# Patient Record
Sex: MALE | Race: WHITE | Employment: OTHER | URBAN - METROPOLITAN AREA
[De-identification: names, ages, dates, MRNs, and addresses within clinical notes are randomized per-mention and may not be internally consistent; named-entity substitution may affect disease eponyms.]

---

## 2017-03-02 RX ORDER — CLOPIDOGREL BISULFATE 75 MG/1
TABLET ORAL
Qty: 30 TABLET | Refills: 6 | Status: SHIPPED | OUTPATIENT
Start: 2017-03-02 | End: 2017-05-26 | Stop reason: SDUPTHER

## 2017-03-02 RX ORDER — ASPIRIN 81 MG
TABLET, DELAYED RELEASE (ENTERIC COATED) ORAL
Qty: 30 TABLET | Refills: 6 | Status: SHIPPED | OUTPATIENT
Start: 2017-03-02 | End: 2017-09-06 | Stop reason: SDUPTHER

## 2017-03-02 RX ORDER — ATORVASTATIN CALCIUM 80 MG/1
TABLET, FILM COATED ORAL
Qty: 30 TABLET | Refills: 6 | Status: SHIPPED | OUTPATIENT
Start: 2017-03-02 | End: 2017-05-26 | Stop reason: SDUPTHER

## 2017-03-02 RX ORDER — CARVEDILOL 3.12 MG/1
TABLET ORAL
Qty: 30 TABLET | Refills: 6 | Status: SHIPPED | OUTPATIENT
Start: 2017-03-02 | End: 2017-05-26 | Stop reason: SDUPTHER

## 2017-03-07 RX ORDER — PANTOPRAZOLE SODIUM 40 MG/1
TABLET, DELAYED RELEASE ORAL
Qty: 90 TABLET | Refills: 3 | Status: SHIPPED | OUTPATIENT
Start: 2017-03-07 | End: 2018-01-16 | Stop reason: SDUPTHER

## 2017-05-26 RX ORDER — CLOPIDOGREL BISULFATE 75 MG/1
TABLET ORAL
Qty: 90 TABLET | Refills: 3 | Status: SHIPPED | OUTPATIENT
Start: 2017-05-26 | End: 2017-09-06 | Stop reason: SDUPTHER

## 2017-05-26 RX ORDER — PANTOPRAZOLE SODIUM 40 MG/1
TABLET, DELAYED RELEASE ORAL
Qty: 90 TABLET | Refills: 3 | OUTPATIENT
Start: 2017-05-26

## 2017-05-26 RX ORDER — CARVEDILOL 3.12 MG/1
TABLET ORAL
Qty: 90 TABLET | Refills: 3 | Status: SHIPPED | OUTPATIENT
Start: 2017-05-26 | End: 2017-07-31 | Stop reason: SDUPTHER

## 2017-05-26 RX ORDER — ATORVASTATIN CALCIUM 80 MG/1
TABLET, FILM COATED ORAL
Qty: 90 TABLET | Refills: 3 | Status: SHIPPED | OUTPATIENT
Start: 2017-05-26 | End: 2017-09-06 | Stop reason: SDUPTHER

## 2017-07-06 ENCOUNTER — OFFICE VISIT (OUTPATIENT)
Dept: CARDIOLOGY CLINIC | Age: 67
End: 2017-07-06

## 2017-07-06 VITALS
BODY MASS INDEX: 25.61 KG/M2 | WEIGHT: 150 LBS | HEART RATE: 92 BPM | SYSTOLIC BLOOD PRESSURE: 130 MMHG | OXYGEN SATURATION: 98 % | DIASTOLIC BLOOD PRESSURE: 68 MMHG | HEIGHT: 64 IN

## 2017-07-06 DIAGNOSIS — I25.10 CORONARY ARTERY DISEASE INVOLVING NATIVE HEART WITHOUT ANGINA PECTORIS, UNSPECIFIED VESSEL OR LESION TYPE: Primary | ICD-10-CM

## 2017-07-06 DIAGNOSIS — I10 ESSENTIAL HYPERTENSION: ICD-10-CM

## 2017-07-06 PROCEDURE — 99214 OFFICE O/P EST MOD 30 MIN: CPT | Performed by: INTERNAL MEDICINE

## 2017-07-31 ENCOUNTER — OFFICE VISIT (OUTPATIENT)
Dept: CARDIOLOGY CLINIC | Age: 67
End: 2017-07-31

## 2017-07-31 VITALS
DIASTOLIC BLOOD PRESSURE: 52 MMHG | SYSTOLIC BLOOD PRESSURE: 122 MMHG | BODY MASS INDEX: 25.61 KG/M2 | HEART RATE: 84 BPM | HEIGHT: 64 IN | WEIGHT: 150 LBS

## 2017-07-31 DIAGNOSIS — E78.2 MIXED HYPERLIPIDEMIA: ICD-10-CM

## 2017-07-31 DIAGNOSIS — I25.10 CORONARY ARTERY DISEASE INVOLVING NATIVE HEART WITHOUT ANGINA PECTORIS, UNSPECIFIED VESSEL OR LESION TYPE: Primary | ICD-10-CM

## 2017-07-31 DIAGNOSIS — Z95.5 S/P CORONARY ARTERY STENT PLACEMENT: ICD-10-CM

## 2017-07-31 DIAGNOSIS — I10 ESSENTIAL HYPERTENSION: ICD-10-CM

## 2017-07-31 DIAGNOSIS — E11.319 TYPE 2 DIABETES MELLITUS WITH RETINOPATHY OF BOTH EYES, WITH LONG-TERM CURRENT USE OF INSULIN, MACULAR EDEMA PRESENCE UNSPECIFIED, UNSPECIFIED RETINOPATHY SEVERITY (HCC): ICD-10-CM

## 2017-07-31 DIAGNOSIS — Z79.4 TYPE 2 DIABETES MELLITUS WITH RETINOPATHY OF BOTH EYES, WITH LONG-TERM CURRENT USE OF INSULIN, MACULAR EDEMA PRESENCE UNSPECIFIED, UNSPECIFIED RETINOPATHY SEVERITY (HCC): ICD-10-CM

## 2017-07-31 PROCEDURE — 99214 OFFICE O/P EST MOD 30 MIN: CPT | Performed by: NURSE PRACTITIONER

## 2017-07-31 RX ORDER — CARVEDILOL 3.12 MG/1
3.12 TABLET ORAL 2 TIMES DAILY WITH MEALS
Qty: 180 TABLET | Refills: 3 | Status: SHIPPED | OUTPATIENT
Start: 2017-07-31 | End: 2017-09-06 | Stop reason: SDUPTHER

## 2017-07-31 RX ORDER — FUROSEMIDE 20 MG/1
20 TABLET ORAL DAILY
Qty: 30 TABLET | Refills: 3 | Status: SHIPPED | OUTPATIENT
Start: 2017-07-31 | End: 2017-08-29 | Stop reason: ALTCHOICE

## 2017-08-11 LAB
BUN BLDV-MCNC: 32 MG/DL
CALCIUM SERPL-MCNC: 9 MG/DL
CHLORIDE BLD-SCNC: 107 MMOL/L
CO2: 27 MMOL/L
CREAT SERPL-MCNC: 1.39 MG/DL
GFR CALCULATED: NORMAL
GLUCOSE BLD-MCNC: 115 MG/DL
POTASSIUM SERPL-SCNC: 4.7 MMOL/L
SODIUM BLD-SCNC: 140 MMOL/L

## 2017-08-15 ENCOUNTER — TELEPHONE (OUTPATIENT)
Dept: CARDIOLOGY CLINIC | Age: 67
End: 2017-08-15

## 2017-08-16 ENCOUNTER — PROCEDURE VISIT (OUTPATIENT)
Dept: CARDIOLOGY CLINIC | Age: 67
End: 2017-08-16

## 2017-08-16 ENCOUNTER — HOSPITAL ENCOUNTER (OUTPATIENT)
Dept: CARDIOLOGY | Facility: CLINIC | Age: 67
Discharge: HOME | End: 2017-08-16
Attending: NURSE PRACTITIONER | Admitting: NURSE PRACTITIONER

## 2017-08-16 DIAGNOSIS — R07.89 OTHER CHEST PAIN: Primary | ICD-10-CM

## 2017-08-16 DIAGNOSIS — I25.118 CORONARY ARTERY DISEASE OF NATIVE HEART WITH STABLE ANGINA PECTORIS, UNSPECIFIED VESSEL OR LESION TYPE (HCC): ICD-10-CM

## 2017-08-16 LAB
LV EF: 55 %
LVEF MODALITY: NORMAL

## 2017-08-17 ENCOUNTER — TELEPHONE (OUTPATIENT)
Dept: CARDIOLOGY CLINIC | Age: 67
End: 2017-08-17

## 2017-08-29 ENCOUNTER — HOSPITAL ENCOUNTER (OUTPATIENT)
Dept: CARDIOLOGY | Facility: CLINIC | Age: 67
Discharge: HOME | End: 2017-08-29
Attending: NURSE PRACTITIONER | Admitting: NURSE PRACTITIONER

## 2017-08-29 ENCOUNTER — OFFICE VISIT (OUTPATIENT)
Dept: CARDIOLOGY CLINIC | Age: 67
End: 2017-08-29

## 2017-08-29 VITALS
WEIGHT: 148 LBS | HEIGHT: 64 IN | BODY MASS INDEX: 25.27 KG/M2 | HEART RATE: 63 BPM | SYSTOLIC BLOOD PRESSURE: 115 MMHG | DIASTOLIC BLOOD PRESSURE: 58 MMHG | OXYGEN SATURATION: 98 %

## 2017-08-29 DIAGNOSIS — R60.0 LOCALIZED EDEMA: ICD-10-CM

## 2017-08-29 DIAGNOSIS — E78.2 MIXED HYPERLIPIDEMIA: ICD-10-CM

## 2017-08-29 DIAGNOSIS — I25.118 CORONARY ARTERY DISEASE OF NATIVE ARTERY OF NATIVE HEART WITH STABLE ANGINA PECTORIS (HCC): Primary | ICD-10-CM

## 2017-08-29 DIAGNOSIS — E11.319 TYPE 2 DIABETES MELLITUS WITH RETINOPATHY OF BOTH EYES, WITH LONG-TERM CURRENT USE OF INSULIN, MACULAR EDEMA PRESENCE UNSPECIFIED, UNSPECIFIED RETINOPATHY SEVERITY (HCC): ICD-10-CM

## 2017-08-29 DIAGNOSIS — Z79.4 TYPE 2 DIABETES MELLITUS WITH RETINOPATHY OF BOTH EYES, WITH LONG-TERM CURRENT USE OF INSULIN, MACULAR EDEMA PRESENCE UNSPECIFIED, UNSPECIFIED RETINOPATHY SEVERITY (HCC): ICD-10-CM

## 2017-08-29 DIAGNOSIS — Z95.5 S/P CORONARY ARTERY STENT PLACEMENT: ICD-10-CM

## 2017-08-29 DIAGNOSIS — I10 ESSENTIAL HYPERTENSION: ICD-10-CM

## 2017-08-29 LAB
LV EF: 64 %
LVEF MODALITY: NORMAL

## 2017-08-29 PROCEDURE — 99214 OFFICE O/P EST MOD 30 MIN: CPT | Performed by: NURSE PRACTITIONER

## 2017-08-29 RX ORDER — LISINOPRIL 10 MG/1
10 TABLET ORAL DAILY
Qty: 30 TABLET | Refills: 5 | Status: SHIPPED | OUTPATIENT
Start: 2017-08-29 | End: 2017-10-30 | Stop reason: SDUPTHER

## 2017-08-30 ENCOUNTER — TELEPHONE (OUTPATIENT)
Dept: CARDIOLOGY CLINIC | Age: 67
End: 2017-08-30

## 2017-09-06 RX ORDER — CLOPIDOGREL BISULFATE 75 MG/1
TABLET ORAL
Qty: 30 TABLET | Refills: 11 | Status: SHIPPED | OUTPATIENT
Start: 2017-09-06 | End: 2018-06-08

## 2017-09-06 RX ORDER — ATORVASTATIN CALCIUM 80 MG/1
TABLET, FILM COATED ORAL
Qty: 30 TABLET | Refills: 0 | Status: SHIPPED | OUTPATIENT
Start: 2017-09-06 | End: 2017-10-06 | Stop reason: SDUPTHER

## 2017-09-06 RX ORDER — ASPIRIN 81 MG
TABLET, DELAYED RELEASE (ENTERIC COATED) ORAL
Qty: 30 TABLET | Refills: 11 | Status: SHIPPED | OUTPATIENT
Start: 2017-09-06 | End: 2018-09-04 | Stop reason: SDUPTHER

## 2017-09-06 RX ORDER — CARVEDILOL 3.12 MG/1
TABLET ORAL
Qty: 30 TABLET | Refills: 11 | Status: ON HOLD | OUTPATIENT
Start: 2017-09-06 | End: 2017-09-12 | Stop reason: HOSPADM

## 2017-09-10 PROBLEM — N18.30 CKD (CHRONIC KIDNEY DISEASE), STAGE III (HCC): Status: ACTIVE | Noted: 2017-09-10

## 2017-09-10 PROBLEM — R55 NEAR SYNCOPE: Status: ACTIVE | Noted: 2017-09-10

## 2017-09-10 PROBLEM — E11.9 DM (DIABETES MELLITUS) (HCC): Status: ACTIVE | Noted: 2017-09-10

## 2017-09-10 PROBLEM — I50.9 CHF (CONGESTIVE HEART FAILURE) (HCC): Status: ACTIVE | Noted: 2017-09-10

## 2017-09-10 PROBLEM — D64.9 ANEMIA: Status: ACTIVE | Noted: 2017-09-10

## 2017-09-10 PROBLEM — R55 SYNCOPE: Status: ACTIVE | Noted: 2017-09-10

## 2017-10-09 RX ORDER — ATORVASTATIN CALCIUM 80 MG/1
TABLET, FILM COATED ORAL
Qty: 30 TABLET | Refills: 0 | Status: SHIPPED | OUTPATIENT
Start: 2017-10-09 | End: 2017-11-07 | Stop reason: SDUPTHER

## 2017-10-30 ENCOUNTER — OFFICE VISIT (OUTPATIENT)
Dept: CARDIOLOGY CLINIC | Age: 67
End: 2017-10-30

## 2017-10-30 VITALS
DIASTOLIC BLOOD PRESSURE: 78 MMHG | OXYGEN SATURATION: 94 % | HEART RATE: 79 BPM | WEIGHT: 148 LBS | BODY MASS INDEX: 26.22 KG/M2 | HEIGHT: 63 IN | SYSTOLIC BLOOD PRESSURE: 124 MMHG

## 2017-10-30 DIAGNOSIS — I50.32 CHRONIC DIASTOLIC CONGESTIVE HEART FAILURE (HCC): ICD-10-CM

## 2017-10-30 DIAGNOSIS — Z95.5 S/P CORONARY ARTERY STENT PLACEMENT: ICD-10-CM

## 2017-10-30 DIAGNOSIS — E78.2 MIXED HYPERLIPIDEMIA: ICD-10-CM

## 2017-10-30 DIAGNOSIS — I10 ESSENTIAL HYPERTENSION: ICD-10-CM

## 2017-10-30 DIAGNOSIS — I25.118 CORONARY ARTERY DISEASE OF NATIVE ARTERY OF NATIVE HEART WITH STABLE ANGINA PECTORIS (HCC): Primary | ICD-10-CM

## 2017-10-30 PROCEDURE — 99213 OFFICE O/P EST LOW 20 MIN: CPT | Performed by: NURSE PRACTITIONER

## 2017-10-30 PROCEDURE — G8484 FLU IMMUNIZE NO ADMIN: HCPCS | Performed by: NURSE PRACTITIONER

## 2017-10-30 PROCEDURE — G8427 DOCREV CUR MEDS BY ELIG CLIN: HCPCS | Performed by: NURSE PRACTITIONER

## 2017-10-30 PROCEDURE — 1036F TOBACCO NON-USER: CPT | Performed by: NURSE PRACTITIONER

## 2017-10-30 PROCEDURE — 3017F COLORECTAL CA SCREEN DOC REV: CPT | Performed by: NURSE PRACTITIONER

## 2017-10-30 PROCEDURE — G8417 CALC BMI ABV UP PARAM F/U: HCPCS | Performed by: NURSE PRACTITIONER

## 2017-10-30 PROCEDURE — G8598 ASA/ANTIPLAT THER USED: HCPCS | Performed by: NURSE PRACTITIONER

## 2017-10-30 PROCEDURE — 1123F ACP DISCUSS/DSCN MKR DOCD: CPT | Performed by: NURSE PRACTITIONER

## 2017-10-30 PROCEDURE — 4040F PNEUMOC VAC/ADMIN/RCVD: CPT | Performed by: NURSE PRACTITIONER

## 2017-10-30 RX ORDER — LISINOPRIL 20 MG/1
20 TABLET ORAL DAILY
Qty: 30 TABLET | Refills: 0 | Status: ON HOLD
Start: 2017-10-30 | End: 2019-04-06 | Stop reason: HOSPADM

## 2017-10-30 RX ORDER — CARVEDILOL 3.12 MG/1
3.12 TABLET ORAL 2 TIMES DAILY WITH MEALS
Qty: 60 TABLET | Refills: 5 | Status: SHIPPED | OUTPATIENT
Start: 2017-10-30 | End: 2018-11-06 | Stop reason: SDUPTHER

## 2017-10-30 NOTE — PROGRESS NOTES
He has a 25.00 pack-year smoking history. He has never used smokeless tobacco. He reports that he does not drink alcohol or use drugs. Family History:   Family History   Problem Relation Age of Onset    Other Mother      CEREBRAL PALSY    Heart Disease Father      CHF       Home Medications:  Prior to Admission medications    Medication Sig Start Date End Date Taking? Authorizing Provider   atorvastatin (LIPITOR) 80 MG tablet TAKE 1 TABLET BY MOUTH AT BEDTIME 10/9/17  Yes Marisa Klilian MD   gabapentin (NEURONTIN) 100 MG capsule Take 2 capsules by mouth daily 9/12/17  Yes Bo Perez MD   gabapentin (NEURONTIN) 400 MG capsule Take 1 capsule by mouth nightly 9/12/17  Yes Bo Perez MD   metFORMIN (GLUCOPHAGE) 1000 MG tablet Take 1,000 mg by mouth 2 times daily (with meals) 6/6/17  Yes Historical Provider, MD   pioglitazone (ACTOS) 45 MG tablet Take 45 mg by mouth daily 6/7/17  Yes Historical Provider, MD   clopidogrel (PLAVIX) 75 MG tablet TAKE ONE (1) TABLET BY MOUTH ONCE DAILY 9/6/17  Yes Fang Rome CNP   ASPIRIN LOW DOSE 81 MG EC tablet TAKE 1 TABLET BY MOUTH ONCE DAILY 9/6/17  Yes Fang Rome CNP   lisinopril (PRINIVIL;ZESTRIL) 10 MG tablet Take 1 tablet by mouth daily 8/29/17  Yes Kirk Newsome NP   SITagliptin (JANUVIA) 100 MG tablet Take 100 mg by mouth daily   Yes Historical Provider, MD   pantoprazole (PROTONIX) 40 MG tablet TAKE 1 TABLET BY MOUTH ONCE DAILY 3/7/17  Yes Marisa Killian MD   insulin glargine (LANTUS) 100 UNIT/ML injection pen Inject 18 Units into the skin nightly  8/13/15  Yes Historical Provider, MD   traZODone (DESYREL) 100 MG tablet Take 100 mg by mouth nightly   Yes Historical Provider, MD   fluticasone (FLONASE) 50 MCG/ACT nasal spray 1 spray by Nasal route daily as needed. Yes Historical Provider, MD   PARoxetine (PAXIL) 40 MG tablet Take 40 mg by mouth every morning.    Yes Historical Provider, MD   carvedilol (COREG) 3.125 MG tablet Take 1 tablet ventricular diastolic filling pressure. Mild mitral regurgitation. Systolic pulmonary artery pressure (SPAP) is normal and estimated at 22 mmHg (RA pressure 3 mmHg). Echo: 12/07/2015  Normal left ventricle size, wall thickness and systolic function with an estimated ejection fraction of 55%. No regional wall motion abnormalities are seen. Diastolic filling parameters suggests grade I diastolic dysfunction. The mitral valve is mildly thickend. Trace mitral and tricuspid regurgitation. Systolic pulmonary artery pressure (SPAP) is normal and estimated at 28 mmHg (RA pressure 3 mmHg). NM Stress Test: 12/07/2015  1. Small focal area of pharmacologically induced cardiac ischemia in the   lateral apex. 2. Ejection fraction of 60%   3. No focal wall motion abnormality. CATH:  10/10/14  PROCEDURE FINDINGS:  1.  Mild to moderate coronary artery disease. There are patent stents noted  within the circumflex coronary artery. The left anterior descending has some  in-stent restenosis in 2 segments that is mild to moderate, 20% to 30%. The  circumflex stent has minimal in-stent restenosis. The right coronary artery  has mild disease in its proximal segment. 2.  Normal left ventricular function with an ejection fraction of 50%. 3.  Normal hemodynamics. ECHO: 5/13:Ejection fraction is visually estimated to be 55 %. wall motion is normal.  The mitral valve is normal in structure and function. No evidence of mitral regurgitation. The aortic valve is normal in structure and function. No evidence of aortic valve regurgitation. no intracardiac mass, vegetations or thrombi. inter atrial septum is intact with normal IVC    5/10/2013,   PCI of the LAD and CIRC with AMY (jailed OM1 and unable to re-cross)    Assessment:    1. Coronary artery disease of native artery of native heart with stable angina pectoris ([address]    2. S/P coronary artery stent placement x 4    3. Essential hypertension    4.

## 2017-11-08 RX ORDER — ATORVASTATIN CALCIUM 80 MG/1
TABLET, FILM COATED ORAL
Qty: 90 TABLET | Refills: 3 | Status: SHIPPED | OUTPATIENT
Start: 2017-11-08 | End: 2018-10-31 | Stop reason: SDUPTHER

## 2018-01-16 RX ORDER — PANTOPRAZOLE SODIUM 40 MG/1
TABLET, DELAYED RELEASE ORAL
Qty: 90 TABLET | Refills: 1 | Status: SHIPPED | OUTPATIENT
Start: 2018-01-16 | End: 2018-08-06 | Stop reason: SDUPTHER

## 2018-01-18 ENCOUNTER — OFFICE VISIT (OUTPATIENT)
Dept: CARDIOLOGY CLINIC | Age: 68
End: 2018-01-18

## 2018-01-18 VITALS
HEIGHT: 63 IN | HEART RATE: 74 BPM | WEIGHT: 145 LBS | DIASTOLIC BLOOD PRESSURE: 56 MMHG | OXYGEN SATURATION: 94 % | SYSTOLIC BLOOD PRESSURE: 118 MMHG | BODY MASS INDEX: 25.69 KG/M2

## 2018-01-18 DIAGNOSIS — I25.118 CORONARY ARTERY DISEASE OF NATIVE ARTERY OF NATIVE HEART WITH STABLE ANGINA PECTORIS (HCC): ICD-10-CM

## 2018-01-18 DIAGNOSIS — I50.32 CHRONIC DIASTOLIC CONGESTIVE HEART FAILURE (HCC): ICD-10-CM

## 2018-01-18 DIAGNOSIS — I10 ESSENTIAL HYPERTENSION: ICD-10-CM

## 2018-01-18 DIAGNOSIS — R07.89 OTHER CHEST PAIN: Primary | ICD-10-CM

## 2018-01-18 PROCEDURE — G8417 CALC BMI ABV UP PARAM F/U: HCPCS | Performed by: INTERNAL MEDICINE

## 2018-01-18 PROCEDURE — 3017F COLORECTAL CA SCREEN DOC REV: CPT | Performed by: INTERNAL MEDICINE

## 2018-01-18 PROCEDURE — G8427 DOCREV CUR MEDS BY ELIG CLIN: HCPCS | Performed by: INTERNAL MEDICINE

## 2018-01-18 PROCEDURE — G8484 FLU IMMUNIZE NO ADMIN: HCPCS | Performed by: INTERNAL MEDICINE

## 2018-01-18 PROCEDURE — 1036F TOBACCO NON-USER: CPT | Performed by: INTERNAL MEDICINE

## 2018-01-18 PROCEDURE — 4040F PNEUMOC VAC/ADMIN/RCVD: CPT | Performed by: INTERNAL MEDICINE

## 2018-01-18 PROCEDURE — 1123F ACP DISCUSS/DSCN MKR DOCD: CPT | Performed by: INTERNAL MEDICINE

## 2018-01-18 PROCEDURE — G8598 ASA/ANTIPLAT THER USED: HCPCS | Performed by: INTERNAL MEDICINE

## 2018-01-18 PROCEDURE — 99213 OFFICE O/P EST LOW 20 MIN: CPT | Performed by: INTERNAL MEDICINE

## 2018-01-18 NOTE — PROGRESS NOTES
Diabetes mellitus ([facility] [address] Environmental allergies; Hydronephrosis; Hyperlipidemia; Hypertension; Neuropathy ([facility] [address] Parkinson's disease ([facility] [address] S/P coronary artery stent placement x 4; and Schizoaffective disorder, bipolar type ([facility] [address] Surgical History:   has a past surgical history that includes Coronary angioplasty; Coronary angioplasty with stent; hernia repair; Vasectomy; and Cystocopy (4/4/14). Social History:   reports that he has quit smoking. His smoking use included Cigarettes. He has a 25.00 pack-year smoking history. He has never used smokeless tobacco. He reports that he does not drink alcohol or use drugs. Family History:  No evidence for sudden cardiac death or premature CAD    Home Medications:  Reviewed and are listed in nursing record.  and/or listed below  Current Outpatient Prescriptions   Medication Sig Dispense Refill    pantoprazole (PROTONIX) 40 MG tablet TAKE 1 TABLET BY MOUTH ONCE DAILY 90 tablet 1    atorvastatin (LIPITOR) 80 MG tablet TAKE 1 TABLET BY MOUTH AT BEDTIME 90 tablet 3    lisinopril (PRINIVIL;ZESTRIL) 20 MG tablet Take 1 tablet by mouth daily 30 tablet 0    carvedilol (COREG) 3.125 MG tablet Take 1 tablet by mouth 2 times daily (with meals) 60 tablet 5    gabapentin (NEURONTIN) 100 MG capsule Take 2 capsules by mouth daily 90 capsule 3    gabapentin (NEURONTIN) 400 MG capsule Take 1 capsule by mouth nightly 90 capsule 3    metFORMIN (GLUCOPHAGE) 1000 MG tablet Take 1,000 mg by mouth 2 times daily (with meals)      pioglitazone (ACTOS) 45 MG tablet Take 45 mg by mouth daily      clopidogrel (PLAVIX) 75 MG tablet TAKE ONE (1) TABLET BY MOUTH ONCE DAILY 30 tablet 11    ASPIRIN LOW DOSE 81 MG EC tablet TAKE 1 TABLET BY MOUTH ONCE DAILY 30 tablet 11    SITagliptin (JANUVIA) 100 MG tablet Take 100 mg by mouth daily      insulin glargine (LANTUS) 100 UNIT/ML injection pen Inject 18 Units into the skin nightly       traZODone (DESYREL) 100 MG tablet Take 100 mg by abnormality, atraumatic   Eyes:  PERRL, conjunctiva/corneas clear       Nose: Nares normal, no drainage or sinus tenderness   Throat: Lips, mucosa, and tongue normal   Neck: Supple, symmetrical, trachea midline, no adenopathy, thyroid: not enlarged, symmetric, no tenderness/mass/nodules, no carotid bruit or JVD       Lungs:   Clear to auscultation bilaterally, respirations unlabored   Chest Wall:  No tenderness or deformity   Heart:  Regular rhythm and normal rate; S1, S2 are normal; no murmur noted; no rub or gallop   Abdomen:   Soft, non-tender, bowel sounds active all four quadrants,  no masses, no organomegaly           Extremities: Extremities normal, atraumatic, no cyanosis or edema   Pulses:  faint and not palpable in the lower extremities bilaterally.      Skin: Skin color, texture, turgor normal, no rashes or lesions   Pysch: Normal mood and unusual affect   Neurologic: Normal gross motor and sensory exam.         Labs  CBC:   Lab Results   Component Value Date    WBC 5.1 09/12/2017    RBC 3.14 09/12/2017    HGB 9.5 09/12/2017    HCT 27.8 09/12/2017    MCV 88.6 09/12/2017    RDW 14.5 09/12/2017     09/12/2017     CMP:    Lab Results   Component Value Date     09/12/2017    K 4.1 09/12/2017     09/12/2017    CO2 25 09/12/2017    BUN 16 09/12/2017    CREATININE 1.1 09/12/2017    GFRAA >60 09/12/2017    GFRAA >60 05/11/2013    AGRATIO 1.4 09/09/2017    LABGLOM >60 09/12/2017    GLUCOSE 114 09/12/2017    PROT 6.8 09/09/2017    CALCIUM 8.6 09/12/2017    BILITOT 0.3 09/09/2017    ALKPHOS 115 09/09/2017    AST 19 09/09/2017    ALT 20 09/09/2017     PT/INR:    No components found for: PTPATIENT,  PTINR  Lab Results   Component Value Date    TROPONINI 0.03 (H) 09/10/2017     No components found for: CHLPL  Lab Results   Component Value Date    TRIG 146 12/07/2015    TRIG 97 10/09/2014    TRIG 115 03/14/2014     Lab Results   Component Value Date    HDL 30 (L) 12/07/2015    HDL 32 (L) 10/09/2014

## 2018-01-18 NOTE — PATIENT INSTRUCTIONS
Plan:    1. Continue current medications. 2. Remain as active as possible. 3. Follow up with me in 6 months.

## 2018-02-13 NOTE — COMMUNICATION BODY
History of present illness on initial date of evaluation:              Deonte López is a 79 y.o. patient who presents for cardiac follow up. Today he reports he has been feeling well. He states that he occasionally has twinges of chest pain during stressful situations. He does not have anything at rest.  He states that the symptoms resolved without intervention and have been unchanged since her last visit. Patient Active Problem List   Diagnosis    DM2 (diabetes mellitus, type 2) ([facility] [address]    Lower urinary tract infectious disease    Dizziness    Hematuria    Hyponatremia    ARF [facility]acute renal failure) ([facility])    S/P coronary artery stent placement    S/P coronary artery stent placement x 4    Orthostatic hypotension    Acute posthemorrhagic anemia    Contusion, knee    DM hyperosmolarity type II, uncontrolled ([facility] [address]    Coronary artery disease involving native coronary artery of native heart without angina pectoris    HTN (hypertension)    Chest pain    Ischemic chest pain ([facility] [address]    Near syncope    CKD (chronic kidney disease), stage III    Anemia    CHF (congestive heart failure) ([facility] [address]    DM (diabetes mellitus) ([facility] [address]    Syncope     Assessment:  79 y.o. patient with:  1. Chest pain              ~chronic, unchanged. Appears chronic stable angina. 2.  Bilateral leg claudication              ~ongoing, recent arterial study 8/2015 showed no blockages  3. Coronary artery disease              ~PCI of the LAD and CIRC with AMY (jailed OM1 and unable to re-cross). ~repeat cath 10/13/2014 - stable disease/stents  4. DM               ~stable  5. Hypertension              ~BP: (118)/(56)    6. Possible GI bleed               ~Colonoscopy/EGD      Plan:    1. I recommend that the patient continue their currently prescribed medications. Their drug modifiable risk factors appear to be well controlled. I will continue to address the need/dosing of medications in future visits.   2. The patient was seen for >25 minutes. >50% of the time was devoted to giving the patient detailed instructions instructions on addressing diet, regular exercise, weight control, smoking abstention, medication compliance, and stress minimization. The patient was provided written and verbal instructions regarding risk factor modification.    3. RTC in 6 months

## 2018-04-10 ENCOUNTER — TELEPHONE (OUTPATIENT)
Dept: CARDIOLOGY CLINIC | Age: 68
End: 2018-04-10

## 2018-04-30 ENCOUNTER — HOSPITAL ENCOUNTER (OUTPATIENT)
Dept: GENERAL RADIOLOGY | Age: 68
Discharge: HOME | End: 2018-04-30
Attending: INTERNAL MEDICINE | Admitting: INTERNAL MEDICINE

## 2018-04-30 ENCOUNTER — OFFICE VISIT (OUTPATIENT)
Dept: CARDIOLOGY CLINIC | Age: 68
End: 2018-04-30

## 2018-04-30 VITALS
HEART RATE: 96 BPM | WEIGHT: 143 LBS | SYSTOLIC BLOOD PRESSURE: 132 MMHG | HEIGHT: 63 IN | BODY MASS INDEX: 25.34 KG/M2 | DIASTOLIC BLOOD PRESSURE: 60 MMHG

## 2018-04-30 DIAGNOSIS — I10 ESSENTIAL HYPERTENSION: ICD-10-CM

## 2018-04-30 DIAGNOSIS — I25.10 CORONARY ARTERY DISEASE INVOLVING NATIVE CORONARY ARTERY OF NATIVE HEART WITHOUT ANGINA PECTORIS: ICD-10-CM

## 2018-04-30 DIAGNOSIS — I25.10 CORONARY ARTERY DISEASE INVOLVING NATIVE CORONARY ARTERY OF NATIVE HEART WITHOUT ANGINA PECTORIS: Primary | ICD-10-CM

## 2018-04-30 LAB
BASOPHILS ABSOLUTE: 0.1 K/UL (ref 0–0.2)
BASOPHILS RELATIVE PERCENT: 0.8 %
EOSINOPHILS ABSOLUTE: 0.3 K/UL (ref 0–0.6)
EOSINOPHILS RELATIVE PERCENT: 4.2 %
HCT VFR BLD CALC: 32.8 % (ref 40.5–52.5)
HEMOGLOBIN: 11.3 G/DL (ref 13.5–17.5)
LYMPHOCYTES ABSOLUTE: 1.2 K/UL (ref 1–5.1)
LYMPHOCYTES RELATIVE PERCENT: 16.3 %
MCH RBC QN AUTO: 30 PG (ref 26–34)
MCHC RBC AUTO-ENTMCNC: 34.5 G/DL (ref 31–36)
MCV RBC AUTO: 87 FL (ref 80–100)
MONOCYTES ABSOLUTE: 0.5 K/UL (ref 0–1.3)
MONOCYTES RELATIVE PERCENT: 7.4 %
NEUTROPHILS ABSOLUTE: 5.3 K/UL (ref 1.7–7.7)
NEUTROPHILS RELATIVE PERCENT: 71.3 %
PDW BLD-RTO: 15.6 % (ref 12.4–15.4)
PLATELET # BLD: 202 K/UL (ref 135–450)
PMV BLD AUTO: 8.4 FL (ref 5–10.5)
RBC # BLD: 3.77 M/UL (ref 4.2–5.9)
WBC # BLD: 7.4 K/UL (ref 4–11)

## 2018-04-30 PROCEDURE — 99214 OFFICE O/P EST MOD 30 MIN: CPT | Performed by: INTERNAL MEDICINE

## 2018-04-30 PROCEDURE — 3017F COLORECTAL CA SCREEN DOC REV: CPT | Performed by: INTERNAL MEDICINE

## 2018-04-30 PROCEDURE — G8598 ASA/ANTIPLAT THER USED: HCPCS | Performed by: INTERNAL MEDICINE

## 2018-04-30 PROCEDURE — G8427 DOCREV CUR MEDS BY ELIG CLIN: HCPCS | Performed by: INTERNAL MEDICINE

## 2018-04-30 PROCEDURE — 4040F PNEUMOC VAC/ADMIN/RCVD: CPT | Performed by: INTERNAL MEDICINE

## 2018-04-30 PROCEDURE — G8417 CALC BMI ABV UP PARAM F/U: HCPCS | Performed by: INTERNAL MEDICINE

## 2018-04-30 PROCEDURE — 1036F TOBACCO NON-USER: CPT | Performed by: INTERNAL MEDICINE

## 2018-04-30 PROCEDURE — 1123F ACP DISCUSS/DSCN MKR DOCD: CPT | Performed by: INTERNAL MEDICINE

## 2018-05-01 ENCOUNTER — TELEPHONE (OUTPATIENT)
Dept: CARDIOLOGY CLINIC | Age: 68
End: 2018-05-01

## 2018-06-08 PROBLEM — E87.6 HYPOKALEMIA: Status: ACTIVE | Noted: 2018-06-08

## 2018-06-08 PROBLEM — S82.851A: Status: ACTIVE | Noted: 2018-06-08

## 2018-06-08 PROBLEM — S62.307A CLOSED FRACTURE OF FIFTH METACARPAL BONE OF LEFT HAND: Status: ACTIVE | Noted: 2018-06-08

## 2018-06-08 PROBLEM — S92.355A CLOSED NONDISPLACED FRACTURE OF FIFTH METATARSAL BONE OF LEFT FOOT: Status: ACTIVE | Noted: 2018-06-08

## 2018-06-08 PROBLEM — E87.5 HYPERKALEMIA: Status: ACTIVE | Noted: 2018-06-08

## 2018-06-20 DIAGNOSIS — R52 PAIN: Primary | ICD-10-CM

## 2018-07-03 DIAGNOSIS — M25.571 RIGHT ANKLE PAIN, UNSPECIFIED CHRONICITY: Primary | ICD-10-CM

## 2018-07-19 DIAGNOSIS — M25.571 RIGHT ANKLE PAIN, UNSPECIFIED CHRONICITY: Primary | ICD-10-CM

## 2018-08-01 DIAGNOSIS — M25.571 RIGHT ANKLE PAIN, UNSPECIFIED CHRONICITY: Primary | ICD-10-CM

## 2018-08-06 RX ORDER — PANTOPRAZOLE SODIUM 40 MG/1
TABLET, DELAYED RELEASE ORAL
Qty: 90 TABLET | Refills: 3 | Status: SHIPPED | OUTPATIENT
Start: 2018-08-06 | End: 2019-06-03 | Stop reason: SDUPTHER

## 2018-09-05 RX ORDER — ASPIRIN 81 MG/1
TABLET, COATED ORAL
Qty: 30 TABLET | Refills: 11 | Status: SHIPPED | OUTPATIENT
Start: 2018-09-05 | End: 2019-08-22 | Stop reason: SDUPTHER

## 2018-09-05 RX ORDER — CARVEDILOL 3.12 MG/1
TABLET ORAL
Qty: 30 TABLET | Refills: 11 | Status: SHIPPED | OUTPATIENT
Start: 2018-09-05 | End: 2018-11-06

## 2018-09-05 RX ORDER — CLOPIDOGREL BISULFATE 75 MG/1
TABLET ORAL
Qty: 30 TABLET | Refills: 11 | Status: SHIPPED | OUTPATIENT
Start: 2018-09-05 | End: 2018-11-06 | Stop reason: SDUPTHER

## 2018-10-31 RX ORDER — ATORVASTATIN CALCIUM 80 MG/1
TABLET, FILM COATED ORAL
Qty: 90 TABLET | Refills: 0 | Status: SHIPPED | OUTPATIENT
Start: 2018-10-31

## 2018-11-06 RX ORDER — CARVEDILOL 3.12 MG/1
3.12 TABLET ORAL 2 TIMES DAILY WITH MEALS
Qty: 60 TABLET | Refills: 3 | Status: SHIPPED | OUTPATIENT
Start: 2018-11-06 | End: 2019-01-29 | Stop reason: SDUPTHER

## 2018-11-06 RX ORDER — CLOPIDOGREL BISULFATE 75 MG/1
TABLET ORAL
Qty: 30 TABLET | Refills: 3 | Status: SHIPPED | OUTPATIENT
Start: 2018-11-06 | End: 2019-06-18 | Stop reason: SDUPTHER

## 2018-11-09 ENCOUNTER — APPOINTMENT (OUTPATIENT)
Dept: CT IMAGING | Age: 68
End: 2018-11-09
Payer: COMMERCIAL

## 2018-11-09 ENCOUNTER — APPOINTMENT (OUTPATIENT)
Dept: GENERAL RADIOLOGY | Age: 68
End: 2018-11-09
Payer: COMMERCIAL

## 2018-11-09 ENCOUNTER — HOSPITAL ENCOUNTER (EMERGENCY)
Age: 68
Discharge: HOME | End: 2018-11-09
Attending: EMERGENCY MEDICINE
Payer: COMMERCIAL

## 2018-11-09 VITALS
TEMPERATURE: 97.5 F | OXYGEN SATURATION: 100 % | HEIGHT: 64 IN | SYSTOLIC BLOOD PRESSURE: 151 MMHG | WEIGHT: 140 LBS | HEART RATE: 70 BPM | BODY MASS INDEX: 23.9 KG/M2 | DIASTOLIC BLOOD PRESSURE: 63 MMHG | RESPIRATION RATE: 14 BRPM

## 2018-11-09 DIAGNOSIS — R53.81 COMPLAINT OF DEBILITY AND MALAISE: Primary | ICD-10-CM
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
## 2018-11-09 LAB
A/G RATIO: 1.3 (ref 1.1–2.2)
ACETAMINOPHEN LEVEL: <5 UG/ML (ref 10–30)
ALBUMIN SERPL-MCNC: 3.9 G/DL (ref 3.4–5)
ALP BLD-CCNC: 187 U/L (ref 40–129)
ALT SERPL-CCNC: 16 U/L (ref 10–40)
AMMONIA: 20 UMOL/L (ref 16–60)
AMPHETAMINE SCREEN, URINE: NORMAL
ANION GAP SERPL CALCULATED.3IONS-SCNC: 12 MMOL/L (ref 3–16)
AST SERPL-CCNC: 17 U/L (ref 15–37)
BACTERIA: ABNORMAL /HPF
BARBITURATE SCREEN URINE: NORMAL
BASOPHILS ABSOLUTE: 0 K/UL (ref 0–0.2)
BASOPHILS RELATIVE PERCENT: 0.6 %
BENZODIAZEPINE SCREEN, URINE: NORMAL
BILIRUB SERPL-MCNC: <0.2 MG/DL (ref 0–1)
BILIRUBIN URINE: NEGATIVE
BLOOD, URINE: ABNORMAL
BUN BLDV-MCNC: 33 MG/DL (ref 7–20)
CALCIUM SERPL-MCNC: 8.8 MG/DL (ref 8.3–10.6)
CANNABINOID SCREEN URINE: NORMAL
CHLORIDE BLD-SCNC: 98 MMOL/L (ref 99–110)
CLARITY: CLEAR
CO2: 23 MMOL/L (ref 21–32)
COCAINE METABOLITE SCREEN URINE: NORMAL
COLOR: YELLOW
CREAT SERPL-MCNC: 1.6 MG/DL (ref 0.8–1.3)
EOSINOPHILS ABSOLUTE: 0.3 K/UL (ref 0–0.6)
EOSINOPHILS RELATIVE PERCENT: 4.3 %
ETHANOL: NORMAL MG/DL (ref 0–0.08)
GFR AFRICAN AMERICAN: 52
GFR NON-AFRICAN AMERICAN: 43
GLOBULIN: 2.9 G/DL
GLUCOSE BLD-MCNC: 88 MG/DL (ref 70–99)
GLUCOSE URINE: NEGATIVE MG/DL
HCT VFR BLD CALC: 31.2 % (ref 40.5–52.5)
HEMOGLOBIN: 10.6 G/DL (ref 13.5–17.5)
KETONES, URINE: NEGATIVE MG/DL
LEUKOCYTE ESTERASE, URINE: NEGATIVE
LIPASE: 33 U/L (ref 13–60)
LYMPHOCYTES ABSOLUTE: 0.9 K/UL (ref 1–5.1)
LYMPHOCYTES RELATIVE PERCENT: 12.9 %
Lab: NORMAL
MCH RBC QN AUTO: 29.2 PG (ref 26–34)
MCHC RBC AUTO-ENTMCNC: 33.8 G/DL (ref 31–36)
MCV RBC AUTO: 86.3 FL (ref 80–100)
METHADONE SCREEN, URINE: NORMAL
MICROSCOPIC EXAMINATION: YES
MONOCYTES ABSOLUTE: 0.5 K/UL (ref 0–1.3)
MONOCYTES RELATIVE PERCENT: 7.7 %
NEUTROPHILS ABSOLUTE: 5.2 K/UL (ref 1.7–7.7)
NEUTROPHILS RELATIVE PERCENT: 74.5 %
NITRITE, URINE: NEGATIVE
OPIATE SCREEN URINE: NORMAL
OXYCODONE URINE: NORMAL
PDW BLD-RTO: 15.1 % (ref 12.4–15.4)
PH UA: 5.5
PH UA: 5.5
PHENCYCLIDINE SCREEN URINE: NORMAL
PLATELET # BLD: 192 K/UL (ref 135–450)
PMV BLD AUTO: 8.1 FL (ref 5–10.5)
POTASSIUM REFLEX MAGNESIUM: 4.1 MMOL/L (ref 3.5–5.1)
PROPOXYPHENE SCREEN: NORMAL
PROTEIN UA: ABNORMAL MG/DL
RBC # BLD: 3.62 M/UL (ref 4.2–5.9)
RBC UA: ABNORMAL /HPF (ref 0–2)
SALICYLATE, SERUM: 0.7 MG/DL (ref 15–30)
SODIUM BLD-SCNC: 133 MMOL/L (ref 136–145)
SPECIFIC GRAVITY UA: <=1.005
TOTAL PROTEIN: 6.8 G/DL (ref 6.4–8.2)
URINE TYPE: ABNORMAL
UROBILINOGEN, URINE: 0.2 E.U./DL
WBC # BLD: 7 K/UL (ref 4–11)
WBC UA: ABNORMAL /HPF (ref 0–5)

## 2018-11-09 PROCEDURE — G0480 DRUG TEST DEF 1-7 CLASSES: HCPCS

## 2018-11-09 PROCEDURE — 71045 X-RAY EXAM CHEST 1 VIEW: CPT

## 2018-11-09 PROCEDURE — 99284 EMERGENCY DEPT VISIT MOD MDM: CPT

## 2018-11-09 PROCEDURE — 2580000003 HC RX 258: Performed by: EMERGENCY MEDICINE

## 2018-11-09 PROCEDURE — 85025 COMPLETE CBC W/AUTO DIFF WBC: CPT

## 2018-11-09 PROCEDURE — 93005 ELECTROCARDIOGRAM TRACING: CPT | Performed by: EMERGENCY MEDICINE

## 2018-11-09 PROCEDURE — 72125 CT NECK SPINE W/O DYE: CPT

## 2018-11-09 PROCEDURE — 82140 ASSAY OF AMMONIA: CPT

## 2018-11-09 PROCEDURE — 70450 CT HEAD/BRAIN W/O DYE: CPT

## 2018-11-09 PROCEDURE — 83690 ASSAY OF LIPASE: CPT

## 2018-11-09 PROCEDURE — 80307 DRUG TEST PRSMV CHEM ANLYZR: CPT

## 2018-11-09 PROCEDURE — 87086 URINE CULTURE/COLONY COUNT: CPT

## 2018-11-09 PROCEDURE — 96360 HYDRATION IV INFUSION INIT: CPT

## 2018-11-09 PROCEDURE — 81001 URINALYSIS AUTO W/SCOPE: CPT

## 2018-11-09 PROCEDURE — 80053 COMPREHEN METABOLIC PANEL: CPT

## 2018-11-09 PROCEDURE — 93010 ELECTROCARDIOGRAM REPORT: CPT | Performed by: INTERNAL MEDICINE

## 2018-11-09 RX ORDER — 0.9 % SODIUM CHLORIDE 0.9 %
1000 INTRAVENOUS SOLUTION INTRAVENOUS ONCE
Status: COMPLETED | OUTPATIENT
Start: 2018-11-09 | End: 2018-11-09

## 2018-11-09 RX ADMIN — SODIUM CHLORIDE 1000 ML: 9 INJECTION, SOLUTION INTRAVENOUS at 16:17

## 2018-11-09 ASSESSMENT — PAIN SCALES - GENERAL
PAINLEVEL_OUTOF10: 2
PAINLEVEL_OUTOF10: 0

## 2018-11-09 ASSESSMENT — PAIN DESCRIPTION - LOCATION: LOCATION: EAR;TEETH

## 2018-11-09 ASSESSMENT — PAIN DESCRIPTION - PAIN TYPE: TYPE: ACUTE PAIN

## 2018-11-09 NOTE — ED NOTES
Patient discharged with instructions, follow up instructions, verbalizes understanding. Ambulates from Ed without assist, gait steady.  No distress noted, respirations even and unlabored     Amanda Marrero RN  11/09/18 1987

## 2018-11-09 NOTE — ED NOTES
Medication reconciliation incomplete due to patient/family's inability to provide complete list.  Will need follow up. States his wife knows all of his medications.       Skyler Erazo RN  11/09/18 [id number]

## 2018-11-11 LAB — URINE CULTURE, ROUTINE: NORMAL

## 2018-11-21 LAB
EKG ATRIAL RATE: 69 BPM
EKG DIAGNOSIS: NORMAL
EKG P AXIS: 62 DEGREES
EKG P-R INTERVAL: 160 MS
EKG Q-T INTERVAL: 424 MS
EKG QRS DURATION: 150 MS
EKG QTC CALCULATION (BAZETT): 454 MS
EKG R AXIS: -4 DEGREES
EKG T AXIS: 7 DEGREES
EKG VENTRICULAR RATE: 69 BPM

## 2018-11-23 NOTE — PROGRESS NOTES
2. Mild patchy periatrial white matter disease and abnormal signal in the left carolann, which are most consistent with chronic small vessel ischemic changes. 3. Mild atrophic changes. 2/2/2015 4:14 PM    ECHO: 5/13:Ejection fraction is visually estimated to be 55 %. wall motion is normal.  The mitral valve is normal in structure and function. No evidence of mitral  regurgitation. The aortic valve is normal in structure and function. No evidence of aortic  valve regurgitation. no intracardiac mass, vegetations or thrombi. inter atrial septum is intact with normal IVC    Echo: 12/07/2015  Normal left ventricle size, wall thickness and systolic function with an  estimated ejection fraction of 55%. No regional wall motion abnormalities  are seen. Diastolic filling parameters suggests grade I diastolic dysfunction. The mitral valve is mildly thickend. Trace mitral and tricuspid regurgitation. Systolic pulmonary artery pressure (SPAP) is normal and estimated at 28 mmHg  (RA pressure 3 mmHg). STRESS TEST:5/13-Normal pharmacological stress study. NM Stress Test: 12/07/2015  1. Small focal area of pharmacologically induced cardiac ischemia in the   lateral apex. 2. Ejection fraction of 60%   3. No focal wall motion abnormality. CATH:  10/10/14  PROCEDURE FINDINGS:  1.  Mild to moderate coronary artery disease. There are patent stents noted  within the circumflex coronary artery. The left anterior descending has some  in-stent restenosis in 2 segments that is mild to moderate, 20% to 30%. The  circumflex stent has minimal in-stent restenosis. The right coronary artery  has mild disease in its proximal segment. 2.  Normal left ventricular function with an ejection fraction of 50%. 3.  Normal hemodynamics. 5/10/2013,   PCI of the LAD and CIRC with AMY (jailed OM1 and unable to re-cross)    Arterial Duplex: 8/4/15  Summary    1.  There is no evidence to suggest arterial insufficiency at differently: Take 100 mg by mouth 3 times daily. Lilly Drummond ) 90 capsule 3    SITagliptin (JANUVIA) 100 MG tablet Take 100 mg by mouth daily      traZODone (DESYREL) 100 MG tablet Take 100 mg by mouth nightly      fluticasone (FLONASE) 50 MCG/ACT nasal spray 1 spray by Nasal route daily as needed.  PARoxetine (PAXIL) 40 MG tablet Take 40 mg by mouth every morning.  gabapentin (NEURONTIN) 400 MG capsule Take 1 capsule by mouth nightly 90 capsule 3    pioglitazone (ACTOS) 45 MG tablet Take 45 mg by mouth daily       No current facility-administered medications for this visit. Allergies:  Patient has no known allergies. Review of Systems:   All 14 point review of symptoms completed. Pertinent positives identified in the HPI, all other review of symptoms negative as below.     Review of Systems - History obtained from the patient  General ROS: negative for - chills, fever or night sweats  Psychological ROS: negative for - disorientation or hallucinations  Ophthalmic ROS: negative for - dry eyes, eye pain or loss of vision  ENT ROS: negative for - nasal discharge or sore throat  Allergy and Immunology ROS: negative for - hives or itchy/watery eyes  Hematological and Lymphatic ROS: negative for - jaundice or night sweats  Endocrine ROS: negative for - mood swings or temperature intolerance  Breast ROS: deferred  Respiratory ROS: negative for - hemoptysis or stridor  Cardiovascular ROS: negative for - chest pain, dyspnea on exertion or palpitations  Gastrointestinal ROS: no abdominal pain, change in bowel habits, or black or bloody stools  Genito-Urinary ROS: no dysuria, trouble voiding, or hematuria  Musculoskeletal ROS: negative for - gait disturbance, joint pain or joint stiffness  Neurological ROS: negative for - seizures or speech problems  Dermatological ROS: negative for - rash or skin lesion changes        Physical Examination:    Filed Vitals:    05/24/13 0928   BP: 82/0   Repeat  110/70   Pulse: 73      Weight: 147 lb 9.6 oz (67 kg)     Wt Readings from Last 3 Encounters:   11/26/18 147 lb 9.6 oz (67 kg)   11/09/18 140 lb (63.5 kg)   06/13/18 152 lb 8.9 oz (69.2 kg)         General Appearance:  Alert, cooperative, no distress, appears stated age   Head:  Normocephalic, without obvious abnormality, atraumatic   Eyes:  PERRL, conjunctiva/corneas clear       Nose: Nares normal, no drainage or sinus tenderness   Throat: Lips, mucosa, and tongue normal   Neck: Supple, symmetrical, trachea midline, no adenopathy, thyroid: not enlarged, symmetric, no tenderness/mass/nodules, no carotid bruit or JVD       Lungs:   Clear to auscultation bilaterally, respirations unlabored   Chest Wall:  No tenderness or deformity   Heart:  Regular rhythm and normal rate; S1, S2 are normal; no murmur noted; no rub or gallop   Abdomen:   Soft, non-tender, bowel sounds active all four quadrants,  no masses, no organomegaly           Extremities: Extremities normal, atraumatic, no cyanosis or edema   Pulses:  faint and not palpable in the lower extremities bilaterally.      Skin: Skin color, texture, turgor normal, no rashes or lesions   Pysch: Normal mood and unusual affect   Neurologic: Normal gross motor and sensory exam.         Labs  CBC:   Lab Results   Component Value Date    WBC 7.0 11/09/2018    RBC 3.62 11/09/2018    HGB 10.6 11/09/2018    HCT 31.2 11/09/2018    MCV 86.3 11/09/2018    RDW 15.1 11/09/2018     11/09/2018     CMP:    Lab Results   Component Value Date     11/09/2018    K 4.1 11/09/2018    CL 98 11/09/2018    CO2 23 11/09/2018    BUN 33 11/09/2018    CREATININE 1.6 11/09/2018    GFRAA 52 11/09/2018    GFRAA >60 05/11/2013    AGRATIO 1.3 11/09/2018    LABGLOM 43 11/09/2018    GLUCOSE 88 11/09/2018    PROT 6.8 11/09/2018    CALCIUM 8.8 11/09/2018    BILITOT <0.2 11/09/2018    ALKPHOS 187 11/09/2018    AST 17 11/09/2018    ALT 16 11/09/2018     PT/INR:    No components found for: PTPATIENT,  PTINR  Lab

## 2018-11-26 ENCOUNTER — OFFICE VISIT (OUTPATIENT)
Dept: CARDIOLOGY CLINIC | Age: 68
End: 2018-11-26
Payer: COMMERCIAL

## 2018-11-26 VITALS
HEIGHT: 64 IN | HEART RATE: 78 BPM | DIASTOLIC BLOOD PRESSURE: 60 MMHG | BODY MASS INDEX: 25.2 KG/M2 | WEIGHT: 147.6 LBS | SYSTOLIC BLOOD PRESSURE: 114 MMHG | OXYGEN SATURATION: 98 %

## 2018-11-26 DIAGNOSIS — D64.9 ANEMIA, UNSPECIFIED TYPE: ICD-10-CM

## 2018-11-26 DIAGNOSIS — I25.10 CORONARY ARTERY DISEASE INVOLVING NATIVE CORONARY ARTERY OF NATIVE HEART WITHOUT ANGINA PECTORIS: ICD-10-CM

## 2018-11-26 DIAGNOSIS — I95.1 ORTHOSTATIC HYPOTENSION: ICD-10-CM

## 2018-11-26 DIAGNOSIS — I10 ESSENTIAL HYPERTENSION: Primary | ICD-10-CM

## 2018-11-26 PROCEDURE — 99214 OFFICE O/P EST MOD 30 MIN: CPT | Performed by: INTERNAL MEDICINE

## 2018-11-26 PROCEDURE — 3017F COLORECTAL CA SCREEN DOC REV: CPT | Performed by: INTERNAL MEDICINE

## 2018-11-26 PROCEDURE — G8598 ASA/ANTIPLAT THER USED: HCPCS | Performed by: INTERNAL MEDICINE

## 2018-11-26 PROCEDURE — G8417 CALC BMI ABV UP PARAM F/U: HCPCS | Performed by: INTERNAL MEDICINE

## 2018-11-26 PROCEDURE — 1123F ACP DISCUSS/DSCN MKR DOCD: CPT | Performed by: INTERNAL MEDICINE

## 2018-11-26 PROCEDURE — G8427 DOCREV CUR MEDS BY ELIG CLIN: HCPCS | Performed by: INTERNAL MEDICINE

## 2018-11-26 PROCEDURE — 1036F TOBACCO NON-USER: CPT | Performed by: INTERNAL MEDICINE

## 2018-11-26 PROCEDURE — G8484 FLU IMMUNIZE NO ADMIN: HCPCS | Performed by: INTERNAL MEDICINE

## 2018-11-26 PROCEDURE — 4040F PNEUMOC VAC/ADMIN/RCVD: CPT | Performed by: INTERNAL MEDICINE

## 2018-11-26 PROCEDURE — 1101F PT FALLS ASSESS-DOCD LE1/YR: CPT | Performed by: INTERNAL MEDICINE

## 2018-11-26 NOTE — PATIENT INSTRUCTIONS
Plan:    1. Referral to Dr. Sony Bridges, Gastroenterology to determine the cause of your anemia. 2. I recommend that the patient continue their currently prescribed medications. Their drug modifiable risk factors appear to be well controlled. I will continue to address the need/dosing of medications in future visits. 3. Follow up with me in 6 months.

## 2018-11-26 NOTE — COMMUNICATION BODY
[address] [facility]    PATIENT: Kayy Duke  DATE: 2018  MRN: [id number]  CSN: [id number]  : 1950    Coronary Artery Disease    History of present illness on initial date of evaluation:   Kayy Duke is a 79 y.o. patient who presents for cardiac follow up. He was found to anemic on his blood work ordered at his last visit. It was decided to have him remain off of Plavix until her blood counts normalized. This does not appear to have resolved on his recent labs. Today he reports he has not been feeling well. He recently presented to the emergency room, 2018, due to a fall and not feeling well. He has been feeling extremely weak with low energy levels. He feels off balance more often than not. He has been taking his medications as prescribed. Assessment:  79 y.o. patient with:  1. Anemia   ~ongoing   ~off of Plavix   2. Chest pain   ~chronic, unchanged. Appears chronic stable angina. 3.  Bilateral leg claudication   ~ongoing, recent arterial study 2015 showed no blockages  4. Coronary artery disease   ~PCI of the LAD and CIRC with AMY (jailed OM1 and unable to re-cross). ~repeat cath 10/13/2014 - stable disease/stents  5. Diabetes mellitus    ~stable  6. Hypertension   ~BP: (114)/(60)    7. Possible GI bleed    ~Colonoscopy/EGD     Plan:    1. Referral to Dr. Goodman, Gastroenterology to determine the cause of your anemia. 2. I recommend that the patient continue their currently prescribed medications. Their drug modifiable risk factors appear to be well controlled. I will continue to address the need/dosing of medications in future visits. 3. Follow up with me in 6 months. This note was scribed in the presence of Mariya Arias MD, by Edgardo Lim RN.     I, Dr. Mariya Arias, personally performed the services described in this documentation, as scribed by the above signed scribe in my presence.  It is both accurate and complete to

## 2018-11-30 ENCOUNTER — HOSPITAL ENCOUNTER (OUTPATIENT)
Age: 68
Discharge: HOME | End: 2018-11-30
Payer: COMMERCIAL

## 2018-11-30 ENCOUNTER — INITIAL CONSULT (OUTPATIENT)
Dept: GASTROENTEROLOGY | Age: 68
End: 2018-11-30
Payer: COMMERCIAL

## 2018-11-30 VITALS
WEIGHT: 148.6 LBS | SYSTOLIC BLOOD PRESSURE: 146 MMHG | DIASTOLIC BLOOD PRESSURE: 72 MMHG | BODY MASS INDEX: 25.37 KG/M2 | HEIGHT: 64 IN

## 2018-11-30 DIAGNOSIS — D64.9 ANEMIA, UNSPECIFIED TYPE: ICD-10-CM

## 2018-11-30 DIAGNOSIS — D64.9 ANEMIA, UNSPECIFIED TYPE: Primary | ICD-10-CM

## 2018-11-30 PROCEDURE — G8417 CALC BMI ABV UP PARAM F/U: HCPCS | Performed by: INTERNAL MEDICINE

## 2018-11-30 PROCEDURE — 82746 ASSAY OF FOLIC ACID SERUM: CPT

## 2018-11-30 PROCEDURE — 1123F ACP DISCUSS/DSCN MKR DOCD: CPT | Performed by: INTERNAL MEDICINE

## 2018-11-30 PROCEDURE — 1101F PT FALLS ASSESS-DOCD LE1/YR: CPT | Performed by: INTERNAL MEDICINE

## 2018-11-30 PROCEDURE — 1036F TOBACCO NON-USER: CPT | Performed by: INTERNAL MEDICINE

## 2018-11-30 PROCEDURE — 99204 OFFICE O/P NEW MOD 45 MIN: CPT | Performed by: INTERNAL MEDICINE

## 2018-11-30 PROCEDURE — 36415 COLL VENOUS BLD VENIPUNCTURE: CPT

## 2018-11-30 PROCEDURE — G8598 ASA/ANTIPLAT THER USED: HCPCS | Performed by: INTERNAL MEDICINE

## 2018-11-30 PROCEDURE — 3017F COLORECTAL CA SCREEN DOC REV: CPT | Performed by: INTERNAL MEDICINE

## 2018-11-30 PROCEDURE — 83550 IRON BINDING TEST: CPT

## 2018-11-30 PROCEDURE — 4040F PNEUMOC VAC/ADMIN/RCVD: CPT | Performed by: INTERNAL MEDICINE

## 2018-11-30 PROCEDURE — 83540 ASSAY OF IRON: CPT

## 2018-11-30 PROCEDURE — G8427 DOCREV CUR MEDS BY ELIG CLIN: HCPCS | Performed by: INTERNAL MEDICINE

## 2018-11-30 PROCEDURE — 82728 ASSAY OF FERRITIN: CPT

## 2018-11-30 PROCEDURE — G8484 FLU IMMUNIZE NO ADMIN: HCPCS | Performed by: INTERNAL MEDICINE

## 2018-11-30 PROCEDURE — 82607 VITAMIN B-12: CPT

## 2018-11-30 RX ORDER — POLYETHYLENE GLYCOL 3350 17 G/17G
255 POWDER ORAL DAILY
Qty: 255 G | Refills: 0 | Status: ON HOLD | OUTPATIENT
Start: 2018-11-30 | End: 2018-12-13 | Stop reason: HOSPADM

## 2018-11-30 NOTE — PATIENT INSTRUCTIONS
to detect lumps (tumors), polyps, inflammation, and areas of bleeding. Your caregiver may also take a small piece of tissue (biopsy) that will be examined under a microscope. LET YOUR CAREGIVER KNOW ABOUT:   Allergies to food or medicine. Medicines taken, including vitamins, herbs, eyedrops, over-the-counter medicines, and creams. Use of steroids (by mouth or creams). Previous problems with anesthetics or numbing medicines. History of bleeding problems or blood clots. Previous surgery. Other health problems, including diabetes and kidney problems. Possibility of pregnancy, if this applies. BEFORE THE PROCEDURE   A clear liquid diet may be required for 2 days before the exam.   Ask your caregiver about changing or stopping your regular medications. Liquid injections (enemas) or laxatives may be required. A large amount of electrolyte solution may be given to you to drink over a short period of time. This solution is used to clean out your colon. You should be present 60 minutes prior to your procedure or as directed by your caregiver. AFTER THE PROCEDURE   If you received a sedative or pain relieving medication, you will need to arrange for someone to drive you home. Occasionally, there is a little blood passed with the first bowel movement. Do not be concerned. FINDING OUT THE RESULTS OF YOUR TEST   Not all test results are available during your visit. If your test results are not back during the visit, make an appointment with your caregiver to find out the results. Do not assume everything is normal if you have not heard from your caregiver or the medical facility. It is important for you to follow up on all of your test results. HOME CARE INSTRUCTIONS   It is not unusual to pass moderate amounts of gas and experience mild abdominal cramping following the procedure.  This is due to air being used to inflate your colon during the exam. Walking or a warm pack on your belly (abdomen) may into the anus and advanced through the entire colon. The procedure generally takes between 20 minutes and one hour. Other tests that are sometimes used to screen for colon cancer, like virtual colonoscopy (also called CT colonography), are discussed separately. More detailed information about colonoscopy is available by subscription. REASONS FOR COLONOSCOPY - The most common reasons for colonoscopy are to evaluate the following:        As a screening exam for colon cancer      Rectal bleeding      A change in bowel habits, like persistent diarrhea      Iron deficiency anemia (a decrease in blood count due to loss of iron)      A family history of colon cancer      As a follow-up test in people with colon polyps or colon cancer      Chronic, unexplained abdominal or rectal pain      An abnormal X-ray exam, like a barium enema or CT scan    COLONOSCOPY PREPARATION - Before colonoscopy, your colon must be completely cleaned out so that the doctor can see any abnormal areas. To clean the colon, you will take a strong laxative and empty your bowels the night before your test.    Your doctor's office will provide specific instructions about how you should prepare for colonoscopy. Be sure to read these instructions ahead of time so you will be prepared for the prep. If you have questions, call the doctor's office in advance. You will need to avoid solid food for at least one day before the test. You should also drink plenty of fluids on the day before the test. You can drink clear liquids up to several hours before your procedure, including:        Water      Clear broth (beef, chicken, or vegetable)      Coffee or tea (without milk)      Ices      Gelatin (avoid red gelatin)    The day or night before the colonoscopy, you will take a laxative in two parts:        A pill that you take by mouth      A powder that is mixed with water    The most common laxative treatment is called Go-Lytely® or Half-Lytely®.  You

## 2018-11-30 NOTE — PROGRESS NOTES
01/18/18 145 lb (65.8 kg)   10/30/17 148 lb (67.1 kg)   09/12/17 143 lb 6.4 oz (65 kg)   08/29/17 148 lb (67.1 kg)   07/31/17 150 lb (68 kg)   07/06/17 150 lb (68 kg)   12/22/16 142 lb (64.4 kg)   07/30/16 138 lb (62.6 kg)   06/22/16 138 lb (62.6 kg)   12/22/15 142 lb (64.4 kg)   12/06/15 140 lb (63.5 kg)   11/09/15 137 lb 6.4 oz (62.3 kg)   06/12/15 139 lb (63 kg)   03/13/15 120 lb (54.4 kg)   10/24/14 126 lb (57.2 kg)   10/10/14 127 lb (57.6 kg)   09/10/14 123 lb (55.8 kg)   04/23/14 118 lb (53.5 kg)   04/04/14 118 lb (53.5 kg)   03/28/14 117 lb (53.1 kg)   03/26/14 117 lb (53.1 kg)   03/13/14 140 lb (63.5 kg)   02/21/14 129 lb (58.5 kg)   02/06/14 140 lb (63.5 kg)   08/29/13 118 lb (53.5 kg)   05/24/13 118 lb (53.5 kg)   05/11/13 118 lb 1.6 oz (53.6 kg)       No components found for: HGBA1C  BP Readings from Last 3 Encounters:   11/30/18 (!) 146/72   11/26/18 114/60   11/09/18 (!) 151/63     Health Maintenance   Topic Date Due    AAA screen  1950    Hepatitis C screen  1950    Diabetic foot exam  12/29/1960    Diabetic retinal exam  12/29/1960    DTaP/Tdap/Td vaccine (1 - Tdap) 12/29/1969    Shingles Vaccine (1 of 2 - 2 Dose Series) 12/29/2000    Colon cancer screen colonoscopy  12/29/2000    Lipid screen  12/07/2016    Flu vaccine (1) 09/01/2018    Pneumococcal low/med risk (2 of 2 - PPSV23) 09/11/2018    A1C test (Diabetic or Prediabetic)  06/08/2019    Potassium monitoring  11/09/2019    Creatinine monitoring  11/09/2019       No components found for: [facility]     PAST MEDICAL HISTORY     Past Medical History:   Diagnosis Date    Ataxia 3/14/2014    BPH (benign prostatic hyperplasia)     FERGUSON IN PLACE    CAD (coronary artery disease)     Coronary atherosclerosis of native coronary artery 5/11/2013    Diabetes mellitus ([facility] [address]     Environmental allergies     Hydronephrosis 3/14/2014    Hyperlipidemia     Hypertension     Neuropathy     Parkinson's disease ([facility] [address]     S/P coronary artery stent placement x 4     x 4    Schizoaffective disorder, bipolar type ([address]      FAMILY HISTORY     Family History   Problem Relation Age of Onset    Other Mother         CEREBRAL PALSY    Heart Disease Father         CHF     SOCIAL HISTORY     Social History     Social History    Marital status:      Spouse name: N/A    Number of children: N/A    Years of education: N/A     Occupational History    Not on file.      Social History Main Topics    Smoking status: Former Smoker     Packs/day: 1.00     Years: 25.00     Types: Cigarettes    Smokeless tobacco: Never Used      Comment: QUIT 5/2013    Alcohol use No    Drug use: No    Sexual activity: Not Currently     Other Topics Concern    Not on file     Social History Narrative    No narrative on file     SURGICAL HISTORY     Past Surgical History:   Procedure Laterality Date    ANKLE SURGERY Right 06/09/2018    ORIF of R ankle     CORONARY ANGIOPLASTY      CORONARY ANGIOPLASTY WITH STENT PLACEMENT      x 4    CYSTOSCOPY  4/4/14    transurethral vaporization of prostate    HERNIA REPAIR      X2    VASECTOMY       CURRENT MEDICATIONS   (This list may include medications prescribed during this encounter as epic can not insert only the list prior to this encounter.)  Current Outpatient Rx   Medication Sig Dispense Refill    bisacodyl (DULCOLAX) 5 MG EC tablet Take 1 tablet by mouth daily as needed for Constipation 4 tablet 0    polyethylene glycol (MIRALAX) POWD powder Take 255 g by mouth daily Take as directed for colonoscopy 255 g 0    carvedilol (COREG) 3.125 MG tablet Take 1 tablet by mouth 2 times daily (with meals) 60 tablet 3    clopidogrel (PLAVIX) 75 MG tablet TAKE 1 TABLET BY MOUTH ONCE DAILY 30 tablet 3    atorvastatin (LIPITOR) 80 MG tablet TAKE 1 TABLET BY MOUTH AT BEDTIME 90 tablet 0    ASPIRIN LOW DOSE 81 MG EC tablet TAKE 1 TABLET BY MOUTH ONCE DAILY 30 tablet 11    pantoprazole (PROTONIX) 40 MG tablet TAKE 1 TABLET BY MOUTH ONCE DAILY 90 tablet 3    insulin glargine (LANTUS) 100 UNIT/ML injection vial Inject 30 Units into the skin nightly 1 vial 3    lisinopril (PRINIVIL;ZESTRIL) 20 MG tablet Take 1 tablet by mouth daily 30 tablet 0    gabapentin (NEURONTIN) 100 MG capsule Take 2 capsules by mouth daily (Patient taking differently: Take 100 mg by mouth 3 times daily. Veleta Gift ) 90 capsule 3    gabapentin (NEURONTIN) 400 MG capsule Take 1 capsule by mouth nightly 90 capsule 3    pioglitazone (ACTOS) 45 MG tablet Take 45 mg by mouth daily      SITagliptin (JANUVIA) 100 MG tablet Take 100 mg by mouth daily      traZODone (DESYREL) 100 MG tablet Take 100 mg by mouth nightly      fluticasone (FLONASE) 50 MCG/ACT nasal spray 1 spray by Nasal route daily as needed.  PARoxetine (PAXIL) 40 MG tablet Take 40 mg by mouth every morning. ALLERGIES   No Known Allergies  IMMUNIZATIONS     Immunization History   Administered Date(s) Administered    Influenza Virus Vaccine 10/10/2014    Pneumococcal 13-valent Conjugate ([id number]) 09/11/2017     REVIEW OF SYSTEMS   See HPI for further details and pertinent postiives. Negative for the following:  Constitutional: Negative for weight change. Negative for appetite change and fatigue. HENT: Negative for nosebleeds, sore throat, mouth sores, and voice change. Respiratory: Negative for cough, choking and chest tightness. Cardiovascular: Negative for chest pain   Gastrointestinal: See HPI  Musculoskeletal: Negative for arthralgias. Skin: Negative for pallor. Neurological: Negative for weakness and light-headedness. Hematological: Negative for adenopathy. Does not bruise/bleed easily. Psychiatric/Behavioral: Negative for suicidal ideas.    PHYSICAL EXAM   VITAL SIGNS: BP (!) 146/72   Ht 5' 3.5\" (1.613 m)   Wt 148 lb 9.6 oz (67.4 kg)   BMI 25.91 kg/m²   Wt Readings from Last 3 Encounters:   11/30/18 148 lb 9.6 oz (67.4 kg)   11/26/18 147 lb 9.6 oz (67 kg)   11/09/18 Order Specific Question:   Screening or Diagnostic? Answer:   Gabriel Pennington was seen today for establish care. Diagnoses and all orders for this visit:    Anemia, unspecified type  -     EGD; Future  -     COLONOSCOPY W/ OR W/O BIOPSY; Future  -     bisacodyl (DULCOLAX) 5 MG EC tablet; Take 1 tablet by mouth daily as needed for Constipation  -     polyethylene glycol (MIRALAX) POWD powder; Take 255 g by mouth daily Take as directed for colonoscopy  -     Iron and TIBC; Future  -     FERRITIN; Future  -     Vitamin B12 & Folate; Future    He has presumed sandra related to chronic gi blood loss secondary to small bowel and cecal AVM's in combination with CKD where they are more common. AVMs are more likely to bleed on plavix and his cbc may have improved 2g while off plavix? Will see if any are treatable by repeating bidirectional endoscopy. Should continue indifinite iron therapy. Likely ok to resume plavix after scopes and observe Hg if benefits greater then risks. ORDERED FUTURE/PENDING TESTS     Lab Frequency Next Occurrence   Lipid Panel Once 03/01/2019       FOLLOWUP   Return for EGD & Colonoscopy.           Bernard 40 11/30/18 2:45 PM    CC:  PRESTON Welch - CNP

## 2018-12-01 LAB
FERRITIN: 45.7 NG/ML (ref 30–400)
FOLATE: 10.51 NG/ML (ref 4.78–24.2)
IRON SATURATION: 27 % (ref 20–50)
IRON: 95 UG/DL (ref 59–158)
TOTAL IRON BINDING CAPACITY: 354 UG/DL (ref 260–445)
VITAMIN B-12: 818 PG/ML (ref 211–911)

## 2018-12-10 RX ORDER — GABAPENTIN 300 MG/1
300 CAPSULE ORAL 3 TIMES DAILY
Status: ON HOLD | COMMUNITY
End: 2020-04-02 | Stop reason: SDUPTHER

## 2018-12-10 NOTE — PROGRESS NOTES
Obstructive Sleep Apnea (CATALINO) Screening     Patient:  Daniel Plaza    YOB: 1950      Medical Record #:  [id number]                     Date:  12/10/2018     1. Are you a loud and/or regular snorer? []  Yes       [x] No    2. Have you been observed to gasp or stop breathing during sleep? []  Yes       [x] No    3. Do you feel tired or groggy upon awakening or do you awaken with a headache?           []  Yes       [] No    4. Are you often tired or fatigued during the wake time hours? []  Yes       [] No    5. Do you fall asleep sitting, reading, watching TV or driving? []  Yes       [] No    6. Do you often have problems with memory or concentration? []  Yes       [] No    **If patient's score is ? 3 they are considered high risk for CATALINO. Notify the anesthesiologist of the high risk and document in focus note. Note:  If the patient's BMI is more than 35 kg m¯² , has neck circumference > 40 cm, and/or high blood pressure the risk is greater (© American Sleep Apnea Association, 2006).

## 2018-12-12 ENCOUNTER — ANESTHESIA EVENT (OUTPATIENT)
Dept: ENDOSCOPY | Age: 68
End: 2018-12-12
Payer: COMMERCIAL

## 2018-12-13 ENCOUNTER — ANESTHESIA (OUTPATIENT)
Dept: ENDOSCOPY | Age: 68
End: 2018-12-13
Payer: COMMERCIAL

## 2018-12-13 ENCOUNTER — HOSPITAL ENCOUNTER (OUTPATIENT)
Age: 68
Setting detail: OUTPATIENT SURGERY
Discharge: HOME | End: 2018-12-13
Attending: INTERNAL MEDICINE | Admitting: INTERNAL MEDICINE
Payer: COMMERCIAL

## 2018-12-13 ENCOUNTER — TELEPHONE (OUTPATIENT)
Dept: GASTROENTEROLOGY | Age: 68
End: 2018-12-13

## 2018-12-13 VITALS
WEIGHT: 148 LBS | SYSTOLIC BLOOD PRESSURE: 163 MMHG | TEMPERATURE: 97.5 F | OXYGEN SATURATION: 100 % | HEIGHT: 64 IN | RESPIRATION RATE: 16 BRPM | BODY MASS INDEX: 25.27 KG/M2 | HEART RATE: 66 BPM | DIASTOLIC BLOOD PRESSURE: 68 MMHG

## 2018-12-13 VITALS — OXYGEN SATURATION: 100 % | DIASTOLIC BLOOD PRESSURE: 56 MMHG | SYSTOLIC BLOOD PRESSURE: 117 MMHG

## 2018-12-13 DIAGNOSIS — D64.9 ANEMIA, UNSPECIFIED TYPE: Primary | ICD-10-CM

## 2018-12-13 LAB
GLUCOSE BLD-MCNC: 228 MG/DL (ref 70–99)
PERFORMED ON: ABNORMAL

## 2018-12-13 PROCEDURE — 2720000010 HC SURG SUPPLY STERILE: Performed by: INTERNAL MEDICINE

## 2018-12-13 PROCEDURE — 2709999900 HC NON-CHARGEABLE SUPPLY: Performed by: INTERNAL MEDICINE

## 2018-12-13 PROCEDURE — 88305 TISSUE EXAM BY PATHOLOGIST: CPT

## 2018-12-13 PROCEDURE — 3609009900 HC COLONOSCOPY W/CONTROL BLEEDING ANY METHOD: Performed by: INTERNAL MEDICINE

## 2018-12-13 PROCEDURE — 3700000001 HC ADD 15 MINUTES (ANESTHESIA): Performed by: INTERNAL MEDICINE

## 2018-12-13 PROCEDURE — 7100000010 HC PHASE II RECOVERY - FIRST 15 MIN: Performed by: INTERNAL MEDICINE

## 2018-12-13 PROCEDURE — 7100000011 HC PHASE II RECOVERY - ADDTL 15 MIN: Performed by: INTERNAL MEDICINE

## 2018-12-13 PROCEDURE — 3609012400 HC EGD TRANSORAL BIOPSY SINGLE/MULTIPLE: Performed by: INTERNAL MEDICINE

## 2018-12-13 PROCEDURE — 45382 COLONOSCOPY W/CONTROL BLEED: CPT | Performed by: INTERNAL MEDICINE

## 2018-12-13 PROCEDURE — 6360000002 HC RX W HCPCS: Performed by: NURSE ANESTHETIST, CERTIFIED REGISTERED

## 2018-12-13 PROCEDURE — 3700000000 HC ANESTHESIA ATTENDED CARE: Performed by: INTERNAL MEDICINE

## 2018-12-13 PROCEDURE — 2580000003 HC RX 258: Performed by: ANESTHESIOLOGY

## 2018-12-13 PROCEDURE — 2500000003 HC RX 250 WO HCPCS: Performed by: NURSE ANESTHETIST, CERTIFIED REGISTERED

## 2018-12-13 PROCEDURE — 43239 EGD BIOPSY SINGLE/MULTIPLE: CPT | Performed by: INTERNAL MEDICINE

## 2018-12-13 RX ORDER — SODIUM CHLORIDE, SODIUM LACTATE, POTASSIUM CHLORIDE, CALCIUM CHLORIDE 600; 310; 30; 20 MG/100ML; MG/100ML; MG/100ML; MG/100ML
INJECTION, SOLUTION INTRAVENOUS CONTINUOUS
Status: DISCONTINUED | OUTPATIENT
Start: 2018-12-13 | End: 2018-12-17 | Stop reason: HOSPADM

## 2018-12-13 RX ORDER — SODIUM CHLORIDE 9 MG/ML
INJECTION, SOLUTION INTRAVENOUS CONTINUOUS
Status: DISCONTINUED | OUTPATIENT
Start: 2018-12-13 | End: 2018-12-17 | Stop reason: HOSPADM

## 2018-12-13 RX ORDER — PROPOFOL 10 MG/ML
INJECTION, EMULSION INTRAVENOUS PRN
Status: DISCONTINUED | OUTPATIENT
Start: 2018-12-13 | End: 2018-12-13 | Stop reason: SDUPTHER

## 2018-12-13 RX ADMIN — PROPOFOL 50 MG: 10 INJECTION, EMULSION INTRAVENOUS at 11:26

## 2018-12-13 RX ADMIN — PROPOFOL 30 MG: 10 INJECTION, EMULSION INTRAVENOUS at 11:15

## 2018-12-13 RX ADMIN — PROPOFOL 70 MG: 10 INJECTION, EMULSION INTRAVENOUS at 11:12

## 2018-12-13 RX ADMIN — SODIUM CHLORIDE, POTASSIUM CHLORIDE, SODIUM LACTATE AND CALCIUM CHLORIDE: 600; 310; 30; 20 INJECTION, SOLUTION INTRAVENOUS at 10:34

## 2018-12-13 RX ADMIN — LIDOCAINE HYDROCHLORIDE 20 MG: 10 INJECTION, SOLUTION INFILTRATION; PERINEURAL at 11:12

## 2018-12-13 RX ADMIN — PROPOFOL 70 MG: 10 INJECTION, EMULSION INTRAVENOUS at 11:18

## 2018-12-13 RX ADMIN — PROPOFOL 30 MG: 10 INJECTION, EMULSION INTRAVENOUS at 11:21

## 2018-12-13 RX ADMIN — PROPOFOL 50 MG: 10 INJECTION, EMULSION INTRAVENOUS at 11:24

## 2018-12-13 NOTE — ANESTHESIA POSTPROCEDURE EVALUATION
Department of Anesthesiology  Postprocedure Note    Patient: Kris Gooden  MRN: [id number]  YOB: 1950  Date of evaluation: 12/13/2018  Time:  12:54 PM     Procedure Summary     Date:  12/13/18 Room / Location:  [facility] / [address] [facility] ENDOSCOPY    Anesthesia Start:  1104 Anesthesia Stop:  1142    Procedures:       EGD BIOPSY (N/A )      COLONOSCOPY CONTROL HEMORRHAGE (N/A ) Diagnosis:       Anemia, unspecified type      (ANEMIA)    Surgeon:  Aayush Jack MD Responsible Provider:  Marcy Cabral MD    Anesthesia Type:  TIVA ASA Status:  3          Anesthesia Type: TIVA    Wendy Phase I: Wendy Score: 10    Wendy Phase II: Wendy Score: 10    Last vitals: Reviewed and per EMR flowsheets.        Anesthesia Post Evaluation    Patient location during evaluation: PACU  Level of consciousness: awake  Airway patency: patent  Nausea & Vomiting: no nausea  Complications: no  Cardiovascular status: blood pressure returned to baseline  Respiratory status: acceptable  Hydration status: euvolemic

## 2018-12-13 NOTE — H&P
Hypertension      Neuropathy      Parkinson's disease ([facility] [address]      S/P coronary artery stent placement x 4       x 4    Schizoaffective disorder, bipolar type ([facility] [address]           FAMILY HISTORY      Family History         Family History   Problem Relation Age of Onset    Other Mother           CEREBRAL PALSY    Heart Disease Father           CHF         SOCIAL HISTORY      Social History   Social History            Social History    Marital status:        Spouse name: N/A    Number of children: N/A    Years of education: N/A          Occupational History    Not on file.             Social History Main Topics    Smoking status: Former Smoker       Packs/day: 1.00       Years: 25.00       Types: Cigarettes    Smokeless tobacco: Never Used         Comment: QUIT 5/2013    Alcohol use No    Drug use: No    Sexual activity: Not Currently           Other Topics Concern    Not on file          Social History Narrative    No narrative on file         SURGICAL HISTORY      Past Surgical History         Past Surgical History:   Procedure Laterality Date    ANKLE SURGERY Right 06/09/2018     ORIF of R ankle     CORONARY ANGIOPLASTY        CORONARY ANGIOPLASTY WITH STENT PLACEMENT         x 4    CYSTOSCOPY   4/4/14     transurethral vaporization of prostate    HERNIA REPAIR         X2    VASECTOMY             CURRENT MEDICATIONS   (This list may include medications prescribed during this encounter as epic can not insert only the list prior to this encounter.)  Current Outpatient Rx          Current Outpatient Rx   Medication Sig Dispense Refill    bisacodyl (DULCOLAX) 5 MG EC tablet Take 1 tablet by mouth daily as needed for Constipation 4 tablet 0    polyethylene glycol (MIRALAX) POWD powder Take 255 g by mouth daily Take as directed for colonoscopy 255 g 0    carvedilol (COREG) 3.125 MG tablet Take 1 tablet by mouth 2 times daily (with meals) 60 tablet 3    clopidogrel (PLAVIX) 75 MG tablet TAKE 1 TABLET BY MOUTH ONCE DAILY 30 tablet 3    atorvastatin (LIPITOR) 80 MG tablet TAKE 1 TABLET BY MOUTH AT BEDTIME 90 tablet 0    ASPIRIN LOW DOSE 81 MG EC tablet TAKE 1 TABLET BY MOUTH ONCE DAILY 30 tablet 11    pantoprazole (PROTONIX) 40 MG tablet TAKE 1 TABLET BY MOUTH ONCE DAILY 90 tablet 3    insulin glargine (LANTUS) 100 UNIT/ML injection vial Inject 30 Units into the skin nightly 1 vial 3    lisinopril (PRINIVIL;ZESTRIL) 20 MG tablet Take 1 tablet by mouth daily 30 tablet 0    gabapentin (NEURONTIN) 100 MG capsule Take 2 capsules by mouth daily (Patient taking differently: Take 100 mg by mouth 3 times daily. Burnice Sinning ) 90 capsule 3    gabapentin (NEURONTIN) 400 MG capsule Take 1 capsule by mouth nightly 90 capsule 3    pioglitazone (ACTOS) 45 MG tablet Take 45 mg by mouth daily        SITagliptin (JANUVIA) 100 MG tablet Take 100 mg by mouth daily        traZODone (DESYREL) 100 MG tablet Take 100 mg by mouth nightly        fluticasone (FLONASE) 50 MCG/ACT nasal spray 1 spray by Nasal route daily as needed.        PARoxetine (PAXIL) 40 MG tablet Take 40 mg by mouth every morning.             ALLERGIES   No Known Allergies  IMMUNIZATIONS           Immunization History   Administered Date(s) Administered    Influenza Virus Vaccine 10/10/2014    Pneumococcal 13-valent Conjugate ([id number]) 09/11/2017      REVIEW OF SYSTEMS   See HPI for further details and pertinent postiives. Negative for the following:  Constitutional: Negative for weight change. Negative for appetite change and fatigue. HENT: Negative for nosebleeds, sore throat, mouth sores, and voice change. Respiratory: Negative for cough, choking and chest tightness. Cardiovascular: Negative for chest pain   Gastrointestinal: See HPI  Musculoskeletal: Negative for arthralgias. Skin: Negative for pallor. Neurological: Negative for weakness and light-headedness. Hematological: Negative for adenopathy. Does not bruise/bleed easily.

## 2018-12-13 NOTE — ANESTHESIA PRE PROCEDURE
Department of Anesthesiology  Preprocedure Note       Name:  Drew Perez   Age:  79 y.o.  :  1950                                          MRN:  [id number]         Date:  2018      Surgeon: Av Nelson):  Wen Patiño MD    Procedure: EGD AND COLONOSCOPY WITH ANESTHESIA (N/A )  EGD AND COLONOSCOPY WITH ANESTHESIA (N/A )    Medications prior to admission:   Prior to Admission medications    Medication Sig Start Date End Date Taking? Authorizing Provider   gabapentin (NEURONTIN) 300 MG capsule Take 300 mg by mouth 3 times daily. Jossie Condon Historical Provider, MD   PEG-KCl-NaCl-NaSulf-Na Asc-C (PLENVU) 140 g SOLR Gave pt sample- lot # [id number] exp 18   Wen Patiño MD   bisacodyl (DULCOLAX) 5 MG EC tablet Take 1 tablet by mouth daily as needed for Constipation 18   Wen Patiño MD   polyethylene glycol [facility]) POWD powder Take 255 g by mouth daily Take as directed for colonoscopy 18   Wen Patiño MD   carvedilol (COREG) 3.125 MG tablet Take 1 tablet by mouth 2 times daily (with meals) 18   Haylee Fairchild MD   clopidogrel (PLAVIX) 75 MG tablet TAKE 1 TABLET BY MOUTH ONCE DAILY 18   Haylee Fairchild MD   atorvastatin (LIPITOR) 80 MG tablet TAKE 1 TABLET BY MOUTH AT BEDTIME 10/31/18   Sarahi Calderón MD   ASPIRIN LOW DOSE 81 MG EC tablet TAKE 1 TABLET BY MOUTH ONCE DAILY 18   Sarahi Calderón MD   pantoprazole (PROTONIX) 40 MG tablet TAKE 1 TABLET BY MOUTH ONCE DAILY 18   Sarahi Calderón MD   insulin glargine (LANTUS) 100 UNIT/ML injection vial Inject 30 Units into the skin nightly 18   PRESTON Stone CNP   lisinopril (PRINIVIL;ZESTRIL) 20 MG tablet Take 1 tablet by mouth daily 10/30/17   PRESTON Leon CNP   pioglitazone (ACTOS) 45 MG tablet Take 45 mg by mouth daily 17   Historical Provider, MD   SITagliptin (JANUVIA) 100 MG tablet Take 100 mg by mouth daily    Historical Provider, MD  Parkinson's disease ([facility]     ?  S/P coronary artery stent placement x 4     x 4    Schizoaffective disorder, bipolar type ([facility]        Past Surgical History:        Procedure Laterality Date    ANKLE SURGERY Right 06/09/2018    ORIF of R ankle     CORONARY ANGIOPLASTY      CORONARY ANGIOPLASTY WITH STENT PLACEMENT      x 4    CYSTOSCOPY  4/4/14    transurethral vaporization of prostate    HERNIA REPAIR      X2    VASECTOMY         Social History:    Social History   Substance Use Topics    Smoking status: Former Smoker     Packs/day: 1.00     Years: 25.00     Types: Cigarettes     Quit date: 5/10/2013    Smokeless tobacco: Never Used    Alcohol use No                                Counseling given: Not Answered      Vital Signs (Current):   Vitals:    12/10/18 1000   Weight: 148 lb (67.1 kg)   Height: 5' 3.5\" (1.613 m)                                              BP Readings from Last 3 Encounters:   11/30/18 (!) 146/72   11/26/18 114/60   11/09/18 (!) 151/63       NPO Status:                                                                                 BMI:   Wt Readings from Last 3 Encounters:   12/10/18 148 lb (67.1 kg)   11/30/18 148 lb 9.6 oz (67.4 kg)   11/26/18 147 lb 9.6 oz (67 kg)     Body mass index is 25.81 kg/m².     CBC:   Lab Results   Component Value Date    WBC 7.0 11/09/2018    RBC 3.62 11/09/2018    HGB 10.6 11/09/2018    HCT 31.2 11/09/2018    MCV 86.3 11/09/2018    RDW 15.1 11/09/2018     11/09/2018       CMP:   Lab Results   Component Value Date     11/09/2018    K 4.1 11/09/2018    CL 98 11/09/2018    CO2 23 11/09/2018    BUN 33 11/09/2018    CREATININE 1.6 11/09/2018    GFRAA 52 11/09/2018    GFRAA >60 05/11/2013    AGRATIO 1.3 11/09/2018    LABGLOM 43 11/09/2018    GLUCOSE 88 11/09/2018    PROT 6.8 11/09/2018    CALCIUM 8.8 11/09/2018    BILITOT <0.2 11/09/2018    ALKPHOS 187 11/09/2018    AST 17 11/09/2018    ALT 16 11/09/2018       POC Tests: No results for

## 2018-12-26 ENCOUNTER — TELEPHONE (OUTPATIENT)
Dept: GASTROENTEROLOGY | Age: 68
End: 2018-12-26

## 2018-12-26 DIAGNOSIS — D50.9 IRON DEFICIENCY ANEMIA, UNSPECIFIED IRON DEFICIENCY ANEMIA TYPE: Primary | ICD-10-CM

## 2019-01-14 ENCOUNTER — TELEPHONE (OUTPATIENT)
Dept: GASTROENTEROLOGY | Age: 69
End: 2019-01-14

## 2019-01-14 DIAGNOSIS — R10.11 RUQ PAIN: Primary | ICD-10-CM

## 2019-01-21 ENCOUNTER — TELEPHONE (OUTPATIENT)
Dept: GASTROENTEROLOGY | Age: 69
End: 2019-01-21

## 2019-01-24 ENCOUNTER — HOSPITAL ENCOUNTER (OUTPATIENT)
Dept: ULTRASOUND IMAGING | Age: 69
Discharge: HOME | End: 2019-01-24
Payer: COMMERCIAL

## 2019-01-24 DIAGNOSIS — R10.11 RUQ PAIN: ICD-10-CM

## 2019-01-24 PROCEDURE — 76705 ECHO EXAM OF ABDOMEN: CPT

## 2019-01-29 RX ORDER — CARVEDILOL 3.12 MG/1
TABLET ORAL
Qty: 180 TABLET | Refills: 3 | Status: SHIPPED | OUTPATIENT
Start: 2019-01-29 | End: 2019-06-18 | Stop reason: SDUPTHER

## 2019-04-05 ENCOUNTER — HOSPITAL ENCOUNTER (INPATIENT)
Age: 69
LOS: 1 days | Discharge: HOME | DRG: 641 | End: 2019-04-06
Attending: EMERGENCY MEDICINE | Admitting: INTERNAL MEDICINE
Payer: COMMERCIAL

## 2019-04-05 DIAGNOSIS — R11.2 NON-INTRACTABLE VOMITING WITH NAUSEA, UNSPECIFIED VOMITING TYPE: ICD-10-CM

## 2019-04-05 DIAGNOSIS — E87.5 HYPERKALEMIA: Primary | ICD-10-CM

## 2019-04-05 DIAGNOSIS — E86.0 DEHYDRATION: ICD-10-CM

## 2019-04-05 PROBLEM — N17.9 AKI (ACUTE KIDNEY INJURY) (HCC): Status: ACTIVE | Noted: 2019-04-05

## 2019-04-05 LAB
A/G RATIO: 1.8 (ref 1.1–2.2)
ALBUMIN SERPL-MCNC: 4.1 G/DL (ref 3.4–5)
ALP BLD-CCNC: 112 U/L (ref 40–129)
ALT SERPL-CCNC: 19 U/L (ref 10–40)
ANION GAP SERPL CALCULATED.3IONS-SCNC: 7 MMOL/L (ref 3–16)
ANION GAP SERPL CALCULATED.3IONS-SCNC: 9 MMOL/L (ref 3–16)
AST SERPL-CCNC: 19 U/L (ref 15–37)
BILIRUB SERPL-MCNC: <0.2 MG/DL (ref 0–1)
BUN BLDV-MCNC: 25 MG/DL (ref 7–20)
BUN BLDV-MCNC: 29 MG/DL (ref 7–20)
CALCIUM SERPL-MCNC: 8.6 MG/DL (ref 8.3–10.6)
CALCIUM SERPL-MCNC: 8.8 MG/DL (ref 8.3–10.6)
CHLORIDE BLD-SCNC: 103 MMOL/L (ref 99–110)
CHLORIDE BLD-SCNC: 104 MMOL/L (ref 99–110)
CO2: 20 MMOL/L (ref 21–32)
CO2: 20 MMOL/L (ref 21–32)
CREAT SERPL-MCNC: 1.4 MG/DL (ref 0.8–1.3)
CREAT SERPL-MCNC: 1.7 MG/DL (ref 0.8–1.3)
EKG ATRIAL RATE: 69 BPM
EKG DIAGNOSIS: NORMAL
EKG P AXIS: 48 DEGREES
EKG P-R INTERVAL: 162 MS
EKG Q-T INTERVAL: 392 MS
EKG QRS DURATION: 142 MS
EKG QTC CALCULATION (BAZETT): 420 MS
EKG R AXIS: -6 DEGREES
EKG T AXIS: 4 DEGREES
EKG VENTRICULAR RATE: 69 BPM
GFR AFRICAN AMERICAN: 49
GFR AFRICAN AMERICAN: >60
GFR NON-AFRICAN AMERICAN: 40
GFR NON-AFRICAN AMERICAN: 50
GLOBULIN: 2.3 G/DL
GLUCOSE BLD-MCNC: 112 MG/DL (ref 70–99)
GLUCOSE BLD-MCNC: 166 MG/DL (ref 70–99)
GLUCOSE BLD-MCNC: 289 MG/DL (ref 70–99)
GLUCOSE BLD-MCNC: 289 MG/DL (ref 70–99)
GLUCOSE BLD-MCNC: 379 MG/DL (ref 70–99)
GLUCOSE BLD-MCNC: 39 MG/DL (ref 70–99)
GLUCOSE BLD-MCNC: 45 MG/DL (ref 70–99)
HCT VFR BLD CALC: 27.8 % (ref 40.5–52.5)
HEMOGLOBIN: 9.4 G/DL (ref 13.5–17.5)
LIPASE: 23 U/L (ref 13–60)
MAGNESIUM: 1.9 MG/DL (ref 1.8–2.4)
MCH RBC QN AUTO: 30.1 PG (ref 26–34)
MCHC RBC AUTO-ENTMCNC: 33.7 G/DL (ref 31–36)
MCV RBC AUTO: 89.3 FL (ref 80–100)
OCCULT BLOOD SCREENING: NORMAL
PDW BLD-RTO: 15.2 % (ref 12.4–15.4)
PERFORMED ON: ABNORMAL
PLATELET # BLD: 165 K/UL (ref 135–450)
PMV BLD AUTO: 8 FL (ref 5–10.5)
POTASSIUM SERPL-SCNC: 6.3 MMOL/L (ref 3.5–5.1)
POTASSIUM SERPL-SCNC: 6.5 MMOL/L (ref 3.5–5.1)
RBC # BLD: 3.11 M/UL (ref 4.2–5.9)
SODIUM BLD-SCNC: 130 MMOL/L (ref 136–145)
SODIUM BLD-SCNC: 133 MMOL/L (ref 136–145)
SPECIMEN STATUS: NORMAL
TOTAL PROTEIN: 6.4 G/DL (ref 6.4–8.2)
TROPONIN: <0.01 NG/ML
URIC ACID, SERUM: 6.2 MG/DL (ref 3.5–7.2)
WBC # BLD: 5.9 K/UL (ref 4–11)

## 2019-04-05 PROCEDURE — 96361 HYDRATE IV INFUSION ADD-ON: CPT

## 2019-04-05 PROCEDURE — 96365 THER/PROPH/DIAG IV INF INIT: CPT

## 2019-04-05 PROCEDURE — 6370000000 HC RX 637 (ALT 250 FOR IP): Performed by: INTERNAL MEDICINE

## 2019-04-05 PROCEDURE — 2580000003 HC RX 258: Performed by: INTERNAL MEDICINE

## 2019-04-05 PROCEDURE — 87324 CLOSTRIDIUM AG IA: CPT

## 2019-04-05 PROCEDURE — 93010 ELECTROCARDIOGRAM REPORT: CPT | Performed by: INTERNAL MEDICINE

## 2019-04-05 PROCEDURE — 6360000002 HC RX W HCPCS: Performed by: PHYSICIAN ASSISTANT

## 2019-04-05 PROCEDURE — 84550 ASSAY OF BLOOD/URIC ACID: CPT

## 2019-04-05 PROCEDURE — 85027 COMPLETE CBC AUTOMATED: CPT

## 2019-04-05 PROCEDURE — 6360000002 HC RX W HCPCS: Performed by: INTERNAL MEDICINE

## 2019-04-05 PROCEDURE — 6370000000 HC RX 637 (ALT 250 FOR IP): Performed by: PHYSICIAN ASSISTANT

## 2019-04-05 PROCEDURE — 80053 COMPREHEN METABOLIC PANEL: CPT

## 2019-04-05 PROCEDURE — 2580000003 HC RX 258: Performed by: PHYSICIAN ASSISTANT

## 2019-04-05 PROCEDURE — G0378 HOSPITAL OBSERVATION PER HR: HCPCS

## 2019-04-05 PROCEDURE — 96372 THER/PROPH/DIAG INJ SC/IM: CPT

## 2019-04-05 PROCEDURE — 83735 ASSAY OF MAGNESIUM: CPT

## 2019-04-05 PROCEDURE — 36415 COLL VENOUS BLD VENIPUNCTURE: CPT

## 2019-04-05 PROCEDURE — 82570 ASSAY OF URINE CREATININE: CPT

## 2019-04-05 PROCEDURE — 84484 ASSAY OF TROPONIN QUANT: CPT

## 2019-04-05 PROCEDURE — G0328 FECAL BLOOD SCRN IMMUNOASSAY: HCPCS

## 2019-04-05 PROCEDURE — 87449 NOS EACH ORGANISM AG IA: CPT

## 2019-04-05 PROCEDURE — 84300 ASSAY OF URINE SODIUM: CPT

## 2019-04-05 PROCEDURE — 96375 TX/PRO/DX INJ NEW DRUG ADDON: CPT

## 2019-04-05 PROCEDURE — 51798 US URINE CAPACITY MEASURE: CPT

## 2019-04-05 PROCEDURE — 93005 ELECTROCARDIOGRAM TRACING: CPT | Performed by: PHYSICIAN ASSISTANT

## 2019-04-05 PROCEDURE — 99285 EMERGENCY DEPT VISIT HI MDM: CPT

## 2019-04-05 PROCEDURE — 83690 ASSAY OF LIPASE: CPT

## 2019-04-05 PROCEDURE — 1200000000 HC SEMI PRIVATE

## 2019-04-05 RX ORDER — CALCIUM GLUCONATE 94 MG/ML
1 INJECTION, SOLUTION INTRAVENOUS ONCE
Status: DISCONTINUED | OUTPATIENT
Start: 2019-04-05 | End: 2019-04-05 | Stop reason: CLARIF

## 2019-04-05 RX ORDER — HEPARIN SODIUM 5000 [USP'U]/ML
5000 INJECTION, SOLUTION INTRAVENOUS; SUBCUTANEOUS EVERY 8 HOURS SCHEDULED
Status: DISCONTINUED | OUTPATIENT
Start: 2019-04-05 | End: 2019-04-06 | Stop reason: HOSPADM

## 2019-04-05 RX ORDER — PAROXETINE HYDROCHLORIDE 20 MG/1
40 TABLET, FILM COATED ORAL EVERY MORNING
Status: DISCONTINUED | OUTPATIENT
Start: 2019-04-06 | End: 2019-04-06 | Stop reason: HOSPADM

## 2019-04-05 RX ORDER — FLUTICASONE PROPIONATE 50 MCG
1 SPRAY, SUSPENSION (ML) NASAL DAILY
Status: DISCONTINUED | OUTPATIENT
Start: 2019-04-06 | End: 2019-04-06 | Stop reason: HOSPADM

## 2019-04-05 RX ORDER — CLOPIDOGREL BISULFATE 75 MG/1
75 TABLET ORAL DAILY
Status: DISCONTINUED | OUTPATIENT
Start: 2019-04-06 | End: 2019-04-06 | Stop reason: HOSPADM

## 2019-04-05 RX ORDER — SODIUM POLYSTYRENE SULFONATE 15 G/60ML
15 SUSPENSION ORAL; RECTAL ONCE
Status: COMPLETED | OUTPATIENT
Start: 2019-04-05 | End: 2019-04-05

## 2019-04-05 RX ORDER — NICOTINE POLACRILEX 4 MG
15 LOZENGE BUCCAL PRN
Status: DISCONTINUED | OUTPATIENT
Start: 2019-04-05 | End: 2019-04-06 | Stop reason: HOSPADM

## 2019-04-05 RX ORDER — SODIUM POLYSTYRENE SULFONATE 15 G/60ML
30 SUSPENSION ORAL; RECTAL ONCE
Status: COMPLETED | OUTPATIENT
Start: 2019-04-06 | End: 2019-04-06

## 2019-04-05 RX ORDER — DOCUSATE SODIUM 100 MG/1
100 CAPSULE, LIQUID FILLED ORAL 2 TIMES DAILY
Status: DISCONTINUED | OUTPATIENT
Start: 2019-04-05 | End: 2019-04-06 | Stop reason: HOSPADM

## 2019-04-05 RX ORDER — DEXTROSE MONOHYDRATE 50 MG/ML
100 INJECTION, SOLUTION INTRAVENOUS PRN
Status: DISCONTINUED | OUTPATIENT
Start: 2019-04-05 | End: 2019-04-06 | Stop reason: HOSPADM

## 2019-04-05 RX ORDER — TRAZODONE HYDROCHLORIDE 50 MG/1
100 TABLET ORAL NIGHTLY
Status: DISCONTINUED | OUTPATIENT
Start: 2019-04-05 | End: 2019-04-06 | Stop reason: HOSPADM

## 2019-04-05 RX ORDER — ONDANSETRON 2 MG/ML
4 INJECTION INTRAMUSCULAR; INTRAVENOUS EVERY 6 HOURS PRN
Status: DISCONTINUED | OUTPATIENT
Start: 2019-04-05 | End: 2019-04-06 | Stop reason: HOSPADM

## 2019-04-05 RX ORDER — 0.9 % SODIUM CHLORIDE 0.9 %
1000 INTRAVENOUS SOLUTION INTRAVENOUS ONCE
Status: COMPLETED | OUTPATIENT
Start: 2019-04-05 | End: 2019-04-05

## 2019-04-05 RX ORDER — GABAPENTIN 300 MG/1
300 CAPSULE ORAL 3 TIMES DAILY
Status: DISCONTINUED | OUTPATIENT
Start: 2019-04-05 | End: 2019-04-06 | Stop reason: HOSPADM

## 2019-04-05 RX ORDER — DEXTROSE MONOHYDRATE 25 G/50ML
12.5 INJECTION, SOLUTION INTRAVENOUS PRN
Status: DISCONTINUED | OUTPATIENT
Start: 2019-04-05 | End: 2019-04-06 | Stop reason: HOSPADM

## 2019-04-05 RX ORDER — SODIUM CHLORIDE 0.9 % (FLUSH) 0.9 %
10 SYRINGE (ML) INJECTION EVERY 12 HOURS SCHEDULED
Status: DISCONTINUED | OUTPATIENT
Start: 2019-04-05 | End: 2019-04-06 | Stop reason: HOSPADM

## 2019-04-05 RX ORDER — SODIUM CHLORIDE 9 MG/ML
INJECTION, SOLUTION INTRAVENOUS CONTINUOUS
Status: DISCONTINUED | OUTPATIENT
Start: 2019-04-05 | End: 2019-04-06 | Stop reason: HOSPADM

## 2019-04-05 RX ORDER — ATORVASTATIN CALCIUM 80 MG/1
80 TABLET, FILM COATED ORAL NIGHTLY
Status: DISCONTINUED | OUTPATIENT
Start: 2019-04-05 | End: 2019-04-06 | Stop reason: HOSPADM

## 2019-04-05 RX ORDER — ASPIRIN 81 MG/1
81 TABLET ORAL DAILY
Status: DISCONTINUED | OUTPATIENT
Start: 2019-04-06 | End: 2019-04-06 | Stop reason: HOSPADM

## 2019-04-05 RX ORDER — CARVEDILOL 3.12 MG/1
3.12 TABLET ORAL 2 TIMES DAILY WITH MEALS
Status: DISCONTINUED | OUTPATIENT
Start: 2019-04-05 | End: 2019-04-06 | Stop reason: HOSPADM

## 2019-04-05 RX ORDER — SODIUM CHLORIDE 0.9 % (FLUSH) 0.9 %
10 SYRINGE (ML) INJECTION PRN
Status: DISCONTINUED | OUTPATIENT
Start: 2019-04-05 | End: 2019-04-06 | Stop reason: HOSPADM

## 2019-04-05 RX ORDER — PANTOPRAZOLE SODIUM 40 MG/1
40 TABLET, DELAYED RELEASE ORAL DAILY
Status: DISCONTINUED | OUTPATIENT
Start: 2019-04-06 | End: 2019-04-06 | Stop reason: HOSPADM

## 2019-04-05 RX ORDER — INSULIN GLARGINE 100 [IU]/ML
30 INJECTION, SOLUTION SUBCUTANEOUS NIGHTLY
Status: DISCONTINUED | OUTPATIENT
Start: 2019-04-05 | End: 2019-04-05

## 2019-04-05 RX ADMIN — DEXTROSE 15 G: 15 GEL ORAL at 17:37

## 2019-04-05 RX ADMIN — INSULIN HUMAN 10 UNITS: 100 INJECTION, SOLUTION PARENTERAL at 15:07

## 2019-04-05 RX ADMIN — TRAZODONE HYDROCHLORIDE 100 MG: 50 TABLET ORAL at 21:05

## 2019-04-05 RX ADMIN — SODIUM POLYSTYRENE SULFONATE 15 G: 15 SUSPENSION ORAL; RECTAL at 15:07

## 2019-04-05 RX ADMIN — CARVEDILOL 3.12 MG: 3.12 TABLET, FILM COATED ORAL at 18:12

## 2019-04-05 RX ADMIN — SODIUM CHLORIDE: 9 INJECTION, SOLUTION INTRAVENOUS at 18:12

## 2019-04-05 RX ADMIN — CALCIUM GLUCONATE 1 G: 98 INJECTION, SOLUTION INTRAVENOUS at 15:07

## 2019-04-05 RX ADMIN — DEXTROSE MONOHYDRATE 12.5 G: 25 INJECTION, SOLUTION INTRAVENOUS at 17:59

## 2019-04-05 RX ADMIN — INSULIN LISPRO 5 UNITS: 100 INJECTION, SOLUTION INTRAVENOUS; SUBCUTANEOUS at 21:53

## 2019-04-05 RX ADMIN — SODIUM CHLORIDE, PRESERVATIVE FREE 10 ML: 5 INJECTION INTRAVENOUS at 21:06

## 2019-04-05 RX ADMIN — GABAPENTIN 300 MG: 300 CAPSULE ORAL at 21:05

## 2019-04-05 RX ADMIN — SODIUM CHLORIDE 1000 ML: 9 INJECTION, SOLUTION INTRAVENOUS at 14:07

## 2019-04-05 RX ADMIN — HEPARIN SODIUM 5000 UNITS: 5000 INJECTION INTRAVENOUS; SUBCUTANEOUS at 21:06

## 2019-04-05 ASSESSMENT — PAIN SCALES - GENERAL
PAINLEVEL_OUTOF10: 6
PAINLEVEL_OUTOF10: 0

## 2019-04-05 ASSESSMENT — PAIN DESCRIPTION - PAIN TYPE: TYPE: ACUTE PAIN

## 2019-04-05 ASSESSMENT — PAIN DESCRIPTION - LOCATION: LOCATION: ABDOMEN

## 2019-04-05 NOTE — ED PROVIDER NOTES
I independently performed a history and physical on Chauncey Mraio. All diagnostic, treatment, and disposition decisions were made by myself in conjunction with the advanced practice provider. For further details of [facility] emergency department encounter, please see ZEESHAN Helm's documentation. Patient complains of abdominal pain and some dark stools. On exam abdomen is benign. Labs significant for hypokalemia. Patient being admitted for further treatment and evaluation. No EKG changes to suggest impending arrhythmia from hyperkalemia.     EKG  The Ekg interpreted by me shows  normal sinus rhythm with a rate of 69  Axis is   Normal  QTc is  normal  rbbb   ST Segments: no acute change  No significant change from prior EKG dated 9 NOV 2018            Jermaine Broussard MD  04/05/19 [id number]

## 2019-04-05 NOTE — ED PROVIDER NOTES
[facility] Emergency Department    CHIEF COMPLAINT  Abdominal Pain (x 1 week off and on with N/V x 1 time )      HISTORY OF PRESENT ILLNESS  Pito Lucero is a 76 y.o. male who presents to the ED complaining of one-week history of abdominal discomfort and feeling unwell. Patient observed lying in bed, appears nontoxic and in no acute distress at this time. He was dropped off by a neighbor today for evaluation. Patient states that she has had some epigastric and lower abdominal discomfort for the past week. Develop some nausea and vomiting today. States that he has had some loose stools. Has had some formed stools which were black. May be taking blood thinners although unsure. States that he went to doctor's office to be evaluated today and was informed that he had abnormal lab work and needed to come into the emergency. Believes that his potassium was low. He denies associated headache, lightheadedness, dizziness or confusion. No chest pain shortness of breath. No urinary symptoms. No fevers or chills. No other complaints, modifying factors or associated symptoms. Nursing notes reviewed. Past Medical History:   Diagnosis Date    Arthritis     Ataxia 3/14/2014    BPH (benign prostatic hyperplasia)     FERGUSON IN PLACE    CAD (coronary artery disease)     Coronary atherosclerosis of native coronary artery 5/11/2013    Diabetes mellitus ([facility]     Environmental allergies     Hydronephrosis 3/14/2014    Hyperlipidemia     Hypertension     Kidney disease     Neuropathy     Parkinson's disease ([address]     ?     S/P coronary artery stent placement x 4     x 4    Schizoaffective disorder, bipolar type [facility])      Past Surgical History:   Procedure Laterality Date    ANKLE SURGERY Right 06/09/2018    ORIF of R ankle     COLONOSCOPY N/A 12/13/2018    COLONOSCOPY CONTROL HEMORRHAGE performed by Jose Mccauley MD at [place] [address] facility-administered medications for this encounter. Current Outpatient Medications   Medication Sig Dispense Refill    Dulaglutide (TRULICITY SC) Inject into the skin      carvedilol (COREG) 3.125 MG tablet TAKE ONE (1) TABLET BY MOUTH TWICE DAILY WITH MEALS 180 tablet 3    gabapentin (NEURONTIN) 300 MG capsule Take 300 mg by mouth 3 times daily. Alison Renner clopidogrel (PLAVIX) 75 MG tablet TAKE 1 TABLET BY MOUTH ONCE DAILY 30 tablet 3    atorvastatin (LIPITOR) 80 MG tablet TAKE 1 TABLET BY MOUTH AT BEDTIME 90 tablet 0    ASPIRIN LOW DOSE 81 MG EC tablet TAKE 1 TABLET BY MOUTH ONCE DAILY 30 tablet 11    pantoprazole (PROTONIX) 40 MG tablet TAKE 1 TABLET BY MOUTH ONCE DAILY 90 tablet 3    insulin glargine (LANTUS) 100 UNIT/ML injection vial Inject 30 Units into the skin nightly 1 vial 3    lisinopril (PRINIVIL;ZESTRIL) 20 MG tablet Take 1 tablet by mouth daily 30 tablet 0    pioglitazone (ACTOS) 45 MG tablet Take 45 mg by mouth daily      traZODone (DESYREL) 100 MG tablet Take 100 mg by mouth nightly      fluticasone (FLONASE) 50 MCG/ACT nasal spray 1 spray by Nasal route daily as needed.  PARoxetine (PAXIL) 40 MG tablet Take 40 mg by mouth every morning. No Known Allergies    REVIEW OF SYSTEMS  10 systems reviewed, pertinent positives per HPI otherwise noted to be negative    PHYSICAL EXAM  BP (!) 151/68   Pulse 74   Temp 98 °F (36.7 °C) (Oral)   Resp 15   Ht 5' 2\" (1.575 m)   Wt 145 lb (65.8 kg)   SpO2 100%   BMI 26.52 kg/m²   GENERAL APPEARANCE: Awake and alert. Cooperative. No acute distress. HEAD: Normocephalic. Atraumatic. EYES: PERRL. EOM's grossly intact. ENT: Mucous membranes are moist.   NECK: Supple. HEART: RRR. No murmurs. No chest wall tenderness. LUNGS: Respirations unlabored. CTAB. Good air exchange. Speaking comfortably in full sentences. ABDOMEN: Soft. Non-distended. Generalized nonfocal abdominal tenderness without rigidity, guarding or rebound.   Negative Villanueva's, McBurney's, Rovsing's. No fluids or ascites. No hernias or masses. Bowel sounds normal quadrants. No CVA tenderness. EXTREMITIES: No peripheral edema. Moves all extremities equally. All extremities neurovascularly intact. SKIN: Warm and dry. No acute rashes. NEUROLOGICAL: Alert and oriented. CN's 2-12 intact. No gross facial drooping. Strength 5/5, sensation intact. PSYCHIATRIC: Normal mood and affect. ED COURSE   I have evaluated this patient in collaboration with Dr. Heather Greenberg. Pain control was not required here in the ED. Triage vital stable. CBC baseline anemia w/o leukocytosis. CMP consistent w/dehydration and hyperkalemia. Infusion on 1L IVF bolus. Blood glucose 290. Given 10 units of insulin. Also given kayexalate and calcium gluconate. EKG NSR w/o acute ischemic changes. Normal Magnesium and lipase. Troponin negative. Discussed admission with hospitalist and orders placed. A discussion was had with Mr Kuldeep Koroma regarding nausea/vomiting, ED findings, and recommedations for admission. All questions were answered. Patient in agreement. 30 minutes of critical care time spent, not including any separately billable procedures.     MDM  Results for orders placed or performed during the hospital encounter of 04/05/19   CBC   Result Value Ref Range    WBC 5.9 4.0 - 11.0 K/uL    RBC 3.11 (L) 4.20 - 5.90 M/uL    Hemoglobin 9.4 (L) 13.5 - 17.5 g/dL    Hematocrit 27.8 (L) 40.5 - 52.5 %    MCV 89.3 80.0 - 100.0 fL    MCH 30.1 26.0 - 34.0 pg    MCHC 33.7 31.0 - 36.0 g/dL    RDW 15.2 12.4 - 15.4 %    Platelets 758 951 - 008 K/uL    MPV 8.0 5.0 - 10.5 fL   Comprehensive metabolic panel   Result Value Ref Range    Sodium 130 (L) 136 - 145 mmol/L    Potassium 6.3 (HH) 3.5 - 5.1 mmol/L    Chloride 103 99 - 110 mmol/L    CO2 20 (L) 21 - 32 mmol/L    Anion Gap 7 3 - 16    Glucose 289 (H) 70 - 99 mg/dL    BUN 29 (H) 7 - 20 mg/dL    CREATININE 1.7 (H) 0.8 - 1.3 mg/dL    GFR Non-African American 40 (A) >60    GFR  49 (A) >60    Calcium 8.8 8.3 - 10.6 mg/dL    Total Protein 6.4 6.4 - 8.2 g/dL    Alb 4.1 3.4 - 5.0 g/dL    Albumin/Globulin Ratio 1.8 1.1 - 2.2    Total Bilirubin <0.2 0.0 - 1.0 mg/dL    Alkaline Phosphatase 112 40 - 129 U/L    ALT 19 10 - 40 U/L    AST 19 15 - 37 U/L    Globulin 2.3 g/dL   Lipase   Result Value Ref Range    Lipase 23.0 13.0 - 60.0 U/L   Sample possible blood bank testing   Result Value Ref Range    Specimen Status MERCEDES    Magnesium   Result Value Ref Range    Magnesium 1.90 1.80 - 2.40 mg/dL   Troponin   Result Value Ref Range    Troponin <0.01 <0.01 ng/mL   POCT Glucose   Result Value Ref Range    POC Glucose 39 (LL) 70 - 99 mg/dl    Performed on ACCU-CHEK    POCT Glucose   Result Value Ref Range    POC Glucose 45 (LL) 70 - 99 mg/dl    Performed on ACCU-CHEK    POCT Glucose   Result Value Ref Range    POC Glucose 112 (H) 70 - 99 mg/dl    Performed on ACCU-CHEK    EKG 12 Lead   Result Value Ref Range    Ventricular Rate 69 BPM    Atrial Rate 69 BPM    P-R Interval 162 ms    QRS Duration 142 ms    Q-T Interval 392 ms    QTc Calculation (Bazett) 420 ms    P Axis 48 degrees    R Axis -6 degrees    T Axis 4 degrees    Diagnosis       Normal sinus rhythmRight bundle branch blockAbnormal ECGWhen compared with ECG of 09-NOV-2018 15:38,No significant change was foundConfirmed by [facility] OMERO ERVIN MD ([id number]) on 4/5/2019 5:31:39 PM     I spoke with Keri Bonilla and Mio Phillips. We thoroughly discussed the history, physical exam, laboratory and imaging studies, as well as, emergency department course. Based upon that discussion, we've decided to admit Ulysses Wetzel to the hospital for further observation, evaluation, and treatment. Final Impression  1. Hyperkalemia    2. Non-intractable vomiting with nausea, unspecified vomiting type    3. Dehydration      Blood pressure (!) 163/86, pulse 88, temperature 98 °F (36.7 °C), temperature source Oral, resp.  rate 14, height 5' 2\" (1.575 m), weight 145 lb (65.8 kg), SpO2 98 %. DISPOSITION  Patient was admitted to the hospital in stable condition.          ZEESHAN Jimenez  04/05/19 184 ZEESHAN Yates  04/05/19 1943

## 2019-04-05 NOTE — PROGRESS NOTES
Patient admitted to room 358 from ED. Patient oriented to room, call light, bed rails, phone, lights and bathroom. Patient instructed about the schedule of the day including: vital sign frequency, lab draws, possible tests, frequency of MD and staff rounds, including RN/MD rounding together at bedside, daily weights, and I &O's. Patient instructed about prescribed diet, how to use 8MENU, and television. Bed alarm in place, patient aware of placement and reason. Telemetry box #103 in place, patient aware of placement and reason. Bed locked, in lowest position, side rails up 2/4, call light within reach. Will continue to monitor. Pt placed in Environmental precautions. Positive for bed bugs in ED.

## 2019-04-05 NOTE — ED NOTES
[email or web address]  Re: hyperkalemia per Clair Carpenter returned call @[phone]     Laveda Pat Naegele  04/05/19 [id number]

## 2019-04-05 NOTE — PROGRESS NOTES
1731 - Received call from PCA that patient's blood glucose level 39. Pt Alert and speaking, able to answer orientation questions correctly. 1737 - oral glucose gel 2 tubes given per order. Will recheck blood glucose level in 15 minutes. 1755 - blood glucose level 45.     1759 - Dextrose 50% solution 12.5g given. Will recheck blood glucose level in 15 minutes. 1814 - Blood glucose level 112    Will monitor.

## 2019-04-06 VITALS
HEART RATE: 76 BPM | DIASTOLIC BLOOD PRESSURE: 65 MMHG | HEIGHT: 62 IN | RESPIRATION RATE: 18 BRPM | OXYGEN SATURATION: 100 % | TEMPERATURE: 97.6 F | SYSTOLIC BLOOD PRESSURE: 147 MMHG | WEIGHT: 151.9 LBS | BODY MASS INDEX: 27.95 KG/M2

## 2019-04-06 LAB
A/G RATIO: 1.6 (ref 1.1–2.2)
ALBUMIN SERPL-MCNC: 3.5 G/DL (ref 3.4–5)
ALP BLD-CCNC: 107 U/L (ref 40–129)
ALT SERPL-CCNC: 19 U/L (ref 10–40)
ANION GAP SERPL CALCULATED.3IONS-SCNC: 6 MMOL/L (ref 3–16)
AST SERPL-CCNC: 18 U/L (ref 15–37)
BILIRUB SERPL-MCNC: <0.2 MG/DL (ref 0–1)
BUN BLDV-MCNC: 22 MG/DL (ref 7–20)
C DIFFICILE TOXIN, EIA: NORMAL
CALCIUM SERPL-MCNC: 8.2 MG/DL (ref 8.3–10.6)
CHLORIDE BLD-SCNC: 112 MMOL/L (ref 99–110)
CO2: 21 MMOL/L (ref 21–32)
CREAT SERPL-MCNC: 1.3 MG/DL (ref 0.8–1.3)
CREATININE URINE: 21.5 MG/DL (ref 39–259)
EKG ATRIAL RATE: 79 BPM
EKG DIAGNOSIS: NORMAL
EKG P AXIS: 68 DEGREES
EKG P-R INTERVAL: 156 MS
EKG Q-T INTERVAL: 396 MS
EKG QRS DURATION: 140 MS
EKG QTC CALCULATION (BAZETT): 454 MS
EKG R AXIS: 14 DEGREES
EKG T AXIS: 30 DEGREES
EKG VENTRICULAR RATE: 79 BPM
GFR AFRICAN AMERICAN: >60
GFR NON-AFRICAN AMERICAN: 55
GLOBULIN: 2.2 G/DL
GLUCOSE BLD-MCNC: 132 MG/DL (ref 70–99)
GLUCOSE BLD-MCNC: 150 MG/DL (ref 70–99)
GLUCOSE BLD-MCNC: 192 MG/DL (ref 70–99)
PERFORMED ON: ABNORMAL
PERFORMED ON: ABNORMAL
POTASSIUM REFLEX MAGNESIUM: 4.8 MMOL/L (ref 3.5–5.1)
SODIUM BLD-SCNC: 139 MMOL/L (ref 136–145)
SODIUM URINE: 39 MMOL/L
TOTAL PROTEIN: 5.7 G/DL (ref 6.4–8.2)
TROPONIN: <0.01 NG/ML

## 2019-04-06 PROCEDURE — 96375 TX/PRO/DX INJ NEW DRUG ADDON: CPT

## 2019-04-06 PROCEDURE — 2500000003 HC RX 250 WO HCPCS: Performed by: INTERNAL MEDICINE

## 2019-04-06 PROCEDURE — 93005 ELECTROCARDIOGRAM TRACING: CPT | Performed by: INTERNAL MEDICINE

## 2019-04-06 PROCEDURE — 2580000003 HC RX 258: Performed by: INTERNAL MEDICINE

## 2019-04-06 PROCEDURE — 80053 COMPREHEN METABOLIC PANEL: CPT

## 2019-04-06 PROCEDURE — G0378 HOSPITAL OBSERVATION PER HR: HCPCS

## 2019-04-06 PROCEDURE — 6370000000 HC RX 637 (ALT 250 FOR IP): Performed by: INTERNAL MEDICINE

## 2019-04-06 PROCEDURE — 6360000002 HC RX W HCPCS: Performed by: INTERNAL MEDICINE

## 2019-04-06 PROCEDURE — 84484 ASSAY OF TROPONIN QUANT: CPT

## 2019-04-06 PROCEDURE — 36415 COLL VENOUS BLD VENIPUNCTURE: CPT

## 2019-04-06 PROCEDURE — 93010 ELECTROCARDIOGRAM REPORT: CPT | Performed by: INTERNAL MEDICINE

## 2019-04-06 PROCEDURE — 96372 THER/PROPH/DIAG INJ SC/IM: CPT

## 2019-04-06 RX ADMIN — SODIUM CHLORIDE: 9 INJECTION, SOLUTION INTRAVENOUS at 06:16

## 2019-04-06 RX ADMIN — FLUTICASONE PROPIONATE 1 SPRAY: 50 SPRAY, METERED NASAL at 08:17

## 2019-04-06 RX ADMIN — CARVEDILOL 3.12 MG: 3.12 TABLET, FILM COATED ORAL at 08:16

## 2019-04-06 RX ADMIN — SODIUM POLYSTYRENE SULFONATE 30 G: 15 SUSPENSION ORAL; RECTAL at 00:00

## 2019-04-06 RX ADMIN — SODIUM BICARBONATE 50 MEQ: 84 INJECTION, SOLUTION INTRAVENOUS at 00:01

## 2019-04-06 RX ADMIN — HEPARIN SODIUM 5000 UNITS: 5000 INJECTION INTRAVENOUS; SUBCUTANEOUS at 15:02

## 2019-04-06 RX ADMIN — PANTOPRAZOLE SODIUM 40 MG: 40 TABLET, DELAYED RELEASE ORAL at 08:17

## 2019-04-06 RX ADMIN — ASPIRIN 81 MG: 81 TABLET, COATED ORAL at 08:17

## 2019-04-06 RX ADMIN — PAROXETINE HYDROCHLORIDE 40 MG: 20 TABLET, FILM COATED ORAL at 08:16

## 2019-04-06 RX ADMIN — GABAPENTIN 300 MG: 300 CAPSULE ORAL at 08:17

## 2019-04-06 RX ADMIN — CLOPIDOGREL BISULFATE 75 MG: 75 TABLET ORAL at 08:17

## 2019-04-06 RX ADMIN — GABAPENTIN 300 MG: 300 CAPSULE ORAL at 15:02

## 2019-04-06 RX ADMIN — HEPARIN SODIUM 5000 UNITS: 5000 INJECTION INTRAVENOUS; SUBCUTANEOUS at 06:15

## 2019-04-06 ASSESSMENT — PAIN SCALES - GENERAL
PAINLEVEL_OUTOF10: 0
PAINLEVEL_OUTOF10: 4

## 2019-04-06 ASSESSMENT — PAIN DESCRIPTION - PAIN TYPE: TYPE: ACUTE PAIN

## 2019-04-06 ASSESSMENT — PAIN DESCRIPTION - LOCATION: LOCATION: CHEST

## 2019-04-06 NOTE — DISCHARGE SUMMARY
Hospital Medicine Discharge Summary    Patient ID: Elier Davis      Patient's PCP: Ric Nayak, APRN - CNP    Admit Date: 4/5/2019     Discharge Date:   04/06/19    Admitting Physician: John Llamas MD     Discharge Physician: Inna Garcia MD     Discharge Diagnoses: Active Hospital Problems    Diagnosis Date Noted    [facility] [N17.9] 04/05/2019    Hyperkalemia [E87.5] 06/08/2018    Coronary artery disease involving native coronary artery of native heart without angina pectoris [I25.10] 10/09/2014    HTN (hypertension) [I10] 10/09/2014    S/P coronary artery stent placement [Z95.5]     DM2 (diabetes mellitus, type 2) ([facility] [E11.9] 05/12/2013       The patient was seen and examined on day of discharge and this discharge summary is in conjunction with any daily progress note from day of discharge. Hospital Course:   76 y.o. male who presented to [facility] with above problems  Patient presented to the ED today with complaints of abdominal pain, lower quadrants and epigastric in location, intermittent since one week, with occasional diarrhea, associated with nausea and vomiting today, complains of generalized weakness and just not feeling well. Patient went to his PCPs office today and complained of feeling dizzy, lightheaded, his blood sugar was checked found to be 184, he was given 4 ounces of soda and a package of peanut butter crackers, and then left the clinic without any incidents. His blood was drawn for labs, and was later called to report it was abnormal and was asked to come to the ER. OCTAVIO  - likely prerenal from dehydration  - Cr improved with IVF  - will continue to hold ACE on discharge  - C diff was negative. Hyperkalemia  - likely related to OCTAVIO, ACE use  - improved with IVF, kayexalate  - will continue to hold ACE    HTN  - well controlled  - Continue coreg.  Discontinued ACE on discharge  - Should follow up with PCP regarding possibly restarting ACE at a later date    DMII  - BG levels normalized. Had both hyperglycemia and hypoglycemia during admission  - will continue usual regimen on discharge    Physical Exam Performed:     /69   Pulse 84   Temp 97.8 °F (36.6 °C) (Oral)   Resp 16   Ht 5' 2\" (1.575 m)   Wt 151 lb 14.4 oz (68.9 kg)   SpO2 99%   BMI 27.78 kg/m²         General appearance:  No apparent distress, appears stated age and cooperative. HEENT:  Normal cephalic, atraumatic without obvious deformity. Pupils equal, round, and reactive to light. Extra ocular muscles intact. Conjunctivae/corneas clear. Dry oral mucosa  Neck: Supple, with full range of motion. No jugular venous distention. Trachea midline. Respiratory:  Normal respiratory effort. Clear to auscultation, bilaterally without Rales/Wheezes/Rhonchi. Cardiovascular:  Regular rate and rhythm with normal S1/S2 without murmurs, rubs or gallops. Abdomen: Soft, non-tender, non-distended with normal bowel sounds. Musculoskeletal:  No clubbing, cyanosis or edema bilaterally. Full range of motion without deformity. Skin: Skin color, texture, normal.  No rashes or lesions. Decreased skin turgor  Neurologic:  Neurovascularly intact without any focal sensory/motor deficits. Cranial nerves: II-XII intact, grossly non-focal.  Psychiatric:  Alert and oriented, thought content appropriate, normal insight  Capillary Refill: Brisk,< 3 seconds   Peripheral Pulses: +2 palpable, equal bilaterally       Labs:  For convenience and continuity at follow-up the following most recent labs are provided:      CBC:    Lab Results   Component Value Date    WBC 5.9 04/05/2019    HGB 9.4 04/05/2019    HCT 27.8 04/05/2019     04/05/2019       Renal:    Lab Results   Component Value Date     04/06/2019    K 4.8 04/06/2019     04/06/2019    CO2 21 04/06/2019    BUN 22 04/06/2019    CREATININE 1.3 04/06/2019    CALCIUM 8.2 04/06/2019    PHOS 3.8 09/12/2017 Significant Diagnostic Studies    Radiology:   No orders to display          Consults:     IP CONSULT TO HOSPITALIST  IP CONSULT TO NEPHROLOGY    Disposition:  home     Condition at Discharge: Stable    Discharge Instructions/Follow-up:  Follow up with PCP within 1 week    Code Status:  Full Code     Activity: activity as tolerated    Diet: diabetic diet      Discharge Medications:     Current Discharge Medication List           Details   Dulaglutide (TRULICITY SC) Inject into the skin      carvedilol (COREG) 3.125 MG tablet TAKE ONE (1) TABLET BY MOUTH TWICE DAILY WITH MEALS  Qty: 180 tablet, Refills: 3      gabapentin (NEURONTIN) 300 MG capsule Take 300 mg by mouth 3 times daily. .      clopidogrel (PLAVIX) 75 MG tablet TAKE 1 TABLET BY MOUTH ONCE DAILY  Qty: 30 tablet, Refills: 3      atorvastatin (LIPITOR) 80 MG tablet TAKE 1 TABLET BY MOUTH AT BEDTIME  Qty: 90 tablet, Refills: 0      ASPIRIN LOW DOSE 81 MG EC tablet TAKE 1 TABLET BY MOUTH ONCE DAILY  Qty: 30 tablet, Refills: 11      pantoprazole (PROTONIX) 40 MG tablet TAKE 1 TABLET BY MOUTH ONCE DAILY  Qty: 90 tablet, Refills: 3      insulin glargine (LANTUS) 100 UNIT/ML injection vial Inject 30 Units into the skin nightly  Qty: 1 vial, Refills: 3      pioglitazone (ACTOS) 45 MG tablet Take 45 mg by mouth daily      traZODone (DESYREL) 100 MG tablet Take 100 mg by mouth nightly      fluticasone (FLONASE) 50 MCG/ACT nasal spray 1 spray by Nasal route daily as needed. PARoxetine (PAXIL) 40 MG tablet Take 40 mg by mouth every morning. Time Spent on discharge is more than 20 minutes in the examination, evaluation, counseling and review of medications and discharge plan. Signed:    Dmitriy Bailey MD   4/6/2019      Thank you PRESTON Roberts - LC for the opportunity to be involved in this patient's care. If you have any questions or concerns please feel free to contact me at [phone].

## 2019-04-06 NOTE — CONSULTS
[facility]    Consult Note           Reason for Consult:  hyperkalemia  Requesting Physician:  Dr. Heidi Guardado    Chief Complaint:  Dizziness, abd pain, n/v    History of Present Illness on 4/6/19:    76 y.o. yo male with PMH of DM, CAD, CKD 2-3 ( followed by UC)  who is admitted for hyperkalemia   Pt presented to the PCP c/o dizziness, abd pain and nausea and was thought to have viral URI; labs showed k of 6.5 and cr of 1.6 and was instructed to come to the ED  He also had hypoglycemia episodes   He was medically treated with iv insulin, kayexalate times 2 and has improved  lisinopril has been stopped     Past Medical History:        Diagnosis Date    Arthritis     Ataxia 3/14/2014    BPH (benign prostatic hyperplasia)     FERGUSON IN PLACE    CAD (coronary artery disease)     Coronary atherosclerosis of native coronary artery 5/11/2013    Diabetes mellitus ([facility]     Environmental allergies     Hydronephrosis 3/14/2014    Hyperlipidemia     Hypertension     Kidney disease     Neuropathy     Parkinson's disease ([facility]     ?  S/P coronary artery stent placement x 4     x 4    Schizoaffective disorder, bipolar type [facility])        Past Surgical History:        Procedure Laterality Date    ANKLE SURGERY Right 06/09/2018    ORIF of R ankle     COLONOSCOPY N/A 12/13/2018    COLONOSCOPY CONTROL HEMORRHAGE [facility] by [facility]ylene Hale[facility] at [facility]      x 4    CYSTOSCOPY  4/4/14    transurethral vaporization of prostate    HERNIA REPAIR      X2    UPPER GASTROINTESTINAL ENDOSCOPY N/A 12/13/2018    EGD BIOPSY performed by Cherylene Hale, MD at [address] [facility] Medications:    No current facility-administered medications on file prior to encounter.       Current Outpatient Medications on File Prior to Encounter   Medication Sig Dispense Refill    Dulaglutide (TRULICITY SC) Inject into the skin      carvedilol (COREG) 3.125 MG tablet TAKE ONE (1) TABLET BY MOUTH TWICE DAILY WITH MEALS 180 tablet 3    gabapentin (NEURONTIN) 300 MG capsule Take 300 mg by mouth 3 times daily. Sushant Hylton clopidogrel (PLAVIX) 75 MG tablet TAKE 1 TABLET BY MOUTH ONCE DAILY 30 tablet 3    atorvastatin (LIPITOR) 80 MG tablet TAKE 1 TABLET BY MOUTH AT BEDTIME 90 tablet 0    ASPIRIN LOW DOSE 81 MG EC tablet TAKE 1 TABLET BY MOUTH ONCE DAILY 30 tablet 11    pantoprazole (PROTONIX) 40 MG tablet TAKE 1 TABLET BY MOUTH ONCE DAILY 90 tablet 3    insulin glargine (LANTUS) 100 UNIT/ML injection vial Inject 30 Units into the skin nightly 1 vial 3    pioglitazone (ACTOS) 45 MG tablet Take 45 mg by mouth daily      traZODone (DESYREL) 100 MG tablet Take 100 mg by mouth nightly      fluticasone (FLONASE) 50 MCG/ACT nasal spray 1 spray by Nasal route daily as needed.  PARoxetine (PAXIL) 40 MG tablet Take 40 mg by mouth every morning. Allergies:  Patient has no known allergies.     Social History:    Social History     Socioeconomic History    Marital status:      Spouse name: Not on file    Number of children: Not on file    Years of education: Not on file    Highest education level: Not on file   Occupational History    Not on file   Social Needs    Financial resource strain: Not on file    Food insecurity:     Worry: Not on file     Inability: Not on file    Transportation needs:     Medical: Not on file     Non-medical: Not on file   Tobacco Use    Smoking status: Former Smoker     Packs/day: 1.00     Years: 25.00     Pack years: 25.00     Types: Cigarettes     Last attempt to quit: 5/10/2013     Years since quittin.9    Smokeless tobacco: Never Used   Substance and Sexual Activity    Alcohol use: No    Drug use: No    Sexual activity: Not Currently   Lifestyle    Physical activity:     Days per week: Not on file     Minutes per session: Not on file    Stress: Not on file   Relationships    Social connections:     Talks on phone: Not on file     Gets together: Not on file     Attends Hindu service: Not on file     Active member of club or organization: Not on file     Attends meetings of clubs or organizations: Not on file     Relationship status: Not on file    Intimate partner violence:     Fear of current or ex partner: Not on file     Emotionally abused: Not on file     Physically abused: Not on file     Forced sexual activity: Not on file   Other Topics Concern    Not on file   Social History Narrative    Not on file       Family History:   Family History   Problem Relation Age of Onset    Other Mother         CEREBRAL PALSY    Heart Disease Father         CHF       Review of Systems:   Pertinent positives stated above in HPI. All other 10 systems were reviewed and were negative.      Physical exam:   Constitutional:  VITALS:  /69   Pulse 84   Temp 97.8 °F (36.6 °C) (Oral)   Resp 16   Ht 5' 2\" (1.575 m)   Wt 151 lb 14.4 oz (68.9 kg)   SpO2 99%   BMI 27.78 kg/m²   Gen: alert, awake, nad  Skin: no rash, turgor wnl  Heent:  eomi, mmm  Neck: no bruits or jvd noted, thyroid normal  Cardiovascular:  S1, S2 without m/r/g  Respiratory: CTA B without w/r/r; respiratory effort normal  Abdomen:  +bs, soft, nt, nd, no hepatosplenomegaly  Ext: no lower extremity edema  Neuro/Psy: AAoriented times 3 ; moves all 4 ext  Musculoskeletal:  Rom, muscular strength intact; digits, nails normal    Data/  Recent Labs     04/05/19  1316   WBC 5.9   HGB 9.4*   HCT 27.8*   MCV 89.3        Recent Labs     04/05/19  1316 04/05/19  2220 04/06/19  0653   * 133* 139   K 6.3* 6.5* 4.8    104 112*   CO2 20* 20* 21   GLUCOSE 289* 289* 150*   MG 1.90  --   --    BUN 29* 25* 22*   CREATININE 1.7* 1.4* 1.3   LABGLOM 40* 50* 55*   GFRAA 49* >60 >60    chaparro 39 cr 21.5    Assessment  -OCTAVIO on CKD in the setting likely volume depletion from n/v and decreased po with abd pain, improved with ivf.  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 Please do not hesitate to call with questions.     Juvencio Baker  [facility]  Office: [phone]  Fax:    [phone]

## 2019-05-01 ENCOUNTER — TELEPHONE (OUTPATIENT)
Dept: CARDIOLOGY CLINIC | Age: 69
End: 2019-05-01

## 2019-05-01 NOTE — TELEPHONE ENCOUNTER
Pt called stating that he has been struggling with epigastric pain and difficulty eating. He states that he was told that he has a hiatal hernia years ago but never had this treated. He states the pain is the same as what he felt prior to being admitted for hypokalemia on 4/5/2019. He has been unable to eat. The pain is non-exertional, does not radiate, and is only exacerbated by eating. Pt sounded anxious and slightly short of breath on the phone. I advised him to contact his Gastroenterology to discuss his reported hiatal hernia, but to also proceed to the ER for evaluation as he sounded to be in slight distress. He was agreeable.

## 2019-05-02 ENCOUNTER — TELEPHONE (OUTPATIENT)
Dept: GASTROENTEROLOGY | Age: 69
End: 2019-05-02

## 2019-05-02 DIAGNOSIS — R13.19 ESOPHAGEAL DYSPHAGIA: Primary | ICD-10-CM

## 2019-05-02 NOTE — TELEPHONE ENCOUNTER
Ok to schedule egd with new dysphagia if patient agrees. He did not have this symptom when we did his egd 5 months ago. He is also still anemic so may need additional workup.

## 2019-05-02 NOTE — PROGRESS NOTES
Obstructive Sleep Apnea (CATALINO) Screening     Patient:  Donnamaria Nissen    YOB: 1950      Medical Record #:  [id number]                     Date:  5/2/2019     1. Are you a loud and/or regular snorer? []  Yes       [x] No    2. Have you been observed to gasp or stop breathing during sleep? []  Yes       [x] No    3. Do you feel tired or groggy upon awakening or do you awaken with a headache?           []  Yes       [] No    4. Are you often tired or fatigued during the wake time hours? []  Yes       [] No    5. Do you fall asleep sitting, reading, watching TV or driving? []  Yes       [] No    6. Do you often have problems with memory or concentration? []  Yes       [] No    **If patient's score is ? 3 they are considered high risk for CATALINO. Notify the anesthesiologist of the high risk and document in focus note. Note:  If the patient's BMI is more than 35 kg m¯² , has neck circumference > 40 cm, and/or high blood pressure the risk is greater (© American Sleep [employer]nea Association, 2006).

## 2019-05-02 NOTE — TELEPHONE ENCOUNTER
Pt called stating that he has been struggling with epigastric pain and difficulty eating. He states that he was told that he has a hiatal hernia years ago but never had this treated. He states the pain is the same as what he felt prior to being admitted for hypokalemia on 4/5/2019. He has been unable to eat. The pain is non-exertional, does not radiate, and is only exacerbated by eating.  He called cardio yesterday and was told to go to the ER if it gets worse or call here

## 2019-05-03 ENCOUNTER — ANESTHESIA (OUTPATIENT)
Dept: ENDOSCOPY | Age: 69
End: 2019-05-03
Payer: COMMERCIAL

## 2019-05-03 ENCOUNTER — HOSPITAL ENCOUNTER (OUTPATIENT)
Age: 69
Setting detail: OUTPATIENT SURGERY
Discharge: HOME | End: 2019-05-03
Attending: INTERNAL MEDICINE | Admitting: INTERNAL MEDICINE
Payer: COMMERCIAL

## 2019-05-03 ENCOUNTER — ANESTHESIA EVENT (OUTPATIENT)
Dept: ENDOSCOPY | Age: 69
End: 2019-05-03
Payer: COMMERCIAL

## 2019-05-03 VITALS — DIASTOLIC BLOOD PRESSURE: 67 MMHG | SYSTOLIC BLOOD PRESSURE: 158 MMHG | OXYGEN SATURATION: 100 %

## 2019-05-03 VITALS
HEART RATE: 83 BPM | RESPIRATION RATE: 15 BRPM | TEMPERATURE: 97 F | DIASTOLIC BLOOD PRESSURE: 83 MMHG | WEIGHT: 145 LBS | OXYGEN SATURATION: 98 % | BODY MASS INDEX: 25.69 KG/M2 | SYSTOLIC BLOOD PRESSURE: 148 MMHG | HEIGHT: 63 IN

## 2019-05-03 DIAGNOSIS — D50.0 IRON DEFICIENCY ANEMIA DUE TO CHRONIC BLOOD LOSS: Primary | ICD-10-CM

## 2019-05-03 LAB
GLUCOSE BLD-MCNC: 154 MG/DL (ref 70–99)
PERFORMED ON: ABNORMAL

## 2019-05-03 PROCEDURE — 7100000011 HC PHASE II RECOVERY - ADDTL 15 MIN: Performed by: INTERNAL MEDICINE

## 2019-05-03 PROCEDURE — 7100000010 HC PHASE II RECOVERY - FIRST 15 MIN: Performed by: INTERNAL MEDICINE

## 2019-05-03 PROCEDURE — 43450 DILATE ESOPHAGUS 1/MULT PASS: CPT | Performed by: INTERNAL MEDICINE

## 2019-05-03 PROCEDURE — 3609017100 HC EGD: Performed by: INTERNAL MEDICINE

## 2019-05-03 PROCEDURE — 6360000002 HC RX W HCPCS: Performed by: NURSE ANESTHETIST, CERTIFIED REGISTERED

## 2019-05-03 PROCEDURE — 2500000003 HC RX 250 WO HCPCS: Performed by: NURSE ANESTHETIST, CERTIFIED REGISTERED

## 2019-05-03 PROCEDURE — 43235 EGD DIAGNOSTIC BRUSH WASH: CPT | Performed by: INTERNAL MEDICINE

## 2019-05-03 PROCEDURE — 2580000003 HC RX 258: Performed by: INTERNAL MEDICINE

## 2019-05-03 PROCEDURE — 2709999900 HC NON-CHARGEABLE SUPPLY: Performed by: INTERNAL MEDICINE

## 2019-05-03 PROCEDURE — 3700000000 HC ANESTHESIA ATTENDED CARE: Performed by: INTERNAL MEDICINE

## 2019-05-03 RX ORDER — OXYCODONE HYDROCHLORIDE AND ACETAMINOPHEN 5; 325 MG/1; MG/1
2 TABLET ORAL PRN
Status: DISCONTINUED | OUTPATIENT
Start: 2019-05-03 | End: 2019-05-03 | Stop reason: HOSPADM

## 2019-05-03 RX ORDER — MORPHINE SULFATE 2 MG/ML
2 INJECTION, SOLUTION INTRAMUSCULAR; INTRAVENOUS EVERY 5 MIN PRN
Status: DISCONTINUED | OUTPATIENT
Start: 2019-05-03 | End: 2019-05-03 | Stop reason: HOSPADM

## 2019-05-03 RX ORDER — FERROUS SULFATE 325(65) MG
325 TABLET ORAL 2 TIMES DAILY
Qty: 60 TABLET | Refills: 11 | Status: SHIPPED | OUTPATIENT
Start: 2019-05-03

## 2019-05-03 RX ORDER — LIDOCAINE HYDROCHLORIDE 20 MG/ML
INJECTION, SOLUTION INFILTRATION; PERINEURAL PRN
Status: DISCONTINUED | OUTPATIENT
Start: 2019-05-03 | End: 2019-05-03 | Stop reason: SDUPTHER

## 2019-05-03 RX ORDER — OMEPRAZOLE 20 MG/1
20 CAPSULE, DELAYED RELEASE ORAL DAILY
Qty: 30 CAPSULE | Refills: 11 | Status: ON HOLD | OUTPATIENT
Start: 2019-05-03 | End: 2019-05-30 | Stop reason: HOSPADM

## 2019-05-03 RX ORDER — ONDANSETRON 2 MG/ML
4 INJECTION INTRAMUSCULAR; INTRAVENOUS PRN
Status: DISCONTINUED | OUTPATIENT
Start: 2019-05-03 | End: 2019-05-03 | Stop reason: HOSPADM

## 2019-05-03 RX ORDER — HYDRALAZINE HYDROCHLORIDE 20 MG/ML
5 INJECTION INTRAMUSCULAR; INTRAVENOUS EVERY 10 MIN PRN
Status: DISCONTINUED | OUTPATIENT
Start: 2019-05-03 | End: 2019-05-03 | Stop reason: HOSPADM

## 2019-05-03 RX ORDER — MORPHINE SULFATE 2 MG/ML
1 INJECTION, SOLUTION INTRAMUSCULAR; INTRAVENOUS EVERY 5 MIN PRN
Status: DISCONTINUED | OUTPATIENT
Start: 2019-05-03 | End: 2019-05-03 | Stop reason: HOSPADM

## 2019-05-03 RX ORDER — OXYCODONE HYDROCHLORIDE AND ACETAMINOPHEN 5; 325 MG/1; MG/1
1 TABLET ORAL PRN
Status: DISCONTINUED | OUTPATIENT
Start: 2019-05-03 | End: 2019-05-03 | Stop reason: HOSPADM

## 2019-05-03 RX ORDER — LABETALOL HYDROCHLORIDE 5 MG/ML
5 INJECTION, SOLUTION INTRAVENOUS EVERY 10 MIN PRN
Status: DISCONTINUED | OUTPATIENT
Start: 2019-05-03 | End: 2019-05-03 | Stop reason: HOSPADM

## 2019-05-03 RX ORDER — MEPERIDINE HYDROCHLORIDE 50 MG/ML
12.5 INJECTION INTRAMUSCULAR; INTRAVENOUS; SUBCUTANEOUS EVERY 5 MIN PRN
Status: DISCONTINUED | OUTPATIENT
Start: 2019-05-03 | End: 2019-05-03 | Stop reason: HOSPADM

## 2019-05-03 RX ORDER — DIPHENHYDRAMINE HYDROCHLORIDE 50 MG/ML
12.5 INJECTION INTRAMUSCULAR; INTRAVENOUS
Status: DISCONTINUED | OUTPATIENT
Start: 2019-05-03 | End: 2019-05-03 | Stop reason: HOSPADM

## 2019-05-03 RX ORDER — PROMETHAZINE HYDROCHLORIDE 25 MG/ML
6.25 INJECTION, SOLUTION INTRAMUSCULAR; INTRAVENOUS
Status: DISCONTINUED | OUTPATIENT
Start: 2019-05-03 | End: 2019-05-03 | Stop reason: HOSPADM

## 2019-05-03 RX ORDER — PROPOFOL 10 MG/ML
INJECTION, EMULSION INTRAVENOUS PRN
Status: DISCONTINUED | OUTPATIENT
Start: 2019-05-03 | End: 2019-05-03 | Stop reason: SDUPTHER

## 2019-05-03 RX ORDER — SODIUM CHLORIDE 9 MG/ML
INJECTION, SOLUTION INTRAVENOUS CONTINUOUS
Status: DISCONTINUED | OUTPATIENT
Start: 2019-05-03 | End: 2019-05-03 | Stop reason: HOSPADM

## 2019-05-03 RX ADMIN — SODIUM CHLORIDE: 9 INJECTION, SOLUTION INTRAVENOUS at 09:16

## 2019-05-03 RX ADMIN — LIDOCAINE HYDROCHLORIDE 60 MG: 20 INJECTION, SOLUTION INFILTRATION; PERINEURAL at 09:54

## 2019-05-03 RX ADMIN — PROPOFOL 130 MG: 10 INJECTION, EMULSION INTRAVENOUS at 09:54

## 2019-05-03 ASSESSMENT — PAIN - FUNCTIONAL ASSESSMENT: PAIN_FUNCTIONAL_ASSESSMENT: 0-10

## 2019-05-03 ASSESSMENT — PAIN SCALES - GENERAL
PAINLEVEL_OUTOF10: 0

## 2019-05-03 NOTE — H&P
[facility] [facility],  [facility], [address] [place] [address]  Phone: [phone]     CHIEF COMPLAINT           Chief Complaint   Patient presents with    Establish Care       Esophageal dysphagia      HPI      Thank you PRESTON Saavedra CNP for asking me to see Luis Franco in consultation. He is a  [2] White [1] 79 y.o. . male seen independently who presents with the following GI complaints:  . Luis Franco called with new onset esophageal dysphagia and thus presents for EGD. Also has heartburn and has stopped his reflux medication. Also no longer taking iron. 11/30/2018  Is referred for second opinion for his chronic normocytic anemia documented since at least 2014. Other cell lines ok. On chronic iron. Labs ordered this visit show iron studies, folate and B12 all within normal levels. He had had panendoscopy at [facility]. They hint an EGD and colonoscopy (no available for review) being unremarkable on his capsule endoscopy report which showed possible small bowel and cecal AVMs. Denies overt bleeding. Most recent bmp Cr 1.6 and he is followed by nephrology. Anemia felt secondary to CKD. Plavix was held recently because anemia. Blood count did not change while off plavix. The is documented hematuria.     HPI elements: location, severity, timing, modifying factors, quality, duration, context and associated signs/symptoms. EGD 12/13/2018 - normal  Colonoscopy 12/13/2018 - avm cecum and ascending colon treated with APC  Capsule endoscopy: 5/10/2017 small bowel and cecal avm's? Without bleeding     Review of available records reveals:       Wt Readings from Last 50 Encounters:   11/30/18 148 lb 9.6 oz (67.4 kg)   11/26/18 147 lb 9.6 oz (67 kg)   11/09/18 140 lb (63.5 kg)   06/13/18 152 lb 8.9 oz (69.2 kg)   04/30/18 143 lb (64.9 kg)   01/18/18 145 lb (65.8 kg)   10/30/17 148 lb (67.1 kg)   09/12/17 143 lb 6.4 oz (65 kg)   08/29/17 148 lb (67.1 kg) DOSE 81 MG EC tablet TAKE 1 TABLET BY MOUTH ONCE DAILY 30 tablet 11    pantoprazole (PROTONIX) 40 MG tablet TAKE 1 TABLET BY MOUTH ONCE DAILY 90 tablet 3    insulin glargine (LANTUS) 100 UNIT/ML injection vial Inject 30 Units into the skin nightly 1 vial 3    lisinopril (PRINIVIL;ZESTRIL) 20 MG tablet Take 1 tablet by mouth daily 30 tablet 0    gabapentin (NEURONTIN) 100 MG capsule Take 2 capsules by mouth daily (Patient taking differently: Take 100 mg by mouth 3 times daily. Susy Justin ) 90 capsule 3    gabapentin (NEURONTIN) 400 MG capsule Take 1 capsule by mouth nightly 90 capsule 3    pioglitazone (ACTOS) 45 MG tablet Take 45 mg by mouth daily        SITagliptin (JANUVIA) 100 MG tablet Take 100 mg by mouth daily        traZODone (DESYREL) 100 MG tablet Take 100 mg by mouth nightly        fluticasone (FLONASE) 50 MCG/ACT nasal spray 1 spray by Nasal route daily as needed.        PARoxetine (PAXIL) 40 MG tablet Take 40 mg by mouth every morning.             ALLERGIES   No Known Allergies  IMMUNIZATIONS           Immunization History   Administered Date(s) Administered    Influenza Virus Vaccine 10/10/2014    Pneumococcal 13-valent Conjugate ([id number]) 09/11/2017      REVIEW OF SYSTEMS   See HPI for further details and pertinent postiives. Negative for the following:  Constitutional: Negative for weight change. Negative for appetite change and fatigue. HENT: Negative for nosebleeds, sore throat, mouth sores, and voice change. Respiratory: Negative for cough, choking and chest tightness. Cardiovascular: Negative for chest pain   Gastrointestinal: See HPI  Musculoskeletal: Negative for arthralgias. Skin: Negative for pallor. Neurological: Negative for weakness and light-headedness. Hematological: Negative for adenopathy. Does not bruise/bleed easily. Psychiatric/Behavioral: Negative for suicidal ideas.    PHYSICAL EXAM   VITAL SIGNS: BP (!) 146/72   Ht 5' 3.5\" (1.613 m)   Wt 148 lb 9.6 oz (67.4 kg) BMI 25.91 kg/m²       Wt Readings from Last 3 Encounters:   11/30/18 148 lb 9.6 oz (67.4 kg)   11/26/18 147 lb 9.6 oz (67 kg)   11/09/18 140 lb (63.5 kg)      Constitutional: Well developed, Well nourished, No acute distress, Non-toxic appearance. unkempt  HENT: Normocephalic, Atraumatic, Bilateral external ears normal, Oropharynx moist, No oral exudates, Nose normal.   Eyes: Conjunctiva normal, No discharge. Neck: Normal range of motion, No tenderness, Supple, No stridor. Lymphatic: No cervical, subclavian, or axillary lymphadenopathy. Cardiovascular: Normal heart rate, Normal rhythm, No murmurs, No rubs, No gallops. Thorax & Lungs: Normal breath sounds, No respiratory distress, No wheezing, No chest tenderness. No gynecomastia. Abdomen: scars consistent with stated surgeries, no hernias, no HSM, soft NTND   Rectal:  Deferred. Skin: Warm, Dry, No erythema, No rash. No bruising. No spider hemangiomas. Back: No tenderness, No CVA tenderness. Lower Extremities: Intact distal pulses, No edema, No tenderness, No cyanosis, No clubbing. Neurologic: Alert & oriented x 3, Normal motor function, Normal sensory function, No focal deficits noted. No asterixis.   RADIOLOGY/PROCEDURES         FINAL IMPRESSION             Orders Placed This Encounter   Procedures    COLONOSCOPY W/ OR W/O BIOPSY       Standing Status:   Future       Standing Expiration Date:   11/30/2019       Scheduling Instructions:         With diprivan       Order Specific Question:   Screening or Diagnostic?       Answer:   Diagnostic    Iron and TIBC       Standing Status:   Future       Number of Occurrences:   1       Standing Expiration Date:   11/30/2019       Order Specific Question:   Is Patient Fasting?       Answer:   no       Order Specific Question:   No of Hours?       Answer:   8    FERRITIN       Standing Status:   Future       Number of Occurrences:   1       Standing Expiration Date:   11/30/2019    Vitamin B12 & Folate       Standing Status:   Future       Number of Occurrences:   1       Standing Expiration Date:   11/30/2019    EGD       Standing Status:   Future       Standing Expiration Date:   12/30/2018       Order Specific Question:   Screening or Diagnostic?       Answer:   Diagnostic      Thi Baron was seen today for establish care.     Diagnoses and all orders for this visit:     Anemia, unspecified type, heartburn, esophageal dysphagia  -     EGD; Future  -     COLONOSCOPY W/ OR W/O BIOPSY; Future  -     bisacodyl (DULCOLAX) 5 MG EC tablet; Take 1 tablet by mouth daily as needed for Constipation  -     polyethylene glycol (MIRALAX) POWD powder; Take 255 g by mouth daily Take as directed for colonoscopy  -     Iron and TIBC; Future  -     FERRITIN; Future  -     Vitamin B12 & Folate; Future     He has presumed sandra related to chronic gi blood loss secondary to small bowel and cecal AVM's in combination with CKD where they are more common. AVMs are more likely to bleed on plavix and his cbc may have improved 2g while off plavix? Will see if any are treatable by repeating bidirectional endoscopy. Should continue indifinite iron therapy. Likely ok to resume plavix after scopes and observe Hg if benefits greater then risks.   ORDERED FUTURE/PENDING TESTS      Lab Frequency Next Occurrence   Lipid Panel Once 03/01/2019         FOLLOWUP   EGD      Bernard 40 5/3/19 9:40 AM

## 2019-05-03 NOTE — PROGRESS NOTES
POCT      Blood Glucose:      (Normal Range 70-99)   154    PT:      (Normal Range 9.8 - 13)    INR:      (Normal Range 0.86-1.14)

## 2019-05-03 NOTE — OP NOTE
associated stricture. -The esophagus was dilated with a 54F Cassidy with mild resistance and no heme.  -small amount of food in the stomach which could indicated gastroparesis    Specimens: Was Not Obtained    Complications:   None; patient tolerated the procedure well. Disposition:   PACU - hemodynamically stable. Estimated Blood loss:  none    Impression:   -See post-procedure diagnoses. Recommendations:  -Acid suppression with a proton pump inhibitor. Prilosec for 1 year sent  -blood work/cbc in 6 weeks recommended and ordered. -iron sulfate twice a day recommended and ordered. -Monitor response to esophageal dilation. Follow up recommended if dilation did not help or it recurs.         Bernard 40 5/3/19 10:07 AM

## 2019-05-03 NOTE — ANESTHESIA POSTPROCEDURE EVALUATION
Department of Anesthesiology  Postprocedure Note    Patient: Jessica Siegel  MRN: [id number]  YOB: 1950  Date of evaluation: 5/3/2019  Time:  10:09 AM     Procedure Summary     Date:  05/03/19 Room / Location:  [facility]    Anesthesia Start:  2577 Anesthesia Stop:  6187    Procedure:  EGD WITH ANESTHESIA (N/A ) Diagnosis:       Dysphagia, unspecified type      (DYSPHAGIA)    Surgeon:  Edilberto Nguyen MD Responsible Provider:  Dilan Sue MD    Anesthesia Type:  general ASA Status:  2          Anesthesia Type: general    Wendy Phase I: Wendy Score: 10    Wendy Phase II: Wendy Score: 7    Last vitals: Reviewed and per EMR flowsheets. Anesthesia Post Evaluation    Patient location during evaluation: PACU  Patient participation: complete - patient participated  Level of consciousness: awake  Airway patency: patent  Nausea & Vomiting: no nausea and no vomiting  Complications: no  Cardiovascular status: blood pressure returned to baseline  Respiratory status: acceptable  Hydration status: euvolemic  Comments: Vital signs stable upon transfer to Stage 2 recovery.

## 2019-05-03 NOTE — PROGRESS NOTES
Discharged in no distress to be accompanied to passenger side of car with family sent home in taxi via voucher due to patient had no way to get home . Assessment unchanged. Patient denies pain.

## 2019-05-03 NOTE — ANESTHESIA PRE PROCEDURE
Department of Anesthesiology  Preprocedure Note       Name:  Christopher Park   Age:  76 y.o.  :  1950                                          MRN:  [id number]         Date:  5/3/2019      Surgeon: Denise Landin):  Trina Luo MD    Procedure: EGD WITH ANESTHESIA (N/A )    Medications prior to admission:   Prior to Admission medications    Medication Sig Start Date End Date Taking? Authorizing Provider   Dulaglutide (TRULICITY SC) Inject 1.5 mg into the skin once a week    Yes Historical Provider, MD   carvedilol (COREG) 3.125 MG tablet TAKE ONE (1) TABLET BY MOUTH TWICE DAILY WITH MEALS 19  Yes Scotty Ng MD   gabapentin (NEURONTIN) 300 MG capsule Take 300 mg by mouth 3 times daily. .   Yes Historical Provider, MD   clopidogrel (PLAVIX) 75 MG tablet TAKE 1 TABLET BY MOUTH ONCE DAILY 18  Yes Sebastián Preciado MD   atorvastatin (LIPITOR) 80 MG tablet TAKE 1 TABLET BY MOUTH AT BEDTIME 10/31/18  Yes Scotty Ng MD   ASPIRIN LOW DOSE 81 MG EC tablet TAKE 1 TABLET BY MOUTH ONCE DAILY 18  Yes Scotty Ng MD   pantoprazole (PROTONIX) 40 MG tablet TAKE 1 TABLET BY MOUTH ONCE DAILY 18  Yes Scotty Ng MD   insulin glargine (LANTUS) 100 UNIT/ML injection vial Inject 30 Units into the skin nightly 18  Yes PRESTON Bhakta - CNP   pioglitazone (ACTOS) 45 MG tablet Take 45 mg by mouth daily 17  Yes Historical Provider, MD   traZODone (DESYREL) 100 MG tablet Take 100 mg by mouth nightly   Yes Historical Provider, MD   fluticasone (FLONASE) 50 MCG/ACT nasal spray 1 spray by Nasal route daily as needed.     Historical Provider, MD       Current medications:    Current Facility-Administered Medications   Medication Dose Route Frequency Provider Last Rate Last Dose    0.9 % sodium chloride infusion   Intravenous Continuous Trina Luo MD 30 mL/hr at 19 4914         Allergies:  No Known Allergies    Problem List:    Patient Active Problem List Diagnosis Code    DM2 (diabetes mellitus, type 2) ([facility] E11.9    Dizziness R42    Hematuria R31.9    Hyponatremia E87.1    ARF (acute renal failure) ([facility]) N17.9    S/P coronary artery stent placement Z95.5    S/P coronary artery stent placement x 4 Z95.5    Orthostatic hypotension I95.1    Acute posthemorrhagic anemia D62    Contusion, knee S80.00XA    DM hyperosmolarity type II, uncontrolled ([facility]) E11.00, E11.65    Coronary artery disease involving native coronary artery of native heart without angina pectoris I25.10    HTN (hypertension) I10    Chest pain R07.9    Ischemic chest pain I25.9    Near syncope R55    CKD (chronic kidney disease), stage III ([facility]) N18.3    Anemia D64.9    CHF (congestive heart failure) ([facility]) I50.9    DM (diabetes mellitus) ([facility]) E11.9    Syncope R55    Closed trimalleolar fracture of right ankle S82.851A    Closed fracture of fifth metacarpal bone of left hand S62.307A    Closed nondisplaced fracture of fifth metatarsal bone of left foot S92.355A    Hypokalemia E87.6    Hyperkalemia E87.5    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 Q27.33    OCTAVIO (acute kidney injury) ([facility] N17.9       Past Medical History:        Diagnosis Date    Arthritis     Ataxia 3/14/2014    BPH (benign prostatic hyperplasia)     CAD (coronary artery disease)     4 stents    Coronary atherosclerosis of native coronary artery 5/11/2013    Diabetes mellitus ([facility]     Environmental allergies     Hepatitis     Hydronephrosis 3/14/2014    Hyperlipidemia     Hypertension     Kidney disease     Neuropathy     Parkinson's disease ([facility]     ?     S/P coronary artery stent placement x 4     x 4    Schizoaffective disorder, bipolar type [facility])        Past Surgical History:        Procedure Laterality Date    ANKLE SURGERY Right 06/09/2018    ORIF of R ankle     COLONOSCOPY N/A 12/13/2018    COLONOSCOPY CONTROL HEMORRHAGE performed by Minal Remy MD at [facility]    CORONARY ANGIOPLASTY      CORONARY ANGIOPLASTY WITH STENT PLACEMENT      x 4    CYSTOSCOPY  14    transurethral vaporization of prostate    HERNIA REPAIR      X2    UPPER GASTROINTESTINAL ENDOSCOPY N/A 2018    EGD BIOPSY performed by Felix Ceja MD at [address]         Social History:    Social History     Tobacco Use    Smoking status: Former Smoker     Packs/day: 1.00     Years: 25.00     Pack years: 25.00     Types: Cigarettes     Last attempt to quit: 5/10/2013     Years since quittin.9    Smokeless tobacco: Never Used   Substance Use Topics    Alcohol use: No                                Counseling given: Not Answered      Vital Signs (Current):   Vitals:    19 1455 19 0854   BP:  (!) 144/77   Pulse:  97   Resp:  16   Temp:  97 °F (36.1 °C)   TempSrc:  Temporal   SpO2:  99%   Weight: 145 lb (65.8 kg) 145 lb (65.8 kg)   Height: 5' 3\" (1.6 m) 5' 3\" (1.6 m)                                              BP Readings from Last 3 Encounters:   19 (!) 144/77   19 (!) 147/65   18 (!) 117/56       NPO Status: Time of last liquid consumption:                         Time of last solid consumption:                         Date of last liquid consumption: 19                        Date of last solid food consumption: 19    BMI:   Wt Readings from Last 3 Encounters:   19 145 lb (65.8 kg)   19 151 lb 14.4 oz (68.9 kg)   18 148 lb (67.1 kg)     Body mass index is 25.69 kg/m².     CBC:   Lab Results   Component Value Date    WBC 5.9 2019    RBC 3.11 2019    HGB 9.4 2019    HCT 27.8 2019    MCV 89.3 2019    RDW 15.2 2019     2019       CMP:   Lab Results   Component Value Date     2019    K 4.8 2019     2019    CO2 21 2019    BUN 22 2019    CREATININE 1.3 2019    GFRAA >60 2019 GFRAA >60 05/11/2013    AGRATIO 1.6 04/06/2019    LABGLOM 55 04/06/2019    GLUCOSE 150 04/06/2019    PROT 5.7 04/06/2019    CALCIUM 8.2 04/06/2019    BILITOT <0.2 04/06/2019    ALKPHOS 107 04/06/2019    AST 18 04/06/2019    ALT 19 04/06/2019       POC Tests:   Recent Labs     05/03/19  0905   POCGLU 154*       Coags:   Lab Results   Component Value Date    PROTIME 11.6 06/08/2018    INR 1.02 06/08/2018    APTT 27.3 10/08/2014       HCG (If Applicable): No results found for: PREGTESTUR, PREGSERUM, HCG, HCGQUANT     ABGs: No results found for: PHART, PO2ART, GXR9QFE, WLR9HOW, BEART, K3ZLDMXA     Type & Screen (If Applicable):  No results found for: LABABO, [address]    Anesthesia Evaluation  Patient summary reviewed and Nursing notes reviewed  Airway: Mallampati: II  TM distance: >3 FB   Neck ROM: full  Mouth opening: > = 3 FB Dental:          Pulmonary:Negative Pulmonary ROS and normal exam                               Cardiovascular:    (+) hypertension: no interval change, CAD: non-obstructive, CHF: no interval change,       ECG reviewed        Stress test reviewed       Beta Blocker:  Dose within 24 Hrs      ROS comment: Cardiac stents x 4 in 2013    Stress test 2017:     Summary   Normal myocardial perfusion study.   There is normal perfusion at rest and stress.   There is no inducible ischemia or infarct.   There are no regional wall motion abnormalities.   Normal left ventricular systolic function with ejection fraction of 64 %. Neuro/Psych:   (+) psychiatric history:            GI/Hepatic/Renal:   (+) liver disease:,           Endo/Other:    (+) Diabetes, . Abdominal:           Vascular: negative vascular ROS. Anesthesia Plan      general     ASA 2       Induction: intravenous. Anesthetic plan and risks discussed with patient. Plan discussed with CRNA.                   Lilia Lara MD   5/3/2019

## 2019-05-03 NOTE — PROGRESS NOTES
Patients pre op sugar 154 patient states will resume medication and monitor at home and states no need to recheck blood sugar here.

## 2019-05-03 NOTE — PROGRESS NOTES
Discharge instructions reviewed with patient and responsible adult. Discharge instructions signed and copy given with no additional questions. Patient to be discharged home with belongings. Glasses given to patient.

## 2019-05-26 ENCOUNTER — APPOINTMENT (OUTPATIENT)
Dept: GENERAL RADIOLOGY | Age: 69
DRG: 563 | End: 2019-05-26
Payer: COMMERCIAL

## 2019-05-26 ENCOUNTER — HOSPITAL ENCOUNTER (INPATIENT)
Age: 69
LOS: 3 days | Discharge: NURSING, REHAB OR LONG-TERM CARE | DRG: 563 | End: 2019-05-30
Attending: INTERNAL MEDICINE | Admitting: INTERNAL MEDICINE
Payer: COMMERCIAL

## 2019-05-26 DIAGNOSIS — S92.325A CLOSED NONDISPLACED FRACTURE OF SECOND METATARSAL BONE OF LEFT FOOT, INITIAL ENCOUNTER: ICD-10-CM

## 2019-05-26 DIAGNOSIS — N28.9 RENAL INSUFFICIENCY: ICD-10-CM

## 2019-05-26 DIAGNOSIS — S92.335A CLOSED NONDISPLACED FRACTURE OF THIRD METATARSAL BONE OF LEFT FOOT, INITIAL ENCOUNTER: ICD-10-CM

## 2019-05-26 DIAGNOSIS — S92.345A CLOSED NONDISPLACED FRACTURE OF FOURTH METATARSAL BONE OF LEFT FOOT, INITIAL ENCOUNTER: ICD-10-CM

## 2019-05-26 DIAGNOSIS — S90.32XA TRAUMATIC ECCHYMOSIS OF LEFT FOOT, INITIAL ENCOUNTER: ICD-10-CM

## 2019-05-26 DIAGNOSIS — M79.672 LEFT FOOT PAIN: ICD-10-CM

## 2019-05-26 DIAGNOSIS — S92.312A CLOSED DISPLACED FRACTURE OF FIRST METATARSAL BONE OF LEFT FOOT, INITIAL ENCOUNTER: Primary | ICD-10-CM

## 2019-05-26 DIAGNOSIS — R73.9 HYPERGLYCEMIA: ICD-10-CM

## 2019-05-26 PROBLEM — S92.309A: Status: ACTIVE | Noted: 2019-05-26

## 2019-05-26 LAB
ANION GAP SERPL CALCULATED.3IONS-SCNC: 10 MMOL/L (ref 3–16)
BASOPHILS ABSOLUTE: 0 K/UL (ref 0–0.2)
BASOPHILS RELATIVE PERCENT: 0.6 %
BUN BLDV-MCNC: 28 MG/DL (ref 7–20)
CALCIUM SERPL-MCNC: 8.7 MG/DL (ref 8.3–10.6)
CHLORIDE BLD-SCNC: 101 MMOL/L (ref 99–110)
CO2: 22 MMOL/L (ref 21–32)
CREAT SERPL-MCNC: 1.6 MG/DL (ref 0.8–1.3)
EOSINOPHILS ABSOLUTE: 0.1 K/UL (ref 0–0.6)
EOSINOPHILS RELATIVE PERCENT: 1.7 %
GFR AFRICAN AMERICAN: 52
GFR NON-AFRICAN AMERICAN: 43
GLUCOSE BLD-MCNC: 333 MG/DL (ref 70–99)
HCT VFR BLD CALC: 25.5 % (ref 40.5–52.5)
HEMOGLOBIN: 8.8 G/DL (ref 13.5–17.5)
LYMPHOCYTES ABSOLUTE: 0.7 K/UL (ref 1–5.1)
LYMPHOCYTES RELATIVE PERCENT: 9.4 %
MCH RBC QN AUTO: 30.1 PG (ref 26–34)
MCHC RBC AUTO-ENTMCNC: 34.5 G/DL (ref 31–36)
MCV RBC AUTO: 87.4 FL (ref 80–100)
MONOCYTES ABSOLUTE: 0.9 K/UL (ref 0–1.3)
MONOCYTES RELATIVE PERCENT: 12.1 %
NEUTROPHILS ABSOLUTE: 5.4 K/UL (ref 1.7–7.7)
NEUTROPHILS RELATIVE PERCENT: 76.2 %
PDW BLD-RTO: 14.7 % (ref 12.4–15.4)
PLATELET # BLD: 170 K/UL (ref 135–450)
PMV BLD AUTO: 8.5 FL (ref 5–10.5)
POTASSIUM REFLEX MAGNESIUM: 4.4 MMOL/L (ref 3.5–5.1)
RBC # BLD: 2.92 M/UL (ref 4.2–5.9)
SODIUM BLD-SCNC: 133 MMOL/L (ref 136–145)
WBC # BLD: 7.1 K/UL (ref 4–11)

## 2019-05-26 PROCEDURE — 2W3MX1Z IMMOBILIZATION OF LEFT LOWER EXTREMITY USING SPLINT: ICD-10-PCS | Performed by: INTERNAL MEDICINE

## 2019-05-26 PROCEDURE — 85025 COMPLETE CBC W/AUTO DIFF WBC: CPT

## 2019-05-26 PROCEDURE — 83036 HEMOGLOBIN GLYCOSYLATED A1C: CPT

## 2019-05-26 PROCEDURE — 80048 BASIC METABOLIC PNL TOTAL CA: CPT

## 2019-05-26 PROCEDURE — 99285 EMERGENCY DEPT VISIT HI MDM: CPT

## 2019-05-26 PROCEDURE — 36415 COLL VENOUS BLD VENIPUNCTURE: CPT

## 2019-05-26 PROCEDURE — 73630 X-RAY EXAM OF FOOT: CPT

## 2019-05-26 PROCEDURE — 4500000025 HC ED LEVEL 5 PROCEDURE

## 2019-05-26 PROCEDURE — 85610 PROTHROMBIN TIME: CPT

## 2019-05-26 RX ORDER — OXYCODONE HYDROCHLORIDE AND ACETAMINOPHEN 5; 325 MG/1; MG/1
1 TABLET ORAL EVERY 6 HOURS PRN
Qty: 12 TABLET | Refills: 0 | Status: SHIPPED | OUTPATIENT
Start: 2019-05-26 | End: 2019-05-29

## 2019-05-26 ASSESSMENT — PAIN SCALES - GENERAL: PAINLEVEL_OUTOF10: 3

## 2019-05-27 ENCOUNTER — APPOINTMENT (OUTPATIENT)
Dept: CT IMAGING | Age: 69
DRG: 563 | End: 2019-05-27
Payer: COMMERCIAL

## 2019-05-27 PROBLEM — S92.302A: Status: ACTIVE | Noted: 2019-05-27

## 2019-05-27 LAB
GLUCOSE BLD-MCNC: 132 MG/DL (ref 70–99)
GLUCOSE BLD-MCNC: 219 MG/DL (ref 70–99)
GLUCOSE BLD-MCNC: 256 MG/DL (ref 70–99)
GLUCOSE BLD-MCNC: 259 MG/DL (ref 70–99)
GLUCOSE BLD-MCNC: 264 MG/DL (ref 70–99)
INR BLD: 1.13 (ref 0.86–1.14)
PERFORMED ON: ABNORMAL
PROTHROMBIN TIME: 12.9 SEC (ref 9.8–13)

## 2019-05-27 PROCEDURE — 6370000000 HC RX 637 (ALT 250 FOR IP): Performed by: INTERNAL MEDICINE

## 2019-05-27 PROCEDURE — 2580000003 HC RX 258: Performed by: INTERNAL MEDICINE

## 2019-05-27 PROCEDURE — 96360 HYDRATION IV INFUSION INIT: CPT

## 2019-05-27 PROCEDURE — 73700 CT LOWER EXTREMITY W/O DYE: CPT

## 2019-05-27 PROCEDURE — 96361 HYDRATE IV INFUSION ADD-ON: CPT

## 2019-05-27 PROCEDURE — 1200000000 HC SEMI PRIVATE

## 2019-05-27 PROCEDURE — G0378 HOSPITAL OBSERVATION PER HR: HCPCS

## 2019-05-27 RX ORDER — SODIUM CHLORIDE 0.9 % (FLUSH) 0.9 %
10 SYRINGE (ML) INJECTION EVERY 12 HOURS SCHEDULED
Status: DISCONTINUED | OUTPATIENT
Start: 2019-05-27 | End: 2019-05-30 | Stop reason: HOSPADM

## 2019-05-27 RX ORDER — DEXTROSE MONOHYDRATE 50 MG/ML
100 INJECTION, SOLUTION INTRAVENOUS PRN
Status: DISCONTINUED | OUTPATIENT
Start: 2019-05-27 | End: 2019-05-30 | Stop reason: HOSPADM

## 2019-05-27 RX ORDER — HYDROCODONE BITARTRATE AND ACETAMINOPHEN 7.5; 325 MG/1; MG/1
1 TABLET ORAL ONCE
Status: COMPLETED | OUTPATIENT
Start: 2019-05-27 | End: 2019-05-27

## 2019-05-27 RX ORDER — LISINOPRIL 5 MG/1
1 TABLET ORAL DAILY
COMMUNITY
End: 2022-05-27

## 2019-05-27 RX ORDER — DEXTROSE MONOHYDRATE 25 G/50ML
12.5 INJECTION, SOLUTION INTRAVENOUS PRN
Status: DISCONTINUED | OUTPATIENT
Start: 2019-05-27 | End: 2019-05-30 | Stop reason: HOSPADM

## 2019-05-27 RX ORDER — SENNA PLUS 8.6 MG/1
1 TABLET ORAL DAILY PRN
Status: DISCONTINUED | OUTPATIENT
Start: 2019-05-27 | End: 2019-05-30 | Stop reason: HOSPADM

## 2019-05-27 RX ORDER — INSULIN GLARGINE 100 [IU]/ML
30 INJECTION, SOLUTION SUBCUTANEOUS NIGHTLY
Status: DISCONTINUED | OUTPATIENT
Start: 2019-05-27 | End: 2019-05-30 | Stop reason: HOSPADM

## 2019-05-27 RX ORDER — ONDANSETRON 2 MG/ML
4 INJECTION INTRAMUSCULAR; INTRAVENOUS EVERY 6 HOURS PRN
Status: DISCONTINUED | OUTPATIENT
Start: 2019-05-27 | End: 2019-05-30 | Stop reason: HOSPADM

## 2019-05-27 RX ORDER — ATORVASTATIN CALCIUM 80 MG/1
80 TABLET, FILM COATED ORAL NIGHTLY
Status: DISCONTINUED | OUTPATIENT
Start: 2019-05-27 | End: 2019-05-30 | Stop reason: HOSPADM

## 2019-05-27 RX ORDER — CARVEDILOL 3.12 MG/1
3.12 TABLET ORAL 2 TIMES DAILY WITH MEALS
Status: DISCONTINUED | OUTPATIENT
Start: 2019-05-27 | End: 2019-05-30 | Stop reason: HOSPADM

## 2019-05-27 RX ORDER — GABAPENTIN 300 MG/1
300 CAPSULE ORAL 3 TIMES DAILY
Status: DISCONTINUED | OUTPATIENT
Start: 2019-05-27 | End: 2019-05-30 | Stop reason: HOSPADM

## 2019-05-27 RX ORDER — PAROXETINE HYDROCHLORIDE 40 MG/1
1 TABLET, FILM COATED ORAL DAILY
COMMUNITY
Start: 2018-12-06

## 2019-05-27 RX ORDER — SODIUM CHLORIDE 9 MG/ML
INJECTION, SOLUTION INTRAVENOUS CONTINUOUS
Status: DISCONTINUED | OUTPATIENT
Start: 2019-05-27 | End: 2019-05-28

## 2019-05-27 RX ORDER — PANTOPRAZOLE SODIUM 40 MG/1
40 TABLET, DELAYED RELEASE ORAL DAILY
Status: DISCONTINUED | OUTPATIENT
Start: 2019-05-27 | End: 2019-05-30 | Stop reason: HOSPADM

## 2019-05-27 RX ORDER — PIOGLITAZONEHYDROCHLORIDE 30 MG/1
45 TABLET ORAL DAILY
Status: DISCONTINUED | OUTPATIENT
Start: 2019-05-27 | End: 2019-05-30 | Stop reason: HOSPADM

## 2019-05-27 RX ORDER — FERROUS SULFATE 325(65) MG
325 TABLET ORAL 2 TIMES DAILY
Status: DISCONTINUED | OUTPATIENT
Start: 2019-05-27 | End: 2019-05-30 | Stop reason: HOSPADM

## 2019-05-27 RX ORDER — TRAZODONE HYDROCHLORIDE 50 MG/1
100 TABLET ORAL NIGHTLY
Status: DISCONTINUED | OUTPATIENT
Start: 2019-05-27 | End: 2019-05-30 | Stop reason: HOSPADM

## 2019-05-27 RX ORDER — NICOTINE POLACRILEX 4 MG
15 LOZENGE BUCCAL PRN
Status: DISCONTINUED | OUTPATIENT
Start: 2019-05-27 | End: 2019-05-30 | Stop reason: HOSPADM

## 2019-05-27 RX ORDER — SODIUM CHLORIDE 0.9 % (FLUSH) 0.9 %
10 SYRINGE (ML) INJECTION PRN
Status: DISCONTINUED | OUTPATIENT
Start: 2019-05-27 | End: 2019-05-30 | Stop reason: HOSPADM

## 2019-05-27 RX ADMIN — INSULIN LISPRO 3 UNITS: 100 INJECTION, SOLUTION INTRAVENOUS; SUBCUTANEOUS at 17:05

## 2019-05-27 RX ADMIN — SODIUM CHLORIDE: 9 INJECTION, SOLUTION INTRAVENOUS at 09:32

## 2019-05-27 RX ADMIN — ATORVASTATIN CALCIUM 80 MG: 80 TABLET, FILM COATED ORAL at 21:07

## 2019-05-27 RX ADMIN — INSULIN LISPRO 2 UNITS: 100 INJECTION, SOLUTION INTRAVENOUS; SUBCUTANEOUS at 09:25

## 2019-05-27 RX ADMIN — SODIUM CHLORIDE, PRESERVATIVE FREE 10 ML: 5 INJECTION INTRAVENOUS at 09:24

## 2019-05-27 RX ADMIN — HYDROCODONE BITARTRATE AND ACETAMINOPHEN 1 TABLET: 7.5; 325 TABLET ORAL at 06:47

## 2019-05-27 RX ADMIN — FERROUS SULFATE TAB 325 MG (65 MG ELEMENTAL FE) 325 MG: 325 (65 FE) TAB at 09:19

## 2019-05-27 RX ADMIN — PANTOPRAZOLE SODIUM 40 MG: 40 TABLET, DELAYED RELEASE ORAL at 09:19

## 2019-05-27 RX ADMIN — SODIUM CHLORIDE: 9 INJECTION, SOLUTION INTRAVENOUS at 23:12

## 2019-05-27 RX ADMIN — INSULIN GLARGINE 30 UNITS: 100 INJECTION, SOLUTION SUBCUTANEOUS at 21:07

## 2019-05-27 RX ADMIN — CARVEDILOL 3.12 MG: 3.12 TABLET, FILM COATED ORAL at 09:19

## 2019-05-27 RX ADMIN — FERROUS SULFATE TAB 325 MG (65 MG ELEMENTAL FE) 325 MG: 325 (65 FE) TAB at 21:07

## 2019-05-27 RX ADMIN — ATORVASTATIN CALCIUM 80 MG: 80 TABLET, FILM COATED ORAL at 02:59

## 2019-05-27 RX ADMIN — TRAZODONE HYDROCHLORIDE 100 MG: 50 TABLET ORAL at 02:59

## 2019-05-27 RX ADMIN — GABAPENTIN 300 MG: 300 CAPSULE ORAL at 09:19

## 2019-05-27 RX ADMIN — CARVEDILOL 3.12 MG: 3.12 TABLET, FILM COATED ORAL at 17:08

## 2019-05-27 RX ADMIN — INSULIN LISPRO 2 UNITS: 100 INJECTION, SOLUTION INTRAVENOUS; SUBCUTANEOUS at 21:08

## 2019-05-27 RX ADMIN — GABAPENTIN 300 MG: 300 CAPSULE ORAL at 21:07

## 2019-05-27 RX ADMIN — GABAPENTIN 300 MG: 300 CAPSULE ORAL at 14:42

## 2019-05-27 RX ADMIN — PIOGLITAZONE 45 MG: 30 TABLET ORAL at 09:22

## 2019-05-27 RX ADMIN — FERROUS SULFATE TAB 325 MG (65 MG ELEMENTAL FE) 325 MG: 325 (65 FE) TAB at 02:59

## 2019-05-27 RX ADMIN — TRAZODONE HYDROCHLORIDE 100 MG: 50 TABLET ORAL at 23:12

## 2019-05-27 ASSESSMENT — PAIN SCALES - GENERAL
PAINLEVEL_OUTOF10: 7
PAINLEVEL_OUTOF10: 2
PAINLEVEL_OUTOF10: 4
PAINLEVEL_OUTOF10: 4

## 2019-05-27 ASSESSMENT — PAIN DESCRIPTION - ORIENTATION: ORIENTATION: LEFT

## 2019-05-27 ASSESSMENT — PAIN DESCRIPTION - DESCRIPTORS: DESCRIPTORS: SHOOTING

## 2019-05-27 ASSESSMENT — PAIN DESCRIPTION - LOCATION: LOCATION: FOOT

## 2019-05-27 ASSESSMENT — PAIN DESCRIPTION - FREQUENCY: FREQUENCY: CONTINUOUS

## 2019-05-27 ASSESSMENT — PAIN DESCRIPTION - PAIN TYPE: TYPE: ACUTE PAIN

## 2019-05-27 NOTE — CONSULTS
Department of Orthopedic Surgery  Attending   Consult Note        Reason for Consult:  Left foot pain  Requesting Physician: Neva Sheehan MD  Date of Service: 5/27/2019 12:59 PM    CHIEF COMPLAINT:  As Above    History Obtained From:  patient    HISTORY OF PRESENT ILLNESS:                The patient is a 76 y.o. male who presents with above chief complaint. Left foot pain. He states that he was going down some steps in her feet when he slipped. He twisted his left foot. He did something similar to this he years ago on his right foot and I saw him for that previously. He is not sure why his legs give way at times. Complains of pain through the entire left foot no significant ankle tenderness. No injury to upper extremities neck back hips knees or right ankle. He is diabetic and does have neuropathy. I don't see a recent hemoglobin A1c. He states he has significant balance issues and is unable to use crutches. He normally walks with a cane. He hasn't tried walking with a walker and on his last admission for right foot injury he required 2 months of nursing home stay    Past Medical History:        Diagnosis Date    Arthritis     Ataxia 3/14/2014    BPH (benign prostatic hyperplasia)     CAD (coronary artery disease)     4 stents    Coronary atherosclerosis of native coronary artery 5/11/2013    Diabetes mellitus ([facility] [address]     Environmental allergies     Hepatitis     Hydronephrosis 3/14/2014    Hyperlipidemia     Hypertension     Kidney disease     Neuropathy     Parkinson's disease ([facility] [address]     ?     S/P coronary artery stent placement x 4     x 4    Schizoaffective disorder, bipolar type [facility])      Past Surgical History:        Procedure Laterality Date    ANKLE SURGERY Right 06/09/2018    ORIF of R ankle     COLONOSCOPY N/A 12/13/2018    COLONOSCOPY CONTROL HEMORRHAGE performed by Madison Jean MD at [address] [facility] STENT PLACEMENT      x 4    CYSTOSCOPY  4/4/14    transurethral vaporization of prostate    HERNIA REPAIR      X2    UPPER GASTROINTESTINAL ENDOSCOPY N/A 12/13/2018    EGD BIOPSY performed by Tim Eden MD at [place] [address] 5/3/2019    EGD WITH ANESTHESIA performed by Tim Eden MD at [address] [place] [address]           Medications Prior to Admission:   Prior to Admission medications    Medication Sig Start Date End Date Taking? Authorizing Provider   lisinopril (PRINIVIL;ZESTRIL) 5 MG tablet Take 1 tablet by mouth daily   Yes Historical Provider, MD   metFORMIN (GLUCOPHAGE) 1000 MG tablet Take 1 tablet by mouth daily   Yes Historical Provider, MD   PARoxetine (PAXIL) 40 MG tablet Take 1 tablet by mouth daily 12/6/18  Yes Historical Provider, MD   oxyCODONE-acetaminophen (PERCOCET) 5-325 MG per tablet Take 1 tablet by mouth every 6 hours as needed for Pain for up to 3 days. Intended supply: 3 days. Take lowest dose possible to manage pain 5/26/19 5/29/19 Yes ZEESHAN Payan   omeprazole (PRILOSEC) 20 MG delayed release capsule Take 1 capsule by mouth daily 5/3/19   Tim Eden MD   ferrous sulfate 325 (65 Fe) MG tablet Take 1 tablet by mouth 2 times daily 5/3/19   Tim Eden MD   Dulaglutide (TRULICITY SC) Inject 1.5 mg into the skin once a week     Historical Provider, MD   carvedilol (COREG) 3.125 MG tablet TAKE ONE (1) TABLET BY MOUTH TWICE DAILY WITH MEALS 1/29/19   Issa Devries MD   gabapentin (NEURONTIN) 300 MG capsule Take 300 mg by mouth 3 times daily. Bernice Hamilton     Historical Provider, MD   clopidogrel (PLAVIX) 75 MG tablet TAKE 1 TABLET BY MOUTH ONCE DAILY 11/6/18   Bethany Padilla MD   atorvastatin (LIPITOR) 80 MG tablet TAKE 1 TABLET BY MOUTH AT BEDTIME 10/31/18   Issa Devries MD   ASPIRIN LOW DOSE 81 MG EC tablet TAKE 1 TABLET BY MOUTH ONCE DAILY 9/5/18   Issa Devries MD   pantoprazole (PROTONIX) 40 MG tablet TAKE 1 TABLET BY MOUTH ONCE DAILY 8/6/18   Romero Tong MD   insulin glargine (LANTUS) 100 UNIT/ML injection vial Inject 30 Units into the skin nightly 6/13/18   PRESTON Garcia CNP   pioglitazone (ACTOS) 45 MG tablet Take 45 mg by mouth daily 6/7/17   Historical Provider, MD   traZODone (DESYREL) 100 MG tablet Take 100 mg by mouth nightly    Historical Provider, MD   fluticasone (FLONASE) 50 MCG/ACT nasal spray 1 spray by Nasal route daily as needed. Historical Provider, MD       Allergies:  Patient has no known allergies. Social History:    Tobacco:  reports that he quit smoking about 6 years ago. His smoking use included cigarettes. He has a 25.00 pack-year smoking history. He has never used smokeless tobacco.   Alcohol:  reports that he does not drink alcohol. Illicit Drug: No  Family History:       Problem Relation Age of Onset    Other Mother         CEREBRAL PALSY    Heart Disease Father         CHF       REVIEW OF SYSTEMS:    CONSTITUTIONAL:  negative  MUSCULOSKELETAL:  positive for  pain    PHYSICAL EXAM:    awake, alert, cooperative, no apparent distress, and appears stated age  MUSCULOSKELETAL:  Left foot is in a splint. He has tenderness to palpation throughout the left foot has obvious fracture blister over the dorsum of the foot is very dry skin. Sensation is diminished to light touch is full range of motion of the hip and knee on the left side full range of motion of the hip knee and ankle on the right. DATA:    CBC:   Recent Labs     05/26/19  2332   WBC 7.1   HGB 8.8*        BMP:    Recent Labs     05/26/19  2332   *   K 4.4      CO2 22   BUN 28*   CREATININE 1.6*   GLUCOSE 333*     INR:   Recent Labs     05/26/19  2332   INR 1.13       Radiology:   CT FOOT LEFT WO CONTRAST   Final Result   Fractures involving the base of the 1st, 2nd, and 3rd metatarsals, cuboid,   and 2nd through 5th metatarsal heads.          XR FOOT LEFT (MIN 3 VIEWS)   Preliminary Result   There are 2nd through 4th metatarsal neck fractures. Base of 1st metatarsal fracture with articular extension. IMPRESSION/RECOMMENDATIONS:    Assessment: Fractures involving the first through fifth metatarsals and cuboid without significant displacement    Plan:  1) he will continue to stay in a splint for right now working rehab on gait and transfers. If he is unable to care for himself he may require skilled nursing facility stay. We did discuss that there is a chance that his fractures may displace in the future especially if he walks on this. If they do displace it would require surgical intervention. He demonstrates good understanding and follow-up with me in 10-14 days after discharge        Thank you for the opportunity to consult on this patient.     Lola Floyd

## 2019-05-27 NOTE — PROGRESS NOTES
..Patient admitted to room 523 from ED. Patient oriented to room, call light, bed rails, phone, lights and bathroom. Patient instructed about the schedule of the day including: vital sign frequency, lab draws, possible tests, frequency of MD and staff rounds, including RN/MD rounding together at bedside, daily weights, and I &O's. Patient instructed about prescribed diet, how to use 8MENU, and television. Bed alarm in place, patient aware of placement and reason. Bed locked, in lowest position, side rails up 2/4, call light within reach. Will continue to monitor.

## 2019-05-27 NOTE — PLAN OF CARE
..Pt scoring pain on 0-10 scale. Pain medications given per MAR. Pt instructed to call out when pain level increasing. Call light within reach. Nurse will continue to reassess and monitor. Carlos Navarro .Bed in lowest position, wheels locked, 2/4 side rails up, nonskid footwear on. Bed/ chair check alarm in place, call light within reach. Pt instructed to call out when needing assistance. Pt stated understanding. Nurse will continue to monitor.

## 2019-05-27 NOTE — ED PROVIDER NOTES
4    CYSTOSCOPY  14    transurethral vaporization of prostate    HERNIA REPAIR      X2    UPPER GASTROINTESTINAL ENDOSCOPY N/A 2018    EGD BIOPSY performed by Sandra Oliva MD at [address] N/A 5/3/2019    EGD WITH ANESTHESIA performed by Sandra Oliva MD at [address] [facility]       Family History   Problem Relation Age of Onset    Other Mother         CEREBRAL PALSY    Heart Disease Father         CHF     Social History     Socioeconomic History    Marital status:      Spouse name: Not on file    Number of children: Not on file    Years of education: Not on file    Highest education level: Not on file   Occupational History    Not on file   Social Needs    Financial resource strain: Not on file    Food insecurity:     Worry: Not on file     Inability: Not on file    Transportation needs:     Medical: Not on file     Non-medical: Not on file   Tobacco Use    Smoking status: Former Smoker     Packs/day: 1.00     Years: 25.00     Pack years: 25.00     Types: Cigarettes     Last attempt to quit: 5/10/2013     Years since quittin.0    Smokeless tobacco: Never Used   Substance and Sexual Activity    Alcohol use: No    Drug use: No    Sexual activity: Not Currently   Lifestyle    Physical activity:     Days per week: Not on file     Minutes per session: Not on file    Stress: Not on file   Relationships    Social connections:     Talks on phone: Not on file     Gets together: Not on file     Attends Congregational service: Not on file     Active member of club or organization: Not on file     Attends meetings of clubs or organizations: Not on file     Relationship status: Not on file    Intimate partner violence:     Fear of current or ex partner: Not on file     Emotionally abused: Not on file     Physically abused: Not on file     Forced sexual activity: Not on file   Other Topics Concern    Not on file Social History Narrative    Not on file     No current facility-administered medications for this encounter. Current Outpatient Medications   Medication Sig Dispense Refill    oxyCODONE-acetaminophen (PERCOCET) 5-325 MG per tablet Take 1 tablet by mouth every 6 hours as needed for Pain for up to 3 days. Intended supply: 3 days. Take lowest dose possible to manage pain 12 tablet 0    omeprazole (PRILOSEC) 20 MG delayed release capsule Take 1 capsule by mouth daily 30 capsule 11    ferrous sulfate 325 (65 Fe) MG tablet Take 1 tablet by mouth 2 times daily 60 tablet 11    Dulaglutide (TRULICITY SC) Inject 1.5 mg into the skin once a week       carvedilol (COREG) 3.125 MG tablet TAKE ONE (1) TABLET BY MOUTH TWICE DAILY WITH MEALS 180 tablet 3    gabapentin (NEURONTIN) 300 MG capsule Take 300 mg by mouth 3 times daily. Sushant Hylton clopidogrel (PLAVIX) 75 MG tablet TAKE 1 TABLET BY MOUTH ONCE DAILY 30 tablet 3    atorvastatin (LIPITOR) 80 MG tablet TAKE 1 TABLET BY MOUTH AT BEDTIME 90 tablet 0    ASPIRIN LOW DOSE 81 MG EC tablet TAKE 1 TABLET BY MOUTH ONCE DAILY 30 tablet 11    pantoprazole (PROTONIX) 40 MG tablet TAKE 1 TABLET BY MOUTH ONCE DAILY 90 tablet 3    insulin glargine (LANTUS) 100 UNIT/ML injection vial Inject 30 Units into the skin nightly 1 vial 3    pioglitazone (ACTOS) 45 MG tablet Take 45 mg by mouth daily      traZODone (DESYREL) 100 MG tablet Take 100 mg by mouth nightly      fluticasone (FLONASE) 50 MCG/ACT nasal spray 1 spray by Nasal route daily as needed. No Known Allergies    REVIEW OF SYSTEMS:  6 systems reviewed, pertinent positives per HPI otherwise noted to be negative. PHYSICAL EXAM:  BP (!) 121/58   Pulse 94   Temp 98.3 °F (36.8 °C) (Oral)   Resp 16   Ht 5' 3\" (1.6 m)   Wt 146 lb (66.2 kg)   SpO2 97%   BMI 25.86 kg/m²   CONSTITUTIONAL: Awake and alert. Well-developed. Well-nourished. Non-toxic. Cooperative. No acute distress. HENT: Normocephalic. Atraumatic. External ears normal, without discharge. Nose normal. Mucous membranes moist.  EYES: Conjunctiva non-injected. No scleral icterus. PERRL. EOM's grossly intact. NECK: Supple. Normal ROM. CARDIOVASCULAR: Normal heart rate. Intact distal pulses. PULMONARY/CHEST WALL: Breathing is unlabored. Equal, symmetric chest rise. Speaking comfortably in full sentences. ABDOMEN: Nondistended  MUSKULOSKELETAL: Left lower extremity: No pain to palpate the ankle joint. There is swelling and bruising across the dorsum of the left foot. There are some closed blisters just proximal to the first and second toes. There is pain to palpate across the top of the foot without appreciable crepitus. The patient maintains good ROM. No acute deformities. SKIN: Warm and dry. Skin intact though there is bruising and blisters to the top of the left foot. See picture below. NEUROLOGICAL: Alert and oriented x 3. Strength is 5/5 in all extremities and sensation is intact.   PSYCHIATRIC: Normal affect      Left foot:            Labs:      Results for orders placed or performed during the hospital encounter of 05/26/19   CBC Auto Differential   Result Value Ref Range    WBC 7.1 4.0 - 11.0 K/uL    RBC 2.92 (L) 4.20 - 5.90 M/uL    Hemoglobin 8.8 (L) 13.5 - 17.5 g/dL    Hematocrit 25.5 (L) 40.5 - 52.5 %    MCV 87.4 80.0 - 100.0 fL    MCH 30.1 26.0 - 34.0 pg    MCHC 34.5 31.0 - 36.0 g/dL    RDW 14.7 12.4 - 15.4 %    Platelets 267 679 - 245 K/uL    MPV 8.5 5.0 - 10.5 fL    Neutrophils % 76.2 %    Lymphocytes % 9.4 %    Monocytes % 12.1 %    Eosinophils % 1.7 %    Basophils % 0.6 %    Neutrophils # 5.4 1.7 - 7.7 K/uL    Lymphocytes # 0.7 (L) 1.0 - 5.1 K/uL    Monocytes # 0.9 0.0 - 1.3 K/uL    Eosinophils # 0.1 0.0 - 0.6 K/uL    Basophils # 0.0 0.0 - 0.2 K/uL   Basic Metabolic Panel w/ Reflex to MG   Result Value Ref Range    Sodium 133 (L) 136 - 145 mmol/L    Potassium reflex Magnesium 4.4 3.5 - 5.1 mmol/L    Chloride 101 99 - 110 mmol/L    CO2 22 21 - 32 mmol/L    Anion Gap 10 3 - 16    Glucose 333 (H) 70 - 99 mg/dL    BUN 28 (H) 7 - 20 mg/dL    CREATININE 1.6 (H) 0.8 - 1.3 mg/dL    GFR Non- 43 (A) >60    GFR  52 (A) >60    Calcium 8.7 8.3 - 10.6 mg/dL   Protime-INR   Result Value Ref Range    Protime 12.9 9.8 - 13.0 sec    INR 1.13 0.86 - 1.14         RADIOLOGY:    All x-ray studies are viewed/reviewed by me. Formal interpretations per the radiologist are as follows:      Xr Foot Left (min 3 Views)    Result Date: 5/26/2019  EXAMINATION: 3 XRAY VIEWS OF THE LEFT FOOT 5/26/2019 8:52 pm COMPARISON: None HISTORY: ORDERING SYSTEM PROVIDED HISTORY: fall TECHNOLOGIST PROVIDED HISTORY: Reason for exam:->fall Initial exam. FINDINGS: Intra-articular fracture base of the 1st proximal phalanx. There are additional fractures involving the metatarsal necks involving the 2nd, 3rd, and 4th metatarsals. The Lisfranc articulation is maintained. There are 2nd through 4th metatarsal neck fractures. Base of 1st metatarsal fracture with articular extension. PROCEDURE:   Definitive fracture care: left foot fracture:  1. Short leg splint applied  2. Good alignment of fracture maintained/achieved  3. Patient remained neurovascularly intact  4. The patient is comfortable and all questions are answered        CONSULTS:  [facility]          ED COURSE/MDM:  Patient was given the following medications: none    I have evaluated this patient here in the ED. This patient is here with left foot pain after suffering a fall yesterday. Today his foot is more swollen and bruised and it was yesterday. He continues with pain and is now concerned he may have fractured his foot given his experience with a right foot fracture in the past.  An x-ray of his left foot is done revealing fractures to the second, third and fourth metatarsal necks.   He also has a fracture at the base of the first metatarsal with articular 5. Left foot pain    6. Traumatic ecchymosis of left foot, initial encounter    7. Renal insufficiency    8. Hyperglycemia        Blood pressure (!) 121/58, pulse 94, temperature 98.3 °F (36.8 °C), temperature source Oral, resp. rate 16, height 5' 3\" (1.6 m), weight 146 lb (66.2 kg), SpO2 97 %.           [address] [place]  05/26/19 1988       [place]  05/27/19 [id number]

## 2019-05-27 NOTE — PROGRESS NOTES
..4 Eyes Skin Assessment     The patient is being assess for  Admission    I agree that 2 RN's have performed a thorough Head to Toe Skin Assessment on the patient. ALL assessment sites listed below have been assessed. Areas assessed by both nurses: yes  [x]   Head, Face, and Ears   [x]   Shoulders, Back, and Chest  [x]   Arms, Elbows, and Hands   [x]   Coccyx, Sacrum, and IschIum  [x]   Legs, Feet, and Heels        Does the Patient have Skin Breakdown?   No         Edison Prevention initiated:  Yes   Wound Care Orders initiated:  No      [facility] nurse consulted for Pressure Injury (Stage 3,4, Unstageable, DTI, NWPT, and Complex wounds), New and Established Ostomies:  No      Nurse 1 eSignature: Electronically signed by Macrina Carroll on 5/27/19 at 1:20 AM    **SHARE this note so that the co-signing nurse is able to place an eSignature**    Nurse 2 eSignature: Electronically signed by José Luis Rader RN on 5/27/19 at 5:20 AM

## 2019-05-27 NOTE — PLAN OF CARE
..Pt scoring pain on 0-10 scale. Pain medications given per MAR. Pt instructed to call out when pain level increasing. Call light within reach. Nurse will continue to reassess and monitor. Monreal Challenger .Bed in lowest position, wheels locked, 2/4 side rails up, nonskid footwear on. Bed/ chair check alarm in place, call light within reach. Pt instructed to call out when needing assistance. Pt stated understanding. Nurse will continue to monitor.

## 2019-05-27 NOTE — ED NOTES
Pt unsteady on crutches unsafe to send home to care for self. Pt wife at bedside. Wife has no way home request to stay with pr overnight in room.      Gerry Tan, SABAN  52/98/[id number]

## 2019-05-28 LAB
ESTIMATED AVERAGE GLUCOSE: 226 MG/DL
GLUCOSE BLD-MCNC: 153 MG/DL (ref 70–99)
GLUCOSE BLD-MCNC: 171 MG/DL (ref 70–99)
GLUCOSE BLD-MCNC: 207 MG/DL (ref 70–99)
GLUCOSE BLD-MCNC: 222 MG/DL (ref 70–99)
HBA1C MFR BLD: 9.5 %
PERFORMED ON: ABNORMAL

## 2019-05-28 PROCEDURE — 1200000000 HC SEMI PRIVATE

## 2019-05-28 PROCEDURE — G0378 HOSPITAL OBSERVATION PER HR: HCPCS

## 2019-05-28 PROCEDURE — 96372 THER/PROPH/DIAG INJ SC/IM: CPT

## 2019-05-28 PROCEDURE — 97162 PT EVAL MOD COMPLEX 30 MIN: CPT

## 2019-05-28 PROCEDURE — 96361 HYDRATE IV INFUSION ADD-ON: CPT

## 2019-05-28 PROCEDURE — 97116 GAIT TRAINING THERAPY: CPT

## 2019-05-28 PROCEDURE — 97166 OT EVAL MOD COMPLEX 45 MIN: CPT

## 2019-05-28 PROCEDURE — 6370000000 HC RX 637 (ALT 250 FOR IP): Performed by: INTERNAL MEDICINE

## 2019-05-28 PROCEDURE — 6360000002 HC RX W HCPCS: Performed by: INTERNAL MEDICINE

## 2019-05-28 PROCEDURE — 99232 SBSQ HOSP IP/OBS MODERATE 35: CPT | Performed by: PHYSICIAN ASSISTANT

## 2019-05-28 PROCEDURE — 97530 THERAPEUTIC ACTIVITIES: CPT

## 2019-05-28 PROCEDURE — 2580000003 HC RX 258: Performed by: INTERNAL MEDICINE

## 2019-05-28 RX ORDER — CLOPIDOGREL BISULFATE 75 MG/1
75 TABLET ORAL DAILY
Status: DISCONTINUED | OUTPATIENT
Start: 2019-05-28 | End: 2019-05-30 | Stop reason: HOSPADM

## 2019-05-28 RX ORDER — ASPIRIN 81 MG/1
81 TABLET, CHEWABLE ORAL DAILY
Status: DISCONTINUED | OUTPATIENT
Start: 2019-05-28 | End: 2019-05-30 | Stop reason: HOSPADM

## 2019-05-28 RX ADMIN — ATORVASTATIN CALCIUM 80 MG: 80 TABLET, FILM COATED ORAL at 20:43

## 2019-05-28 RX ADMIN — INSULIN LISPRO 1 UNITS: 100 INJECTION, SOLUTION INTRAVENOUS; SUBCUTANEOUS at 22:09

## 2019-05-28 RX ADMIN — ENOXAPARIN SODIUM 40 MG: 40 INJECTION SUBCUTANEOUS at 09:39

## 2019-05-28 RX ADMIN — FERROUS SULFATE TAB 325 MG (65 MG ELEMENTAL FE) 325 MG: 325 (65 FE) TAB at 20:43

## 2019-05-28 RX ADMIN — TRAZODONE HYDROCHLORIDE 100 MG: 50 TABLET ORAL at 22:12

## 2019-05-28 RX ADMIN — SODIUM CHLORIDE, PRESERVATIVE FREE 10 ML: 5 INJECTION INTRAVENOUS at 20:43

## 2019-05-28 RX ADMIN — GABAPENTIN 300 MG: 300 CAPSULE ORAL at 09:38

## 2019-05-28 RX ADMIN — INSULIN LISPRO 2 UNITS: 100 INJECTION, SOLUTION INTRAVENOUS; SUBCUTANEOUS at 11:41

## 2019-05-28 RX ADMIN — ASPIRIN 81 MG 81 MG: 81 TABLET ORAL at 11:40

## 2019-05-28 RX ADMIN — INSULIN LISPRO 1 UNITS: 100 INJECTION, SOLUTION INTRAVENOUS; SUBCUTANEOUS at 09:40

## 2019-05-28 RX ADMIN — CLOPIDOGREL BISULFATE 75 MG: 75 TABLET ORAL at 11:40

## 2019-05-28 RX ADMIN — GABAPENTIN 300 MG: 300 CAPSULE ORAL at 14:08

## 2019-05-28 RX ADMIN — GABAPENTIN 300 MG: 300 CAPSULE ORAL at 20:43

## 2019-05-28 RX ADMIN — PIOGLITAZONE 45 MG: 30 TABLET ORAL at 09:38

## 2019-05-28 RX ADMIN — INSULIN GLARGINE 30 UNITS: 100 INJECTION, SOLUTION SUBCUTANEOUS at 22:10

## 2019-05-28 RX ADMIN — INSULIN LISPRO 1 UNITS: 100 INJECTION, SOLUTION INTRAVENOUS; SUBCUTANEOUS at 17:45

## 2019-05-28 RX ADMIN — CARVEDILOL 3.12 MG: 3.12 TABLET, FILM COATED ORAL at 09:38

## 2019-05-28 RX ADMIN — FERROUS SULFATE TAB 325 MG (65 MG ELEMENTAL FE) 325 MG: 325 (65 FE) TAB at 09:39

## 2019-05-28 RX ADMIN — PANTOPRAZOLE SODIUM 40 MG: 40 TABLET, DELAYED RELEASE ORAL at 09:38

## 2019-05-28 RX ADMIN — CARVEDILOL 3.12 MG: 3.12 TABLET, FILM COATED ORAL at 17:45

## 2019-05-28 ASSESSMENT — PAIN DESCRIPTION - PAIN TYPE: TYPE: ACUTE PAIN

## 2019-05-28 ASSESSMENT — PAIN DESCRIPTION - ORIENTATION: ORIENTATION: LEFT

## 2019-05-28 ASSESSMENT — PAIN SCALES - GENERAL
PAINLEVEL_OUTOF10: 0
PAINLEVEL_OUTOF10: 3

## 2019-05-28 ASSESSMENT — PAIN DESCRIPTION - LOCATION: LOCATION: FOOT

## 2019-05-28 NOTE — CARE COORDINATION
[facility]idence at [facility] can accept pt and have started precert now. Will continue to follow and coordinate discharge arrangements.   Arvind Garner, JR-MAYRA

## 2019-05-28 NOTE — PROGRESS NOTES
Physical Therapy    Facility/Department: [facility] C5 - MED SURG/ORTHO  Initial Assessment and treatment    NAME: Jolanta Benz  : 1950  MRN: [id number]    Date of Service: 2019    Discharge Recommendations:  Subacute/Skilled Nursing Facility   PT Equipment Recommendations  Equipment Needed: Yes  Mobility Devices: Melda Slack: Standard    Assessment   Body structures, Functions, Activity limitations: Decreased functional mobility ; Decreased endurance;Decreased balance;Decreased ROM  Assessment: Pt referred for PT evaluation during current hospital stay with a diagnosis of fx of L metatarsal neck after fall. Pt currently functioning bleow baseline, requiring SBA for bed mobility, Kevin for transfers, and min to mod A x2 for gait with SW, moderaltely unsteady with several small episodes of LOB and one significant LOB posteriorly requiring Kevin x1 and mod A x1 to correct. Pt would benefit from skilled acute PT to address deficits. [facility] to [facility] PLOF  Treatment Diagnosis: decreased indep with mobility  Prognosis: Good  Decision Making: Medium Complexity  Patient Education: role of PT, transfers and giat with NWB LLE and SW  Barriers to Learning: no  REQUIRES PT FOLLOW UP: Yes  Activity Tolerance  Activity Tolerance: Patient limited by fatigue;Patient Tolerated treatment well       Patient Diagnosis(es): The primary encounter diagnosis was Closed displaced fracture of first metatarsal bone of left foot, initial encounter. Diagnoses of Closed nondisplaced fracture of second metatarsal bone of left foot, initial encounter, Closed nondisplaced fracture of third metatarsal bone of left foot, initial encounter, Closed nondisplaced fracture of fourth metatarsal bone of left foot, initial encounter, Left foot pain, Traumatic ecchymosis of left foot, initial encounter, Renal insufficiency, and Hyperglycemia were also pertinent to this visit.      has a past medical history of Arthritis, Ataxia, BPH (benign prostatic hyperplasia), CAD (coronary artery disease), Coronary atherosclerosis of native coronary artery, Diabetes mellitus ([facility] [address] Environmental allergies, Hepatitis, Hydronephrosis, Hyperlipidemia, Hypertension, Kidney disease, Neuropathy, Parkinson's disease ([facility] [address] S/P coronary artery stent placement x 4, and Schizoaffective disorder, bipolar type ([facility] [address] has a past surgical history that includes Coronary angioplasty; Coronary angioplasty with stent; hernia repair; Vasectomy; Cystocopy (4/4/14); Ankle surgery (Right, 06/09/2018); Upper gastrointestinal endoscopy (N/A, 12/13/2018); Colonoscopy (N/A, 12/13/2018); and Upper gastrointestinal endoscopy (N/A, 5/3/2019).     Restrictions  Restrictions/Precautions  Restrictions/Precautions: General Precautions, Fall Risk, Weight Bearing  Required Braces or Orthoses?: Yes  Lower Extremity Weight Bearing Restrictions  Left Lower Extremity Weight Bearing: Non Weight Bearing  Required Braces or Orthoses  Left Lower Extremity Brace: (Splint)  Position Activity Restriction  Other position/activity restrictions: up as tolerated  Vision/Hearing  Vision: Impaired  Vision Exceptions: Wears glasses at all times  Hearing: Exceptions to [facility]  Hearing Exceptions: Hard of hearing/hearing concerns;Bilateral hearing aid     Subjective  General  Chart Reviewed: Yes  Patient assessed for rehabilitation services?: Yes  Response To Previous Treatment: Not applicable  Family / Caregiver Present: No  Referring Practitioner: Yolanda Barrett MD  Referral Date : 05/27/19  Diagnosis: left metatarsal neck fx  Follows Commands: Within Functional Limits  General Comment  Comments: Pt resting in bed upon entry, RN cleared pt for therapy  Subjective  Subjective: Pt agreeable to PT  Pain Screening  Patient Currently in Pain: Denies  Vital Signs  Patient Currently in Pain: Denies  Pre Treatment Pain Screening  Intervention List: Patient able to continue with treatment    Orientation  Orientation  Overall Orientation Status: Within Functional Limits  Social/Functional History  Social/Functional History  Lives With: Spouse  Type of Home: Apartment  Home Layout: One level  Home Access: Stairs to enter with rails  Entrance Stairs - Number of Steps: 8  Entrance Stairs - Rails: Left  Bathroom Shower/Tub: Tub/Shower unit, Shower chair with back  Bathroom Toilet: Standard  Bathroom Equipment: Grab bars around toilet  Home Equipment: [place], [employer], [address], 4 wheeled walker  ADL Assistance: [address] [employer] [facility] Responsibilities: (pt helps with laundry, pt does grocery shopping)  Ambulation Assistance: Independent  Transfer Assistance: Independent  Active : No  Occupation: On disability         Objective          AROM RLE (degrees)  RLE AROM: WFL  AROM LLE (degrees)  LLE AROM : WFL(WFL hip and knee, ankle not tested, in bulky dressings)  Strength RLE  Strength RLE: WFL  Comment: grossly 4+ to 5/5 throughout  Strength LLE  Comment: grossly 4+ to 5/5 throughout, except left ankle not tested due to metatarsal fx        Bed mobility  Supine to Sit: Stand by assistance  Sit to Supine: Unable to assess(up in chair at EOS)  Scooting: Stand by assistance(to EOB)  Transfers  Sit to Stand: Minimal Assistance  Stand to sit: Minimal Assistance  Bed to Chair: Minimal assistance(with SW)  Ambulation  Ambulation?: Yes  WB Status: NWB LLE  Ambulation 1  Surface: level tile  Device: Standard Walker  Assistance: 2 Person assistance(min A x2)  Quality of Gait: hop to pattern, moderatley unsteady with one episode of LOB posteriorly mod A of 1 and min A of 1 to correct, several small LOB, mildly impulsive with walker, with early fatigue  Distance: 20 ft with 1 seated rest break due to fatigue in BUEs and RLE     Balance  Posture: Fair  Sitting - Static: Good  Sitting - Dynamic: Good  Standing - Static: Fair  Standing - Dynamic: Fair;-  Comments: Pt stood EOB with LE's touching bed and Kevin to pull up briefs and pants        Plan   Plan  Times per week: 7  Current Treatment Recommendations: Strengthening, Gait Training, ROM, Balance Training, Functional Mobility Training, Endurance Training, Transfer Training  Safety Devices  Type of devices: All fall risk precautions in place, Left in chair, Call light within reach, Chair alarm in place, Nurse notified, Gait belt, Patient at risk for falls      AM-PAC Score  AM-PAC Inpatient Mobility Raw Score : 14  AM-PAC Inpatient T-Scale Score : 38.1  Mobility Inpatient CMS 0-100% Score: 61.29  Mobility Inpatient CMS G-Code Modifier : CL          Goals  Short term goals  Time Frame for Short term goals: 6/01/19 unless specified  Short term goal 1: Pt will perform bed mobility with mod I by 5/30/19  Short term goal 2: Pt will perform transfers with CGA  Short term goal 3: Pt will ambulate 25 ft with SW and CGA  Short term goal 4: Pt will tolerate 1- 15 resp ob LE exercise for strenghtening and balance   Patient Goals   Patient goals : \"to get stronger\"       Therapy Time   Individual Concurrent Group Co-treatment   Time In 0825         Time Out 0901         Minutes 36         Timed Code Treatment Minutes: 26 Minutes     If pt is discharged prior to next therapy session, this note will serve as discharge summary.   Roberta Knight, PT

## 2019-05-28 NOTE — PROGRESS NOTES
Occupational Therapy   Occupational Therapy Initial Assessment and Treatment Note  Date: 2019   Patient Name: Raul Ramirez  MRN: [id number]     : 1950    Date of Service: 2019    Discharge Recommendations:  [address]  OT Equipment Recommendations  Equipment Needed: No  Other: defer to next level of care    Assessment   Performance deficits / Impairments: Decreased functional mobility ; Decreased balance;Decreased safe awareness;Decreased ADL status; Decreased endurance;Decreased high-level IADLs;Decreased sensation  Assessment: Pt reports typically independent with ADL/IADL, functional mobility/transfers with use of cane, presenting at up to Mod A x2 for mobility with use of walker during eval. Believe pt would benefit from further therapy to increase safety/independence in these areas prior to returning home. Continue OT tx. Prognosis: Good  Decision Making: Medium Complexity  Patient Education: role of OT, safety, WB status  REQUIRES OT FOLLOW UP: Yes  Activity Tolerance  Activity Tolerance: Patient Tolerated treatment well;Patient limited by fatigue  Safety Devices  Safety Devices in place: Yes  Type of devices: Left in chair;Chair alarm in place;Call light within reach;Gait belt;Nurse notified           Patient Diagnosis(es): The primary encounter diagnosis was Closed displaced fracture of first metatarsal bone of left foot, initial encounter. Diagnoses of Closed nondisplaced fracture of second metatarsal bone of left foot, initial encounter, Closed nondisplaced fracture of third metatarsal bone of left foot, initial encounter, Closed nondisplaced fracture of fourth metatarsal bone of left foot, initial encounter, Left foot pain, Traumatic ecchymosis of left foot, initial encounter, Renal insufficiency, and Hyperglycemia were also pertinent to this visit.      has a past medical history of Arthritis, Ataxia, BPH (benign prostatic hyperplasia), CAD (coronary artery disease), Coronary atherosclerosis of native coronary artery, Diabetes mellitus ([facility] [address] Environmental allergies, Hepatitis, Hydronephrosis, Hyperlipidemia, Hypertension, Kidney disease, Neuropathy, Parkinson's disease ([facility] [address] S/P coronary artery stent placement x 4, and Schizoaffective disorder, bipolar type ([facility] [address] has a past surgical history that includes Coronary angioplasty; Coronary angioplasty with stent; hernia repair; Vasectomy; Cystocopy (4/4/14); Ankle surgery (Right, 06/09/2018); Upper gastrointestinal endoscopy (N/A, 12/13/2018); Colonoscopy (N/A, 12/13/2018); and Upper gastrointestinal endoscopy (N/A, 5/3/2019).            Restrictions  Restrictions/Precautions  Restrictions/Precautions: General Precautions, Fall Risk, Weight Bearing  Required Braces or Orthoses?: Yes  Lower Extremity Weight Bearing Restrictions  Left Lower Extremity Weight Bearing: Non Weight Bearing  Required Braces or Orthoses  Left Lower Extremity Brace: (splint)  Position Activity Restriction  Other position/activity restrictions: up as tolerated    Subjective   General  Chart Reviewed: Yes  Patient assessed for rehabilitation services?: Yes  Family / Caregiver Present: No  Referring Practitioner: Da Sneed MD  Diagnosis: First through fifth metatarsals and cuboid without significant displacement  Subjective  Subjective: Pt in bed on arrival, agreeable to eval and treat  General Comment  Comments: Per RN okay to treat    Social/Functional History  Social/Functional History  Lives With: Spouse  Type of Home: Apartment  Home Layout: One level  Home Access: Stairs to enter with rails  Entrance Stairs - Number of Steps: 8  Entrance Stairs - Rails: Left  Bathroom Shower/Tub: Tub/Shower unit, Shower chair with back  Bathroom Toilet: Standard  Bathroom Equipment: Grab bars around toilet  Home Equipment: Lisa [place], [address] [facility], 4 wheeled walker  ADL Assistance: Independent  Homemaking Responsibilities: (pt helps with Strength  Gross LUE Strength: WFL  RUE Strength  Gross RUE Strength: WFL          Plan   Plan  Times per week: 4-6x/week   Current Treatment Recommendations: Strengthening, Balance Training, Functional Mobility Training, Endurance Training, Patient/Caregiver Education & Training, Equipment Evaluation, Education, & procurement, Self-Care / ADL, Safety Education & Training, Positioning, Pain Management           AM-PAC Score  AM-PAC Inpatient Daily Activity Raw Score: 16  AM-PAC Inpatient ADL T-Scale Score : 35.96  ADL Inpatient CMS 0-100% Score: 53.32  ADL Inpatient CMS G-Code Modifier : CK    Goals  Short term goals  Time Frame for Short term goals: 1 week (by 5/28/19)  Short term goal 1: Pt will complete functional transfers with SBA   Short term goal 2: Pt will complete LE dressing with SBA  Short term goal 3: Pt will perform 3-5 minutes dynamic standing activity with CGA and min cues for WB status LLE by 5/31  Patient Goals   Patient goals : \"to get back to normal\"       Therapy Time   Individual Concurrent Group Co-treatment   Time In 0825(10 minutes for eval)         Time Out 0900         Minutes 35         Timed Code Treatment Minutes: 25 Minutes     This note to serve as d/c summary should pt d/c prior to next session.     Angie Christian, OTR/L

## 2019-05-28 NOTE — PLAN OF CARE
Pt educated on the importance of turning In bed to keep skin intact. Pt refused to let nurse see bottom. Stated he could and would turn himself. Will continue to assess and monitor.

## 2019-05-28 NOTE — PROGRESS NOTES
Hospitalist Progress Note      PCP: Ric Nayak, APRN - CNP    Date of Admission: 5/26/2019    Chief Complaint: Accidental fall, left foot pain    Hospital Course:      Subjective:   Patient is up in bed, comfortable, not in distress. Complaining of pain in left foot area. No new event overnight noted. Medications:  Reviewed    Infusion Medications    dextrose      sodium chloride 75 mL/hr at 05/27/19 2312     Scheduled Medications    atorvastatin  80 mg Oral Nightly    carvedilol  3.125 mg Oral BID WC    ferrous sulfate  325 mg Oral BID    gabapentin  300 mg Oral TID    insulin glargine  30 Units Subcutaneous Nightly    pantoprazole  40 mg Oral Daily    pioglitazone  45 mg Oral Daily    traZODone  100 mg Oral Nightly    sodium chloride flush  10 mL Intravenous 2 times per day    enoxaparin  40 mg Subcutaneous Daily    insulin lispro  0-6 Units Subcutaneous TID WC    insulin lispro  0-3 Units Subcutaneous Nightly     PRN Meds: sodium chloride flush, ondansetron, senna, glucose, dextrose, glucagon (rDNA), dextrose      Intake/Output Summary (Last 24 hours) at 5/28/2019 1053  Last data filed at 5/28/2019 0859  Gross per 24 hour   Intake --   Output 1025 ml   Net -1025 ml       Physical Exam Performed:    /80   Pulse 86   Temp 97.8 °F (36.6 °C) (Oral)   Resp 16   Ht 5' 3\" (1.6 m)   Wt 146 lb (66.2 kg)   SpO2 96%   BMI 25.86 kg/m²     General appearance: No apparent distress, appears stated age and cooperative. HEENT: Pupils equal, round, and reactive to light. Conjunctivae/corneas clear. Neck: Supple, with full range of motion. No jugular venous distention. Trachea midline. Respiratory:  Normal respiratory effort. Clear to auscultation, bilaterally without Rales/Wheezes/Rhonchi. Cardiovascular: Regular rate and rhythm with normal S1/S2 without murmurs, rubs or gallops. Abdomen: Soft, non-tender, non-distended with normal bowel sounds.   Musculoskeletal: No clubbing, cyanosis or edema bilaterally. Full range of motion without deformity. Left foot fractures. Skin: Skin color, texture, turgor normal.  No rashes or lesions. Neurologic:  Neurovascularly intact without any focal sensory/motor deficits. Cranial nerves: II-XII intact, grossly non-focal.  Psychiatric: Alert and oriented, thought content appropriate, normal insight  Capillary Refill: Brisk,< 3 seconds   Peripheral Pulses: +2 palpable, equal bilaterally       Labs:   Recent Labs     05/26/19  2332   WBC 7.1   HGB 8.8*   HCT 25.5*        Recent Labs     05/26/19  2332   *   K 4.4      CO2 22   BUN 28*   CREATININE 1.6*   CALCIUM 8.7     No results for input(s): AST, ALT, BILIDIR, BILITOT, ALKPHOS in the last 72 hours. Recent Labs     05/26/19  2332   INR 1.13     No results for input(s): [place] in the last 72 hours. Urinalysis:      Lab Results   Component Value Date    NITRU Negative 11/09/2018    WBCUA 0-2 11/09/2018    BACTERIA Rare 11/09/2018    RBCUA 0-2 11/09/2018    BLOODU TRACE-LYSED 11/09/2018    SPECGRAV <=1.005 11/09/2018    GLUCOSEU Negative 11/09/2018       Radiology:  CT FOOT LEFT WO CONTRAST   Final Result   Fractures involving the base of the 1st, 2nd, and 3rd metatarsals, cuboid,   and 2nd through 5th metatarsal heads. XR FOOT LEFT (MIN 3 VIEWS)   Final Result   There are 2nd through 4th metatarsal neck fractures. Base of 1st metatarsal fracture with articular extension.                  Assessment/Plan:    Active Hospital Problems    Diagnosis    Closed fracture of metatarsal neck, left, initial encounter [S92.302A]    Closed fracture of metatarsal neck [S92.309A]    CKD (chronic kidney disease), stage III (HCC) [N18.3]    Coronary artery disease involving native coronary artery of native heart without angina pectoris [I25.10]    HTN (hypertension) [I10]    S/P coronary artery stent placement x 4 [Z95.5]    DM2 (diabetes mellitus, type 2) ([facility] [address] [E11.9]     Closed fracture of metatarsal head and neck. Left, multiple  2/2 trauma  - Orthopedics consult  - Pain management  - DVT.  ppx  Input from orthopedic surgery noted, plan for conservative management.     CAD S/P coronary artery stent placement x 4   - Dual antiplatelet therapy on hold in case he needs surgery  - Resume Coreg + statin     HTN (hypertension) -  controlled, resume home meds     DM2 (diabetes mellitus, type 2)  A1c 8.4, uncontrolled  Resume insulin Lantus, pioglitazone, and SSI with Accu-Cheks     CKD (chronic kidney disease), stage III - stable, monitor        DVT Prophylaxis: Lovenox  Diet: DIET CARB CONTROL;  Code Status: Full Code    PT/OT Eval Status: Ordered    Dispo - possibly today    Pamella Genao MD

## 2019-05-29 LAB
GLUCOSE BLD-MCNC: 138 MG/DL (ref 70–99)
GLUCOSE BLD-MCNC: 200 MG/DL (ref 70–99)
GLUCOSE BLD-MCNC: 241 MG/DL (ref 70–99)
GLUCOSE BLD-MCNC: 75 MG/DL (ref 70–99)
PERFORMED ON: ABNORMAL
PERFORMED ON: NORMAL

## 2019-05-29 PROCEDURE — 6370000000 HC RX 637 (ALT 250 FOR IP): Performed by: INTERNAL MEDICINE

## 2019-05-29 PROCEDURE — 1200000000 HC SEMI PRIVATE

## 2019-05-29 PROCEDURE — 6360000002 HC RX W HCPCS: Performed by: INTERNAL MEDICINE

## 2019-05-29 PROCEDURE — 97110 THERAPEUTIC EXERCISES: CPT

## 2019-05-29 PROCEDURE — 96372 THER/PROPH/DIAG INJ SC/IM: CPT

## 2019-05-29 PROCEDURE — G0378 HOSPITAL OBSERVATION PER HR: HCPCS

## 2019-05-29 PROCEDURE — 2580000003 HC RX 258: Performed by: INTERNAL MEDICINE

## 2019-05-29 PROCEDURE — 97116 GAIT TRAINING THERAPY: CPT

## 2019-05-29 RX ORDER — HYDROCODONE BITARTRATE AND ACETAMINOPHEN 5; 325 MG/1; MG/1
1 TABLET ORAL EVERY 6 HOURS PRN
Status: DISCONTINUED | OUTPATIENT
Start: 2019-05-29 | End: 2019-05-30 | Stop reason: HOSPADM

## 2019-05-29 RX ORDER — TRAMADOL HYDROCHLORIDE 50 MG/1
50 TABLET ORAL 3 TIMES DAILY
Status: DISCONTINUED | OUTPATIENT
Start: 2019-05-29 | End: 2019-05-30 | Stop reason: HOSPADM

## 2019-05-29 RX ADMIN — TRAZODONE HYDROCHLORIDE 100 MG: 50 TABLET ORAL at 22:40

## 2019-05-29 RX ADMIN — FERROUS SULFATE TAB 325 MG (65 MG ELEMENTAL FE) 325 MG: 325 (65 FE) TAB at 09:25

## 2019-05-29 RX ADMIN — ASPIRIN 81 MG 81 MG: 81 TABLET ORAL at 09:25

## 2019-05-29 RX ADMIN — PIOGLITAZONE 45 MG: 30 TABLET ORAL at 09:24

## 2019-05-29 RX ADMIN — INSULIN LISPRO 2 UNITS: 100 INJECTION, SOLUTION INTRAVENOUS; SUBCUTANEOUS at 11:38

## 2019-05-29 RX ADMIN — CLOPIDOGREL BISULFATE 75 MG: 75 TABLET ORAL at 09:25

## 2019-05-29 RX ADMIN — TRAMADOL HYDROCHLORIDE 50 MG: 50 TABLET, FILM COATED ORAL at 21:23

## 2019-05-29 RX ADMIN — CARVEDILOL 3.12 MG: 3.12 TABLET, FILM COATED ORAL at 09:25

## 2019-05-29 RX ADMIN — PANTOPRAZOLE SODIUM 40 MG: 40 TABLET, DELAYED RELEASE ORAL at 09:25

## 2019-05-29 RX ADMIN — SODIUM CHLORIDE, PRESERVATIVE FREE 10 ML: 5 INJECTION INTRAVENOUS at 09:21

## 2019-05-29 RX ADMIN — ENOXAPARIN SODIUM 40 MG: 40 INJECTION SUBCUTANEOUS at 09:20

## 2019-05-29 RX ADMIN — GABAPENTIN 300 MG: 300 CAPSULE ORAL at 15:14

## 2019-05-29 RX ADMIN — ATORVASTATIN CALCIUM 80 MG: 80 TABLET, FILM COATED ORAL at 21:22

## 2019-05-29 RX ADMIN — GABAPENTIN 300 MG: 300 CAPSULE ORAL at 09:25

## 2019-05-29 RX ADMIN — INSULIN GLARGINE 30 UNITS: 100 INJECTION, SOLUTION SUBCUTANEOUS at 21:31

## 2019-05-29 RX ADMIN — CARVEDILOL 3.12 MG: 3.12 TABLET, FILM COATED ORAL at 18:55

## 2019-05-29 RX ADMIN — FERROUS SULFATE TAB 325 MG (65 MG ELEMENTAL FE) 325 MG: 325 (65 FE) TAB at 21:23

## 2019-05-29 RX ADMIN — TRAMADOL HYDROCHLORIDE 50 MG: 50 TABLET, FILM COATED ORAL at 15:15

## 2019-05-29 RX ADMIN — INSULIN LISPRO 1 UNITS: 100 INJECTION, SOLUTION INTRAVENOUS; SUBCUTANEOUS at 21:29

## 2019-05-29 RX ADMIN — GABAPENTIN 300 MG: 300 CAPSULE ORAL at 21:23

## 2019-05-29 RX ADMIN — SODIUM CHLORIDE, PRESERVATIVE FREE 10 ML: 5 INJECTION INTRAVENOUS at 21:22

## 2019-05-29 ASSESSMENT — PAIN SCALES - GENERAL
PAINLEVEL_OUTOF10: 2
PAINLEVEL_OUTOF10: 4
PAINLEVEL_OUTOF10: 0

## 2019-05-29 ASSESSMENT — PAIN DESCRIPTION - PAIN TYPE
TYPE: ACUTE PAIN
TYPE: ACUTE PAIN

## 2019-05-29 ASSESSMENT — PAIN DESCRIPTION - LOCATION
LOCATION: FOOT
LOCATION: FOOT

## 2019-05-29 ASSESSMENT — PAIN DESCRIPTION - ORIENTATION
ORIENTATION: LEFT
ORIENTATION: LEFT

## 2019-05-29 NOTE — PROGRESS NOTES
Hospitalist Progress Note      PCP: PRESTON Garcia - CNP    Date of Admission: 5/26/2019    Chief Complaint: Accidental fall, left foot pain    Hospital Course:      Subjective:   Patient is up in bed, comfortable, not in distress. Complaining of pain in left foot area. No new event overnight noted. Medications:  Reviewed    Infusion Medications    dextrose       Scheduled Medications    clopidogrel  75 mg Oral Daily    aspirin  81 mg Oral Daily    atorvastatin  80 mg Oral Nightly    carvedilol  3.125 mg Oral BID WC    ferrous sulfate  325 mg Oral BID    gabapentin  300 mg Oral TID    insulin glargine  30 Units Subcutaneous Nightly    pantoprazole  40 mg Oral Daily    pioglitazone  45 mg Oral Daily    traZODone  100 mg Oral Nightly    sodium chloride flush  10 mL Intravenous 2 times per day    enoxaparin  40 mg Subcutaneous Daily    insulin lispro  0-6 Units Subcutaneous TID WC    insulin lispro  0-3 Units Subcutaneous Nightly     PRN Meds: sodium chloride flush, ondansetron, senna, glucose, dextrose, glucagon (rDNA), dextrose      Intake/Output Summary (Last 24 hours) at 5/29/2019 1058  Last data filed at 5/28/2019 2045  Gross per 24 hour   Intake 240 ml   Output 500 ml   Net -260 ml       Physical Exam Performed:    BP (!) 154/79   Pulse 87   Temp 98.1 °F (36.7 °C) (Oral)   Resp 16   Ht 5' 3\" (1.6 m)   Wt 146 lb (66.2 kg)   SpO2 97%   BMI 25.86 kg/m²     General appearance: No apparent distress, appears stated age and cooperative. HEENT: Pupils equal, round, and reactive to light. Conjunctivae/corneas clear. Neck: Supple, with full range of motion. No jugular venous distention. Trachea midline. Respiratory:  Normal respiratory effort. Clear to auscultation, bilaterally without Rales/Wheezes/Rhonchi. Cardiovascular: Regular rate and rhythm with normal S1/S2 without murmurs, rubs or gallops.   Abdomen: Soft, non-tender, non-distended with normal bowel sounds. Musculoskeletal: No clubbing, cyanosis or edema bilaterally. Full range of motion without deformity. Left foot fractures. Skin: Skin color, texture, turgor normal.  No rashes or lesions. Neurologic:  Neurovascularly intact without any focal sensory/motor deficits. Cranial nerves: II-XII intact, grossly non-focal.  Psychiatric: Alert and oriented, thought content appropriate, normal insight  Capillary Refill: Brisk,< 3 seconds   Peripheral Pulses: +2 palpable, equal bilaterally       Labs:   Recent Labs     05/26/19  2332   WBC 7.1   HGB 8.8*   HCT 25.5*        Recent Labs     05/26/19  2332   *   K 4.4      CO2 22   BUN 28*   CREATININE 1.6*   CALCIUM 8.7     No results for input(s): AST, ALT, BILIDIR, BILITOT, ALKPHOS in the last 72 hours. Recent Labs     05/26/19  2332   INR 1.13     No results for input(s): Ramses [place] in the last 72 hours. Urinalysis:      Lab Results   Component Value Date    NITRU Negative 11/09/2018    WBCUA 0-2 11/09/2018    BACTERIA Rare 11/09/2018    RBCUA 0-2 11/09/2018    BLOODU TRACE-LYSED 11/09/2018    SPECGRAV <=1.005 11/09/2018    GLUCOSEU Negative 11/09/2018       Radiology:  CT FOOT LEFT WO CONTRAST   Final Result   Fractures involving the base of the 1st, 2nd, and 3rd metatarsals, cuboid,   and 2nd through 5th metatarsal heads. XR FOOT LEFT (MIN 3 VIEWS)   Final Result   There are 2nd through 4th metatarsal neck fractures. Base of 1st metatarsal fracture with articular extension.                  Assessment/Plan:    Active Hospital Problems    Diagnosis    Closed fracture of metatarsal neck, left, initial encounter [S92.302A]    Closed fracture of metatarsal neck [S92.309A]    CKD (chronic kidney disease), stage III (HCC) [N18.3]    Coronary artery disease involving native coronary artery of native heart without angina pectoris [I25.10]    HTN (hypertension) [I10]    S/P coronary artery stent placement x 4 [Z95.5]    DM2 (diabetes mellitus, type 2) (HCC) [E11.9]     Closed fracture of metatarsal head and neck. Left, multiple  2/2 trauma  - Orthopedics consult  - Pain management  - DVT. ppx  Input from orthopedic surgery noted, plan for conservative management.     CAD S/P coronary artery stent placement x 4   - Dual antiplatelet therapy on hold in case he needs surgery  - Resume Coreg + statin     HTN (hypertension) -  controlled, resume home meds     DM2 (diabetes mellitus, type 2)  A1c 8.4, uncontrolled  Resume insulin Lantus, pioglitazone, and SSI with Accu-Cheks     CKD (chronic kidney disease), stage III - stable, monitor        DVT Prophylaxis: Lovenox  Diet: DIET CARB CONTROL;  Code Status: Full Code    PT/OT Eval Status: Ordered    Dispo - patient is medically stable for discharge since 5/28/2019. Awaiting placement.     Musa Camacho MD

## 2019-05-29 NOTE — CARE COORDINATION
Placed call to [facility] and left a message for Ailyn, admissions asking for a call back on pending pre-cert    Mike Harrison MSW, LSW

## 2019-05-29 NOTE — PLAN OF CARE
Problem: Falls - Risk of:  Goal: Will remain free from falls  Description  Will remain free from falls  Outcome: Ongoing  Note:   Bed in lowest, locked position, side rails up X2, nonskid footwear in place, call light within reach. Pt refusing bed check, instructed pt to call for assistance before getting out of bed, pt verbalized understanding. Will continue to monitor.

## 2019-05-29 NOTE — PROGRESS NOTES
Physical Therapy  Facility/Department: [place] [address] - MED SURG/ORTHO  Daily Treatment Note  NAME: Nicole Martinez  : 1950  MRN: [id number]    Date of Service: 2019    Discharge Recommendations:  [address]        Patient Diagnosis(es): The primary encounter diagnosis was Closed displaced fracture of first metatarsal bone of left foot, initial encounter. Diagnoses of Closed nondisplaced fracture of second metatarsal bone of left foot, initial encounter, Closed nondisplaced fracture of third metatarsal bone of left foot, initial encounter, Closed nondisplaced fracture of fourth metatarsal bone of left foot, initial encounter, Left foot pain, Traumatic ecchymosis of left foot, initial encounter, Renal insufficiency, and Hyperglycemia were also pertinent to this visit. has a past medical history of Arthritis, Ataxia, BPH (benign prostatic hyperplasia), CAD (coronary artery disease), Coronary atherosclerosis of native coronary artery, Diabetes mellitus ([address] Environmental allergies, Hepatitis, Hydronephrosis, Hyperlipidemia, Hypertension, Kidney disease, Neuropathy, Parkinson's disease ([address] S/P coronary artery stent placement x 4, and Schizoaffective disorder, bipolar type ([address] has a past surgical history that includes Coronary angioplasty; Coronary angioplasty with stent; hernia repair; Vasectomy; Cystocopy (14); Ankle surgery (Right, 2018); Upper gastrointestinal endoscopy (N/A, 2018); Colonoscopy (N/A, 2018); and Upper gastrointestinal endoscopy (N/A, 5/3/2019).     Restrictions  Restrictions/Precautions  Restrictions/Precautions: (found live bed bug on pt's chair, reported and given to RN)  Required Braces or Orthoses?: Yes  Lower Extremity Weight Bearing Restrictions  Left Lower Extremity Weight Bearing: Non Weight Bearing  Required Braces or Orthoses  Left Lower Extremity Brace: (splint)  Position Activity Restriction  Other position/activity transfers and gait with NWB LLE and RW  REQUIRES PT FOLLOW UP: Yes  Activity Tolerance  Activity Tolerance: Patient limited by fatigue;Patient Tolerated treatment well       AM-PAC Score  AM-PAC Inpatient Mobility Raw Score : 17  AM-PAC Inpatient T-Scale Score : 42.13  Mobility Inpatient CMS 0-100% Score: 50.57  Mobility Inpatient CMS G-Code Modifier : CK          Goals  Short term goals  Time Frame for Short term goals: 6/01/19 unless specified  Short term goal 1: Pt will perform bed mobility with mod I by 5/30/19  Short term goal 2: Pt will perform transfers with CGA; goal met 5/29  Short term goal 3: Pt will ambulate 25 ft with SW and CGA; goal met 5/29  Short term goal 4: Pt will tolerate 1- 15 reps of LE exercise for strenghtening and balance   Patient Goals   Patient goals : \"to get stronger\"    Plan    Plan  Times per week: 7  Current Treatment Recommendations: Strengthening, Gait Training, ROM, Balance Training, Functional Mobility Training, Endurance Training, Transfer Training  Safety Devices  Type of devices: All fall risk precautions in place, Left in chair, Call light within reach, Chair alarm in place, Nurse notified, Gait belt, Patient at risk for falls     Therapy Time   Individual Concurrent Group Co-treatment   Time In 1150         Time Out 1218         Minutes 28              If pt is discharged prior to next therapy session, this note will serve as discharge summary.     Alyssa Clark, PT

## 2019-05-30 VITALS
HEART RATE: 82 BPM | SYSTOLIC BLOOD PRESSURE: 177 MMHG | TEMPERATURE: 97.7 F | BODY MASS INDEX: 25.87 KG/M2 | WEIGHT: 146 LBS | DIASTOLIC BLOOD PRESSURE: 73 MMHG | HEIGHT: 63 IN | OXYGEN SATURATION: 98 % | RESPIRATION RATE: 16 BRPM

## 2019-05-30 LAB
GLUCOSE BLD-MCNC: 229 MG/DL (ref 70–99)
GLUCOSE BLD-MCNC: 91 MG/DL (ref 70–99)
PERFORMED ON: ABNORMAL
PERFORMED ON: NORMAL

## 2019-05-30 PROCEDURE — 2580000003 HC RX 258: Performed by: INTERNAL MEDICINE

## 2019-05-30 PROCEDURE — 6360000002 HC RX W HCPCS: Performed by: INTERNAL MEDICINE

## 2019-05-30 PROCEDURE — 6370000000 HC RX 637 (ALT 250 FOR IP): Performed by: INTERNAL MEDICINE

## 2019-05-30 PROCEDURE — 96372 THER/PROPH/DIAG INJ SC/IM: CPT

## 2019-05-30 PROCEDURE — 97530 THERAPEUTIC ACTIVITIES: CPT

## 2019-05-30 PROCEDURE — G0378 HOSPITAL OBSERVATION PER HR: HCPCS

## 2019-05-30 RX ORDER — HYDROCODONE BITARTRATE AND ACETAMINOPHEN 5; 325 MG/1; MG/1
1 TABLET ORAL EVERY 6 HOURS PRN
Qty: 21 TABLET | Refills: 0 | Status: SHIPPED | OUTPATIENT
Start: 2019-05-30 | End: 2019-06-06

## 2019-05-30 RX ORDER — TRAMADOL HYDROCHLORIDE 50 MG/1
50 TABLET ORAL 3 TIMES DAILY
Qty: 15 TABLET | Refills: 0 | Status: SHIPPED | OUTPATIENT
Start: 2019-05-30 | End: 2019-06-02

## 2019-05-30 RX ADMIN — CARVEDILOL 3.12 MG: 3.12 TABLET, FILM COATED ORAL at 10:14

## 2019-05-30 RX ADMIN — ASPIRIN 81 MG 81 MG: 81 TABLET ORAL at 10:14

## 2019-05-30 RX ADMIN — FERROUS SULFATE TAB 325 MG (65 MG ELEMENTAL FE) 325 MG: 325 (65 FE) TAB at 10:13

## 2019-05-30 RX ADMIN — PIOGLITAZONE 45 MG: 30 TABLET ORAL at 10:13

## 2019-05-30 RX ADMIN — INSULIN LISPRO 2 UNITS: 100 INJECTION, SOLUTION INTRAVENOUS; SUBCUTANEOUS at 12:29

## 2019-05-30 RX ADMIN — TRAMADOL HYDROCHLORIDE 50 MG: 50 TABLET, FILM COATED ORAL at 10:14

## 2019-05-30 RX ADMIN — GABAPENTIN 300 MG: 300 CAPSULE ORAL at 15:16

## 2019-05-30 RX ADMIN — CLOPIDOGREL BISULFATE 75 MG: 75 TABLET ORAL at 10:13

## 2019-05-30 RX ADMIN — ENOXAPARIN SODIUM 40 MG: 40 INJECTION SUBCUTANEOUS at 10:14

## 2019-05-30 RX ADMIN — GABAPENTIN 300 MG: 300 CAPSULE ORAL at 10:14

## 2019-05-30 RX ADMIN — PANTOPRAZOLE SODIUM 40 MG: 40 TABLET, DELAYED RELEASE ORAL at 10:13

## 2019-05-30 RX ADMIN — TRAMADOL HYDROCHLORIDE 50 MG: 50 TABLET, FILM COATED ORAL at 15:16

## 2019-05-30 RX ADMIN — SODIUM CHLORIDE, PRESERVATIVE FREE 10 ML: 5 INJECTION INTRAVENOUS at 10:15

## 2019-05-30 ASSESSMENT — PAIN SCALES - GENERAL
PAINLEVEL_OUTOF10: 0
PAINLEVEL_OUTOF10: 6
PAINLEVEL_OUTOF10: 0

## 2019-05-30 NOTE — PROGRESS NOTES
Occupational Therapy  Facility/Department: [facility] - MED SURG/ORTHO  Daily Treatment Note  NAME: Jerry Mcconnell  : 1950  MRN: [id number]    Date of Service: 2019    Discharge Recommendations:  Subacute/Skilled Nursing Facility  OT Equipment Recommendations  Equipment Needed: No  Other: defer to next level of care    Assessment   Performance deficits / Impairments: Decreased functional mobility ; Decreased balance;Decreased safe awareness;Decreased ADL status; Decreased endurance;Decreased high-level IADLs;Decreased sensation  Assessment: Pt pleasant and agreeable to treatment, with improved level of assist for functional mobility/transfers, but continues to require cues for safety and hand placement throughout session. Continue to recommend SNF at d/c. Continue OT tx. Patient Education: safety  REQUIRES OT FOLLOW UP: Yes  Activity Tolerance  Activity Tolerance: Patient Tolerated treatment well;Patient limited by fatigue  Safety Devices  Safety Devices in place: Yes  Type of devices: Left in chair;Chair alarm in place;Call light within reach;Gait belt;Nurse notified         Patient Diagnosis(es): The primary encounter diagnosis was Closed displaced fracture of first metatarsal bone of left foot, initial encounter. Diagnoses of Closed nondisplaced fracture of second metatarsal bone of left foot, initial encounter, Closed nondisplaced fracture of third metatarsal bone of left foot, initial encounter, Closed nondisplaced fracture of fourth metatarsal bone of left foot, initial encounter, Left foot pain, Traumatic ecchymosis of left foot, initial encounter, Renal insufficiency, and Hyperglycemia were also pertinent to this visit.       has a past medical history of Arthritis, Ataxia, BPH (benign prostatic hyperplasia), CAD (coronary artery disease), Coronary atherosclerosis of native coronary artery, Diabetes mellitus ([facility] Environmental allergies, Hepatitis, Hydronephrosis, Hyperlipidemia, Hypertension, Kidney disease, Neuropathy, Parkinson's disease ([facility] S/P coronary artery stent placement x 4, and Schizoaffective disorder, bipolar type ([facility] [facility] has a past surgical history that includes Coronary angioplasty; Coronary angioplasty with stent; hernia repair; Vasectomy; Cystocopy (4/4/14); Ankle surgery (Right, 06/09/2018); Upper gastrointestinal endoscopy (N/A, 12/13/2018); Colonoscopy (N/A, 12/13/2018); and Upper gastrointestinal endoscopy (N/A, 5/3/2019). Restrictions  Restrictions/Precautions  Restrictions/Precautions: General Precautions, Fall Risk, Weight Bearing, Isolation  Required Braces or Orthoses?: Yes  Lower Extremity Weight Bearing Restrictions  Left Lower Extremity Weight Bearing: Non Weight Bearing  Required Braces or Orthoses  Left Lower Extremity Brace: (splint)  Position Activity Restriction  Other position/activity restrictions: up as tolerated, environmental for bed bugs  Subjective   General  Chart Reviewed: Yes  Patient assessed for rehabilitation services?: Yes  Family / Caregiver Present: No  Referring Practitioner: Werner Lema MD  Diagnosis: First through fifth metatarsals and cuboid without significant displacement  Subjective  Subjective: Pt in bed on arrival, agreeable to eval and treat  General Comment  Comments: Per RN okay to treat      Orientation  Orientation  Overall Orientation Status: Within Functional Limits  Objective    ADL  LE Dressing: Supervision(seated reaching to feet)        Balance  Sitting Balance: Supervision  Standing Balance: Contact guard assistance  Standing Balance  Time: 3-4 minutes, 1-2 minutes, <1 minute over multiple trials  Activity: mobility, transfers  Functional Mobility  Functional - Mobility Device: Rolling Walker  Activity: Other; To/from bathroom  Assist Level: Contact guard assistance  Toilet Transfers  Toilet - Technique: Ambulating(RW)  Equipment Used: Standard toilet  Toilet Transfer: Contact guard assistance(LOB upon sitting,

## 2019-05-30 NOTE — PROGRESS NOTES
Hospitalist Progress Note      PCP: PRESTON Noguera CNP    Date of Admission: 5/26/2019    Chief Complaint: Accidental fall, left foot pain    Hospital Course:      Subjective:   Patient is up in bed, comfortable, not in distress. Complaining of pain in left foot area. No new event overnight noted. Medications:  Reviewed    Infusion Medications    dextrose       Scheduled Medications    traMADol  50 mg Oral TID    clopidogrel  75 mg Oral Daily    aspirin  81 mg Oral Daily    atorvastatin  80 mg Oral Nightly    carvedilol  3.125 mg Oral BID WC    ferrous sulfate  325 mg Oral BID    gabapentin  300 mg Oral TID    insulin glargine  30 Units Subcutaneous Nightly    pantoprazole  40 mg Oral Daily    pioglitazone  45 mg Oral Daily    traZODone  100 mg Oral Nightly    sodium chloride flush  10 mL Intravenous 2 times per day    enoxaparin  40 mg Subcutaneous Daily    insulin lispro  0-6 Units Subcutaneous TID WC    insulin lispro  0-3 Units Subcutaneous Nightly     PRN Meds: HYDROcodone 5 mg - acetaminophen, sodium chloride flush, ondansetron, senna, glucose, dextrose, glucagon (rDNA), dextrose      Intake/Output Summary (Last 24 hours) at 5/30/2019 1104  Last data filed at 5/29/2019 2118  Gross per 24 hour   Intake 240 ml   Output 300 ml   Net -60 ml       Physical Exam Performed:    BP (!) 177/73   Pulse 82   Temp 97.7 °F (36.5 °C) (Oral)   Resp 16   Ht 5' 3\" (1.6 m)   Wt 146 lb (66.2 kg)   SpO2 98%   BMI 25.86 kg/m²     General appearance: No apparent distress, appears stated age and cooperative. HEENT: Pupils equal, round, and reactive to light. Conjunctivae/corneas clear. Neck: Supple, with full range of motion. No jugular venous distention. Trachea midline. Respiratory:  Normal respiratory effort. Clear to auscultation, bilaterally without Rales/Wheezes/Rhonchi. Cardiovascular: Regular rate and rhythm with normal S1/S2 without murmurs, rubs or gallops.   Abdomen: Soft, non-tender, non-distended with normal bowel sounds. Musculoskeletal: No clubbing, cyanosis or edema bilaterally. Full range of motion without deformity. Left foot fractures. Skin: Skin color, texture, turgor normal.  No rashes or lesions. Neurologic:  Neurovascularly intact without any focal sensory/motor deficits. Cranial nerves: II-XII intact, grossly non-focal.  Psychiatric: Alert and oriented, thought content appropriate, normal insight  Capillary Refill: Brisk,< 3 seconds   Peripheral Pulses: +2 palpable, equal bilaterally       Labs:   No results for input(s): WBC, HGB, HCT, PLT in the last 72 hours. No results for input(s): NA, K, CL, CO2, BUN, CREATININE, CALCIUM, PHOS in the last 72 hours. Invalid input(s): MAGNES  No results for input(s): AST, ALT, BILIDIR, BILITOT, ALKPHOS in the last 72 hours. No results for input(s): INR in the last 72 hours. No results for input(s): Nasreen Dark in the last 72 hours. Urinalysis:      Lab Results   Component Value Date    NITRU Negative 11/09/2018    WBCUA 0-2 11/09/2018    BACTERIA Rare 11/09/2018    RBCUA 0-2 11/09/2018    BLOODU TRACE-LYSED 11/09/2018    SPECGRAV <=1.005 11/09/2018    GLUCOSEU Negative 11/09/2018       Radiology:  CT FOOT LEFT WO CONTRAST   Final Result   Fractures involving the base of the 1st, 2nd, and 3rd metatarsals, cuboid,   and 2nd through 5th metatarsal heads. XR FOOT LEFT (MIN 3 VIEWS)   Final Result   There are 2nd through 4th metatarsal neck fractures. Base of 1st metatarsal fracture with articular extension.                  Assessment/Plan:    Active Hospital Problems    Diagnosis    Closed fracture of metatarsal neck, left, initial encounter [S92.302A]    Closed fracture of metatarsal neck [S92.309A]    CKD (chronic kidney disease), stage III (HCC) [N18.3]    Coronary artery disease involving native coronary artery of native heart without angina pectoris [I25.10]    HTN (hypertension) [I10]    S/P coronary artery stent placement x 4 [Z95.5]    DM2 (diabetes mellitus, type 2) ([facility] [E11.9]     Closed fracture of metatarsal head and neck. Left, multiple  2/2 trauma  - Orthopedics consult  - Pain management  - DVT. ppx  Input from orthopedic surgery noted, plan for conservative management.     CAD S/P coronary artery stent placement x 4   - Dual antiplatelet therapy on hold in case he needs surgery  - Resume Coreg + statin     HTN (hypertension) -  controlled, resume home meds     DM2 (diabetes mellitus, type 2)  A1c 8.4, uncontrolled  Resume insulin Lantus, pioglitazone, and SSI with Accu-Cheks     CKD (chronic kidney disease), stage III - stable, monitor        DVT Prophylaxis: Lovenox  Diet: DIET CARB CONTROL;  Code Status: Full Code    PT/OT Eval Status: Ordered    Dispo - patient is medically stable for discharge since 5/28/2019. Awaiting placement.     Osman Castle MD

## 2019-05-30 NOTE — DISCHARGE INSTR - COC
Continuity of Care Form    Patient Name: Teddy Jerry   :  1950  MRN:  [id number]    Admit date:  2019  Discharge date:  19    Code Status Order: Full Code   Advance Directives:   Advance Care Flowsheet Documentation     Date/Time Healthcare Directive Type of Healthcare Directive Copy in [address] Agent's Name Healthcare Agent's Phone Number    [phone]  No, patient does not have an advance directive for healthcare treatment -- -- -- -- --          Admitting Physician:  Werner Lema MD  PCP: PRESTON Mosley CNP    Discharging Nurse: Ant Hoff Unit/Room#: 5378/6652-10  Discharging Unit Phone Number: [phone]    Emergency Contact:   Extended Emergency Contact Information  Primary Emergency Contact: Fara Mario  Address: Bonifacio Ceballos [address] [place] [address]. [address], [facility] [address] Phone: [phone]  Relation: Spouse    Past Surgical History:  Past Surgical History:   Procedure Laterality Date    ANKLE SURGERY Right 2018    ORIF of R ankle     COLONOSCOPY N/A 2018    COLONOSCOPY CONTROL HEMORRHAGE performed by Ashlye Peralta MD at [facility]      x 4    CYSTOSCOPY  14    transurethral vaporization of prostate    HERNIA REPAIR      X2    UPPER GASTROINTESTINAL ENDOSCOPY N/A 2018    EGD BIOPSY performed by Ashley Peralta MD at [facility] N/A 5/3/2019    EGD WITH ANESTHESIA performed by Ashley Peralta MD at [address] [facility]         Immunization History:   Immunization History   Administered Date(s) Administered    Influenza Virus Vaccine 10/10/2014    Pneumococcal 13-valent Conjugate (Patricia Waltham) 2017       Active Problems:  Patient Active Problem List   Diagnosis Code    DM2 (diabetes mellitus, type 2) ([facility]) E11.9    Dizziness R42    Hematuria R31.9    Hyponatremia E87.1    ARF (acute renal failure) ([facility]) N17.9    S/P coronary artery stent placement Z95.5    S/P coronary artery stent placement x 4 Z95.5    Orthostatic hypotension I95.1    Acute posthemorrhagic anemia D62    Contusion, knee S80.00XA    DM hyperosmolarity type II, uncontrolled ([facility]) E11.00, E11.65    Coronary artery disease involving native coronary artery of native heart without angina pectoris I25.10    HTN (hypertension) I10    Chest pain R07.9    Ischemic chest pain I25.9    Near syncope R55    CKD (chronic kidney disease), stage III ([facility]) N18.3    Anemia D64.9    CHF (congestive heart failure) ([facility]) I50.9    DM (diabetes mellitus) ([facility]) E11.9    Syncope R55    Closed trimalleolar fracture of right ankle S82.851A    Closed fracture of fifth metacarpal bone of left hand S62.307A    Closed nondisplaced fracture of fifth metatarsal bone of left foot S92.355A    Hypokalemia E87.6    Hyperkalemia E87.5    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 Q27.33    OCTAVIO (acute kidney injury) ([facility]) N17.9    Heartburn R12    Esophageal dysphagia R13.10    Esophagitis, [place] grade B K20.8    Closed fracture of metatarsal neck S92.309A    Closed fracture of metatarsal neck, left, initial encounter S92.302A       Isolation/Infection:   Isolation          No Isolation            Nurse Assessment:  Last Vital Signs: BP (!) 177/73   Pulse 82   Temp 97.7 °F (36.5 °C) (Oral)   Resp 16   Ht 5' 3\" (1.6 m)   Wt 146 lb (66.2 kg)   SpO2 98%   BMI 25.86 kg/m²     Last documented pain score (0-10 scale): Pain Level: 6  Last Weight:   Wt Readings from Last 1 Encounters:   05/26/19 146 lb (66.2 kg)     Mental Status:  oriented and alert    IV Access:  - None    Nursing Mobility/ADLs:  Walking   Assisted  Transfer  Assisted  Bathing  Assisted  Dressing  Assisted  Toileting  Assisted  Feeding  Independent  Med SECTION    Prognosis: Fair    Condition at Discharge: Stable    Rehab Potential (if transferring to Rehab): Fair    Recommended Labs or Other Treatments After Discharge:     Physician Certification: I certify the above information and transfer of Vineet Shearer  is necessary for the continuing treatment of the diagnosis listed and that he requires [facility] for less 30 days.      Update Admission H&P: No change in H&P    PHYSICIAN SIGNATURE:  Electronically signed by Loy Roberts MD on 5/30/19 at 1:15 PM

## 2019-05-30 NOTE — CARE COORDINATION
CASE MANAGEMENT DISCHARGE SUMMARY      Discharge to: [facility] completed: Yes  Hospital Exemption Notification (HENS) completed: Yes    New Durable Medical Equipment ordered/agency: N/A    Transportation:    Family/car: N   Medical Transport explained to [employer]. Pt/family voice no agency preference. Agency used: [facility] up time: 3:00pm   Ambulance form completed: Yes    Notified:    Family:    Facility/Agency: [facility] faxed   RN: Mark Bal    Phone number for report to facility: [phone]    Note: Discharging nurse to complete [facility], reconcile [facility], and place final copy with patient's discharge packet. RN to ensure that written prescriptions for  Level II medications are sent with patient to the facility as per protocol.

## 2019-05-30 NOTE — DISCHARGE SUMMARY
Hospital Medicine Discharge Summary    Patient ID: Christopher Park      Patient's PCP: Gorge Torres APRN - CNP    Admit Date: 5/26/2019     Discharge Date:   5/30/19    Admitting Physician: Socorro Hoffman MD     Discharge Physician: Abbe Mathis MD     Discharge Diagnoses: Active Hospital Problems    Diagnosis    Closed fracture of metatarsal neck, left, initial encounter [S92.302A]    Closed fracture of metatarsal neck [S92.309A]    CKD (chronic kidney disease), stage III (HCC) [N18.3]    Coronary artery disease involving native coronary artery of native heart without angina pectoris [I25.10]    HTN (hypertension) [I10]    S/P coronary artery stent placement x 4 [Z95.5]    DM2 (diabetes mellitus, type 2) ([facility] [E11.9]       The patient was seen and examined on day of discharge and this discharge summary is in conjunction with any daily progress note from day of discharge. Hospital Course:     Closed fracture of metatarsal head and neck. Left, multiple  2/2 trauma  - Orthopedics consult  - Pain management  - DVT. ppx  Input from orthopedic surgery noted, plan for conservative management.     CAD S/P coronary artery stent placement x 4   - Dual antiplatelet therapy on hold in case he needs surgery  - Resume Coreg + statin     HTN (hypertension) -  controlled, resume home meds     DM2 (diabetes mellitus, type 2)  A1c 8.4, uncontrolled  Resume insulin Lantus, pioglitazone, and SSI with Accu-Cheks     CKD (chronic kidney disease), stage III - stable, monitor        Physical Exam Performed:     BP (!) 177/73   Pulse 82   Temp 97.7 °F (36.5 °C) (Oral)   Resp 16   Ht 5' 3\" (1.6 m)   Wt 146 lb (66.2 kg)   SpO2 98%   BMI 25.86 kg/m²       General appearance:  No apparent distress, appears stated age and cooperative. HEENT:  Normal cephalic, atraumatic without obvious deformity. Pupils equal, round, and reactive to light. Extra ocular muscles intact.  Conjunctivae/corneas clear.  Neck: Supple, with full range of motion. No jugular venous distention. Trachea midline. Respiratory:  Normal respiratory effort. Clear to auscultation, bilaterally without Rales/Wheezes/Rhonchi. Cardiovascular:  Regular rate and rhythm with normal S1/S2 without murmurs, rubs or gallops. Abdomen: Soft, non-tender, non-distended with normal bowel sounds. Musculoskeletal:  No clubbing, cyanosis or edema bilaterally. Full range of motion without deformity. Skin: Skin color, texture, turgor normal.  No rashes or lesions. Neurologic:  Neurovascularly intact without any focal sensory/motor deficits. Cranial nerves: II-XII intact, grossly non-focal.  Psychiatric:  Alert and oriented, thought content appropriate, normal insight  Capillary Refill: Brisk,< 3 seconds   Peripheral Pulses: +2 palpable, equal bilaterally       Labs: For convenience and continuity at follow-up the following most recent labs are provided:      CBC:    Lab Results   Component Value Date    WBC 7.1 05/26/2019    HGB 8.8 05/26/2019    HCT 25.5 05/26/2019     05/26/2019       Renal:    Lab Results   Component Value Date     05/26/2019    K 4.4 05/26/2019     05/26/2019    CO2 22 05/26/2019    BUN 28 05/26/2019    CREATININE 1.6 05/26/2019    CALCIUM 8.7 05/26/2019    PHOS 3.8 09/12/2017         Significant Diagnostic Studies    Radiology:   CT FOOT LEFT WO CONTRAST   Final Result   Fractures involving the base of the 1st, 2nd, and 3rd metatarsals, cuboid,   and 2nd through 5th metatarsal heads. XR FOOT LEFT (MIN 3 VIEWS)   Final Result   There are 2nd through 4th metatarsal neck fractures. Base of 1st metatarsal fracture with articular extension.                 Consults:     IP CONSULT TO HOSPITALIST  IP CONSULT TO ORTHOPEDIC SURGERY  IP CONSULT TO SOCIAL WORK    Disposition:  SNF     Condition at Discharge: Stable    Discharge Instructions/Follow-up:  Ortho as directed    Code Status:  Full Code Activity: activity as tolerated    Diet: cardiac diet      Discharge Medications:     Current Discharge Medication List           Details   traMADol (ULTRAM) 50 MG tablet Take 1 tablet by mouth 3 times daily for 3 days. Qty: 15 tablet, Refills: 0    Comments: Reduce doses taken as pain becomes manageable  Associated Diagnoses: Closed nondisplaced fracture of second metatarsal bone of left foot, initial encounter; Closed nondisplaced fracture of third metatarsal bone of left foot, initial encounter      HYDROcodone-acetaminophen (NORCO) 5-325 MG per tablet Take 1 tablet by mouth every 6 hours as needed for Pain for up to 7 days. Qty: 21 tablet, Refills: 0    Comments: Reduce doses taken as pain becomes manageable  Associated Diagnoses: Closed displaced fracture of first metatarsal bone of left foot, initial encounter; Closed nondisplaced fracture of second metatarsal bone of left foot, initial encounter; Closed nondisplaced fracture of third metatarsal bone of left foot, initial encounter; Closed nondisplaced fracture of fourth metatarsal bone of left foot, initial encounter              Details   lisinopril (PRINIVIL;ZESTRIL) 5 MG tablet Take 1 tablet by mouth daily      metFORMIN (GLUCOPHAGE) 1000 MG tablet Take 1 tablet by mouth daily      PARoxetine (PAXIL) 40 MG tablet Take 1 tablet by mouth daily      ferrous sulfate 325 (65 Fe) MG tablet Take 1 tablet by mouth 2 times daily  Qty: 60 tablet, Refills: 11      Dulaglutide (TRULICITY SC) Inject 1.5 mg into the skin once a week       carvedilol (COREG) 3.125 MG tablet TAKE ONE (1) TABLET BY MOUTH TWICE DAILY WITH MEALS  Qty: 180 tablet, Refills: 3      gabapentin (NEURONTIN) 300 MG capsule Take 300 mg by mouth 3 times daily. .      clopidogrel (PLAVIX) 75 MG tablet TAKE 1 TABLET BY MOUTH ONCE DAILY  Qty: 30 tablet, Refills: 3      atorvastatin (LIPITOR) 80 MG tablet TAKE 1 TABLET BY MOUTH AT BEDTIME  Qty: 90 tablet, Refills: 0      ASPIRIN LOW DOSE 81 MG EC tablet TAKE 1 TABLET BY MOUTH ONCE DAILY  Qty: 30 tablet, Refills: 11      pantoprazole (PROTONIX) 40 MG tablet TAKE 1 TABLET BY MOUTH ONCE DAILY  Qty: 90 tablet, Refills: 3      insulin glargine (LANTUS) 100 UNIT/ML injection vial Inject 30 Units into the skin nightly  Qty: 1 vial, Refills: 3      pioglitazone (ACTOS) 45 MG tablet Take 45 mg by mouth daily      traZODone (DESYREL) 100 MG tablet Take 100 mg by mouth nightly      fluticasone (FLONASE) 50 MCG/ACT nasal spray 1 spray by Nasal route daily as needed. Time Spent on discharge is more than 30 minutes in the examination, evaluation, counseling and review of medications and discharge plan. Signed:    Emely Kelly MD   5/30/2019      Thank you PRESTON Cabrera - CNP for the opportunity to be involved in this patient's care. If you have any questions or concerns please feel free to contact me at [phone].

## 2019-06-03 RX ORDER — PANTOPRAZOLE SODIUM 40 MG/1
TABLET, DELAYED RELEASE ORAL
Qty: 90 TABLET | Refills: 5 | Status: SHIPPED | OUTPATIENT
Start: 2019-06-03 | End: 2019-07-30 | Stop reason: SDUPTHER

## 2019-06-05 ENCOUNTER — OFFICE VISIT (OUTPATIENT)
Dept: ORTHOPEDIC SURGERY | Age: 69
End: 2019-06-05
Payer: COMMERCIAL

## 2019-06-05 VITALS
BODY MASS INDEX: 25.86 KG/M2 | WEIGHT: 145.94 LBS | DIASTOLIC BLOOD PRESSURE: 58 MMHG | SYSTOLIC BLOOD PRESSURE: 98 MMHG | HEART RATE: 88 BPM | HEIGHT: 63 IN

## 2019-06-05 DIAGNOSIS — R52 PAIN: Primary | ICD-10-CM

## 2019-06-05 PROCEDURE — 29425 APPL SHORT LEG CAST WALKING: CPT | Performed by: ORTHOPAEDIC SURGERY

## 2019-06-05 PROCEDURE — 99024 POSTOP FOLLOW-UP VISIT: CPT | Performed by: ORTHOPAEDIC SURGERY

## 2019-06-06 NOTE — PROGRESS NOTES
Subjective: Patient is here for follow-up of his left foot multiple metatarsal fractures. I saw him in the hospital.  He is diabetic and his last hemoglobin A1c was 9.5. He states that his pain is mild to moderate mainly over the medial aspect of the left foot. He is never injured this foot before that he knows of. He is now staying in a nursing home  Objective: Physical exam shows mild to moderate swelling around the left foot no skin disruption. His skin is somewhat dry no evidence of compartment syndrome he does have tenderness mainly over the first metatarsal base and extensor tendons are intact sensation is decreased to light touch and Gutierrez Gab 5.0 7 monofilament  Imaging: 3 views of the left foot and CT scan obtained previously shows evidence of for second and third metatarsal base fractures no disruption of the Lisfranc joint also has second through fifth metatarsal neck fractures and a cuboid fracture  Assessment and plan: I again reviewed these findings with him. We put him in a short leg nonweightbearing cast out past his toes after covering his leg with Polysporin ointment. He'll follow-up with me in 2 weeks cast should be removed repeat x-rays of the foot. We did talk about the possibility of pressure sore and that he should contact us or have his caretakers contact us immediately if he has any problems.   He demonstrates good understanding of this

## 2019-06-12 ENCOUNTER — HOSPITAL ENCOUNTER (EMERGENCY)
Age: 69
Discharge: NURSING, REHAB OR LONG-TERM CARE | End: 2019-06-12
Payer: COMMERCIAL

## 2019-06-12 ENCOUNTER — APPOINTMENT (OUTPATIENT)
Dept: GENERAL RADIOLOGY | Age: 69
End: 2019-06-12
Payer: COMMERCIAL

## 2019-06-12 VITALS
DIASTOLIC BLOOD PRESSURE: 71 MMHG | SYSTOLIC BLOOD PRESSURE: 159 MMHG | TEMPERATURE: 97.9 F | HEART RATE: 81 BPM | OXYGEN SATURATION: 98 % | RESPIRATION RATE: 16 BRPM

## 2019-06-12 DIAGNOSIS — M25.432 PAIN AND SWELLING OF LEFT WRIST: ICD-10-CM

## 2019-06-12 DIAGNOSIS — S52.502A CLOSED FRACTURE OF DISTAL ENDS OF LEFT RADIUS AND ULNA, INITIAL ENCOUNTER: Primary | ICD-10-CM

## 2019-06-12 DIAGNOSIS — M25.532 PAIN AND SWELLING OF LEFT WRIST: ICD-10-CM

## 2019-06-12 DIAGNOSIS — S52.602A CLOSED FRACTURE OF DISTAL ENDS OF LEFT RADIUS AND ULNA, INITIAL ENCOUNTER: Primary | ICD-10-CM
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
## 2019-06-12 PROCEDURE — 73110 X-RAY EXAM OF WRIST: CPT

## 2019-06-12 PROCEDURE — 6370000000 HC RX 637 (ALT 250 FOR IP): Performed by: NURSE PRACTITIONER

## 2019-06-12 PROCEDURE — 4500000023 HC ED LEVEL 3 PROCEDURE

## 2019-06-12 PROCEDURE — 99283 EMERGENCY DEPT VISIT LOW MDM: CPT

## 2019-06-12 RX ORDER — HYDROCODONE BITARTRATE AND ACETAMINOPHEN 5; 325 MG/1; MG/1
1 TABLET ORAL ONCE
Status: COMPLETED | OUTPATIENT
Start: 2019-06-12 | End: 2019-06-12

## 2019-06-12 RX ORDER — HYDROCODONE BITARTRATE AND ACETAMINOPHEN 5; 325 MG/1; MG/1
1 TABLET ORAL EVERY 6 HOURS PRN
Qty: 20 TABLET | Refills: 0 | Status: SHIPPED | OUTPATIENT
Start: 2019-06-12 | End: 2019-06-15

## 2019-06-12 RX ADMIN — HYDROCODONE BITARTRATE AND ACETAMINOPHEN 1 TABLET: 5; 325 TABLET ORAL at 21:17

## 2019-06-12 ASSESSMENT — PAIN DESCRIPTION - LOCATION
LOCATION: WRIST
LOCATION: WRIST

## 2019-06-12 ASSESSMENT — PAIN DESCRIPTION - ORIENTATION
ORIENTATION: LEFT
ORIENTATION: LEFT

## 2019-06-12 ASSESSMENT — PAIN DESCRIPTION - PAIN TYPE
TYPE: ACUTE PAIN
TYPE: ACUTE PAIN

## 2019-06-12 ASSESSMENT — PAIN SCALES - GENERAL
PAINLEVEL_OUTOF10: 4
PAINLEVEL_OUTOF10: 3
PAINLEVEL_OUTOF10: 7

## 2019-06-12 NOTE — ED PROVIDER NOTES
Evaluated by Advanced Practice Provider    [facility]    CHIEF COMPLAINT  Fall (LEFT wrist pain after falling. NO LOC. )    HISTORY OF PRESENT ILLNESS  Daniel Lang is a 76 y.o. male who presents to the ED with complaints of left wrist pain. This injury occurred: just prior to arrival in ED. Patient denies numbness into the left hand or fingers. Patient denies tingling into the hand or fingers. Patient denies difficulty moving the shoulder or elbow of the affected extremity. There is swelling and possible deformity vs. Swelling to the wrist.  Mechanism of injury: fall. Patient reports that he was hopping across the room on one foot as he currently has a cast to the left leg for multiple fractures, states he has a wheelchair available but would not be able to get to the air conditioner because of the size of the wheelchair. He fell and injured his wrist.  Patient's occupation: Disabled  Dominant hand: Left  Prior injuries to the area : None. Patient denies chest pain, or shortness of breath, denies abdominal pain, nausea vomiting, diarrhea, fever, or chills. The patient is currently rating their pain as 7/10 and describes it as an aching type of pain. No treatments have been tried prior to arrival in the ED. The patient denies other complaints, modifying factors or associated symptoms. The patient arrived to the ED via EMS transport. Nursing notes reviewed. Past Medical History:   Diagnosis Date    Arthritis     Ataxia 3/14/2014    BPH (benign prostatic hyperplasia)     CAD (coronary artery disease)     4 stents    Coronary atherosclerosis of native coronary artery 5/11/2013    Diabetes mellitus ([facility]     Environmental allergies     Hepatitis     Hydronephrosis 3/14/2014    Hyperlipidemia     Hypertension     Kidney disease     Neuropathy     Parkinson's disease ([address]     ?     S/P coronary artery stent placement x 4     x 4    Schizoaffective disorder, bipolar type [facility])      Past Surgical History:   Procedure Laterality Date    ANKLE SURGERY Right 2018    ORIF of R ankle     COLONOSCOPY N/A 2018    COLONOSCOPY CONTROL HEMORRHAGE [facility]      x 4    CYSTOSCOPY  14    transurethral vaporization of prostate    HERNIA REPAIR      X2    UPPER GASTROINTESTINAL ENDOSCOPY N/A 2018    EGD BIOPSY performed by Minal Remy MD at [facility] N/A 5/3/2019    EGD WITH ANESTHESIA performed by Minal Remy MD at [address] [facility]       Family History   Problem Relation Age of Onset    Other Mother         CEREBRAL PALSY    Heart Disease Father         CHF     Social History     Socioeconomic History    Marital status:      Spouse name: Not on file    Number of children: Not on file    Years of education: Not on file    Highest education level: Not on file   Occupational History    Not on file   Social Needs    Financial resource strain: Not on file    Food insecurity:     Worry: Not on file     Inability: Not on file    Transportation needs:     Medical: Not on file     Non-medical: Not on file   Tobacco Use    Smoking status: Former Smoker     Packs/day: 1.00     Years: 25.00     Pack years: 25.00     Types: Cigarettes     Last attempt to quit: 5/10/2013     Years since quittin.0    Smokeless tobacco: Never Used   Substance and Sexual Activity    Alcohol use: No    Drug use: No    Sexual activity: Not Currently   Lifestyle    Physical activity:     Days per week: Not on file     Minutes per session: Not on file    Stress: Not on file   Relationships    Social connections:     Talks on phone: Not on file     Gets together: Not on file     Attends Church service: Not on file     Active member of club or organization: Not on file     Attends meetings of clubs or organizations: Not on file     Relationship status: Not on file    Intimate partner violence:     Fear of current or ex partner: Not on file     Emotionally abused: Not on file     Physically abused: Not on file     Forced sexual activity: Not on file   Other Topics Concern    Not on file   Social History Narrative    Not on file     Current Facility-Administered Medications   Medication Dose Route Frequency Provider Last Rate Last Dose    HYDROcodone-acetaminophen (NORCO) 5-325 MG per tablet 1 tablet  1 tablet Oral Once PRESTON Julio - CNP         Current Outpatient Medications   Medication Sig Dispense Refill    HYDROcodone-acetaminophen (NORCO) 5-325 MG per tablet Take 1 tablet by mouth every 6 hours as needed for Pain for up to 3 days. 20 tablet 0    pantoprazole (PROTONIX) 40 MG tablet TAKE 1 TABLET BY MOUTH ONCE DAILY 90 tablet 5    lisinopril (PRINIVIL;ZESTRIL) 5 MG tablet Take 1 tablet by mouth daily      metFORMIN (GLUCOPHAGE) 1000 MG tablet Take 1 tablet by mouth daily      PARoxetine (PAXIL) 40 MG tablet Take 1 tablet by mouth daily      ferrous sulfate 325 (65 Fe) MG tablet Take 1 tablet by mouth 2 times daily 60 tablet 11    Dulaglutide (TRULICITY SC) Inject 1.5 mg into the skin once a week       carvedilol (COREG) 3.125 MG tablet TAKE ONE (1) TABLET BY MOUTH TWICE DAILY WITH MEALS 180 tablet 3    gabapentin (NEURONTIN) 300 MG capsule Take 300 mg by mouth 3 times daily. Jyoti Vieira clopidogrel (PLAVIX) 75 MG tablet TAKE 1 TABLET BY MOUTH ONCE DAILY 30 tablet 3    atorvastatin (LIPITOR) 80 MG tablet TAKE 1 TABLET BY MOUTH AT BEDTIME 90 tablet 0    ASPIRIN LOW DOSE 81 MG EC tablet TAKE 1 TABLET BY MOUTH ONCE DAILY 30 tablet 11    insulin glargine (LANTUS) 100 UNIT/ML injection vial Inject 30 Units into the skin nightly 1 vial 3    pioglitazone (ACTOS) 45 MG tablet Take 45 mg by mouth daily      traZODone (DESYREL) 100 MG tablet Take 100 mg by mouth nightly      fluticasone (FLONASE) 50 MCG/ACT nasal spray 1 spray by Nasal route daily as needed. No Known Allergies      REVIEW OF SYSTEMS    6 systems reviewed, pertinent positives per HPI otherwise noted to be negative      PHYSICAL EXAM  BP (!) 169/71   Pulse 80   Temp 97.9 °F (36.6 °C) (Oral)   Resp 17   SpO2 100%   GENERAL APPEARANCE: Awake and alert. Cooperative. No acute distress. HEAD: Normocephalic. Atraumatic. EYES:  EOM's grossly intact. ENT: Mucous membranes are moist.   NECK: Supple. Normal ROM. CHEST: Equal symmetric chest rise. Regular rate and rhythm, normal S1 and S2 heart sounds. LUNGS: Breathing is unlabored. Speaking comfortably in full sentences. Lungs are bilaterally clear to auscultation. Abdomen: Nondistended, nontender to palpation, normal bowel sounds   EXTREMITIES: Wrist exam: left. Swelling and tenderness to distal left forearm, there does appear to be some deformity on exam.  I did not perform range of motion to the left wrist due to the obvious swelling and suspected fracture. There is no bruising observed. Radial and ulnar pulses normal, palpable, 2+ and capillary refill is brisk to distal tips of all fingers to left hand. Sensation intact to light touch to all digits of left hand. Remainder of ipsilateral arm, hand and finger exam is normal. Normal ipsilateral elbow and shoulder. Normal contralateral hand and wrist. Patient can extend all digits of left hand. SKIN: Warm and dry. There is no open wounds. NEUROLOGICAL: Alert and oriented. Able to distinguish light touch to the palmar surface of the 2nd and 5th fingers and to the 1st/2nd interdigital web of the left hand. LABS  I have reviewed all labs for this visit. No results found for this visit on 06/12/19. RADIOLOGY  Xr Wrist Left (min 3 Views)    Result Date: 6/12/2019  EXAMINATION: 3 XRAY VIEWS OF THE LEFT WRIST 6/12/2019 6:49 pm COMPARISON: None.  HISTORY: ORDERING SYSTEM PROVIDED HISTORY: injury poss dislocation TECHNOLOGIST PROVIDED HISTORY: Reason for exam:->injury poss dislocation Ordering Physician Provided Reason for Exam: Fall (LEFT wrist pain after falling. NO LOC. ) FINDINGS: Comminuted intra-articular fracture of the distal radius with impaction and mild apex volar angulation. Displaced acute fracture of the ulnar styloid process. No definite carpal fracture or malalignment. There is diffuse osteopenia. Acute traumatic fractures of the distal radius and ulna. Xr Foot Left (min 3 Views)    Result Date: 5/28/2019  EXAMINATION: 3 XRAY VIEWS OF THE LEFT FOOT 5/26/2019 8:52 pm COMPARISON: None HISTORY: ORDERING SYSTEM PROVIDED HISTORY: fall TECHNOLOGIST PROVIDED HISTORY: Reason for exam:->fall Initial exam. FINDINGS: Intra-articular fracture base of the 1st proximal phalanx. There are additional fractures involving the metatarsal necks involving the 2nd, 3rd, and 4th metatarsals. The Lisfranc articulation is maintained. There are 2nd through 4th metatarsal neck fractures. Base of 1st metatarsal fracture with articular extension. Ct Foot Left Wo Contrast    Result Date: 5/27/2019  EXAMINATION: CT OF THE LEFT FOOT WITHOUT CONTRAST 5/27/2019 12:36 am TECHNIQUE: CT of the left foot was performed without the administration of intravenous contrast.  Multiplanar reformatted images are provided for review. Dose modulation, iterative reconstruction, and/or weight based adjustment of the mA/kV was utilized to reduce the radiation dose to as low as reasonably achievable. COMPARISON: None. HISTORY ORDERING SYSTEM PROVIDED HISTORY: multiple fracture left foot. Ortho requests CT for better evaluation TECHNOLOGIST PROVIDED HISTORY: Ordering Physician Provided Reason for Exam: Multiple fractures seen on xray Acuity: Acute Type of Exam: Initial FINDINGS: Vascular calcifications. Calcaneal spurs. Diffuse soft tissue edema. No fluid collection.  Comminuted fracture base of the 1st metatarsal.  Mild plantar and lateral displacement of small fracture fragments. Comminuted fracture of the 2nd metatarsal head without significant fracture fragment displacement. Comminuted fracture of the 3rd through 5th metatarsal heads without significant displacement of fracture fragments. Nondisplaced small fractures involving the dorsal aspect of the 2nd and 3rd metatarsals at the base. Comminuted fracture of the cuboid with mild lateral displacement of small fracture fragments. Fractures involving the base of the 1st, 2nd, and 3rd metatarsals, cuboid, and 2nd through 5th metatarsal heads. ED COURSE/MDM  Patient seen and evaluated. Old records reviewed. Diagnostic testing reviewed and results discussed. I have evaluated this patient. My supervising physician was available for consultation. Ulysses Has presented to the ED today with above noted complaints. Patient appears well, vital signs are normal.  Patient has swelling with questionable deformity noted to the left wrist, I did not perform range of motion due to suspected fracture. X-ray shows an acute traumatic fracture of the distal radius and ulna. There is a comminuted intra-articular fracture of the distal radius with impaction and mild apex volar angulation. There is displaced acute fracture of the ulnar styloid process. I did review the films and I do not feel that there would be improved alignment of the fracture pieces with reduction, patient will be placed in a volar splint. He will be discharged back to the extended care facility and was advised to follow-up with orthopedics for further evaluation and treatment of this new fracture. Under my direction a volar splint was placed on the left arm by the ED tech. I have examined the splint thoroughly for functionality. Extremity is distally neurovascularly intact with movement of digits, normal sensation and brisk cap refill.   We discussed splint precautions including development of worsening swelling, pain, numbness, tingling, or weakness. At this point I do not feel the patient requires further work up and it is reasonable to discharge the patient. Please refer to AVS for further details of the discharge instructions. Pt was given the following medications or treatments in the ED:   Medications   HYDROcodone-acetaminophen (NORCO) 5-325 MG per tablet 1 tablet (has no administration in time range)       A discussion was had with the patient regarding diagnosis, diagnostic testing results, treatment/ plan of care, and follow up. All questions were answered. Patient will follow up with orthopedist in 3-4 days for further evaluation/treatment with orthopedics. The patient was given strict return precautions as we discussed symptoms that would necessitate return to the ED. Patient will return to ED for new/worsening symptoms. The patient verbalized their understanding and agreement with the above plan. I estimate there is LOW risk for COMPARTMENT SYNDROME, DEEP VENOUS THROMBOSIS, SEPTIC ARTHRITIS, TENDON OR NEUROVASCULAR INJURY, thus I consider the discharge disposition reasonable. Angelic Mario and I have discussed the diagnosis and risks, and we agree with discharging home to follow-up with their primary doctor or the referral orthopedist. We also discussed returning to the Emergency Department immediately if new or worsening symptoms occur. We have discussed the symptoms which are most concerning (e.g., changing or worsening pain, numbness, weakness) that necessitate immediate return. Clinial Impression    1. Closed fracture of distal ends of left radius and ulna, initial encounter    2. Fall, initial encounter    3. Pain and swelling of left wrist        Blood pressure (!) 169/71, pulse 80, temperature 97.9 °F (36.6 °C), temperature source Oral, resp. rate 17, SpO2 100 %. Patient was sent home with a prescription for below medication/s.   I did [place] patient on appropriate use of these medication. New Prescriptions    HYDROCODONE-ACETAMINOPHEN (NORCO) 5-325 MG PER TABLET    Take 1 tablet by mouth every 6 hours as needed for Pain for up to 3 days. FOLLOW UP  PRESTON Ribeiro CNP  [address] [address]  [phone]    Call   As needed    [facility], [address] [facility] [address] [phone]    Call in 1 day  For further evaluation    [facility]  ED  [facility]  [address] [phone]  Go to   If symptoms worsen      DISPOSITION  Patient was discharged to home in good condition. Comment: Please note this report has been produced using speech recognition software and may contain errors related to that system including errors in grammar, punctuation, and spelling, as well as words and phrases that may be inappropriate. If there are any questions or concerns please feel free to contact the dictating provider for clarification.         PRESTON Dhillon CNP  06/12/19 [id number]

## 2019-06-13 NOTE — ED NOTES
Discharge: Pt discharged to home as per order. Scripts plus instructions given. Pt verbalized understanding. Denied questions.        Reshma Cam RN  06/12/19 [id number]

## 2019-06-17 ENCOUNTER — OFFICE VISIT (OUTPATIENT)
Dept: ORTHOPEDIC SURGERY | Age: 69
End: 2019-06-17
Payer: COMMERCIAL

## 2019-06-17 VITALS — HEIGHT: 63 IN | BODY MASS INDEX: 25.86 KG/M2 | WEIGHT: 145.94 LBS

## 2019-06-17 DIAGNOSIS — S52.572A OTHER CLOSED INTRA-ARTICULAR FRACTURE OF DISTAL END OF LEFT RADIUS, INITIAL ENCOUNTER: ICD-10-CM

## 2019-06-17 DIAGNOSIS — R52 PAIN: Primary | ICD-10-CM

## 2019-06-17 DIAGNOSIS — S52.572A CLOSED DIE PUNCH FRACTURE OF DISTAL RADIUS, LEFT, INITIAL ENCOUNTER: Primary | ICD-10-CM

## 2019-06-17 PROCEDURE — 4040F PNEUMOC VAC/ADMIN/RCVD: CPT | Performed by: ORTHOPAEDIC SURGERY

## 2019-06-17 PROCEDURE — G8427 DOCREV CUR MEDS BY ELIG CLIN: HCPCS | Performed by: ORTHOPAEDIC SURGERY

## 2019-06-17 PROCEDURE — 99213 OFFICE O/P EST LOW 20 MIN: CPT | Performed by: ORTHOPAEDIC SURGERY

## 2019-06-17 PROCEDURE — 1111F DSCHRG MED/CURRENT MED MERGE: CPT | Performed by: ORTHOPAEDIC SURGERY

## 2019-06-17 PROCEDURE — 1123F ACP DISCUSS/DSCN MKR DOCD: CPT | Performed by: ORTHOPAEDIC SURGERY

## 2019-06-17 PROCEDURE — G8598 ASA/ANTIPLAT THER USED: HCPCS | Performed by: ORTHOPAEDIC SURGERY

## 2019-06-17 PROCEDURE — 29125 APPL SHORT ARM SPLINT STATIC: CPT | Performed by: ORTHOPAEDIC SURGERY

## 2019-06-17 PROCEDURE — 1036F TOBACCO NON-USER: CPT | Performed by: ORTHOPAEDIC SURGERY

## 2019-06-17 PROCEDURE — 3017F COLORECTAL CA SCREEN DOC REV: CPT | Performed by: ORTHOPAEDIC SURGERY

## 2019-06-17 PROCEDURE — G8417 CALC BMI ABV UP PARAM F/U: HCPCS | Performed by: ORTHOPAEDIC SURGERY

## 2019-06-17 NOTE — PROGRESS NOTES
Chief Complaint  Wrist Injury (Left wrist inj after falling DOI 6/12/2019)      HPI:  The patient is a 76 y.o. right-hand-dominant patient and is seen today regarding an left wrist injury occurring approximately 5 days ago. Patient tripped and fell while walking on his foot cast, landing on a hard surface and had immediate pain and noted deformity followed by swelling and stiffness of the left wrist.  he was seen for Emergency evaluation elsewhere where radiographs were obtained & he was immobilized. No reduction performed. By report, there was not an associated skin injury. he reports severe pain located in the dorsal area of the distal forearm & wrist, no tenderness throughout the hand or elbow. Pt does not have numbness in the hand. Currently resides at a nursing home while recovering from nonsurgical management of a left foot fracture    Medical History:  Past Medical History:   Diagnosis Date    Arthritis     Ataxia 3/14/2014    BPH (benign prostatic hyperplasia)     CAD (coronary artery disease)     4 stents    Coronary atherosclerosis of native coronary artery 5/11/2013    Diabetes mellitus ([facility] [address]     Environmental allergies     Hepatitis     Hydronephrosis 3/14/2014    Hyperlipidemia     Hypertension     Kidney disease     Neuropathy     Parkinson's disease ([facility] [address]     ?     S/P coronary artery stent placement x 4     x 4    Schizoaffective disorder, bipolar type [facility])      Past Surgical History:   Procedure Laterality Date    ANKLE SURGERY Right 06/09/2018    ORIF of R ankle     COLONOSCOPY N/A 12/13/2018    COLONOSCOPY CONTROL HEMORRHAGE performed by Niko Vera MD at [facility]      x 4    CYSTOSCOPY  4/4/14    transurethral vaporization of prostate    HERNIA REPAIR      X2    UPPER GASTROINTESTINAL ENDOSCOPY N/A 12/13/2018    EGD BIOPSY performed by Niko Vera MD at [address] [place] [address] Take 1 tablet by mouth daily      ferrous sulfate 325 (65 Fe) MG tablet Take 1 tablet by mouth 2 times daily 60 tablet 11    Dulaglutide (TRULICITY SC) Inject 1.5 mg into the skin once a week       carvedilol (COREG) 3.125 MG tablet TAKE ONE (1) TABLET BY MOUTH TWICE DAILY WITH MEALS 180 tablet 3    gabapentin (NEURONTIN) 300 MG capsule Take 300 mg by mouth 3 times daily. Geraldine Glatter clopidogrel (PLAVIX) 75 MG tablet TAKE 1 TABLET BY MOUTH ONCE DAILY 30 tablet 3    atorvastatin (LIPITOR) 80 MG tablet TAKE 1 TABLET BY MOUTH AT BEDTIME 90 tablet 0    ASPIRIN LOW DOSE 81 MG EC tablet TAKE 1 TABLET BY MOUTH ONCE DAILY 30 tablet 11    insulin glargine (LANTUS) 100 UNIT/ML injection vial Inject 30 Units into the skin nightly 1 vial 3    pioglitazone (ACTOS) 45 MG tablet Take 45 mg by mouth daily      traZODone (DESYREL) 100 MG tablet Take 100 mg by mouth nightly      fluticasone (FLONASE) 50 MCG/ACT nasal spray 1 spray by Nasal route daily as needed. No current facility-administered medications for this visit. No Known Allergies    ROS:  ROS neg except for positives in HPI    Physical Exam:   GEN/PSYCH: The patient is well nourished, oriented to person, place & time. The patient demonstrates appropriate mood and affect as well as normal gait and station. Pain level appropriate to injury. Skin: Skin color, texture, turgor normal. No rashes or lesions in the injured limb, normal on the contralateral side     Digits:   range of motion is satisfactory bilaterally, mildly diminished secondary to pain on affected side     left wrist:   Range of motion is limited by pain. Swelling is moderate in the wrist & digits. Maximal pain is elicited with palpation of proximal wrist with moderately tender distal ulna/ulnar styloid  Sensation is  subjectively normal in the entire hand. There is  clinical suggestion of mal-alignment of distal radius. No evidence of gross joint instability, excluding the immediate zone of transportation to and from the nursing home. He is in agreement with the plan. We did provide a new short arm splint today to allow more finger motion. He understands good elevation and nonweightbearing status with icing    The risks and benefits of surgical fixation versus non-operative management were discussed thoroughly. These included, but were not limited to infection, tendon or nerve injury, stiffness or pain of the wrist joint long-term, malunion, nonunion, implant failure, tendon rupture, scar sensitivity, adverse effects of anesthesia (stroke or death), and possible need for hardware removal.    We discussed the use of a plate and discussed the use of bracing and therapy postoperatively    All questions and concerns were addressed today. Patient is in agreement with the plan. Jaycee Sweet MD  Hand & Upper Extremity Surgery  [address]  A partner of [facility] ([facility])        Please note that this transcription was created using voice recognition software. Any errors are unintentional and may be due to voice recognition transcription.

## 2019-06-18 ENCOUNTER — TELEPHONE (OUTPATIENT)
Dept: ORTHOPEDIC SURGERY | Age: 69
End: 2019-06-18

## 2019-06-18 RX ORDER — CLOPIDOGREL BISULFATE 75 MG/1
TABLET ORAL
Qty: 30 TABLET | Refills: 3 | Status: SHIPPED | OUTPATIENT
Start: 2019-06-18 | End: 2019-12-13 | Stop reason: SDUPTHER

## 2019-06-18 RX ORDER — CARVEDILOL 3.12 MG/1
TABLET ORAL
Qty: 180 TABLET | Refills: 5 | Status: SHIPPED | OUTPATIENT
Start: 2019-06-18 | End: 2019-12-20 | Stop reason: SDUPTHER

## 2019-06-18 NOTE — TELEPHONE ENCOUNTER
Auth: PHYLLIS  Date: 6/20/19  Reference # [id number]  Spoke with: Online  Type of SX: Outpatient  Location: [place] [address]   SX area: [facility]

## 2019-06-19 ENCOUNTER — ANESTHESIA EVENT (OUTPATIENT)
Dept: OPERATING ROOM | Age: 69
End: 2019-06-19
Payer: COMMERCIAL

## 2019-06-20 ENCOUNTER — ANESTHESIA (OUTPATIENT)
Dept: OPERATING ROOM | Age: 69
End: 2019-06-20
Payer: COMMERCIAL

## 2019-06-20 ENCOUNTER — HOSPITAL ENCOUNTER (OUTPATIENT)
Age: 69
Setting detail: OUTPATIENT SURGERY
Discharge: HOME | End: 2019-06-20
Attending: ORTHOPAEDIC SURGERY | Admitting: ORTHOPAEDIC SURGERY
Payer: COMMERCIAL

## 2019-06-20 VITALS
TEMPERATURE: 97 F | OXYGEN SATURATION: 99 % | RESPIRATION RATE: 17 BRPM | SYSTOLIC BLOOD PRESSURE: 164 MMHG | WEIGHT: 136 LBS | DIASTOLIC BLOOD PRESSURE: 65 MMHG | HEART RATE: 78 BPM | BODY MASS INDEX: 24.1 KG/M2 | HEIGHT: 63 IN

## 2019-06-20 VITALS
OXYGEN SATURATION: 99 % | SYSTOLIC BLOOD PRESSURE: 140 MMHG | DIASTOLIC BLOOD PRESSURE: 69 MMHG | RESPIRATION RATE: 3 BRPM | TEMPERATURE: 96.8 F

## 2019-06-20 DIAGNOSIS — S52.572A OTHER CLOSED INTRA-ARTICULAR FRACTURE OF DISTAL END OF LEFT RADIUS, INITIAL ENCOUNTER: Primary | ICD-10-CM

## 2019-06-20 LAB
GLUCOSE BLD-MCNC: 187 MG/DL (ref 70–99)
GLUCOSE BLD-MCNC: 210 MG/DL (ref 70–99)
PERFORMED ON: ABNORMAL
PERFORMED ON: ABNORMAL

## 2019-06-20 PROCEDURE — C1713 ANCHOR/SCREW BN/BN,TIS/BN: HCPCS | Performed by: ORTHOPAEDIC SURGERY

## 2019-06-20 PROCEDURE — 7100000001 HC PACU RECOVERY - ADDTL 15 MIN: Performed by: ORTHOPAEDIC SURGERY

## 2019-06-20 PROCEDURE — 2709999900 HC NON-CHARGEABLE SUPPLY: Performed by: ORTHOPAEDIC SURGERY

## 2019-06-20 PROCEDURE — 7100000011 HC PHASE II RECOVERY - ADDTL 15 MIN: Performed by: ORTHOPAEDIC SURGERY

## 2019-06-20 PROCEDURE — 2580000003 HC RX 258: Performed by: ORTHOPAEDIC SURGERY

## 2019-06-20 PROCEDURE — 2500000003 HC RX 250 WO HCPCS: Performed by: NURSE ANESTHETIST, CERTIFIED REGISTERED

## 2019-06-20 PROCEDURE — 3600000014 HC SURGERY LEVEL 4 ADDTL 15MIN: Performed by: ORTHOPAEDIC SURGERY

## 2019-06-20 PROCEDURE — 64415 NJX AA&/STRD BRCH PLXS IMG: CPT | Performed by: ANESTHESIOLOGY

## 2019-06-20 PROCEDURE — 2500000003 HC RX 250 WO HCPCS: Performed by: ANESTHESIOLOGY

## 2019-06-20 PROCEDURE — 7100000010 HC PHASE II RECOVERY - FIRST 15 MIN: Performed by: ORTHOPAEDIC SURGERY

## 2019-06-20 PROCEDURE — 2580000003 HC RX 258: Performed by: ANESTHESIOLOGY

## 2019-06-20 PROCEDURE — 7100000000 HC PACU RECOVERY - FIRST 15 MIN: Performed by: ORTHOPAEDIC SURGERY

## 2019-06-20 PROCEDURE — 2720000010 HC SURG SUPPLY STERILE: Performed by: ORTHOPAEDIC SURGERY

## 2019-06-20 PROCEDURE — 3700000001 HC ADD 15 MINUTES (ANESTHESIA): Performed by: ORTHOPAEDIC SURGERY

## 2019-06-20 PROCEDURE — 3600000004 HC SURGERY LEVEL 4 BASE: Performed by: ORTHOPAEDIC SURGERY

## 2019-06-20 PROCEDURE — 3700000000 HC ANESTHESIA ATTENDED CARE: Performed by: ORTHOPAEDIC SURGERY

## 2019-06-20 PROCEDURE — 6360000002 HC RX W HCPCS: Performed by: NURSE ANESTHETIST, CERTIFIED REGISTERED

## 2019-06-20 PROCEDURE — 6360000002 HC RX W HCPCS: Performed by: ORTHOPAEDIC SURGERY

## 2019-06-20 PROCEDURE — 2580000003 HC RX 258: Performed by: NURSE ANESTHETIST, CERTIFIED REGISTERED

## 2019-06-20 DEVICE — SCREW BNE L15MM DIA2.7MM DST RAD NONLOCKING FULL THRD SQ: Type: IMPLANTABLE DEVICE | Site: WRIST | Status: FUNCTIONAL

## 2019-06-20 DEVICE — SCREW BNE L18MM DIA2.7MM DST VOLAR RAD MULTDIR FULL THRD SQ: Type: IMPLANTABLE DEVICE | Site: WRIST | Status: FUNCTIONAL

## 2019-06-20 DEVICE — IMPLANTABLE DEVICE: Type: IMPLANTABLE DEVICE | Site: WRIST | Status: FUNCTIONAL

## 2019-06-20 DEVICE — PLATE BNE W22XL51MM 12 H L DST RAD TI LOK COMPR LO PROF NAR: Type: IMPLANTABLE DEVICE | Site: WRIST | Status: FUNCTIONAL

## 2019-06-20 DEVICE — SCREW BNE L16MM DIA2.7MM DST RAD LOK FULL THRD SQ DRV HD LO: Type: IMPLANTABLE DEVICE | Site: WRIST | Status: FUNCTIONAL

## 2019-06-20 DEVICE — IMPLANTABLE DEVICE
Type: IMPLANTABLE DEVICE | Site: WRIST | Status: FUNCTIONAL
Brand: CORTICAL LOCK SCREW

## 2019-06-20 DEVICE — SCREW BNE L14MM DIA2.7MM DST VOLAR RAD NONLOCKING FULL THRD: Type: IMPLANTABLE DEVICE | Site: WRIST | Status: FUNCTIONAL

## 2019-06-20 DEVICE — SCREW BNE L18MM DIA2.7MM DST RAD LOK FULL THRD SQ DRV HD LO: Type: IMPLANTABLE DEVICE | Site: WRIST | Status: FUNCTIONAL

## 2019-06-20 RX ORDER — LIDOCAINE HYDROCHLORIDE 20 MG/ML
INJECTION, SOLUTION INFILTRATION; PERINEURAL
Status: COMPLETED
Start: 2019-06-20 | End: 2019-06-20

## 2019-06-20 RX ORDER — LIDOCAINE HYDROCHLORIDE 10 MG/ML
INJECTION, SOLUTION EPIDURAL; INFILTRATION; INTRACAUDAL; PERINEURAL
Status: DISCONTINUED
Start: 2019-06-20 | End: 2019-06-20 | Stop reason: HOSPADM

## 2019-06-20 RX ORDER — MEPERIDINE HYDROCHLORIDE 25 MG/ML
12.5 INJECTION INTRAMUSCULAR; INTRAVENOUS; SUBCUTANEOUS EVERY 5 MIN PRN
Status: DISCONTINUED | OUTPATIENT
Start: 2019-06-20 | End: 2019-06-20 | Stop reason: HOSPADM

## 2019-06-20 RX ORDER — SODIUM CHLORIDE, SODIUM LACTATE, POTASSIUM CHLORIDE, CALCIUM CHLORIDE 600; 310; 30; 20 MG/100ML; MG/100ML; MG/100ML; MG/100ML
INJECTION, SOLUTION INTRAVENOUS CONTINUOUS
Status: DISCONTINUED | OUTPATIENT
Start: 2019-06-20 | End: 2019-06-20 | Stop reason: HOSPADM

## 2019-06-20 RX ORDER — DEXAMETHASONE SODIUM PHOSPHATE 4 MG/ML
INJECTION, SOLUTION INTRA-ARTICULAR; INTRALESIONAL; INTRAMUSCULAR; INTRAVENOUS; SOFT TISSUE PRN
Status: DISCONTINUED | OUTPATIENT
Start: 2019-06-20 | End: 2019-06-20 | Stop reason: SDUPTHER

## 2019-06-20 RX ORDER — OXYCODONE HYDROCHLORIDE AND ACETAMINOPHEN 5; 325 MG/1; MG/1
1 TABLET ORAL PRN
Status: DISCONTINUED | OUTPATIENT
Start: 2019-06-20 | End: 2019-06-20 | Stop reason: HOSPADM

## 2019-06-20 RX ORDER — ONDANSETRON 2 MG/ML
4 INJECTION INTRAMUSCULAR; INTRAVENOUS PRN
Status: DISCONTINUED | OUTPATIENT
Start: 2019-06-20 | End: 2019-06-20 | Stop reason: HOSPADM

## 2019-06-20 RX ORDER — DIPHENHYDRAMINE HYDROCHLORIDE 50 MG/ML
12.5 INJECTION INTRAMUSCULAR; INTRAVENOUS
Status: DISCONTINUED | OUTPATIENT
Start: 2019-06-20 | End: 2019-06-20 | Stop reason: HOSPADM

## 2019-06-20 RX ORDER — FENTANYL CITRATE 50 UG/ML
INJECTION, SOLUTION INTRAMUSCULAR; INTRAVENOUS PRN
Status: DISCONTINUED | OUTPATIENT
Start: 2019-06-20 | End: 2019-06-20 | Stop reason: SDUPTHER

## 2019-06-20 RX ORDER — MORPHINE SULFATE 10 MG/ML
2 INJECTION, SOLUTION INTRAMUSCULAR; INTRAVENOUS EVERY 5 MIN PRN
Status: DISCONTINUED | OUTPATIENT
Start: 2019-06-20 | End: 2019-06-20 | Stop reason: HOSPADM

## 2019-06-20 RX ORDER — PROPOFOL 10 MG/ML
INJECTION, EMULSION INTRAVENOUS PRN
Status: DISCONTINUED | OUTPATIENT
Start: 2019-06-20 | End: 2019-06-20 | Stop reason: SDUPTHER

## 2019-06-20 RX ORDER — MORPHINE SULFATE 10 MG/ML
1 INJECTION, SOLUTION INTRAMUSCULAR; INTRAVENOUS EVERY 5 MIN PRN
Status: DISCONTINUED | OUTPATIENT
Start: 2019-06-20 | End: 2019-06-20 | Stop reason: HOSPADM

## 2019-06-20 RX ORDER — MIDAZOLAM HYDROCHLORIDE 1 MG/ML
INJECTION INTRAMUSCULAR; INTRAVENOUS
Status: DISCONTINUED
Start: 2019-06-20 | End: 2019-06-20 | Stop reason: HOSPADM

## 2019-06-20 RX ORDER — BUPIVACAINE HYDROCHLORIDE 5 MG/ML
INJECTION, SOLUTION EPIDURAL; INTRACAUDAL
Status: DISCONTINUED
Start: 2019-06-20 | End: 2019-06-20 | Stop reason: HOSPADM

## 2019-06-20 RX ORDER — LABETALOL HYDROCHLORIDE 5 MG/ML
5 INJECTION, SOLUTION INTRAVENOUS EVERY 10 MIN PRN
Status: DISCONTINUED | OUTPATIENT
Start: 2019-06-20 | End: 2019-06-20 | Stop reason: HOSPADM

## 2019-06-20 RX ORDER — LIDOCAINE HYDROCHLORIDE 20 MG/ML
INJECTION, SOLUTION INFILTRATION; PERINEURAL PRN
Status: DISCONTINUED | OUTPATIENT
Start: 2019-06-20 | End: 2019-06-20 | Stop reason: SDUPTHER

## 2019-06-20 RX ORDER — HYDRALAZINE HYDROCHLORIDE 20 MG/ML
5 INJECTION INTRAMUSCULAR; INTRAVENOUS EVERY 10 MIN PRN
Status: DISCONTINUED | OUTPATIENT
Start: 2019-06-20 | End: 2019-06-20 | Stop reason: HOSPADM

## 2019-06-20 RX ORDER — ROCURONIUM BROMIDE 10 MG/ML
INJECTION, SOLUTION INTRAVENOUS PRN
Status: DISCONTINUED | OUTPATIENT
Start: 2019-06-20 | End: 2019-06-20 | Stop reason: SDUPTHER

## 2019-06-20 RX ORDER — OXYCODONE HYDROCHLORIDE AND ACETAMINOPHEN 5; 325 MG/1; MG/1
1 TABLET ORAL EVERY 6 HOURS PRN
Qty: 28 TABLET | Refills: 0 | Status: SHIPPED | OUTPATIENT
Start: 2019-06-20 | End: 2019-06-27

## 2019-06-20 RX ORDER — SODIUM CHLORIDE, SODIUM LACTATE, POTASSIUM CHLORIDE, CALCIUM CHLORIDE 600; 310; 30; 20 MG/100ML; MG/100ML; MG/100ML; MG/100ML
INJECTION, SOLUTION INTRAVENOUS CONTINUOUS PRN
Status: DISCONTINUED | OUTPATIENT
Start: 2019-06-20 | End: 2019-06-20 | Stop reason: SDUPTHER

## 2019-06-20 RX ORDER — ONDANSETRON 2 MG/ML
INJECTION INTRAMUSCULAR; INTRAVENOUS PRN
Status: DISCONTINUED | OUTPATIENT
Start: 2019-06-20 | End: 2019-06-20 | Stop reason: SDUPTHER

## 2019-06-20 RX ORDER — PROMETHAZINE HYDROCHLORIDE 25 MG/ML
6.25 INJECTION, SOLUTION INTRAMUSCULAR; INTRAVENOUS
Status: DISCONTINUED | OUTPATIENT
Start: 2019-06-20 | End: 2019-06-20 | Stop reason: HOSPADM

## 2019-06-20 RX ORDER — SODIUM CHLORIDE, SODIUM LACTATE, POTASSIUM CHLORIDE, CALCIUM CHLORIDE 600; 310; 30; 20 MG/100ML; MG/100ML; MG/100ML; MG/100ML
INJECTION, SOLUTION INTRAVENOUS
Status: COMPLETED
Start: 2019-06-20 | End: 2019-06-20

## 2019-06-20 RX ORDER — OXYCODONE HYDROCHLORIDE AND ACETAMINOPHEN 5; 325 MG/1; MG/1
2 TABLET ORAL PRN
Status: DISCONTINUED | OUTPATIENT
Start: 2019-06-20 | End: 2019-06-20 | Stop reason: HOSPADM

## 2019-06-20 RX ADMIN — VANCOMYCIN HYDROCHLORIDE 1000 MG: 1 INJECTION, POWDER, LYOPHILIZED, FOR SOLUTION INTRAVENOUS at 10:04

## 2019-06-20 RX ADMIN — LIDOCAINE HYDROCHLORIDE 60 MG: 20 INJECTION, SOLUTION INFILTRATION; PERINEURAL at 10:21

## 2019-06-20 RX ADMIN — SODIUM CHLORIDE, POTASSIUM CHLORIDE, SODIUM LACTATE AND CALCIUM CHLORIDE: 600; 310; 30; 20 INJECTION, SOLUTION INTRAVENOUS at 10:19

## 2019-06-20 RX ADMIN — VANCOMYCIN HYDROCHLORIDE 1000 MG: 1 INJECTION, POWDER, LYOPHILIZED, FOR SOLUTION INTRAVENOUS at 10:19

## 2019-06-20 RX ADMIN — ROCURONIUM BROMIDE 30 MG: 10 INJECTION, SOLUTION INTRAVENOUS at 10:21

## 2019-06-20 RX ADMIN — SUGAMMADEX 200 MG: 100 INJECTION, SOLUTION INTRAVENOUS at 11:04

## 2019-06-20 RX ADMIN — PHENYLEPHRINE HYDROCHLORIDE 100 MCG: 10 INJECTION INTRAVENOUS at 10:35

## 2019-06-20 RX ADMIN — DEXAMETHASONE SODIUM PHOSPHATE 8 MG: 4 INJECTION, SOLUTION INTRAMUSCULAR; INTRAVENOUS at 10:27

## 2019-06-20 RX ADMIN — PHENYLEPHRINE HYDROCHLORIDE 100 MCG: 10 INJECTION INTRAVENOUS at 10:54

## 2019-06-20 RX ADMIN — FENTANYL CITRATE 50 MCG: 50 INJECTION INTRAMUSCULAR; INTRAVENOUS at 10:21

## 2019-06-20 RX ADMIN — LIDOCAINE HYDROCHLORIDE 0.1 ML: 10 INJECTION, SOLUTION EPIDURAL; INFILTRATION; INTRACAUDAL; PERINEURAL at 08:59

## 2019-06-20 RX ADMIN — PROPOFOL 130 MG: 10 INJECTION, EMULSION INTRAVENOUS at 10:21

## 2019-06-20 RX ADMIN — SODIUM CHLORIDE, POTASSIUM CHLORIDE, SODIUM LACTATE AND CALCIUM CHLORIDE: 600; 310; 30; 20 INJECTION, SOLUTION INTRAVENOUS at 08:59

## 2019-06-20 RX ADMIN — ONDANSETRON 4 MG: 2 INJECTION, SOLUTION INTRAMUSCULAR; INTRAVENOUS at 10:27

## 2019-06-20 ASSESSMENT — PULMONARY FUNCTION TESTS
PIF_VALUE: 16
PIF_VALUE: 18
PIF_VALUE: 19
PIF_VALUE: 17
PIF_VALUE: 17
PIF_VALUE: 14
PIF_VALUE: 17
PIF_VALUE: 12
PIF_VALUE: 2
PIF_VALUE: 12
PIF_VALUE: 16
PIF_VALUE: 13
PIF_VALUE: 13
PIF_VALUE: 12
PIF_VALUE: 0
PIF_VALUE: 17
PIF_VALUE: 17
PIF_VALUE: 13
PIF_VALUE: 12
PIF_VALUE: 17
PIF_VALUE: 0
PIF_VALUE: 17
PIF_VALUE: 12
PIF_VALUE: 12
PIF_VALUE: 0
PIF_VALUE: 16
PIF_VALUE: 12
PIF_VALUE: 16
PIF_VALUE: 17
PIF_VALUE: 10
PIF_VALUE: 2
PIF_VALUE: 12
PIF_VALUE: 12
PIF_VALUE: 0
PIF_VALUE: 16
PIF_VALUE: 17
PIF_VALUE: 17
PIF_VALUE: 1
PIF_VALUE: 17
PIF_VALUE: 1
PIF_VALUE: 12
PIF_VALUE: 17
PIF_VALUE: 16
PIF_VALUE: 18
PIF_VALUE: 12
PIF_VALUE: 16
PIF_VALUE: 12
PIF_VALUE: 2
PIF_VALUE: 13

## 2019-06-20 ASSESSMENT — PAIN - FUNCTIONAL ASSESSMENT: PAIN_FUNCTIONAL_ASSESSMENT: 0-10

## 2019-06-20 ASSESSMENT — PAIN SCALES - GENERAL
PAINLEVEL_OUTOF10: 0

## 2019-06-20 NOTE — ANESTHESIA POSTPROCEDURE EVALUATION
05/26/2019 11:32 PM        HGB                      8.8 (L)             05/26/2019 11:32 PM        HCT                      25.5 (L)            05/26/2019 11:32 PM        PLT                      170                 05/26/2019 11:32 PM   RENAL  Lab Results       Component                Value               Date/Time                  NA                       133 (L)             05/26/2019 11:32 PM        K                        4.4                 05/26/2019 11:32 PM        CL                       101                 05/26/2019 11:32 PM        CO2                      22                  05/26/2019 11:32 PM        BUN                      28 (H)              05/26/2019 11:32 PM        CREATININE               1.6 (H)             05/26/2019 11:32 PM        GLUCOSE                  333 (H)             05/26/2019 11:32 PM   COAGS  Lab Results       Component                Value               Date/Time                  PROTIME                  12.9                05/26/2019 11:32 PM        INR                      1.13                05/26/2019 11:32 PM        APTT                     27.3                10/08/2014 07:10 PM     Intake & Output:  @76XKSR@    Nausea & Vomiting:  No    Level of Consciousness:  Awake    Pain Assessment:  Adequate analgesia    Anesthesia Complications:  No apparent anesthetic complications    SUMMARY      Vital signs stable  OK to discharge from Stage I post anesthesia care.   Care transferred from Anesthesiology department on discharge from perioperative area

## 2019-06-20 NOTE — OP NOTE
Manasa Mario (1950)    Date of Surgery- 6/20/2019    Preoperative Diagnosis-   1.  left Distal Radius Fracture                                           Postoperative Diagnosis-  1.  left Distal Radius Fracture    Procedure-     1. Open reduction and internal fixation left      distal radius fracture, intra-articular                2.  Flouroscopy left wrist      Surgeon-  Aquilino Go MD    [address]      Anesthesia- Regional block / Gen    Implants- Biomet     Antibiotics- See Anesthesia Note    DVT prophylaxis- SCDs    Complications- none    The patient was brought to the operating and room and placed in the supine position. After the induction of anesthesia a standard time-out was performed. Description of the Procedure: The left upper extremity had been prepped and draped in normal sterile fashion. Following the final timeout and ensuring antibiotic and DVT prophylaxis, the left upper extremity was exsanguinated and the tourniquet was inflated to 250 mmHg. A standard volar radial incision was made over the FCR, through skin and subcutaneous tissue, protecting the nearby artery and sensory nerve. The FCR sheath was opened and the tendon was temporarily moved. Dissection was taken carefully through the floor of the FCR. Blunt finger dissection was taken to the deeper structures and a blunt retractor was placed. Pronator quadratus was elevated off its radial border exposing the fracture. Fracture was intra-articular and displaced, more than 3 fragments. Early fracture hematoma and healing tissue was gently debrided. Compaction of metaphyseal bone prevented initial maintenance of reduction, therefore, biomet allograft bone was packed into the area for subchondral support. The fracture was then reduced back into a more anatomic alignment and confirmed visually and with fluoroscopy.   Biomet system was used, narrow left plate was selected and placed in the center center position. 4 screws were placed along the shaft utilizing standard AO technique, taking care to measure appropriate length screws. All screws distally were placed utilizing the standard drill guides or the variable angle technology. All drill guides were removed and accounted for. Care was taken to measure the appropriate length screws distally also. At this point there was excellent alignment and stability of the fracture. Intraoperative fluoroscopy confirmed alignment of the fracture and hardware, final fluoroscopic images in the AP, lateral, joint line were all obtained and printed. No DRUJ instability. Motion of the wrist intraoperatively was fluid and non-crepitant. Wound was copiously irrigated. Pronator quadratus was laid over the hardware and the skin was then closed with interrupted nylon suture. Tourniquet was deflated and all fingers were warm and well-perfused. Incision was covered with Xeroform and a volar splint. Patient tolerated the case well, was awoken from sedation and taken to the postanesthesia recovery area in good condition. No complications during the case. Addendum: It should be noted that 3 views of the operative Left wrist were performed with mini C-Arm fluoroscopy for the distal radius fracture. AP, lateral, and oblique views demonstrated satisfactory alignment of the fracture and hardware. Final films were saved and printed. Findings:         Intervention:         Other Notes: Follow-up in 7-10 days, wound inspection and likely suture removal.  Hand therapy referral for a short arm clamshell brace. Begin early motion of the wrist and include motion of the fingers and elbow. No strengthening or lifting for 6-8 weeks.             Jaycee Sweet MD  Hand & Upper Extremity Surgery  [address] [facility] ([facility])

## 2019-06-20 NOTE — ANESTHESIA PRE PROCEDURE
Department of Anesthesiology  Preprocedure Note       Name:  Bella Liao   Age:  76 y.o.  :  1950                                          MRN:  [id number]         Date:  2019      Surgeon: Marcos Beltran):  Carrie Mchugh MD    Procedure: LEFT DISTAL RADIUS OPEN REDUCTION INTERNAL FIXATION  -REGIONAL BLOCK (Left )    Medications prior to admission:   Prior to Admission medications    Medication Sig Start Date End Date Taking? Authorizing Provider   clopidogrel (PLAVIX) 75 MG tablet TAKE 1 TABLET BY MOUTH ONCE DAILY 19   Jd Strickland MD   carvedilol (COREG) 3.125 MG tablet TAKE ONE (1) TABLET BY MOUTH TWICE DAILY WITH MEALS 19   Jd Strickland MD   pantoprazole (PROTONIX) 40 MG tablet TAKE 1 TABLET BY MOUTH ONCE DAILY 6/3/19   Saurabh Camp MD   lisinopril (PRINIVIL;ZESTRIL) 5 MG tablet Take 1 tablet by mouth daily    Historical Provider, MD   metFORMIN (GLUCOPHAGE) 1000 MG tablet Take 1 tablet by mouth daily    Historical Provider, MD   PARoxetine (PAXIL) 40 MG tablet Take 1 tablet by mouth daily 18   Historical Provider, MD   ferrous sulfate 325 (65 Fe) MG tablet Take 1 tablet by mouth 2 times daily 5/3/19   Lenore Lorenzana MD   Dulaglutide (TRULICITY SC) Inject 1.5 mg into the skin once a week     Historical Provider, MD   gabapentin (NEURONTIN) 300 MG capsule Take 300 mg by mouth 3 times daily. Rogerio Evangelista     Historical Provider, MD   atorvastatin (LIPITOR) 80 MG tablet TAKE 1 TABLET BY MOUTH AT BEDTIME 10/31/18   Saurabh Camp MD   ASPIRIN LOW DOSE 81 MG EC tablet TAKE 1 TABLET BY MOUTH ONCE DAILY 18   Saurabh Camp MD   insulin glargine (LANTUS) 100 UNIT/ML injection vial Inject 30 Units into the skin nightly 18   Mounika Duran, APRN - CNP   pioglitazone (ACTOS) 45 MG tablet Take 45 mg by mouth daily 17   Historical Provider, MD   traZODone (DESYREL) 100 MG tablet Take 100 mg by mouth nightly    Historical Provider, MD   fluticasone (FLONASE) 50 MCG/ACT nasal spray 1 spray by Nasal route daily as needed. Historical Provider, MD       Current medications:    No current facility-administered medications for this encounter. Current Outpatient Medications   Medication Sig Dispense Refill    clopidogrel (PLAVIX) 75 MG tablet TAKE 1 TABLET BY MOUTH ONCE DAILY 30 tablet 3    carvedilol (COREG) 3.125 MG tablet TAKE ONE (1) TABLET BY MOUTH TWICE DAILY WITH MEALS 180 tablet 5    pantoprazole (PROTONIX) 40 MG tablet TAKE 1 TABLET BY MOUTH ONCE DAILY 90 tablet 5    lisinopril (PRINIVIL;ZESTRIL) 5 MG tablet Take 1 tablet by mouth daily      metFORMIN (GLUCOPHAGE) 1000 MG tablet Take 1 tablet by mouth daily      PARoxetine (PAXIL) 40 MG tablet Take 1 tablet by mouth daily      ferrous sulfate 325 (65 Fe) MG tablet Take 1 tablet by mouth 2 times daily 60 tablet 11    Dulaglutide (TRULICITY SC) Inject 1.5 mg into the skin once a week       gabapentin (NEURONTIN) 300 MG capsule Take 300 mg by mouth 3 times daily. Wanda Downy atorvastatin (LIPITOR) 80 MG tablet TAKE 1 TABLET BY MOUTH AT BEDTIME 90 tablet 0    ASPIRIN LOW DOSE 81 MG EC tablet TAKE 1 TABLET BY MOUTH ONCE DAILY 30 tablet 11    insulin glargine (LANTUS) 100 UNIT/ML injection vial Inject 30 Units into the skin nightly 1 vial 3    pioglitazone (ACTOS) 45 MG tablet Take 45 mg by mouth daily      traZODone (DESYREL) 100 MG tablet Take 100 mg by mouth nightly      fluticasone (FLONASE) 50 MCG/ACT nasal spray 1 spray by Nasal route daily as needed.          Allergies:  No Known Allergies    Problem List:    Patient Active Problem List   Diagnosis Code    DM2 (diabetes mellitus, type 2) ([facility] [address] E11.9    Dizziness R42    Hematuria R31.9    Hyponatremia E87.1    ARF (acute renal failure) ([facility]) N17.9    S/P coronary artery stent placement Z95.5    S/P coronary artery stent placement x 4 Z95.5    Orthostatic hypotension I95.1    Acute posthemorrhagic anemia D62    Contusion, knee S80. 00XA    DM hyperosmolarity type II, uncontrolled ([facility] E11.00, E11.65    Coronary artery disease involving native coronary artery of native heart without angina pectoris I25.10    HTN (hypertension) I10    Chest pain R07.9    Ischemic chest pain I25.9    Near syncope R55    CKD (chronic kidney disease), stage III ([facility]) N18.3    Anemia D64.9    CHF (congestive heart failure) ([facility]) I50.9    DM (diabetes mellitus) ([facility] E11.9    Syncope R55    Closed trimalleolar fracture of right ankle S82.851A    Closed fracture of fifth metacarpal bone of left hand S62.307A    Closed nondisplaced fracture of fifth metatarsal bone of left foot S92.355A    Hypokalemia E87.6    Hyperkalemia E87.5    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 K55.21    OCTAVIO (acute kidney injury) ([facility] N17.9    Heartburn R12    Esophageal dysphagia R13.10    Esophagitis, [place] grade B K20.8    Closed fracture of metatarsal neck S92.309A    Closed fracture of metatarsal neck, left, initial encounter U00.008P       Past Medical History:        Diagnosis Date    Arthritis     Ataxia 3/14/2014    BPH (benign prostatic hyperplasia)     CAD (coronary artery disease)     4 stents    Coronary atherosclerosis of native coronary artery 5/11/2013    Diabetes mellitus ([facility] [address]     Environmental allergies     Hepatitis     Hydronephrosis 3/14/2014    Hyperlipidemia     Hypertension     Kidney disease     Neuropathy     Parkinson's disease ([facility] [address]     ?     S/P coronary artery stent placement x 4     x 4    Schizoaffective disorder, bipolar type [facility])        Past Surgical History:        Procedure Laterality Date    ANKLE SURGERY Right 06/09/2018    ORIF of R ankle     COLONOSCOPY N/A 12/13/2018    COLONOSCOPY CONTROL HEMORRHAGE performed by Minal Remy MD at [facility]      x 4    CYSTOSCOPY  4/4/14    transurethral vaporization of prostate    HERNIA REPAIR      X2    UPPER GASTROINTESTINAL ENDOSCOPY N/A 2018    EGD BIOPSY performed by Fredrick Sanderson MD at [address] [facility] N/A 5/3/2019    EGD WITH ANESTHESIA performed by Fredrick Sanderson MD at [address]         Social History:    Social History     Tobacco Use    Smoking status: Former Smoker     Packs/day: 1.00     Years: 25.00     Pack years: 25.00     Types: Cigarettes     Last attempt to quit: 5/10/2013     Years since quittin.1    Smokeless tobacco: Never Used   Substance Use Topics    Alcohol use: No                                Counseling given: Not Answered      Vital Signs (Current):   Vitals:    19 1101   Weight: 136 lb (61.7 kg)   Height: 5' 4\" (1.626 m)                                              BP Readings from Last 3 Encounters:   19 (!) 159/71   19 (!) 98/58   19 (!) 177/73       NPO Status:                                                                                 BMI:   Wt Readings from Last 3 Encounters:   19 145 lb 15.1 oz (66.2 kg)   19 145 lb 15.1 oz (66.2 kg)   19 146 lb (66.2 kg)     Body mass index is 23.34 kg/m².     CBC:   Lab Results   Component Value Date    WBC 7.1 2019    RBC 2.92 2019    HGB 8.8 2019    HCT 25.5 2019    MCV 87.4 2019    RDW 14.7 2019     2019       CMP:   Lab Results   Component Value Date     2019    K 4.4 2019     2019    CO2 22 2019    BUN 28 2019    CREATININE 1.6 2019    GFRAA 52 2019    GFRAA >60 2013    AGRATIO 1.6 2019    LABGLOM 43 2019    GLUCOSE 333 2019    PROT 5.7 2019    CALCIUM 8.7 2019    BILITOT <0.2 2019    ALKPHOS 107 2019    AST 18 2019    ALT 19 2019       POC Tests: No results for input(s): POCGLU, POCNA, POCK, POCCL, POCBUN, POCHEMO, POCHCT in the last 72 hours. Coags:   Lab Results   Component Value Date    PROTIME 12.9 05/26/2019    INR 1.13 05/26/2019    APTT 27.3 10/08/2014       HCG (If Applicable): No results found for: PREGTESTUR, PREGSERUM, HCG, HCGQUANT     ABGs: No results found for: PHART, PO2ART, YQQ5DMG, YRZ6PFT, BEART, E2KEAEKM     Type & Screen (If Applicable):  No results found for: LABABO, [address]    Anesthesia Evaluation  Patient summary reviewed and Nursing notes reviewed no history of anesthetic complications:   Airway: Mallampati: III     Neck ROM: full   Dental:          Pulmonary:Negative Pulmonary ROS and normal exam                               Cardiovascular:Negative CV ROS    (+) hypertension:, CAD:, CABG/stent (stent):, CHF:, hyperlipidemia    (-)  angina                Neuro/Psych:   Negative Neuro/Psych ROS  (+) neuromuscular disease: Parkinson's disease, psychiatric history:            GI/Hepatic/Renal: Neg GI/Hepatic/Renal ROS  (+) liver disease:, renal disease: ARF and CRI,      (-) hiatal hernia and GERD       Endo/Other: Negative Endo/Other ROS   (+) Diabetes, . Abdominal:           Vascular:                                      Anesthesia Plan      general     ASA 3     (I discussed with the patient the risks and benefits of PIV, general anesthesia, IV Narcotics, PACU. All questions were answered the patient agrees with the plan.)  Induction: intravenous. Pre-Operative Diagnosis: CLOSED INTRA-ARTICULAR FRACTURE OF DISTAL END OF LEFT RADIUS    76 y.o.   BMI:  Body mass index is 24.09 kg/m².      Vitals:    06/19/19 1101 06/20/19 0829   BP:  133/71   Pulse:  92   Resp:  22   Temp:  97 °F (36.1 °C)   TempSrc:  Temporal   SpO2:  92%   Weight: 136 lb (61.7 kg)    Height: 5' 4\" (1.626 m) 5' 3\" (1.6 m)       No Known Allergies    Social History     Tobacco Use    Smoking status: Former Smoker     Packs/day: 1.00     Years: 25.00     Pack years: 25.00 Types: Cigarettes     Last attempt to quit: 5/10/2013     Years since quittin.1    Smokeless tobacco: Never Used   Substance Use Topics    Alcohol use: No       LABS:    CBC  Lab Results   Component Value Date/Time    WBC 7.1 2019 11:32 PM    HGB 8.8 (L) 2019 11:32 PM    HCT 25.5 (L) 2019 11:32 PM     2019 11:32 PM     RENAL  Lab Results   Component Value Date/Time     (L) 2019 11:32 PM    K 4.4 2019 11:32 PM     2019 11:32 PM    CO2 22 2019 11:32 PM    BUN 28 (H) 2019 11:32 PM    CREATININE 1.6 (H) 2019 11:32 PM    GLUCOSE 333 (H) 2019 11:32 PM     COAGS  Lab Results   Component Value Date/Time    PROTIME 12.9 2019 11:32 PM    INR 1.13 2019 11:32 PM    APTT 27.3 10/08/2014 07:10 PM       Diego Mcgee MD   2019

## 2019-06-20 NOTE — ANESTHESIA PROCEDURE NOTES
Peripheral Block    Patient location during procedure: pre-op  Start time: 6/20/2019 9:21 AM  End time: 6/20/2019 9:22 AM  Staffing  Anesthesiologist: Kemal Cordoba MD  Performed: anesthesiologist   Preanesthetic Checklist  Completed: patient identified, site marked, surgical consent, pre-op evaluation, timeout performed, IV checked, risks and benefits discussed, monitors and equipment checked, anesthesia consent given, oxygen available and patient being monitored  Peripheral Block  Patient position: sitting  Prep: ChloraPrep  Patient monitoring: cardiac monitor, continuous pulse ox, frequent blood pressure checks and IV access  Block type: Brachial plexus  Laterality: left  Injection technique: single-shot  Procedures: ultrasound guided  Local infiltration: lidocaine  Infiltration strength: 1 %  Dose: 3 mL  Supraclavicular  Provider prep: mask and sterile gloves  Local infiltration: lidocaine  Needle  Needle gauge: 21 G  Needle length: 10 cm  Needle localization: ultrasound guidance  Assessment  Injection assessment: negative aspiration for heme, no paresthesia on injection and local visualized surrounding nerve on ultrasound  Paresthesia pain: none  Slow fractionated injection: yes  Hemodynamics: stable  Additional Notes  Immediately prior to procedure a \"time out\" was called to verify the correct patient, allergies, laterality, procedure and equipment. Time out performed with  RN    Local Anesthetic: 0.5 %  Ropivacaine   Amount: 20 ml  in 5 ml increments after negative aspiration each time.     Versed 2mg  Plavix >72 hours    Reason for block: post-op pain management and at surgeon's request

## 2019-06-20 NOTE — PROGRESS NOTES
Pt is alert with no c/o of pain. Vitals are stable will continue to phase 2. Will continue to monitor.

## 2019-06-25 ENCOUNTER — HOSPITAL ENCOUNTER (OUTPATIENT)
Dept: OCCUPATIONAL THERAPY | Age: 69
Setting detail: THERAPIES SERIES
Discharge: HOME | End: 2019-06-25
Payer: COMMERCIAL

## 2019-06-25 PROCEDURE — 97112 NEUROMUSCULAR REEDUCATION: CPT | Performed by: OCCUPATIONAL THERAPIST

## 2019-06-25 PROCEDURE — 97165 OT EVAL LOW COMPLEX 30 MIN: CPT | Performed by: OCCUPATIONAL THERAPIST

## 2019-06-25 PROCEDURE — L3906 WHO W/O JOINTS CF: HCPCS | Performed by: OCCUPATIONAL THERAPIST

## 2019-06-25 NOTE — FLOWSHEET NOTE
all digits   Thumb ROM MP  IP   Thumb opposition  2,3,4,5   Thumb Radial/Palmar abd ROM R:  L:   Wrist ROM Ext/Flex R:  L:  (-5)/37   Rad/Uln dev ROM R:  L:   Forearm ROM  Sup/pron R:  L:45/60   Elbow ROM Ext/flex R:  L:   Edema in cm circumf. Wrist R:  L:    strength in lbs R:  L:           Modalities: 6/25/19                Therapeutic Exercise & Activities:     Pt educ HEP  AROM, splint care, precautions Add PROM        Splint zaida Wrist cock up                                         Therapeutic Exercise and NMR EXR  [] (10465) Provided verbal/tactile cueing for activities related to strengthening, flexibility, endurance, ROM  for improvements in scapular, scapulothoracic and UE control with self care, reaching, carrying, lifting, house/yardwork, driving/computer work.    [] (83643) Provided verbal/tactile cueing for activities related to improving balance, coordination, kinesthetic sense, posture, motor skill, proprioception  to assist with  scapular, scapulothoracic and UE control with self care, reaching, carrying, lifting, house/yardwork, driving/computer work. Therapeutic Activities:    [] ( 14272) therapeutic activities, direct (one on one) patient contact. Use of dynamic activities to improve functional performance.     Activities of Daily Living:  [] (02713) Provided self-care/home management training (i.e., activities of daily living and compensatory training, meal preparation, safety procedures, and instructions in use of assistive technology devices/adaptive equipment)     Home Exercise Program:   See media  [x] (46611) Reviewed/Progressed HEP activities related to strengthening, flexibility, endurance, ROM of scapular, scapulothoracic and UE control with self care, reaching, carrying, lifting, house/yardwork, driving/computer work    Manual Treatments:   [] (85809) Provided manual therapy to mobilize soft tissue/joints of the UE for the purpose of modulating pain, promoting relaxation, slowed due to co-morbidities.   [x] Plan just implemented, too soon to assess goals progression  [] Other:     ASSESSMENT:    Treatment/Activity Tolerance:  [x] Patient tolerated treatment well [] Patient limited by fatique  [] Patient limited by pain  [] Patient limited by other medical complications  [] Other:     Prognosis: [x] Good [] Fair  [] Poor    Patient Requires Follow-up: [x] Yes  [] No    PLAN: Recommend Occupational Therapy 1 times a week for 6 weeks  [] Continue per plan of care [] Alter current plan (see comments)  [x] Plan of care initiated [] Hold pending MD visit [] Discharge    Plan for next session: revise splint and add PROM to HEP    Electronically signed by: Kim Zamora OT/L [id number]

## 2019-06-25 NOTE — PLAN OF CARE
[facility], [address] [place] [address]  Phone: [phone]  Fax [phone]    Occupational Therapy/Hand Therapy Certification  Dear  Tu Rosa,    We had the pleasure of evaluating the following patient for occupational therapy services at [address] [place] [address]. A summary of our findings can be found in the initial assessment below. This includes our plan of care. If you have any questions or concerns regarding these findings, please do not hesitate to contact me at the office phone number checked above. Thank you for the referral.     Physician Signature:_______________________________Date:__________________  By signing above (or electronic signature), therapists plan is approved by physician      Patient: Norris Current   : 1950   MRN: [id number]  Referring Physician: Referring Practitioner: Tu Rosa      Evaluation Date: 2019      Medical Diagnosis Information:  Diagnosis: G86.723E (ICD-10-CM) - Closed die punch fracture of distal radius, left,   Treatment Dx: L wrist pain M25.532           Date of injury: 19  Date and type of Surgery: 19 ORIF                                   Insurance information:  [facility]- 36 visits  $0  Precautions/ Contra-indications:   Latex Allergy:  [x]No      []Yes  Pacemaker:  [x] No       [] Yes     Preferred Language for Healthcare:   [x]English       []other:    [x] Patient reported history, allergies, and medications reviewed - see intake form. SUBJECTIVE: Pt fell on wrist when he lost his balance trying to walk on foot with cast on it    Relevant Medical History/history of current problem: Pt currently in an ECF due to foot injury. He has stairs in his home and needs to progress with ambulation prior to being d/c'd.   Pain Scale: 5/10   [x]Constant      []Intermittent    []other:  Pain Location:  Wrist /hand  Easing factors: rest  Provocative factors: increases with AROM      Occupational Profile: staying at [facility] temporarily while he  Is injured . Home Enviroment: lives with  [x] spouse,  [] family,  [] alone,  [] significant other,   [] other:    Occupation/School: N/A    Recreational Activities/Meaningful Interests:     Prior Level of Function: [x] Independent with ADLs/IADLs     [] Assistance needed (describe):    Patient-Identified Primary Performance Deficits (to be addressed in POC):   [x] bathing    [] household tasks (cooking/cleaning)   [x] dressing    [] self feeding   [x] grooming    [] work/education   [] functional mobility   [x] sleeping/rest   [] toileting/hygiene   [] recreational activities   [] driving    [] community/social participation   [] other:     Comorbidities Affecting Functional Performance:     [x]Anxiety (F41.9)/Depression (F32.9)   [x]Diabetes Type 1(E10.65) or 2 (E11.65)   []Rheumatoid Arthritis (M05.9)  []Fibromyalgia (M79.7)  []Neuropathy(G60.9)  []Osteoarthritis(M19.91)  []None    []Other     OBJECTIVE:   Date:  Hand Dominance:     [x]  Right    [] Left 6/25/2019     Objective Measures:    PAIN 5/10   Quick DASH %   Digits tip to DPFC in cm  approx 6 cm for all digits   Thumb ROM MP  IP   Thumb opposition  2,3,4,5   Thumb Radial/Palmar abd ROM R:  L:   Wrist ROM Ext/Flex R:  L:  (-5)/37   Rad/Uln dev ROM R:  L:   Forearm ROM  Sup/pron R:  L:45/60   Elbow ROM Ext/flex R:  L:   Edema in cm circumf. Wrist R:  L:    strength in lbs R:  L:     Observations (including splints, bandages, incisions, scars):  Post op  Dressings removed    Sensation: [] No reported deficits  [] Intact to light touch    [] [place] Gab test completed, findings as noted:  [x] Other: occasional numbness in fingers    Palpation: NT    Functional Mobility/Transfers/Gait: [x] Independent - no significant gait deviations  [] Assistance needed   [] Assistive device used:      Falls Risk Assessment (30 days):   [x] Falls Risk assessed and no intervention required. [] Falls Risk assessed and Patient requires intervention due to being higher risk   TUG score (>12s at risk):     [] Falls education provided, including      Review Of Systems (ROS): [x]Performed Review of systems (Integumentary, CardioPulmonary, Neurological) by intake and observation. Intake form has been scanned into medical record. Patient has been instructed to contact their primary care physician regarding ROS issues if not already being addressed at this time. ASSESSMENT:   This patient presents with signs and symptoms consistent with the medical diagnosis provided by the referring physician. Impairments (physical, cognitive and/or psychosocial):  [x] Decreased mobility   [x] Weakness    [] Hypersensitivity   [x] Pain/tenderness   [] Edema/swelling   [] Decreased coordination (fine/gross motor)   [] Impaired body mechanics  [] Sensory loss  [] Loss of balance   [] Other:      Performance Deficits (to be addressed in plan of care):   [x] Bathing    [] Household Tasks (cooking/cleaning)   [x] Dressing    [] Self Feeding   [x] Grooming    [] Work/Education   [] Functional Mobility   [x] Sleeping/Rest   [] Toileting/Hygiene   [] Recreational Activities   [] Driving    [] Community/Social Participation   [] Other:     Rehab Potential:   [] Excellent [x] Good [] Fair  [] Poor     Barriers affecting rehab potential:  []Age    []Lack of Motivation   []Co-Morbidities  []Cognitive Function  []Environmental/home/work barriers  []Other:     Tolerance of evaluation/treatment:    [] Excellent [x] Good [] Fair  [] Poor    PLAN OF CARE:  Interventions:   [x] Therapeutic Exercise [x] Therapeutic Activity    [x] Activities of Daily Living [x] Neuromuscular Re-education      [x] Patient Education  [x] Manual Therapy      [x] Modalities as needed, and not otherwise contraindicated, including: ultrasound,paraffin,moist heat/cold pack, electrical stimulation, contrast bath, iontophoresis  [] Splinting    Frequency/Duration:  1 days per week for 6 weeks    GOALS:  Short Term Goals: To be achieved in: 2 weeks  1. Independent in HEP and progression per patient tolerance, in order to prevent re-injury. 2. Patient will have a decrease in pain to facilitate improvement in movement, function, and ADLs as indicated by Functional Deficits. Long Term Goals to be achieved in 6  weeks, including patient directed goals to address identified performance deficits:  1) Pt to be independent in graded HEP progression with a good level of effort and compliance. 2) Pt will demonstrate increased ROM to [facility] for improved performance with self care and sleeping. 3) Pt will have a decrease in pain to 3/10 or less to facilitate improvement in performance with self care and sleeping      OCCUPATIONAL THERAPY EVALUATION COMPLEXITY JUSTIFICATION:    [x] An occupational profile and medical/therapy history, which includes:   [x] a brief history including medical and/or therapy records relating to the     presenting problem   [] an expanded review of medical and/or therapy records and additional review     of physical, cognitive or psychosocial history related to current functional    performance   [] an extensive additional review of review of medical and/or therapy records   and physical, cognitive, or psychosocial history related to current    functional performance    [x] An assessment that identifies performance deficits (relating to physical, cognitive, or psychosocial skills) that result in activity limitations and/or participation restrictions:   [x] 1-3 performance deficits   [] 3-5 performance deficits   [] 5 or more performance deficits    [x] Clinical decision making of:   [x] low complexity, including analysis of occupational profile, data analysis from problem focused assessment, and consideration of a limited number of treatment options. No comorbidities affect occupational performance.   No

## 2019-07-02 ENCOUNTER — OFFICE VISIT (OUTPATIENT)
Dept: ORTHOPEDIC SURGERY | Age: 69
End: 2019-07-02

## 2019-07-02 ENCOUNTER — OFFICE VISIT (OUTPATIENT)
Dept: ORTHOPEDIC SURGERY | Age: 69
End: 2019-07-02
Payer: COMMERCIAL

## 2019-07-02 ENCOUNTER — HOSPITAL ENCOUNTER (OUTPATIENT)
Dept: OCCUPATIONAL THERAPY | Age: 69
Setting detail: THERAPIES SERIES
Discharge: HOME | End: 2019-07-02
Payer: COMMERCIAL

## 2019-07-02 ENCOUNTER — TELEPHONE (OUTPATIENT)
Dept: ORTHOPEDIC SURGERY | Age: 69
End: 2019-07-02

## 2019-07-02 VITALS — HEIGHT: 63 IN | WEIGHT: 136.02 LBS | BODY MASS INDEX: 24.1 KG/M2

## 2019-07-02 VITALS — BODY MASS INDEX: 24.1 KG/M2 | HEIGHT: 63 IN | WEIGHT: 136.02 LBS

## 2019-07-02 DIAGNOSIS — S92.355A CLOSED NONDISPLACED FRACTURE OF FIFTH METATARSAL BONE OF LEFT FOOT, INITIAL ENCOUNTER: Primary | ICD-10-CM

## 2019-07-02 DIAGNOSIS — S52.572A CLOSED DIE PUNCH FRACTURE OF DISTAL RADIUS, LEFT, INITIAL ENCOUNTER: Primary | ICD-10-CM

## 2019-07-02 PROCEDURE — 99024 POSTOP FOLLOW-UP VISIT: CPT | Performed by: ORTHOPAEDIC SURGERY

## 2019-07-02 PROCEDURE — 97110 THERAPEUTIC EXERCISES: CPT | Performed by: OCCUPATIONAL THERAPIST

## 2019-07-02 PROCEDURE — L4361 PNEUMA/VAC WALK BOOT PRE OTS: HCPCS | Performed by: ORTHOPAEDIC SURGERY

## 2019-07-02 NOTE — TELEPHONE ENCOUNTER
7/2/19  DME   - NOT COVERED - FOLLOW MEDICAID FEE SCHEDULE AND ITEM NOT ON FEE SCHEDULE - SPLIT CODING CROSSWALK FOR MEDICAID  IS COVERED AND NO PRECERT REQUIRED - PER NOTES - NDS

## 2019-07-30 RX ORDER — PANTOPRAZOLE SODIUM 40 MG/1
TABLET, DELAYED RELEASE ORAL
Qty: 90 TABLET | Refills: 3 | Status: SHIPPED | OUTPATIENT
Start: 2019-07-30 | End: 2019-12-20 | Stop reason: SDUPTHER

## 2019-07-30 NOTE — TELEPHONE ENCOUNTER
11/23/2018 [facility]  Plan:    1. Referral to Dr. Pam Alonzo, Gastroenterology to determine the cause of your anemia. 2. I recommend that the patient continue their currently prescribed medications. Their drug modifiable risk factors appear to be well controlled. I will continue to address the need/dosing of medications in future visits. 3. Follow up with me in 6 months.

## 2019-08-01 ENCOUNTER — OFFICE VISIT (OUTPATIENT)
Dept: ORTHOPEDIC SURGERY | Age: 69
End: 2019-08-01

## 2019-08-01 VITALS — WEIGHT: 136.02 LBS | HEIGHT: 63 IN | BODY MASS INDEX: 24.1 KG/M2

## 2019-08-01 DIAGNOSIS — S92.355A CLOSED NONDISPLACED FRACTURE OF FIFTH METATARSAL BONE OF LEFT FOOT, INITIAL ENCOUNTER: Primary | ICD-10-CM

## 2019-08-01 PROCEDURE — 99024 POSTOP FOLLOW-UP VISIT: CPT | Performed by: ORTHOPAEDIC SURGERY

## 2019-08-01 RX ORDER — PREDNISONE 10 MG/1
TABLET ORAL
Qty: 14 TABLET | Refills: 0 | Status: CANCELLED | OUTPATIENT
Start: 2019-08-01

## 2019-08-01 RX ORDER — MELOXICAM 15 MG/1
15 TABLET ORAL DAILY
Qty: 30 TABLET | Refills: 0 | Status: CANCELLED | OUTPATIENT
Start: 2019-08-01

## 2019-08-01 NOTE — PROGRESS NOTES
Subjective: Patient is here for follow-up of follow-up of his left foot multiple metatarsal fractures. He states he has been walking around the house without his boot at times. He would like to get out of the boot completely and states he has no pain  Objective: Physical exam shows he has an abrasion on the dorsum of his foot. He states he thinks this is because of lack of compliance. There is no gross deformity through the midfoot or forefoot. He still has some swelling and has 10 degrees of dorsiflexion 40 degrees of plantarflexion strength is at least 3+ to 4-/5 in the dorsiflexion plantarflexion  Imaging: 3 views of the left foot show the fracture is unchanged in position appear to be healing in  Assessment and plan: She is doing much better.   He can wean to a sandal that he has at home will follow-up with me in 6 weeks repeat x-rays there was a concern that he may have had bedbugs

## 2019-08-13 ENCOUNTER — OFFICE VISIT (OUTPATIENT)
Dept: ORTHOPEDIC SURGERY | Age: 69
End: 2019-08-13

## 2019-08-13 VITALS — WEIGHT: 136.02 LBS | HEIGHT: 63 IN | BODY MASS INDEX: 24.1 KG/M2

## 2019-08-13 DIAGNOSIS — S52.572A CLOSED DIE PUNCH FRACTURE OF DISTAL RADIUS, LEFT, INITIAL ENCOUNTER: ICD-10-CM

## 2019-08-13 DIAGNOSIS — M25.532 LEFT WRIST PAIN: Primary | ICD-10-CM

## 2019-08-13 PROCEDURE — 99024 POSTOP FOLLOW-UP VISIT: CPT | Performed by: ORTHOPAEDIC SURGERY

## 2019-08-23 RX ORDER — ASPIRIN 81 MG/1
TABLET, COATED ORAL
Qty: 30 TABLET | Refills: 10 | Status: SHIPPED | OUTPATIENT
Start: 2019-08-23

## 2019-10-18 ENCOUNTER — OFFICE VISIT (OUTPATIENT)
Dept: ORTHOPEDIC SURGERY | Age: 69
End: 2019-10-18
Payer: COMMERCIAL

## 2019-10-18 VITALS — BODY MASS INDEX: 24.1 KG/M2 | WEIGHT: 136.02 LBS | HEIGHT: 63 IN

## 2019-10-18 DIAGNOSIS — M79.672 LEFT FOOT PAIN: Primary | ICD-10-CM

## 2019-10-18 PROCEDURE — 3017F COLORECTAL CA SCREEN DOC REV: CPT | Performed by: ORTHOPAEDIC SURGERY

## 2019-10-18 PROCEDURE — 1123F ACP DISCUSS/DSCN MKR DOCD: CPT | Performed by: ORTHOPAEDIC SURGERY

## 2019-10-18 PROCEDURE — G8484 FLU IMMUNIZE NO ADMIN: HCPCS | Performed by: ORTHOPAEDIC SURGERY

## 2019-10-18 PROCEDURE — G8598 ASA/ANTIPLAT THER USED: HCPCS | Performed by: ORTHOPAEDIC SURGERY

## 2019-10-18 PROCEDURE — 99212 OFFICE O/P EST SF 10 MIN: CPT | Performed by: ORTHOPAEDIC SURGERY

## 2019-10-18 PROCEDURE — G8427 DOCREV CUR MEDS BY ELIG CLIN: HCPCS | Performed by: ORTHOPAEDIC SURGERY

## 2019-10-18 PROCEDURE — G8420 CALC BMI NORM PARAMETERS: HCPCS | Performed by: ORTHOPAEDIC SURGERY

## 2019-10-18 PROCEDURE — 1036F TOBACCO NON-USER: CPT | Performed by: ORTHOPAEDIC SURGERY

## 2019-10-18 PROCEDURE — 4040F PNEUMOC VAC/ADMIN/RCVD: CPT | Performed by: ORTHOPAEDIC SURGERY

## 2019-11-12 ENCOUNTER — OFFICE VISIT (OUTPATIENT)
Dept: ORTHOPEDIC SURGERY | Age: 69
End: 2019-11-12
Payer: COMMERCIAL

## 2019-11-12 VITALS — WEIGHT: 136.02 LBS | BODY MASS INDEX: 24.1 KG/M2 | HEIGHT: 63 IN

## 2019-11-12 DIAGNOSIS — M25.532 LEFT WRIST PAIN: Primary | ICD-10-CM

## 2019-11-12 DIAGNOSIS — S52.572A CLOSED DIE PUNCH FRACTURE OF DISTAL RADIUS, LEFT, INITIAL ENCOUNTER: ICD-10-CM

## 2019-11-12 PROCEDURE — G8427 DOCREV CUR MEDS BY ELIG CLIN: HCPCS | Performed by: ORTHOPAEDIC SURGERY

## 2019-11-12 PROCEDURE — G8420 CALC BMI NORM PARAMETERS: HCPCS | Performed by: ORTHOPAEDIC SURGERY

## 2019-11-12 PROCEDURE — L3908 WHO COCK-UP NONMOLDE PRE OTS: HCPCS | Performed by: ORTHOPAEDIC SURGERY

## 2019-11-12 PROCEDURE — 1036F TOBACCO NON-USER: CPT | Performed by: ORTHOPAEDIC SURGERY

## 2019-11-12 PROCEDURE — G8484 FLU IMMUNIZE NO ADMIN: HCPCS | Performed by: ORTHOPAEDIC SURGERY

## 2019-11-12 PROCEDURE — G8598 ASA/ANTIPLAT THER USED: HCPCS | Performed by: ORTHOPAEDIC SURGERY

## 2019-11-12 PROCEDURE — 4040F PNEUMOC VAC/ADMIN/RCVD: CPT | Performed by: ORTHOPAEDIC SURGERY

## 2019-11-12 PROCEDURE — 3017F COLORECTAL CA SCREEN DOC REV: CPT | Performed by: ORTHOPAEDIC SURGERY

## 2019-11-12 PROCEDURE — 99213 OFFICE O/P EST LOW 20 MIN: CPT | Performed by: ORTHOPAEDIC SURGERY

## 2019-11-12 PROCEDURE — 1123F ACP DISCUSS/DSCN MKR DOCD: CPT | Performed by: ORTHOPAEDIC SURGERY

## 2019-11-18 ENCOUNTER — HOSPITAL ENCOUNTER (OUTPATIENT)
Dept: OCCUPATIONAL THERAPY | Age: 69
Setting detail: THERAPIES SERIES
Discharge: HOME | End: 2019-11-18
Payer: COMMERCIAL

## 2019-11-18 PROCEDURE — 97168 OT RE-EVAL EST PLAN CARE: CPT | Performed by: OCCUPATIONAL THERAPIST

## 2019-11-18 PROCEDURE — 97110 THERAPEUTIC EXERCISES: CPT | Performed by: OCCUPATIONAL THERAPIST

## 2019-11-18 PROCEDURE — 97112 NEUROMUSCULAR REEDUCATION: CPT | Performed by: OCCUPATIONAL THERAPIST

## 2019-11-18 PROCEDURE — 97022 WHIRLPOOL THERAPY: CPT | Performed by: OCCUPATIONAL THERAPIST

## 2019-11-25 ENCOUNTER — HOSPITAL ENCOUNTER (OUTPATIENT)
Dept: OCCUPATIONAL THERAPY | Age: 69
Setting detail: THERAPIES SERIES
Discharge: HOME | End: 2019-11-25
Payer: COMMERCIAL

## 2019-12-02 ENCOUNTER — HOSPITAL ENCOUNTER (OUTPATIENT)
Dept: OCCUPATIONAL THERAPY | Age: 69
Setting detail: THERAPIES SERIES
Discharge: HOME | End: 2019-12-02
Payer: COMMERCIAL

## 2019-12-02 PROCEDURE — 97112 NEUROMUSCULAR REEDUCATION: CPT | Performed by: OCCUPATIONAL THERAPIST

## 2019-12-02 PROCEDURE — 97140 MANUAL THERAPY 1/> REGIONS: CPT | Performed by: OCCUPATIONAL THERAPIST

## 2019-12-02 PROCEDURE — 97110 THERAPEUTIC EXERCISES: CPT | Performed by: OCCUPATIONAL THERAPIST

## 2019-12-10 ENCOUNTER — HOSPITAL ENCOUNTER (OUTPATIENT)
Dept: OCCUPATIONAL THERAPY | Age: 69
Setting detail: THERAPIES SERIES
Discharge: HOME | End: 2019-12-10
Payer: COMMERCIAL

## 2019-12-10 ENCOUNTER — OFFICE VISIT (OUTPATIENT)
Dept: ORTHOPEDIC SURGERY | Age: 69
End: 2019-12-10
Payer: COMMERCIAL

## 2019-12-10 DIAGNOSIS — S52.572A CLOSED DIE PUNCH FRACTURE OF DISTAL RADIUS, LEFT, INITIAL ENCOUNTER: Primary | ICD-10-CM

## 2019-12-10 PROCEDURE — 3017F COLORECTAL CA SCREEN DOC REV: CPT | Performed by: ORTHOPAEDIC SURGERY

## 2019-12-10 PROCEDURE — 4040F PNEUMOC VAC/ADMIN/RCVD: CPT | Performed by: ORTHOPAEDIC SURGERY

## 2019-12-10 PROCEDURE — G8420 CALC BMI NORM PARAMETERS: HCPCS | Performed by: ORTHOPAEDIC SURGERY

## 2019-12-10 PROCEDURE — G8484 FLU IMMUNIZE NO ADMIN: HCPCS | Performed by: ORTHOPAEDIC SURGERY

## 2019-12-10 PROCEDURE — 97140 MANUAL THERAPY 1/> REGIONS: CPT | Performed by: OCCUPATIONAL THERAPIST

## 2019-12-10 PROCEDURE — 1036F TOBACCO NON-USER: CPT | Performed by: ORTHOPAEDIC SURGERY

## 2019-12-10 PROCEDURE — 1123F ACP DISCUSS/DSCN MKR DOCD: CPT | Performed by: ORTHOPAEDIC SURGERY

## 2019-12-10 PROCEDURE — G8598 ASA/ANTIPLAT THER USED: HCPCS | Performed by: ORTHOPAEDIC SURGERY

## 2019-12-10 PROCEDURE — G8428 CUR MEDS NOT DOCUMENT: HCPCS | Performed by: ORTHOPAEDIC SURGERY

## 2019-12-10 PROCEDURE — 99213 OFFICE O/P EST LOW 20 MIN: CPT | Performed by: ORTHOPAEDIC SURGERY

## 2019-12-16 ENCOUNTER — TELEPHONE (OUTPATIENT)
Dept: CARDIOLOGY CLINIC | Age: 69
End: 2019-12-16

## 2019-12-16 RX ORDER — CLOPIDOGREL BISULFATE 75 MG/1
TABLET ORAL
Qty: 30 TABLET | Refills: 0 | Status: SHIPPED | OUTPATIENT
Start: 2019-12-16 | End: 2020-01-14

## 2019-12-20 RX ORDER — PANTOPRAZOLE SODIUM 40 MG/1
TABLET, DELAYED RELEASE ORAL
Qty: 90 TABLET | Refills: 0 | Status: SHIPPED | OUTPATIENT
Start: 2019-12-20

## 2019-12-20 RX ORDER — CARVEDILOL 3.12 MG/1
TABLET ORAL
Qty: 180 TABLET | Refills: 0 | Status: SHIPPED | OUTPATIENT
Start: 2019-12-20 | End: 2020-01-14

## 2020-01-13 ENCOUNTER — APPOINTMENT (OUTPATIENT)
Dept: GENERAL RADIOLOGY | Age: 70
End: 2020-01-13
Payer: COMMERCIAL

## 2020-01-13 ENCOUNTER — APPOINTMENT (OUTPATIENT)
Dept: CT IMAGING | Age: 70
End: 2020-01-13
Payer: COMMERCIAL

## 2020-01-13 ENCOUNTER — HOSPITAL ENCOUNTER (EMERGENCY)
Age: 70
Discharge: HOME | End: 2020-01-13
Attending: EMERGENCY MEDICINE
Payer: COMMERCIAL

## 2020-01-13 VITALS
TEMPERATURE: 97.6 F | DIASTOLIC BLOOD PRESSURE: 60 MMHG | RESPIRATION RATE: 17 BRPM | HEART RATE: 71 BPM | SYSTOLIC BLOOD PRESSURE: 140 MMHG | OXYGEN SATURATION: 99 %

## 2020-01-13 PROCEDURE — 99283 EMERGENCY DEPT VISIT LOW MDM: CPT

## 2020-01-13 PROCEDURE — 90471 IMMUNIZATION ADMIN: CPT | Performed by: EMERGENCY MEDICINE

## 2020-01-13 PROCEDURE — 90715 TDAP VACCINE 7 YRS/> IM: CPT | Performed by: EMERGENCY MEDICINE

## 2020-01-13 PROCEDURE — 12001 RPR S/N/AX/GEN/TRNK 2.5CM/<: CPT

## 2020-01-13 PROCEDURE — 72125 CT NECK SPINE W/O DYE: CPT

## 2020-01-13 PROCEDURE — 70450 CT HEAD/BRAIN W/O DYE: CPT

## 2020-01-13 PROCEDURE — 73060 X-RAY EXAM OF HUMERUS: CPT

## 2020-01-13 PROCEDURE — 6360000002 HC RX W HCPCS: Performed by: EMERGENCY MEDICINE

## 2020-01-13 RX ADMIN — TETANUS TOXOID, REDUCED DIPHTHERIA TOXOID AND ACELLULAR PERTUSSIS VACCINE, ADSORBED 0.5 ML: 5; 2.5; 8; 8; 2.5 SUSPENSION INTRAMUSCULAR at 19:11

## 2020-01-13 ASSESSMENT — ENCOUNTER SYMPTOMS
NAUSEA: 0
RHINORRHEA: 0
ABDOMINAL PAIN: 0
SHORTNESS OF BREATH: 0
SORE THROAT: 0
BACK PAIN: 0
CONSTIPATION: 0
CHEST TIGHTNESS: 0
DIARRHEA: 0
COUGH: 0
VOMITING: 0
TROUBLE SWALLOWING: 0

## 2020-01-13 ASSESSMENT — PAIN DESCRIPTION - PAIN TYPE: TYPE: ACUTE PAIN

## 2020-01-13 NOTE — ED PROVIDER NOTES
[facility]  ED  EMERGENCYDEPARTMENT ENCOUNTER      Pt Name: Acosta Donnelly  MRN: [id number]  [place] 1950  Date of evaluation: 1/13/2020  Nasim Urbina MD    CHIEF COMPLAINT       Chief Complaint   Patient presents with    Fall     Patient has lac to left upper left arm after leaning back in chair and falling back. Pt did hit his head. Pt does take plavix. Did not have (+) LOC         HISTORY OF PRESENT ILLNESS   (Location/Symptom, Timing/Onset,Context/Setting, Quality, Duration, Modifying Factors, Severity)  Note limiting factors. Acosta Donnelly is a 71 y.o. male who presents to the emergency department sp fall. Patient states he sat down on a lawn chair, which tipped over hitting his head on a glass display case that was behind him. Denies LOC. States he also got a cut on his left arm which would not stop bleeding.  -currently on plavix. HPI    Nursing Notes were reviewed. REVIEW OF SYSTEMS    (2-9 systems for level 4, 10 or more for level 5)     Review of Systems   Constitutional: Negative for activity change, appetite change, chills, diaphoresis and fever. HENT: Negative for congestion, rhinorrhea, sneezing, sore throat and trouble swallowing. Respiratory: Negative for cough, chest tightness and shortness of breath. Cardiovascular: Negative for chest pain and palpitations. Gastrointestinal: Negative for abdominal pain, constipation, diarrhea, nausea and vomiting. Genitourinary: Negative for dysuria, flank pain and hematuria. Musculoskeletal: Negative for back pain, gait problem and myalgias. Skin: Positive for wound. Negative for rash. Neurological: Negative for dizziness, weakness and numbness. Except as noted above the remainder of the review of systems was reviewedand negative.        PAST MEDICAL HISTORY     Past Medical History:   Diagnosis Date    Arthritis     Ataxia 3/14/2014    BPH (benign prostatic hyperplasia)     CAD (coronary artery Tenderness: There is no tenderness. There is no guarding or rebound. Musculoskeletal: Normal range of motion. General: No tenderness or deformity. Skin:     General: Skin is warm and dry. Findings: No rash. Neurological:      Mental Status: He is alert and oriented to person, place, and time. GCS: GCS eye subscore is 4. GCS verbal subscore is 5. GCS motor subscore is 6. Psychiatric:         Behavior: Behavior is cooperative. DIAGNOSTIC RESULTS     EKG: All EKG's are interpreted by the Emergency Department Physicianwho either signs or Co-signs this chart in the absence of a cardiologist.      RADIOLOGY:   Non-plain film images such as CT, Ultrasound and MRI are read by the radiologist. Plain radiographic images are visualized and preliminarily interpreted by the emergency physician with the below findings:      Interpretation per the Radiologist below, if available at the time of this note:    CT Head WO Contrast   Final Result   *No acute intracranial abnormality. *No acute cervical spine fracture or subluxation. CT Cervical Spine WO Contrast   Final Result   *No acute intracranial abnormality. *No acute cervical spine fracture or subluxation. XR HUMERUS LEFT (MIN 2 VIEWS)   Final Result   No acute osseous abnormality. ED BEDSIDE ULTRASOUND:   Performed by ED Physician - none    LABS:  Labs Reviewed - No data to display    All other labs were within normal range ornot returned as of this dictation.     EMERGENCY DEPARTMENT COURSE and DIFFERENTIAL DIAGNOSIS/MDM:   Vitals:    Vitals:    01/13/20 1752 01/13/20 1910 01/13/20 2026   BP: (!) 140/68 (!) 141/61 (!) 140/60   Pulse: 80 75 71   Resp: 18 16 17   Temp: 97.6 °F (36.4 °C)     TempSrc: Oral     SpO2: 98% 98% 99%         MDM    ED COURSE/MDM    -Sharla Andrew is a 71 y.o. male with a history of diabetes, hypertension, CKD, CHF presents to ED status post fall with left arm noted and no vascular damage noted      Contaminated: no    Treatment:     Area cleansed with:  Hibiclens    Amount of cleaning:  Standard    Irrigation solution:  Sterile saline    Irrigation volume:  500    Irrigation method:  Pressure wash    Visualized foreign bodies/material removed: no    Skin repair:     Repair method:  Sutures    Suture size:  4-0    Suture material:  Prolene    Suture technique:  Simple interrupted    Number of sutures:  6  Approximation:     Approximation:  Close  Post-procedure details:     Dressing:  Non-adherent dressing    Patient tolerance of procedure: Tolerated well, no immediate complications        FINAL IMPRESSION      1. Arm laceration, left, initial encounter    2.  Fall, initial encounter          DISPOSITION/PLAN   DISPOSITION Decision To Discharge 01/13/2020 08:08:14 PM      PATIENT REFERREDTO:  [id number] [phone]  In 1 week  For suture removal      DISCHARGE MEDICATIONS:  Discharge Medication List as of 1/13/2020  8:08 PM             (Please note that portions of this note were completed with a voice recognition program.  Efforts were made to edit the dictations but occasionally wordsare mis-transcribed.)    Gray Tristan MD (electronically signed)  Attending Emergency Physician            Gray Tristan MD  01/13/20 4195

## 2020-01-14 RX ORDER — CLOPIDOGREL BISULFATE 75 MG/1
TABLET ORAL
Qty: 30 TABLET | Refills: 0 | Status: SHIPPED | OUTPATIENT
Start: 2020-01-14 | End: 2020-02-17

## 2020-01-14 RX ORDER — CARVEDILOL 3.12 MG/1
TABLET ORAL
Qty: 180 TABLET | Refills: 0 | Status: SHIPPED | OUTPATIENT
Start: 2020-01-14 | End: 2020-04-10

## 2020-02-04 ENCOUNTER — OFFICE VISIT (OUTPATIENT)
Dept: CARDIOLOGY CLINIC | Age: 70
End: 2020-02-04
Payer: COMMERCIAL

## 2020-02-04 ENCOUNTER — HOSPITAL ENCOUNTER (OUTPATIENT)
Age: 70
Discharge: HOME | End: 2020-02-04
Payer: COMMERCIAL

## 2020-02-04 VITALS
DIASTOLIC BLOOD PRESSURE: 76 MMHG | BODY MASS INDEX: 23.18 KG/M2 | SYSTOLIC BLOOD PRESSURE: 140 MMHG | OXYGEN SATURATION: 98 % | HEIGHT: 63 IN | HEART RATE: 93 BPM | WEIGHT: 130.8 LBS

## 2020-02-04 LAB
ANION GAP SERPL CALCULATED.3IONS-SCNC: 14 MMOL/L (ref 3–16)
BASOPHILS ABSOLUTE: 0.1 K/UL (ref 0–0.2)
BASOPHILS RELATIVE PERCENT: 0.8 %
BUN BLDV-MCNC: 27 MG/DL (ref 7–20)
CALCIUM SERPL-MCNC: 9.8 MG/DL (ref 8.3–10.6)
CHLORIDE BLD-SCNC: 100 MMOL/L (ref 99–110)
CO2: 23 MMOL/L (ref 21–32)
CREAT SERPL-MCNC: 1.4 MG/DL (ref 0.8–1.3)
EOSINOPHILS ABSOLUTE: 0.3 K/UL (ref 0–0.6)
EOSINOPHILS RELATIVE PERCENT: 3.9 %
GFR AFRICAN AMERICAN: >60
GFR NON-AFRICAN AMERICAN: 50
GLUCOSE BLD-MCNC: 397 MG/DL (ref 70–99)
HCT VFR BLD CALC: 31.8 % (ref 40.5–52.5)
HEMOGLOBIN: 10.9 G/DL (ref 13.5–17.5)
LYMPHOCYTES ABSOLUTE: 1 K/UL (ref 1–5.1)
LYMPHOCYTES RELATIVE PERCENT: 13.5 %
MCH RBC QN AUTO: 29.4 PG (ref 26–34)
MCHC RBC AUTO-ENTMCNC: 34.4 G/DL (ref 31–36)
MCV RBC AUTO: 85.4 FL (ref 80–100)
MONOCYTES ABSOLUTE: 0.4 K/UL (ref 0–1.3)
MONOCYTES RELATIVE PERCENT: 6.3 %
NEUTROPHILS ABSOLUTE: 5.4 K/UL (ref 1.7–7.7)
NEUTROPHILS RELATIVE PERCENT: 75.5 %
PDW BLD-RTO: 16.3 % (ref 12.4–15.4)
PLATELET # BLD: 243 K/UL (ref 135–450)
PMV BLD AUTO: 8.4 FL (ref 5–10.5)
POTASSIUM SERPL-SCNC: 5 MMOL/L (ref 3.5–5.1)
RBC # BLD: 3.72 M/UL (ref 4.2–5.9)
SODIUM BLD-SCNC: 137 MMOL/L (ref 136–145)
WBC # BLD: 7.1 K/UL (ref 4–11)

## 2020-02-04 PROCEDURE — 99214 OFFICE O/P EST MOD 30 MIN: CPT | Performed by: INTERNAL MEDICINE

## 2020-02-04 PROCEDURE — 80048 BASIC METABOLIC PNL TOTAL CA: CPT

## 2020-02-04 PROCEDURE — 1036F TOBACCO NON-USER: CPT | Performed by: INTERNAL MEDICINE

## 2020-02-04 PROCEDURE — 3017F COLORECTAL CA SCREEN DOC REV: CPT | Performed by: INTERNAL MEDICINE

## 2020-02-04 PROCEDURE — G8484 FLU IMMUNIZE NO ADMIN: HCPCS | Performed by: INTERNAL MEDICINE

## 2020-02-04 PROCEDURE — 85025 COMPLETE CBC W/AUTO DIFF WBC: CPT

## 2020-02-04 PROCEDURE — 1123F ACP DISCUSS/DSCN MKR DOCD: CPT | Performed by: INTERNAL MEDICINE

## 2020-02-04 PROCEDURE — 36415 COLL VENOUS BLD VENIPUNCTURE: CPT

## 2020-02-04 PROCEDURE — G8420 CALC BMI NORM PARAMETERS: HCPCS | Performed by: INTERNAL MEDICINE

## 2020-02-04 PROCEDURE — 4040F PNEUMOC VAC/ADMIN/RCVD: CPT | Performed by: INTERNAL MEDICINE

## 2020-02-04 PROCEDURE — G8427 DOCREV CUR MEDS BY ELIG CLIN: HCPCS | Performed by: INTERNAL MEDICINE

## 2020-02-04 NOTE — PROGRESS NOTES
[address] [facility]  Cardiovascular Follow Up    PATIENT: Cady Doll  DATE: 2020  MRN: <[id number]>  CSN: [id number]  : 1950    Hypertension; Coronary Artery Disease; and Shortness of Breath     Subjective:     History of present illness on initial date of evaluation:   Cady Dlol is a 71 y.o. patient who presents for follow up. Since his last visit he has fallen multiple times resulting in orthopedic injuries. Prior to falling, he feels lightheadedness and weakness in his legs. Has also followed up with Dr. Estelle Harrison in May 2019 and underwent an EGD. He continues to have epigastric pain which he attributes to his hernia. He denies any cardiac symptoms. He attempts to remain active working around his apartment. His activity is limited at times due to his weakness in his legs. He is unfamiliar with his medications and is unsure what he is taking.      Patient Active Problem List   Diagnosis    DM2 (diabetes mellitus, type 2) ([address]    Dizziness    Hematuria    Hyponatremia    [facility]F [facility]acute renal failure) ([facility])    S/P coronary artery stent placement    S/P coronary artery stent placement x 4    Orthostatic hypotension    Acute posthemorrhagic anemia    Contusion, knee    DM hyperosmolarity type II, uncontrolled ([address]    Coronary artery disease involving native coronary artery of native heart without angina pectoris    HTN (hypertension)    Chest pain    Ischemic chest pain ([address]    Near syncope    CKD (chronic kidney disease), stage III ([facility])    Anemia    CHF (congestive heart failure) ([address]    DM (diabetes mellitus) ([address]    Syncope    Closed trimalleolar fracture of right ankle    Closed fracture of fifth metacarpal bone of left hand    Closed nondisplaced fracture of fifth metatarsal bone of left foot    Hypokalemia    Hyperkalemia    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    OCTAVIO (acute kidney injury) ([address]    Heartburn    Esophageal dysphagia    Esophagitis, Grundy grade B    Closed fracture of metatarsal neck    Closed fracture of metatarsal neck, left, initial encounter         Cardiac Testing: I have reviewed the findings below. EKG:  MRI of brain  Impression:    1. Chronic inflammatory involvement and complete opacification of the right maxillary sinus. 2. Mild patchy periatrial white matter disease and abnormal signal in the left carolann, which are most consistent with chronic small vessel ischemic changes. 3. Mild atrophic changes. 2/2/2015 4:14 PM    ECHO: 5/13:Ejection fraction is visually estimated to be 55 %. wall motion is normal.  The mitral valve is normal in structure and function. No evidence of mitral  regurgitation. The aortic valve is normal in structure and function. No evidence of aortic  valve regurgitation. no intracardiac mass, vegetations or thrombi. inter atrial septum is intact with normal IVC    Echo: 12/07/2015  Normal left ventricle size, wall thickness and systolic function with an  estimated ejection fraction of 55%. No regional wall motion abnormalities  are seen. Diastolic filling parameters suggests grade I diastolic dysfunction. The mitral valve is mildly thickend. Trace mitral and tricuspid regurgitation. Systolic pulmonary artery pressure (SPAP) is normal and estimated at 28 mmHg  (RA pressure 3 mmHg). STRESS TEST:5/13-Normal pharmacological stress study. NM Stress Test: 12/07/2015  1. Small focal area of pharmacologically induced cardiac ischemia in the   lateral apex. 2. Ejection fraction of 60%   3. No focal wall motion abnormality. CATH:  10/10/14  PROCEDURE FINDINGS:  1.  Mild to moderate coronary artery disease. There are patent stents noted  within the circumflex coronary artery. The left anterior descending has some  in-stent restenosis in 2 segments that is mild to moderate, 20% to 30%. The  circumflex stent has minimal in-stent restenosis.   The right coronary artery  has mild disease in its proximal segment. 2.  Normal left ventricular function with an ejection fraction of 50%. 3.  Normal hemodynamics. 5/10/2013,   PCI of the LAD and CIRC with AMY (jailed OM1 and unable to re-cross)    Arterial Duplex: 8/4/15  Summary    1. There is no evidence to suggest arterial insufficiency at rest involving  the right lower extremity. 2. There is no evidence to suggest arterial insufficiency at rest involving  the left lower extremity. Past Medical History:   has a past medical history of Arthritis, Ataxia, BPH (benign prostatic hyperplasia), CAD (coronary artery disease), Coronary atherosclerosis of native coronary artery, Diabetes mellitus ([address] Environmental allergies, Hepatitis, Hydronephrosis, Hyperlipidemia, Hypertension, Kidney disease, Neuropathy, Parkinson's disease ([address] S/P coronary artery stent placement x 4, and Schizoaffective disorder, bipolar type ([facility] Surgical History:   has a past surgical history that includes Coronary angioplasty; Coronary angioplasty with stent; hernia repair; Vasectomy; Cystocopy (4/4/14); Ankle surgery (Right, 06/09/2018); Upper gastrointestinal endoscopy (N/A, 12/13/2018); Colonoscopy (N/A, 12/13/2018); Upper gastrointestinal endoscopy (N/A, 5/3/2019); and ORIF DISTAL RADIUS FRACTURE (Left, 6/20/2019). Social History:   reports that he quit smoking about 6 years ago. His smoking use included cigarettes. He has a 25.00 pack-year smoking history. He has never used smokeless tobacco. He reports that he does not drink alcohol or use drugs. Family History:  No evidence for sudden cardiac death or premature CAD    Home Medications:  Reviewed and are listed in nursing record.  and/or listed below  Current Outpatient Medications   Medication Sig Dispense Refill    clopidogrel (PLAVIX) 75 MG tablet TAKE 1 TABLET BY MOUTH ONCE DAILY *PATIENT NEEDS APPOINTMENT* 30 tablet 0    carvedilol (COREG) 3.125 MG tablet TAKE ONE (1) TABLET BY MOUTH TWICE DAILY WITH MEALS 180 tablet 0    pantoprazole (PROTONIX) 40 MG tablet Take one tablet daily 90 tablet 0    ASPIRIN LOW DOSE 81 MG EC tablet TAKE 1 TABLET BY MOUTH ONCE DAILY 30 tablet 10    lisinopril (PRINIVIL;ZESTRIL) 5 MG tablet Take 1 tablet by mouth daily      metFORMIN (GLUCOPHAGE) 1000 MG tablet Take 1 tablet by mouth daily      PARoxetine (PAXIL) 40 MG tablet Take 1 tablet by mouth daily      Dulaglutide (TRULICITY SC) Inject 1.5 mg into the skin once a week       gabapentin (NEURONTIN) 300 MG capsule Take 300 mg by mouth 3 times daily. Rui Fling atorvastatin (LIPITOR) 80 MG tablet TAKE 1 TABLET BY MOUTH AT BEDTIME 90 tablet 0    pioglitazone (ACTOS) 45 MG tablet Take 45 mg by mouth daily      traZODone (DESYREL) 100 MG tablet Take 100 mg by mouth nightly      fluticasone (FLONASE) 50 MCG/ACT nasal spray 1 spray by Nasal route daily as needed.  ferrous sulfate 325 (65 Fe) MG tablet Take 1 tablet by mouth 2 times daily (Patient not taking: Reported on 2/4/2020) 60 tablet 11    insulin glargine (LANTUS) 100 UNIT/ML injection vial Inject 30 Units into the skin nightly (Patient not taking: Reported on 2/4/2020) 1 vial 3     No current facility-administered medications for this visit. Allergies:  Patient has no known allergies. Review of Systems:   All 14 point review of symptoms completed. Pertinent positives identified in the HPI, all other review of symptoms negative as below.     Review of Systems - History obtained from the patient  General ROS: negative for - chills, fever or night sweats  Psychological ROS: negative for - disorientation or hallucinations  Ophthalmic ROS: negative for - dry eyes, eye pain or loss of vision  ENT ROS: negative for - nasal discharge or sore throat  Allergy and Immunology ROS: negative for - hives or itchy/watery eyes  Hematological and Lymphatic ROS: negative for - jaundice or night sweats  Endocrine ROS: negative for - mood swings or temperature intolerance  Breast ROS: deferred  Respiratory ROS: negative for - hemoptysis or stridor  Cardiovascular ROS: negative for - chest pain, dyspnea on exertion or palpitations  Gastrointestinal ROS: no abdominal pain, change in bowel habits, or black or bloody stools  Genito-Urinary ROS: no dysuria, trouble voiding, or hematuria  Musculoskeletal ROS: negative for - gait disturbance, joint pain or joint stiffness  Neurological ROS: negative for - seizures or speech problems  Dermatological ROS: negative for - rash or skin lesion changes        Physical Examination:    Filed Vitals:    05/24/13 0928   BP: 82/0   Repeat  110/70   Pulse: 73      Weight: 130 lb 12.8 oz (59.3 kg)     Wt Readings from Last 3 Encounters:   02/04/20 130 lb 12.8 oz (59.3 kg)   11/12/19 136 lb 0.4 oz (61.7 kg)   10/18/19 136 lb 0.4 oz (61.7 kg)         General Appearance:  Alert, cooperative, no distress, appears stated age   Head:  Normocephalic, without obvious abnormality, atraumatic   Eyes:  PERRL, conjunctiva/corneas clear       Nose: Nares normal, no drainage or sinus tenderness   Throat: Lips, mucosa, and tongue normal   Neck: Supple, symmetrical, trachea midline, no adenopathy, thyroid: not enlarged, symmetric, no tenderness/mass/nodules, no carotid bruit or JVD       Lungs:   Clear to auscultation bilaterally, respirations unlabored   Chest Wall:  No tenderness or deformity   Heart:  Regular rhythm and normal rate; S1, S2 are normal; no murmur noted; no rub or gallop   Abdomen:   Soft, non-tender, bowel sounds active all four quadrants,  no masses, no organomegaly           Extremities: Extremities normal, atraumatic, no cyanosis or edema   Pulses:  faint and not palpable in the lower extremities bilaterally.      Skin: Skin color, texture, turgor normal, no rashes or lesions   Pysch: Normal mood and unusual affect   Neurologic: Normal gross motor and sensory exam.         Labs  CBC:   Lab Results   Component Value Date    WBC 7.1 05/26/2019    RBC 2.92 05/26/2019    HGB 8.8 05/26/2019    HCT 25.5 05/26/2019    MCV 87.4 05/26/2019    RDW 14.7 05/26/2019     05/26/2019     CMP:    Lab Results   Component Value Date     05/26/2019    K 4.4 05/26/2019     05/26/2019    CO2 22 05/26/2019    BUN 28 05/26/2019    CREATININE 1.6 05/26/2019    GFRAA 52 05/26/2019    GFRAA >60 05/11/2013    AGRATIO 1.6 04/06/2019    LABGLOM 43 05/26/2019    GLUCOSE 333 05/26/2019    PROT 5.7 04/06/2019    CALCIUM 8.7 05/26/2019    BILITOT <0.2 04/06/2019    ALKPHOS 107 04/06/2019    AST 18 04/06/2019    ALT 19 04/06/2019     PT/INR:    No components found for: PTPATIENT,  PTINR  Lab Results   Component Value Date    TROPONINI <0.01 04/06/2019     No components found for: CHLPL  Lab Results   Component Value Date    TRIG 146 12/07/2015    TRIG 97 10/09/2014    TRIG 115 03/14/2014     Lab Results   Component Value Date    HDL 30 (L) 12/07/2015    HDL 32 (L) 10/09/2014    HDL 4 (L) 03/14/2014     Lab Results   Component Value Date    LDLCALC 22 12/07/2015    LDLCALC 32 10/09/2014    LDLCALC 31 03/14/2014     Lab Results   Component Value Date    LABVLDL 29 12/07/2015    LABVLDL 19 10/09/2014    LABVLDL 23 03/14/2014     Lab Results   Component Value Date    ALT 19 04/06/2019    AST 18 04/06/2019    ALKPHOS 107 04/06/2019    BILITOT <0.2 04/06/2019       Assessment:  71 y.o. patient with:  1. Anemia   ~chronic   ~off of Plavix   2. Chest pain   ~chronic, unchanged. Appears chronic stable angina. 3.  Bilateral leg claudication   ~ongoing, recent arterial study 8/2015 showed no blockages  4. Coronary artery disease   ~PCI of the LAD and CIRC with AMY (jailed OM1 and unable to re-cross). ~repeat cath 10/13/2014 - stable disease/stents  5. Diabetes mellitus    ~stable  6. Hypertension   ~BP: (140)/(76)    7. Possible GI bleed    ~Colonoscopy/EGD     Plan:    1. I feel that you are stable from a cardiac standpoint.    2. I recommend that

## 2020-02-05 ENCOUNTER — TELEPHONE (OUTPATIENT)
Dept: CARDIOLOGY CLINIC | Age: 70
End: 2020-02-05

## 2020-02-05 NOTE — TELEPHONE ENCOUNTER
----- Message from Helen Valdes MD sent at 2/5/2020 10:34 AM EST -----  I reviewed the labs and see improvement compared to prior studies. Continue current medical regimen. Call patient with results and recommendations.

## 2020-02-17 NOTE — TELEPHONE ENCOUNTER
2/4/2020 [facility]     Plan:    1. I feel that you are stable from a cardiac standpoint. 2. I recommend that the patient continue their currently prescribed medications. Their drug modifiable risk factors appear to be well controlled. I will continue to address the need/dosing of medications in future visits. 3. Follow up with your new primary care physician as planned. 4. Check CBC (anemia evaluation) and BMP. 5. Follow up with me in 6 months.

## 2020-02-18 RX ORDER — CLOPIDOGREL BISULFATE 75 MG/1
TABLET ORAL
Qty: 30 TABLET | Refills: 0 | Status: SHIPPED | OUTPATIENT
Start: 2020-02-18 | End: 2020-03-18

## 2020-03-18 RX ORDER — CLOPIDOGREL BISULFATE 75 MG/1
TABLET ORAL
Qty: 90 TABLET | Refills: 3 | Status: ON HOLD | OUTPATIENT
Start: 2020-03-18 | End: 2021-06-15 | Stop reason: HOSPADM

## 2020-04-01 ENCOUNTER — APPOINTMENT (OUTPATIENT)
Dept: GENERAL RADIOLOGY | Age: 70
End: 2020-04-01
Payer: COMMERCIAL

## 2020-04-01 ENCOUNTER — HOSPITAL ENCOUNTER (OUTPATIENT)
Age: 70
Setting detail: OBSERVATION
Discharge: NURSING, REHAB OR LONG-TERM CARE | End: 2020-04-02
Attending: EMERGENCY MEDICINE | Admitting: INTERNAL MEDICINE
Payer: COMMERCIAL

## 2020-04-01 ENCOUNTER — APPOINTMENT (OUTPATIENT)
Dept: CT IMAGING | Age: 70
End: 2020-04-01
Payer: COMMERCIAL

## 2020-04-01 PROBLEM — R27.0 ATAXIA: Status: ACTIVE | Noted: 2020-04-01

## 2020-04-01 LAB
A/G RATIO: 1.6 (ref 1.1–2.2)
ALBUMIN SERPL-MCNC: 4.3 G/DL (ref 3.4–5)
ALP BLD-CCNC: 133 U/L (ref 40–129)
ALT SERPL-CCNC: 16 U/L (ref 10–40)
ANION GAP SERPL CALCULATED.3IONS-SCNC: 12 MMOL/L (ref 3–16)
ANION GAP SERPL CALCULATED.3IONS-SCNC: 14 MMOL/L (ref 3–16)
AST SERPL-CCNC: 16 U/L (ref 15–37)
BASE EXCESS VENOUS: 0 MMOL/L (ref -3–3)
BASOPHILS ABSOLUTE: 0.1 K/UL (ref 0–0.2)
BASOPHILS RELATIVE PERCENT: 1.1 %
BILIRUB SERPL-MCNC: 0.4 MG/DL (ref 0–1)
BILIRUBIN URINE: NEGATIVE
BLOOD, URINE: NEGATIVE
BUN BLDV-MCNC: 29 MG/DL (ref 7–20)
BUN BLDV-MCNC: 32 MG/DL (ref 7–20)
CALCIUM SERPL-MCNC: 9.1 MG/DL (ref 8.3–10.6)
CALCIUM SERPL-MCNC: 9.2 MG/DL (ref 8.3–10.6)
CARBOXYHEMOGLOBIN: 1.5 % (ref 0–1.5)
CHLORIDE BLD-SCNC: 102 MMOL/L (ref 99–110)
CHLORIDE BLD-SCNC: 85 MMOL/L (ref 99–110)
CHP ED QC CHECK: YES
CLARITY: CLEAR
CO2: 24 MMOL/L (ref 21–32)
CO2: 25 MMOL/L (ref 21–32)
COLOR: ABNORMAL
CREAT SERPL-MCNC: 1.5 MG/DL (ref 0.8–1.3)
CREAT SERPL-MCNC: 1.8 MG/DL (ref 0.8–1.3)
EOSINOPHILS ABSOLUTE: 0.1 K/UL (ref 0–0.6)
EOSINOPHILS RELATIVE PERCENT: 2 %
GFR AFRICAN AMERICAN: 45
GFR AFRICAN AMERICAN: 56
GFR NON-AFRICAN AMERICAN: 38
GFR NON-AFRICAN AMERICAN: 46
GLOBULIN: 2.7 G/DL
GLUCOSE BLD-MCNC: 164 MG/DL (ref 70–99)
GLUCOSE BLD-MCNC: 355 MG/DL (ref 70–99)
GLUCOSE BLD-MCNC: 366 MG/DL (ref 70–99)
GLUCOSE BLD-MCNC: 472 MG/DL
GLUCOSE BLD-MCNC: 472 MG/DL (ref 70–99)
GLUCOSE BLD-MCNC: 51 MG/DL (ref 70–99)
GLUCOSE BLD-MCNC: 663 MG/DL (ref 70–99)
GLUCOSE URINE: >=1000 MG/DL
HCO3 VENOUS: 25.3 MMOL/L (ref 23–29)
HCT VFR BLD CALC: 31.5 % (ref 40.5–52.5)
HEMOGLOBIN: 10.6 G/DL (ref 13.5–17.5)
KETONES, URINE: NEGATIVE MG/DL
LEUKOCYTE ESTERASE, URINE: NEGATIVE
LYMPHOCYTES ABSOLUTE: 0.5 K/UL (ref 1–5.1)
LYMPHOCYTES RELATIVE PERCENT: 7.1 %
MCH RBC QN AUTO: 29.7 PG (ref 26–34)
MCHC RBC AUTO-ENTMCNC: 33.6 G/DL (ref 31–36)
MCV RBC AUTO: 88.3 FL (ref 80–100)
METHEMOGLOBIN VENOUS: 0.8 %
MICROSCOPIC EXAMINATION: ABNORMAL
MONOCYTES ABSOLUTE: 0.5 K/UL (ref 0–1.3)
MONOCYTES RELATIVE PERCENT: 6.5 %
NEUTROPHILS ABSOLUTE: 6.2 K/UL (ref 1.7–7.7)
NEUTROPHILS RELATIVE PERCENT: 83.3 %
NITRITE, URINE: NEGATIVE
O2 CONTENT, VEN: 9 VOL %
O2 SAT, VEN: 61 %
O2 THERAPY: NORMAL
PCO2, VEN: 44 MMHG (ref 40–50)
PDW BLD-RTO: 14.7 % (ref 12.4–15.4)
PERFORMED ON: ABNORMAL
PH UA: 6 (ref 5–8)
PH VENOUS: 7.38 (ref 7.35–7.45)
PLATELET # BLD: 241 K/UL (ref 135–450)
PMV BLD AUTO: 8.8 FL (ref 5–10.5)
PO2, VEN: 31.2 MMHG (ref 25–40)
POTASSIUM SERPL-SCNC: 4.6 MMOL/L (ref 3.5–5.1)
POTASSIUM SERPL-SCNC: 5.2 MMOL/L (ref 3.5–5.1)
PRO-BNP: 97 PG/ML (ref 0–124)
PROTEIN UA: NEGATIVE MG/DL
RBC # BLD: 3.57 M/UL (ref 4.2–5.9)
SODIUM BLD-SCNC: 124 MMOL/L (ref 136–145)
SODIUM BLD-SCNC: 138 MMOL/L (ref 136–145)
SPECIFIC GRAVITY UA: <=1.005 (ref 1–1.03)
TCO2 CALC VENOUS: 27 MMOL/L
TOTAL PROTEIN: 7 G/DL (ref 6.4–8.2)
TROPONIN: <0.01 NG/ML
URINE REFLEX TO CULTURE: ABNORMAL
URINE TYPE: ABNORMAL
UROBILINOGEN, URINE: 0.2 E.U./DL
WBC # BLD: 7.4 K/UL (ref 4–11)

## 2020-04-01 PROCEDURE — 97530 THERAPEUTIC ACTIVITIES: CPT

## 2020-04-01 PROCEDURE — 2580000003 HC RX 258: Performed by: INTERNAL MEDICINE

## 2020-04-01 PROCEDURE — 93005 ELECTROCARDIOGRAM TRACING: CPT | Performed by: EMERGENCY MEDICINE

## 2020-04-01 PROCEDURE — 96374 THER/PROPH/DIAG INJ IV PUSH: CPT

## 2020-04-01 PROCEDURE — 97162 PT EVAL MOD COMPLEX 30 MIN: CPT

## 2020-04-01 PROCEDURE — 6370000000 HC RX 637 (ALT 250 FOR IP): Performed by: INTERNAL MEDICINE

## 2020-04-01 PROCEDURE — G0378 HOSPITAL OBSERVATION PER HR: HCPCS

## 2020-04-01 PROCEDURE — 99291 CRITICAL CARE FIRST HOUR: CPT

## 2020-04-01 PROCEDURE — 70450 CT HEAD/BRAIN W/O DYE: CPT

## 2020-04-01 PROCEDURE — 84484 ASSAY OF TROPONIN QUANT: CPT

## 2020-04-01 PROCEDURE — 97116 GAIT TRAINING THERAPY: CPT

## 2020-04-01 PROCEDURE — 83036 HEMOGLOBIN GLYCOSYLATED A1C: CPT

## 2020-04-01 PROCEDURE — 6370000000 HC RX 637 (ALT 250 FOR IP): Performed by: EMERGENCY MEDICINE

## 2020-04-01 PROCEDURE — 36415 COLL VENOUS BLD VENIPUNCTURE: CPT

## 2020-04-01 PROCEDURE — 82803 BLOOD GASES ANY COMBINATION: CPT

## 2020-04-01 PROCEDURE — 2580000003 HC RX 258: Performed by: EMERGENCY MEDICINE

## 2020-04-01 PROCEDURE — 83880 ASSAY OF NATRIURETIC PEPTIDE: CPT

## 2020-04-01 PROCEDURE — 80053 COMPREHEN METABOLIC PANEL: CPT

## 2020-04-01 PROCEDURE — 71046 X-RAY EXAM CHEST 2 VIEWS: CPT

## 2020-04-01 PROCEDURE — 96376 TX/PRO/DX INJ SAME DRUG ADON: CPT

## 2020-04-01 PROCEDURE — 72125 CT NECK SPINE W/O DYE: CPT

## 2020-04-01 PROCEDURE — 81003 URINALYSIS AUTO W/O SCOPE: CPT

## 2020-04-01 PROCEDURE — 94760 N-INVAS EAR/PLS OXIMETRY 1: CPT

## 2020-04-01 PROCEDURE — 85025 COMPLETE CBC W/AUTO DIFF WBC: CPT

## 2020-04-01 RX ORDER — FERROUS SULFATE 325(65) MG
325 TABLET ORAL 2 TIMES DAILY
Status: DISCONTINUED | OUTPATIENT
Start: 2020-04-01 | End: 2020-04-02 | Stop reason: HOSPADM

## 2020-04-01 RX ORDER — DEXTROSE MONOHYDRATE 50 MG/ML
100 INJECTION, SOLUTION INTRAVENOUS PRN
Status: DISCONTINUED | OUTPATIENT
Start: 2020-04-01 | End: 2020-04-02 | Stop reason: HOSPADM

## 2020-04-01 RX ORDER — ACETAMINOPHEN 325 MG/1
650 TABLET ORAL EVERY 6 HOURS PRN
Status: DISCONTINUED | OUTPATIENT
Start: 2020-04-01 | End: 2020-04-02 | Stop reason: HOSPADM

## 2020-04-01 RX ORDER — TRAZODONE HYDROCHLORIDE 50 MG/1
100 TABLET ORAL NIGHTLY
Status: DISCONTINUED | OUTPATIENT
Start: 2020-04-01 | End: 2020-04-02 | Stop reason: HOSPADM

## 2020-04-01 RX ORDER — DEXTROSE MONOHYDRATE 25 G/50ML
12.5 INJECTION, SOLUTION INTRAVENOUS PRN
Status: DISCONTINUED | OUTPATIENT
Start: 2020-04-01 | End: 2020-04-02 | Stop reason: HOSPADM

## 2020-04-01 RX ORDER — POLYETHYLENE GLYCOL 3350 17 G/17G
17 POWDER, FOR SOLUTION ORAL DAILY PRN
Status: DISCONTINUED | OUTPATIENT
Start: 2020-04-01 | End: 2020-04-02 | Stop reason: HOSPADM

## 2020-04-01 RX ORDER — GABAPENTIN 300 MG/1
300 CAPSULE ORAL 3 TIMES DAILY
Status: DISCONTINUED | OUTPATIENT
Start: 2020-04-01 | End: 2020-04-02 | Stop reason: HOSPADM

## 2020-04-01 RX ORDER — NICOTINE POLACRILEX 4 MG
15 LOZENGE BUCCAL PRN
Status: DISCONTINUED | OUTPATIENT
Start: 2020-04-01 | End: 2020-04-02 | Stop reason: HOSPADM

## 2020-04-01 RX ORDER — CARVEDILOL 3.12 MG/1
3.12 TABLET ORAL 2 TIMES DAILY WITH MEALS
Status: DISCONTINUED | OUTPATIENT
Start: 2020-04-01 | End: 2020-04-02 | Stop reason: HOSPADM

## 2020-04-01 RX ORDER — PAROXETINE HYDROCHLORIDE 20 MG/1
40 TABLET, FILM COATED ORAL DAILY
Status: DISCONTINUED | OUTPATIENT
Start: 2020-04-01 | End: 2020-04-02 | Stop reason: HOSPADM

## 2020-04-01 RX ORDER — 0.9 % SODIUM CHLORIDE 0.9 %
1000 INTRAVENOUS SOLUTION INTRAVENOUS ONCE
Status: COMPLETED | OUTPATIENT
Start: 2020-04-01 | End: 2020-04-01

## 2020-04-01 RX ORDER — SODIUM CHLORIDE 9 MG/ML
INJECTION, SOLUTION INTRAVENOUS CONTINUOUS
Status: DISCONTINUED | OUTPATIENT
Start: 2020-04-01 | End: 2020-04-02 | Stop reason: HOSPADM

## 2020-04-01 RX ORDER — ASPIRIN 81 MG/1
81 TABLET ORAL DAILY
Status: DISCONTINUED | OUTPATIENT
Start: 2020-04-01 | End: 2020-04-02 | Stop reason: HOSPADM

## 2020-04-01 RX ORDER — SODIUM CHLORIDE 0.9 % (FLUSH) 0.9 %
10 SYRINGE (ML) INJECTION EVERY 12 HOURS SCHEDULED
Status: DISCONTINUED | OUTPATIENT
Start: 2020-04-01 | End: 2020-04-02 | Stop reason: HOSPADM

## 2020-04-01 RX ORDER — ACETAMINOPHEN 650 MG/1
650 SUPPOSITORY RECTAL EVERY 6 HOURS PRN
Status: DISCONTINUED | OUTPATIENT
Start: 2020-04-01 | End: 2020-04-02 | Stop reason: HOSPADM

## 2020-04-01 RX ORDER — ATORVASTATIN CALCIUM 80 MG/1
80 TABLET, FILM COATED ORAL NIGHTLY
Status: DISCONTINUED | OUTPATIENT
Start: 2020-04-01 | End: 2020-04-02 | Stop reason: HOSPADM

## 2020-04-01 RX ORDER — LISINOPRIL 5 MG/1
5 TABLET ORAL DAILY
Status: DISCONTINUED | OUTPATIENT
Start: 2020-04-01 | End: 2020-04-02 | Stop reason: HOSPADM

## 2020-04-01 RX ORDER — PANTOPRAZOLE SODIUM 40 MG/1
40 TABLET, DELAYED RELEASE ORAL
Status: DISCONTINUED | OUTPATIENT
Start: 2020-04-02 | End: 2020-04-02 | Stop reason: HOSPADM

## 2020-04-01 RX ORDER — SODIUM CHLORIDE 0.9 % (FLUSH) 0.9 %
10 SYRINGE (ML) INJECTION PRN
Status: DISCONTINUED | OUTPATIENT
Start: 2020-04-01 | End: 2020-04-02 | Stop reason: HOSPADM

## 2020-04-01 RX ORDER — PIOGLITAZONEHYDROCHLORIDE 15 MG/1
45 TABLET ORAL DAILY
Status: DISCONTINUED | OUTPATIENT
Start: 2020-04-01 | End: 2020-04-02 | Stop reason: HOSPADM

## 2020-04-01 RX ORDER — INSULIN GLARGINE 100 [IU]/ML
30 INJECTION, SOLUTION SUBCUTANEOUS NIGHTLY
Status: DISCONTINUED | OUTPATIENT
Start: 2020-04-01 | End: 2020-04-02 | Stop reason: HOSPADM

## 2020-04-01 RX ORDER — ONDANSETRON 2 MG/ML
4 INJECTION INTRAMUSCULAR; INTRAVENOUS EVERY 6 HOURS PRN
Status: DISCONTINUED | OUTPATIENT
Start: 2020-04-01 | End: 2020-04-02 | Stop reason: HOSPADM

## 2020-04-01 RX ORDER — PROMETHAZINE HYDROCHLORIDE 25 MG/1
12.5 TABLET ORAL EVERY 6 HOURS PRN
Status: DISCONTINUED | OUTPATIENT
Start: 2020-04-01 | End: 2020-04-02 | Stop reason: HOSPADM

## 2020-04-01 RX ORDER — FAMOTIDINE 20 MG/1
20 TABLET, FILM COATED ORAL 2 TIMES DAILY
Status: DISCONTINUED | OUTPATIENT
Start: 2020-04-01 | End: 2020-04-02 | Stop reason: HOSPADM

## 2020-04-01 RX ORDER — CLOPIDOGREL BISULFATE 75 MG/1
75 TABLET ORAL DAILY
Status: DISCONTINUED | OUTPATIENT
Start: 2020-04-01 | End: 2020-04-02 | Stop reason: HOSPADM

## 2020-04-01 RX ADMIN — ATORVASTATIN CALCIUM 80 MG: 80 TABLET, FILM COATED ORAL at 21:46

## 2020-04-01 RX ADMIN — TRAZODONE HYDROCHLORIDE 100 MG: 50 TABLET ORAL at 21:46

## 2020-04-01 RX ADMIN — ASPIRIN 81 MG: 81 TABLET, COATED ORAL at 21:45

## 2020-04-01 RX ADMIN — GABAPENTIN 300 MG: 300 CAPSULE ORAL at 21:57

## 2020-04-01 RX ADMIN — INSULIN LISPRO 3 UNITS: 100 INJECTION, SOLUTION INTRAVENOUS; SUBCUTANEOUS at 21:44

## 2020-04-01 RX ADMIN — SODIUM CHLORIDE: 9 INJECTION, SOLUTION INTRAVENOUS at 21:44

## 2020-04-01 RX ADMIN — INSULIN GLARGINE 30 UNITS: 100 INJECTION, SOLUTION SUBCUTANEOUS at 21:57

## 2020-04-01 RX ADMIN — LISINOPRIL 5 MG: 5 TABLET ORAL at 21:45

## 2020-04-01 RX ADMIN — INSULIN HUMAN 5 UNITS: 100 INJECTION, SOLUTION PARENTERAL at 16:06

## 2020-04-01 RX ADMIN — INSULIN HUMAN 10 UNITS: 100 INJECTION, SOLUTION PARENTERAL at 14:52

## 2020-04-01 RX ADMIN — PIOGLITAZONE 45 MG: 15 TABLET ORAL at 21:46

## 2020-04-01 RX ADMIN — PAROXETINE HYDROCHLORIDE 40 MG: 20 TABLET, FILM COATED ORAL at 21:46

## 2020-04-01 RX ADMIN — CLOPIDOGREL BISULFATE 75 MG: 75 TABLET ORAL at 21:45

## 2020-04-01 RX ADMIN — SODIUM CHLORIDE 1000 ML: 9 INJECTION, SOLUTION INTRAVENOUS at 14:55

## 2020-04-01 RX ADMIN — METFORMIN HYDROCHLORIDE 1000 MG: 500 TABLET ORAL at 21:46

## 2020-04-01 RX ADMIN — SODIUM CHLORIDE 1000 ML: 9 INJECTION, SOLUTION INTRAVENOUS at 16:05

## 2020-04-01 RX ADMIN — FAMOTIDINE 20 MG: 20 TABLET, FILM COATED ORAL at 21:45

## 2020-04-01 RX ADMIN — Medication 10 ML: at 21:44

## 2020-04-01 ASSESSMENT — ENCOUNTER SYMPTOMS
RHINORRHEA: 0
SHORTNESS OF BREATH: 0
TROUBLE SWALLOWING: 0
CONSTIPATION: 0
NAUSEA: 0
CHEST TIGHTNESS: 0
ABDOMINAL PAIN: 0
DIARRHEA: 0
BACK PAIN: 0
VOMITING: 0
SORE THROAT: 0
COUGH: 0

## 2020-04-01 ASSESSMENT — PAIN SCALES - GENERAL: PAINLEVEL_OUTOF10: 0

## 2020-04-01 NOTE — ED NOTES
Patient identified as a positive fall risk on the ED triage fall screening. Patient placed in fall precautions which includes:  yellow fall risk bracelet on wrist, yellow socks on feet, \"Be Safe\" sign placed on patient's door, and bed alarm placed under patient/alarm turned on. Patient instructed on importance of not getting out of bed or ambulating without assistance for safety.           Ginger Jimenez RN  04/01/20 0165 Patient's Daughter called in regarding her Mother; she will arrange for primrose to bring her in to get EKG and Lab work done. I spoke with Kt Nevarez and she will fax her preop orders. Patient did receive preop orders for labs and EKG on 8/23/18 per Renetta.

## 2020-04-01 NOTE — ED PROVIDER NOTES
tablet Take one tablet daily  Qty: 90 tablet, Refills: 0      ASPIRIN LOW DOSE 81 MG EC tablet TAKE 1 TABLET BY MOUTH ONCE DAILY  Qty: 30 tablet, Refills: 10      lisinopril (PRINIVIL;ZESTRIL) 5 MG tablet Take 1 tablet by mouth daily      metFORMIN (GLUCOPHAGE) 1000 MG tablet Take 1 tablet by mouth daily      PARoxetine (PAXIL) 40 MG tablet Take 1 tablet by mouth daily      ferrous sulfate 325 (65 Fe) MG tablet Take 1 tablet by mouth 2 times daily  Qty: 60 tablet, Refills: 11      Dulaglutide (TRULICITY SC) Inject 1.5 mg into the skin once a week       gabapentin (NEURONTIN) 300 MG capsule Take 300 mg by mouth 3 times daily. Dmitry Yudith atorvastatin (LIPITOR) 80 MG tablet TAKE 1 TABLET BY MOUTH AT BEDTIME  Qty: 90 tablet, Refills: 0      insulin glargine (LANTUS) 100 UNIT/ML injection vial Inject 30 Units into the skin nightly  Qty: 1 vial, Refills: 3      pioglitazone (ACTOS) 45 MG tablet Take 45 mg by mouth daily      traZODone (DESYREL) 100 MG tablet Take 100 mg by mouth nightly      fluticasone (FLONASE) 50 MCG/ACT nasal spray 1 spray by Nasal route daily as needed. ALLERGIES     Patient has no known allergies.     FAMILY HISTORY       Family History   Problem Relation Age of Onset    Other Mother         CEREBRAL PALSY    Heart Disease Father         CHF          SOCIAL HISTORY       Social History     Socioeconomic History    Marital status:      Spouse name: None    Number of children: None    Years of education: None    Highest education level: None   Occupational History    None   Social Needs    Financial resource strain: None    Food insecurity     Worry: None     Inability: None    Transportation needs     Medical: None     Non-medical: None   Tobacco Use    Smoking status: Former Smoker     Packs/day: 1.00     Years: 25.00     Pack years: 25.00     Types: Cigarettes     Last attempt to quit: 5/10/2013     Years since quittin.8    Smokeless tobacco: Never Used   Substance and Sexual Activity    Alcohol use: No    Drug use: No    Sexual activity: Not Currently   Lifestyle    Physical activity     Days per week: None     Minutes per session: None    Stress: None   Relationships    Social connections     Talks on phone: None     Gets together: None     Attends [employer] service: None     Active member of club or organization: None     Attends meetings of clubs or organizations: None     Relationship status: None    Intimate partner violence     Fear of current or ex partner: None     Emotionally abused: None     Physically abused: None     Forced sexual activity: None   Other Topics Concern    None   Social History Narrative    None       SCREENINGS    Myron Coma Scale  Eye Opening: Spontaneous  Best Verbal Response: Oriented  Best Motor Response: Obeys commands  Myron Coma Scale Score: 15        PHYSICAL EXAM    (up to 7 for level 4, 8 ormore for level 5)     ED Triage Vitals [04/01/20 1354]   BP Temp Temp src Pulse Resp SpO2 Height Weight   (!) 155/65 98.1 °F (36.7 °C) -- 96 15 100 % 5' 3\" (1.6 m) 140 lb (63.5 kg)       Physical Exam  Constitutional:       General: He is not in acute distress. Appearance: Normal appearance. He is well-developed. He is not ill-appearing or toxic-appearing. Comments: Sitting in bed comfortably, speaking in full sentences, following verbal commands appropriately. Not in acute distress     HENT:      Head: Normocephalic and atraumatic. Eyes:      Conjunctiva/sclera: Conjunctivae normal.      Pupils: Pupils are equal, round, and reactive to light. Neck:      Musculoskeletal: Normal range of motion and neck supple. Cardiovascular:      Rate and Rhythm: Normal rate and regular rhythm. Heart sounds: Normal heart sounds. No murmur. No friction rub. No gallop. Pulmonary:      Effort: Pulmonary effort is normal. No respiratory distress. Breath sounds: Normal breath sounds.  No decreased breath sounds, wheezing, rhonchi or rales.   Abdominal:      General: Bowel sounds are normal. There is no distension. Palpations: Abdomen is soft. Tenderness: There is no abdominal tenderness. There is no guarding or rebound. Musculoskeletal: Normal range of motion. General: No tenderness or deformity. Skin:     General: Skin is warm and dry. Findings: No rash. Neurological:      Mental Status: He is alert and oriented to person, place, and time. GCS: GCS eye subscore is 4. GCS verbal subscore is 5. GCS motor subscore is 6. Psychiatric:         Behavior: Behavior is cooperative. DIAGNOSTIC RESULTS     EKG: All EKG's are interpreted by the Emergency Department Physicianwho either signs or Co-signs this chart in the absence of a cardiologist.    The Ekg interpreted by me shows  Sinus rhythm with a rate of 97  Axis is   Normal  QTc is  447  Intervals and Durations are unremarkable. ST Segments: no acute change  No significant change from prior EKG dated 4/6/2019    RADIOLOGY:   Non-plain film images such as CT, Ultrasound and MRI are read by the radiologist. Plain radiographic images are visualized and preliminarily interpreted by the emergency physician with the below findings:      Interpretation per the Radiologist below, if available at the time of this note:    XR CHEST STANDARD (2 VW)   Final Result   No acute process. CT Head WO Contrast   Final Result   No acute intracranial abnormality. CT Cervical Spine WO Contrast   Final Result   1. No acute fracture.                ED BEDSIDE ULTRASOUND:   Performed by ED Physician - none    LABS:  Labs Reviewed   CBC WITH AUTO DIFFERENTIAL - Abnormal; Notable for the following components:       Result Value    RBC 3.57 (*)     Hemoglobin 10.6 (*)     Hematocrit 31.5 (*)     Lymphocytes Absolute 0.5 (*)     All other components within normal limits    Narrative:     Performed at:  [facility]  [address] [place] [address],

## 2020-04-01 NOTE — H&P
Hospital Medicine History & Physical      PCP: No primary care provider on file. Date of Admission: 4/1/2020    Date of Service: Pt seen/examined on 4/1/2020 and Admitted to Inpatient with expected LOS greater than two midnights due to medical therapy. Placed in Observation. Chief Complaint: Frequent fall, unable to take care of himself at home      History Of Present Illness:      71 y.o. male who presented to [facility] with frequent fall at home. Patient denies any chest pain, fever, cough, congestion, focal neurologic weakness or deficit. Patient had been noncompliant about taking his medication over month. Patient is admitted for placement. Past Medical History:          Diagnosis Date    Arthritis     Ataxia 3/14/2014    BPH (benign prostatic hyperplasia)     CAD (coronary artery disease)     4 stents    Coronary atherosclerosis of native coronary artery 5/11/2013    Diabetes [facility]     Environmental allergies     Hepatitis     Hydronephrosis 3/14/2014    Hyperlipidemia     Hypertension     Kidney disease     Neuropathy     Parkinson's disease ([facility]     ?     S/P coronary artery stent placement x 4     x 4    Schizoaffective disorder, bipolar type [facility])        Past Surgical History:          Procedure Laterality Date    ANKLE SURGERY Right 06/09/2018    ORIF of R ankle     COLONOSCOPY N/A 12/13/2018    COLONOSCOPY CONTROL HEMORRHAGE [facility]      x 4    CYSTOSCOPY  4/4/14    transurethral vaporization of prostate    HERNIA REPAIR      X2    ORIF DISTAL RADIUS FRACTURE Left 6/20/2019    LEFT DISTAL RADIUS OPEN REDUCTION INTERNAL FIXATION   performed by Hari Martel MD at [address] N/A 12/13/2018    EGD BIOPSY performed by Jes Barber MD at [address] [facility] UPPER GASTROINTESTINAL ENDOSCOPY N/A 5/3/2019    EGD WITH ANESTHESIA performed by Marylene Mains, MD at [facility]placed by [facility] [facility]         Medications Prior to Admission:      Prior to Admission medications    Medication Sig Start Date End Date Taking? Authorizing Provider   clopidogrel (PLAVIX) 75 MG tablet TAKE 1 TABLET BY MOUTH ONCE DAILY 3/18/20   Jeffery Wynne MD   carvedilol (COREG) 3.125 MG tablet TAKE ONE (1) TABLET BY MOUTH TWICE DAILY WITH MEALS 1/14/20   Jeffery Wynne MD   pantoprazole (PROTONIX) 40 MG tablet Take one tablet daily 12/20/19   Jeffery Wynne MD   ASPIRIN LOW DOSE 81 MG EC tablet TAKE 1 TABLET BY MOUTH ONCE DAILY 8/23/19   Surendra Velasquez MD   lisinopril (PRINIVIL;ZESTRIL) 5 MG tablet Take 1 tablet by mouth daily    Historical Provider, MD   metFORMIN (GLUCOPHAGE) 1000 MG tablet Take 1 tablet by mouth daily    Historical Provider, MD   PARoxetine (PAXIL) 40 MG tablet Take 1 tablet by mouth daily 12/6/18   Historical Provider, MD   ferrous sulfate 325 (65 Fe) MG tablet Take 1 tablet by mouth 2 times daily  Patient not taking: Reported on 2/4/2020 5/3/19   Shy Berg MD   Dulaglutide (TRULICITY SC) Inject 1.5 mg into the skin once a week     Historical Provider, MD   gabapentin (NEURONTIN) 300 MG capsule Take 300 mg by mouth 3 times daily. Melisa Smiley Historical Provider, MD   atorvastatin (LIPITOR) 80 MG tablet TAKE 1 TABLET BY MOUTH AT BEDTIME 10/31/18   Jeffery Wynne MD   insulin glargine (LANTUS) 100 UNIT/ML injection vial Inject 30 Units into the skin nightly  Patient not taking: Reported on 2/4/2020 6/13/18   PRESTON Asif - CNP   pioglitazone (ACTOS) 45 MG tablet Take 45 mg by mouth daily 6/7/17   Historical Provider, MD   traZODone (DESYREL) 100 MG tablet Take 100 mg by mouth nightly    Historical Provider, MD   fluticasone (FLONASE) 50 MCG/ACT nasal spray 1 spray by Nasal route daily as needed.     Historical Provider, MD       Allergies:

## 2020-04-01 NOTE — ED NOTES
[email or web address]  Re: admit.  rehab placement, unable to take care of self [email or web address] returned [address] [facility]  04/01/20 9222

## 2020-04-01 NOTE — PROGRESS NOTES
O2 sat = 99% on room air. Pt denies dizziness at rest but c/o mild dizziness toward end of amb. Patient Diagnosis(es): There were no encounter diagnoses. has a past medical history of Arthritis, Ataxia, BPH (benign prostatic hyperplasia), CAD (coronary artery disease), Coronary atherosclerosis of native coronary artery, Diabetes mellitus ([address] Environmental allergies, Hepatitis, Hydronephrosis, Hyperlipidemia, Hypertension, Kidney disease, Neuropathy, Parkinson's disease ([address] S/P coronary artery stent placement x 4, and Schizoaffective disorder, bipolar type ([facility] [address] has a past surgical history that includes Coronary angioplasty; Coronary angioplasty with stent; hernia repair; Vasectomy; Cystocopy (4/4/14); Ankle surgery (Right, 06/09/2018); Upper gastrointestinal endoscopy (N/A, 12/13/2018); Colonoscopy (N/A, 12/13/2018); Upper gastrointestinal endoscopy (N/A, 5/3/2019); and ORIF DISTAL RADIUS FRACTURE (Left, 6/20/2019). Restrictions  Restrictions/Precautions  Restrictions/Precautions: General Precautions, Fall Risk  Position Activity Restriction  Other position/activity restrictions: Telemetry, fall risk  Vision/Hearing  Vision: Impaired  Vision Exceptions: Wears glasses at all times  Hearing: Exceptions to [facility]  Hearing Exceptions: Hard of hearing/hearing concerns;Bilateral hearing aid     Subjective  General  Chart Reviewed: Yes  Patient assessed for rehabilitation services?: Yes  Family / Caregiver Present: No  Referring Practitioner: Evelyne Casas MD  Referral Date : 04/01/20  Diagnosis: Generalized weakness, frequent falls  Follows Commands: Within Functional Limits  General Comment  Comments: Pt resting in bed upon entry of PT, evaluated in [address] [facility] ED  Subjective  Subjective: Pt agreeable to work with PT this afternoon but states he's feeling very \"irritated. \"  Pt estimates he fell 3x within ~30-45 minutes at home due to \"my whole body giving out. \"  Pain Screening  Patient Currently in Pain: Kevin from PT to prevent fall). Demonstrates decreased step length & foot clearance with widened SIMEON. Requires RW for added stability. Tends to rush at times (particularly when turning corners) and requires mod cues for safety. Gait Deviations: Slow Tabby; Increased SIMEON; Decreased step length;Deviated path  Distance: x 100 feet    Stairs/Curb  Stairs?: No(unsafe to attempt given unsteadiness when amb over level surfaces)    Balance  Posture: Fair  Sitting - Static: Good  Sitting - Dynamic: Fair;+  Standing - Static: Fair;+  Standing - Dynamic: Fair;-    Plan   Times per week: 3-5x/week in acute care  Times per day: Daily  Specific instructions for Next Treatment: Progress ther ex and mobility as tolerated  Current Treatment Recommendations: Strengthening, Gait Training, Balance Training, Functional Mobility Training, Transfer Training, Endurance Training, Safety Education & Training, Patient/Caregiver Education & Training, Equipment Evaluation, Education, & procurement, Stair training  Safety Devices: All fall risk precautions in place, Patient at risk for falls, Left in bed, Nurse notified, Call light within reach, Gait belt(no bed alarm needed as pt was evaluated in [facility] ED)    AM-PAC Score  AM-PAC Inpatient Mobility Raw Score : 17 (04/01/20 1730)  AM-PAC Inpatient T-Scale Score : 42.13 (04/01/20 1730)  Mobility Inpatient CMS 0-100% Score: 50.57 (04/01/20 1730)  Mobility Inpatient CMS G-Code Modifier : CK (04/01/20 1730)    Goals  Short term goals  Time Frame for Short term goals: 5 days, 4/06/20 (unless otherwise specified)  Short term goal 1: Pt will transfer supine <-> sit with modified(I)  Short term goal 2: Pt will transfer sit <-> stand and bed>chair using RW with supervision  Short term goal 3: Pt will ambulate x 150 feet using RW with SBA without LOB episode  Short term goal 4: By 4/03/20: Pt will tolerate 12-15 reps BLE exercise for strengthening, balance, and endurance  Short term goal 5:  If safe to attempt: Pt will ambulate up/down 8 steps using 1 handrail with CGA x 1  Patient Goals   Patient goals : \"To go to rehab to get stronger. \"       Therapy Time   Individual Concurrent Group Co-treatment   Time In 1635         Time Out 1710         Minutes 35         Timed Code Treatment Minutes: [address] [facility], [address], [place] #[id number]

## 2020-04-01 NOTE — ED NOTES
Called PT/OT consult @6014  Spoke to PT-Malka @this time, spoke to RN-Lesli GARG.  Will come to ED & eval patient     Kori Deaner Naegele  04/01/20 8665

## 2020-04-02 VITALS
HEIGHT: 63 IN | HEART RATE: 69 BPM | OXYGEN SATURATION: 99 % | TEMPERATURE: 97 F | WEIGHT: 140 LBS | DIASTOLIC BLOOD PRESSURE: 74 MMHG | BODY MASS INDEX: 24.8 KG/M2 | RESPIRATION RATE: 20 BRPM | SYSTOLIC BLOOD PRESSURE: 143 MMHG

## 2020-04-02 LAB
ANION GAP SERPL CALCULATED.3IONS-SCNC: 8 MMOL/L (ref 3–16)
BASOPHILS ABSOLUTE: 0 K/UL (ref 0–0.2)
BASOPHILS RELATIVE PERCENT: 1 %
BUN BLDV-MCNC: 28 MG/DL (ref 7–20)
CALCIUM SERPL-MCNC: 8.6 MG/DL (ref 8.3–10.6)
CHLORIDE BLD-SCNC: 105 MMOL/L (ref 99–110)
CO2: 23 MMOL/L (ref 21–32)
CREAT SERPL-MCNC: 1.4 MG/DL (ref 0.8–1.3)
EKG ATRIAL RATE: 97 BPM
EKG DIAGNOSIS: NORMAL
EKG P AXIS: 72 DEGREES
EKG P-R INTERVAL: 156 MS
EKG Q-T INTERVAL: 352 MS
EKG QRS DURATION: 122 MS
EKG QTC CALCULATION (BAZETT): 447 MS
EKG R AXIS: 6 DEGREES
EKG T AXIS: 44 DEGREES
EKG VENTRICULAR RATE: 97 BPM
EOSINOPHILS ABSOLUTE: 0.2 K/UL (ref 0–0.6)
EOSINOPHILS RELATIVE PERCENT: 4.1 %
ESTIMATED AVERAGE GLUCOSE: 366.6 MG/DL
GFR AFRICAN AMERICAN: >60
GFR NON-AFRICAN AMERICAN: 50
GLUCOSE BLD-MCNC: 204 MG/DL (ref 70–99)
GLUCOSE BLD-MCNC: 234 MG/DL (ref 70–99)
GLUCOSE BLD-MCNC: 241 MG/DL (ref 70–99)
GLUCOSE BLD-MCNC: 95 MG/DL (ref 70–99)
HBA1C MFR BLD: 14.4 %
HCT VFR BLD CALC: 25.8 % (ref 40.5–52.5)
HEMOGLOBIN: 8.7 G/DL (ref 13.5–17.5)
LYMPHOCYTES ABSOLUTE: 0.9 K/UL (ref 1–5.1)
LYMPHOCYTES RELATIVE PERCENT: 20.4 %
MCH RBC QN AUTO: 29.8 PG (ref 26–34)
MCHC RBC AUTO-ENTMCNC: 33.6 G/DL (ref 31–36)
MCV RBC AUTO: 88.5 FL (ref 80–100)
MONOCYTES ABSOLUTE: 0.4 K/UL (ref 0–1.3)
MONOCYTES RELATIVE PERCENT: 7.8 %
NEUTROPHILS ABSOLUTE: 3.1 K/UL (ref 1.7–7.7)
NEUTROPHILS RELATIVE PERCENT: 66.7 %
PDW BLD-RTO: 14.9 % (ref 12.4–15.4)
PERFORMED ON: ABNORMAL
PERFORMED ON: ABNORMAL
PERFORMED ON: NORMAL
PLATELET # BLD: 190 K/UL (ref 135–450)
PMV BLD AUTO: 8.3 FL (ref 5–10.5)
POTASSIUM REFLEX MAGNESIUM: 4.6 MMOL/L (ref 3.5–5.1)
RBC # BLD: 2.91 M/UL (ref 4.2–5.9)
SODIUM BLD-SCNC: 136 MMOL/L (ref 136–145)
WBC # BLD: 4.6 K/UL (ref 4–11)

## 2020-04-02 PROCEDURE — 6370000000 HC RX 637 (ALT 250 FOR IP): Performed by: INTERNAL MEDICINE

## 2020-04-02 PROCEDURE — G0378 HOSPITAL OBSERVATION PER HR: HCPCS

## 2020-04-02 PROCEDURE — 97116 GAIT TRAINING THERAPY: CPT

## 2020-04-02 PROCEDURE — 97535 SELF CARE MNGMENT TRAINING: CPT

## 2020-04-02 PROCEDURE — 36415 COLL VENOUS BLD VENIPUNCTURE: CPT

## 2020-04-02 PROCEDURE — 85025 COMPLETE CBC W/AUTO DIFF WBC: CPT

## 2020-04-02 PROCEDURE — 96372 THER/PROPH/DIAG INJ SC/IM: CPT

## 2020-04-02 PROCEDURE — 93010 ELECTROCARDIOGRAM REPORT: CPT | Performed by: INTERNAL MEDICINE

## 2020-04-02 PROCEDURE — 97110 THERAPEUTIC EXERCISES: CPT

## 2020-04-02 PROCEDURE — 97166 OT EVAL MOD COMPLEX 45 MIN: CPT

## 2020-04-02 PROCEDURE — 2580000003 HC RX 258: Performed by: INTERNAL MEDICINE

## 2020-04-02 PROCEDURE — 80048 BASIC METABOLIC PNL TOTAL CA: CPT

## 2020-04-02 PROCEDURE — 6360000002 HC RX W HCPCS: Performed by: INTERNAL MEDICINE

## 2020-04-02 RX ORDER — GABAPENTIN 300 MG/1
300 CAPSULE ORAL 3 TIMES DAILY
Qty: 9 CAPSULE | Refills: 0 | Status: SHIPPED | OUTPATIENT
Start: 2020-04-02 | End: 2021-06-30

## 2020-04-02 RX ADMIN — CARVEDILOL 3.12 MG: 3.12 TABLET, FILM COATED ORAL at 08:54

## 2020-04-02 RX ADMIN — GABAPENTIN 300 MG: 300 CAPSULE ORAL at 13:50

## 2020-04-02 RX ADMIN — CLOPIDOGREL BISULFATE 75 MG: 75 TABLET ORAL at 08:53

## 2020-04-02 RX ADMIN — PANTOPRAZOLE SODIUM 40 MG: 40 TABLET, DELAYED RELEASE ORAL at 06:16

## 2020-04-02 RX ADMIN — FAMOTIDINE 20 MG: 20 TABLET, FILM COATED ORAL at 08:52

## 2020-04-02 RX ADMIN — METFORMIN HYDROCHLORIDE 1000 MG: 500 TABLET ORAL at 08:53

## 2020-04-02 RX ADMIN — FERROUS SULFATE TAB 325 MG (65 MG ELEMENTAL FE) 325 MG: 325 (65 FE) TAB at 08:52

## 2020-04-02 RX ADMIN — LISINOPRIL 5 MG: 5 TABLET ORAL at 08:53

## 2020-04-02 RX ADMIN — Medication 10 ML: at 08:52

## 2020-04-02 RX ADMIN — ENOXAPARIN SODIUM 40 MG: 40 INJECTION SUBCUTANEOUS at 08:54

## 2020-04-02 RX ADMIN — PAROXETINE HYDROCHLORIDE 40 MG: 20 TABLET, FILM COATED ORAL at 08:53

## 2020-04-02 RX ADMIN — GABAPENTIN 300 MG: 300 CAPSULE ORAL at 08:52

## 2020-04-02 RX ADMIN — PIOGLITAZONE 45 MG: 15 TABLET ORAL at 09:14

## 2020-04-02 RX ADMIN — CARVEDILOL 3.12 MG: 3.12 TABLET, FILM COATED ORAL at 18:01

## 2020-04-02 RX ADMIN — INSULIN LISPRO 2 UNITS: 100 INJECTION, SOLUTION INTRAVENOUS; SUBCUTANEOUS at 09:07

## 2020-04-02 RX ADMIN — ASPIRIN 81 MG: 81 TABLET, COATED ORAL at 08:53

## 2020-04-02 RX ADMIN — INSULIN LISPRO 2 UNITS: 100 INJECTION, SOLUTION INTRAVENOUS; SUBCUTANEOUS at 11:57

## 2020-04-02 ASSESSMENT — PAIN SCALES - GENERAL: PAINLEVEL_OUTOF10: 0

## 2020-04-02 NOTE — DISCHARGE INSTR - COC
Continuity of Care Form    Patient Name: Dinesh Peñaloza   :  1950  MRN:  [id number]    Admit date:  2020  Discharge date:  2020    Code Status Order: Full Code   Advance Directives:   [facility] Documentation     Date/Time Healthcare Directive Type of Healthcare Directive Copy in [address] Agent's Name Healthcare Agent's Phone Number    [phone]  No, patient does not have an advance directive for healthcare treatment -- -- -- -- --          Admitting Physician:  Jaxson Caraballo MD  PCP: No primary care provider on file.     Discharging Nurse: [facility] Unit/Room#: 0688/7610-60  Discharging Unit Phone Number: [phone]    Emergency Contact:   Extended Emergency Contact Information  Primary Emergency Contact: Fara Mario  Address: [address] [place] [address] [address] [place] [address] Phone: [phone]  Relation: Spouse    Past Surgical History:  Past Surgical History:   Procedure Laterality Date    ANKLE SURGERY Right 2018    ORIF of R ankle     COLONOSCOPY N/A 2018    COLONOSCOPY CONTROL HEMORRHAGE performed by Adrian Trevino MD at [facility]      x 4    CYSTOSCOPY  14    transurethral vaporization of prostate    HERNIA REPAIR      X2    ORIF DISTAL RADIUS FRACTURE Left 2019    LEFT DISTAL RADIUS OPEN REDUCTION INTERNAL FIXATION   performed by Renata Santiago MD at [facility] N/A 2018    EGD BIOPSY performed by Adrian Trevino MD at [address] [place] [address] 5/3/2019    EGD WITH ANESTHESIA performed by Adrian Trevino MD at [address] [place] [address],[address] [facility]         Immunization History:   Immunization History   Administered Date(s) Administered    Influenza Virus Vaccine

## 2020-04-02 NOTE — FLOWSHEET NOTE
Mr. Mahogany Betts was sad because his wife in the nursing home. Family does not support him and wife. He misses his wife. Told me about how he met his wife. He did a lot of talking. 04/02/20 1522   Encounter Summary   Services provided to: Patient   Referral/Consult From: Family   Support System Spouse   Continue Visiting   (4/2/Telephonic support.  Sad his wife is nursing home.)   Complexity of Encounter Moderate   Length of Encounter 30 minutes

## 2020-04-02 NOTE — PROGRESS NOTES
errors  Sequencing: Requires cues for some  Cognition Comment: Distractible, tangential but can be redirected. Sensation  Overall Sensation Status: [facility]        Plan   Plan  Times per week: 3-5x  AM-PAC  AM-PAC Inpatient Daily Activity Raw Score: 17 (04/02/20 1025)  AM-PAC Inpatient ADL T-Scale Score : 37.26 (04/02/20 1025)  ADL Inpatient CMS 0-100% Score: 50.11 (04/02/20 1025)  ADL Inpatient CMS G-Code Modifier : CK (04/02/20 1025)  Goals  Short term goals  Time Frame for Short term goals: for 1 week (4/9) unless noted  Short term goal 1: Perform functional transfer with supervision with RW  Short term goal 2: Perform LE dressing with SBA  Short term goal 3: Stand at sink with SBA with RW by 4/07  Short term goal 4: Perform UE exer 15x each to improve endurance  Patient Goals   Patient goals : \"Be able to go home\"     Therapy Time   Individual Concurrent Group Co-treatment   Time In 0945         Time Out 1020         Minutes 35         Timed Code Treatment Minutes: 25 Minutes(10 min eval )    If pt is discharged prior to next OT session, this note will serve as the discharge summary.   Jennifer Diaz OTR/L

## 2020-04-02 NOTE — PROGRESS NOTES
of posterior LOB (each corrected with Kevin from PT to prevent fall). Demonstrates decreased step length & foot clearance with widened SIMEON. Requires RW for added stability. Tends to rush at times (particularly when turning corners) and requires mod cues for safety. Gait Deviations: Slow Tabby; Increased SIMEON; Decreased step length;Deviated path  Distance: x 120 feet     Balance  Posture: Fair  Sitting - Static: Good;-  Sitting - Dynamic: Good;-  Standing - Static: Fair;+  Standing - Dynamic: Fair  Exercises  Straight Leg Raise: x 10 B  Quad Sets: x 10 B  Heelslides: x 10 B  Gluteal Sets: x 10 B  Hip Flexion: x 10 B  Knee Long Arc Quad: x 10 B  Ankle Pumps: x 20 B                          AM-PAC Score  AM-PAC Inpatient Mobility Raw Score : 17 (04/02/20 1204)  AM-PAC Inpatient T-Scale Score : 42.13 (04/02/20 1204)  Mobility Inpatient CMS 0-100% Score: 50.57 (04/02/20 1204)  Mobility Inpatient CMS G-Code Modifier : CK (04/02/20 1204)          Goals  Short term goals  Time Frame for Short term goals: 5 days, 4/06/20 (unless otherwise specified)  Short term goal 1: Pt will transfer supine <-> sit with modified(I)  Short term goal 2: Pt will transfer sit <-> stand and bed>chair using RW with supervision  Short term goal 3: Pt will ambulate x 150 feet using RW with SBA without LOB episode  Short term goal 4: By 4/03/20: Pt will tolerate 12-15 reps BLE exercise for strengthening, balance, and endurance  Short term goal 5: If safe to attempt: Pt will ambulate up/down 8 steps using 1 handrail with CGA x 1  Patient Goals   Patient goals : \"To go to rehab to get stronger. \"    Plan    Plan  Times per week: 3-5x/week in acute care  Times per day: Daily  Specific instructions for Next Treatment: Progress ther ex and mobility as tolerated  Current Treatment Recommendations: Strengthening, Gait Training, Balance Training, Functional Mobility Training, Transfer Training, Endurance Training, Safety Education & Training,

## 2020-04-02 NOTE — DISCHARGE SUMMARY
Hospital Medicine Discharge Summary    Patient ID: Gayscotta Marco      Patient's PCP: No primary care provider on file. Admit Date: 4/1/2020     Discharge Date: 4/2/2020      Admitting Physician: Cindy Rodriguez MD     Discharge Physician: Barron Matthews MD     Discharge Diagnoses: Active Hospital Problems    Diagnosis    Ataxia [R27.0]    HTN (hypertension) [I10]    Hyponatremia [E87.1]    S/P coronary artery stent placement x 4 [Z95.5]    DM2 (diabetes mellitus, type 2) ([facility] [address] [E11.9]       The patient was seen and examined on day of discharge and this discharge summary is in conjunction with any daily progress note from day of discharge. Hospital Course:    History Of Present Illness:       71 y.o. male who presented to [facility] with frequent fall at home. Patient denies any chest pain, fever, cough, congestion, focal neurologic weakness or deficit. Patient had been noncompliant about taking his medication over month.   Patient is admitted for placement.         Frequent fall, ataxia, unknown etiology, patient has this chronic symptoms, admitted to [place] [address] telemetry, CT head and CT cervical spine were unremarkable,   -PT OT eval was ordered  -Social service consulted for placement.     Hypertension, controlled with current regimen, monitored.     Hyponatremia, likely due to dehydration, sodium was 124 on admission initially subsequently after IV fluid transfusion repeat labs showing sodium of 138.  Continued to monitor.     History of CAD, hemodynamically stable, continued antiplatelet and statin therapy.     Diabetes mellitus type 2 uncontrolled, blood sugar was elevated at 663, patient received Humalog in ER along with IV fluid and subsequently blood sugar improved to 164.  Patient had been noncompliant for taking his medication at home.  Continued insulin sliding scale, monitor.       Physical Exam Performed:     BP (!) 143/74   Pulse 69   Temp 97 °F (36.1 °C) (Oral)   Resp 20 Ht 5' 3\" (1.6 m)   Wt 140 lb (63.5 kg)   SpO2 99%   BMI 24.80 kg/m²       General appearance:  No apparent distress, appears stated age and cooperative. HEENT:  Normal cephalic, atraumatic without obvious deformity. Pupils equal, round, and reactive to light. Extra ocular muscles intact. Conjunctivae/corneas clear. Neck: Supple, with full range of motion. No jugular venous distention. Trachea midline. Respiratory:  Normal respiratory effort. Clear to auscultation, bilaterally without Rales/Wheezes/Rhonchi. Cardiovascular:  Regular rate and rhythm with normal S1/S2 without murmurs, rubs or gallops. Abdomen: Soft, non-tender, non-distended with normal bowel sounds. Musculoskeletal:  No clubbing, cyanosis or edema bilaterally. Full range of motion without deformity. Skin: Skin color, texture, turgor normal.  No rashes or lesions. Neurologic:  Neurovascularly intact without any focal sensory/motor deficits. Cranial nerves: II-XII intact, grossly non-focal.  Psychiatric:  Alert and oriented, thought content appropriate, normal insight  Capillary Refill: Brisk,< 3 seconds   Peripheral Pulses: +2 palpable, equal bilaterally       Labs: For convenience and continuity at follow-up the following most recent labs are provided:      CBC:    Lab Results   Component Value Date    WBC 4.6 04/02/2020    HGB 8.7 04/02/2020    HCT 25.8 04/02/2020     04/02/2020       Renal:    Lab Results   Component Value Date     04/02/2020    K 4.6 04/02/2020     04/02/2020    CO2 23 04/02/2020    BUN 28 04/02/2020    CREATININE 1.4 04/02/2020    CALCIUM 8.6 04/02/2020    PHOS 3.8 09/12/2017         Significant Diagnostic Studies    Radiology:   XR CHEST STANDARD (2 VW)   Final Result   No acute process. CT Head WO Contrast   Final Result   No acute intracranial abnormality. CT Cervical Spine WO Contrast   Final Result   1. No acute fracture.                 Consults:     IP CONSULT TO

## 2020-04-02 NOTE — CARE COORDINATION
CASE MANAGEMENT DISCHARGE SUMMARY      Discharge to: [facility] completed: will be completed at [facility] per [facility] Exemption Notification (HENS) completed: Passr completed. New Durable Medical Equipment ordered/agency: none    Transportation: Ambulance   Medical Transport explained to [employer]. Pt/family voice no agency preference. [facility] time: 745pm      Ambulance form completed: Yes    Confirmed discharge plan with:    Patient: yes   Family, name and contact number: no, per pt wife in rehab    Facility/Agency, name:  [facility] faxed to [facility] number for report to facility: [phone]   RN, name: Laura Bethea RN aware    Note: Discharging nurse to complete MATT, reconcile AVS, and place final copy with patient's discharge packet. RN to ensure that written prescriptions for  Level II medications are sent with patient to the facility as per protocol.        Oumou Perez RN

## 2020-04-10 RX ORDER — CARVEDILOL 3.12 MG/1
TABLET ORAL
Qty: 180 TABLET | Refills: 3 | Status: ON HOLD | OUTPATIENT
Start: 2020-04-10 | End: 2021-06-15 | Stop reason: SDUPTHER

## 2020-11-03 PROBLEM — R55 SYNCOPE: Status: RESOLVED | Noted: 2017-09-10 | Resolved: 2020-11-03

## 2020-11-28 NOTE — TELEPHONE ENCOUNTER
11/26/2018 [facility]  Plan:    1. Referral to Dr. Felisa Loco, Gastroenterology to determine the cause of your anemia. 2. I recommend that the patient continue their currently prescribed medications. Their drug modifiable risk factors appear to be well controlled. I will continue to address the need/dosing of medications in future visits. 3. Follow up with me in 6 months. Urinary Tract Infection, Adult  ImageA urinary tract infection (UTI) is an infection of any part of the urinary tract, which includes the kidneys, ureters, bladder, and urethra. These organs make, store, and get rid of urine in the body. UTI can be a bladder infection (cystitis) or kidney infection (pyelonephritis).    What are the causes?  This infection may be caused by fungi, viruses, or bacteria. Bacteria are the most common cause of UTIs. This condition can also be caused by repeated incomplete emptying of the bladder during urination.    What increases the risk?  This condition is more likely to develop if:    You ignore your need to urinate or hold urine for long periods of time.  You do not empty your bladder completely during urination.  You wipe back to front after urinating or having a bowel movement, if you are female.  You are uncircumcised, if you are male.  You are constipated.  You have a urinary catheter that stays in place (indwelling).  You have a weak defense (immune) system.  You have a medical condition that affects your bowels, kidneys, or bladder.  You have diabetes.  You take antibiotic medicines frequently or for long periods of time, and the antibiotics no longer work well against certain types of infections (antibiotic resistance).  You take medicines that irritate your urinary tract.  You are exposed to chemicals that irritate your urinary tract.  You are female.    What are the signs or symptoms?  Symptoms of this condition include:    Fever.  Frequent urination or passing small amounts of urine frequently.  Needing to urinate urgently.  Pain or burning with urination.  Urine that smells bad or unusual.  Cloudy urine.  Pain in the lower abdomen or back.  Trouble urinating.  Blood in the urine.  Vomiting or being less hungry than normal.  Diarrhea or abdominal pain.  Vaginal discharge, if you are female.    How is this diagnosed?  This condition is diagnosed with a medical history and physical exam. You will also need to provide a urine sample to test your urine. Other tests may be done, including:    Blood tests.  Sexually transmitted disease (STD) testing.    If you have had more than one UTI, a cystoscopy or imaging studies may be done to determine the cause of the infections.    How is this treated?  Treatment for this condition often includes a combination of two or more of the following:    Antibiotic medicine.  Other medicines to treat less common causes of UTI.  Over-the-counter medicines to treat pain.  Drinking enough water to stay hydrated.    Follow these instructions at home:  Take over-the-counter and prescription medicines only as told by your health care provider.  If you were prescribed an antibiotic, take it as told by your health care provider. Do not stop taking the antibiotic even if you start to feel better.  Avoid alcohol, caffeine, tea, and carbonated beverages. They can irritate your bladder.  Drink enough fluid to keep your urine clear or pale yellow.  Keep all follow-up visits as told by your health care provider. This is important.  ImageMake sure to:    Empty your bladder often and completely. Do not hold urine for long periods of time.  Empty your bladder before and after sex.  Wipe from front to back after a bowel movement if you are female. Use each tissue one time when you wipe.    Contact a health care provider if:  You have back pain.  You have a fever.  You feel nauseous or vomit.  Your symptoms do not get better after 3 days.  Your symptoms go away and then return.  Get help right away if:  You have severe back pain or lower abdominal pain.  You are vomiting and cannot keep down any medicines or water.  This information is not intended to replace advice given to you by your health care provider. Make sure you discuss any questions you have with your health care provider.

## 2021-06-13 ENCOUNTER — HOSPITAL ENCOUNTER (INPATIENT)
Age: 71
LOS: 2 days | Discharge: HOME | DRG: 872 | End: 2021-06-15
Attending: EMERGENCY MEDICINE | Admitting: INTERNAL MEDICINE
Payer: COMMERCIAL

## 2021-06-13 ENCOUNTER — APPOINTMENT (OUTPATIENT)
Dept: CT IMAGING | Age: 71
DRG: 872 | End: 2021-06-13
Payer: COMMERCIAL

## 2021-06-13 ENCOUNTER — APPOINTMENT (OUTPATIENT)
Dept: GENERAL RADIOLOGY | Age: 71
DRG: 872 | End: 2021-06-13
Payer: COMMERCIAL

## 2021-06-13 DIAGNOSIS — N30.00 ACUTE CYSTITIS WITHOUT HEMATURIA: Primary | ICD-10-CM

## 2021-06-13 DIAGNOSIS — N18.9 ACUTE RENAL FAILURE SUPERIMPOSED ON CHRONIC KIDNEY DISEASE, UNSPECIFIED CKD STAGE, UNSPECIFIED ACUTE RENAL FAILURE TYPE (HCC): ICD-10-CM

## 2021-06-13 DIAGNOSIS — R29.6 FREQUENT FALLS: ICD-10-CM

## 2021-06-13 DIAGNOSIS — R53.1 GENERAL WEAKNESS: ICD-10-CM

## 2021-06-13 DIAGNOSIS — N17.9 ACUTE RENAL FAILURE SUPERIMPOSED ON CHRONIC KIDNEY DISEASE, UNSPECIFIED CKD STAGE, UNSPECIFIED ACUTE RENAL FAILURE TYPE (HCC): ICD-10-CM

## 2021-06-13 PROBLEM — A41.9 SEPSIS (HCC): Status: ACTIVE | Noted: 2021-06-13

## 2021-06-13 LAB
A/G RATIO: 1.2 (ref 1.1–2.2)
ALBUMIN SERPL-MCNC: 4.1 G/DL (ref 3.4–5)
ALP BLD-CCNC: 132 U/L (ref 40–129)
ALT SERPL-CCNC: 15 U/L (ref 10–40)
ANION GAP SERPL CALCULATED.3IONS-SCNC: 15 MMOL/L (ref 3–16)
AST SERPL-CCNC: 16 U/L (ref 15–37)
BACTERIA: ABNORMAL /HPF
BASOPHILS ABSOLUTE: 0 K/UL (ref 0–0.2)
BASOPHILS RELATIVE PERCENT: 0.2 %
BILIRUB SERPL-MCNC: 0.4 MG/DL (ref 0–1)
BILIRUBIN URINE: ABNORMAL
BLOOD, URINE: ABNORMAL
BUN BLDV-MCNC: 47 MG/DL (ref 7–20)
CALCIUM SERPL-MCNC: 9.5 MG/DL (ref 8.3–10.6)
CHLORIDE BLD-SCNC: 106 MMOL/L (ref 99–110)
CLARITY: ABNORMAL
CO2: 17 MMOL/L (ref 21–32)
COLOR: ABNORMAL
CREAT SERPL-MCNC: 2.1 MG/DL (ref 0.8–1.3)
EOSINOPHILS ABSOLUTE: 0 K/UL (ref 0–0.6)
EOSINOPHILS RELATIVE PERCENT: 0.1 %
EPITHELIAL CELLS, UA: 1 /HPF (ref 0–5)
GFR AFRICAN AMERICAN: 38
GFR NON-AFRICAN AMERICAN: 31
GLOBULIN: 3.5 G/DL
GLUCOSE BLD-MCNC: 122 MG/DL (ref 70–99)
GLUCOSE BLD-MCNC: 185 MG/DL (ref 70–99)
GLUCOSE BLD-MCNC: 281 MG/DL (ref 70–99)
GLUCOSE URINE: NEGATIVE MG/DL
HCT VFR BLD CALC: 34.5 % (ref 40.5–52.5)
HEMOGLOBIN: 11.1 G/DL (ref 13.5–17.5)
HYALINE CASTS: 0 /LPF (ref 0–8)
KETONES, URINE: NEGATIVE MG/DL
LACTIC ACID: 1 MMOL/L (ref 0.4–2)
LEUKOCYTE ESTERASE, URINE: ABNORMAL
LYMPHOCYTES ABSOLUTE: 0.9 K/UL (ref 1–5.1)
LYMPHOCYTES RELATIVE PERCENT: 4.4 %
MCH RBC QN AUTO: 30 PG (ref 26–34)
MCHC RBC AUTO-ENTMCNC: 32.2 G/DL (ref 31–36)
MCV RBC AUTO: 93 FL (ref 80–100)
MICROSCOPIC EXAMINATION: YES
MONOCYTES ABSOLUTE: 1.3 K/UL (ref 0–1.3)
MONOCYTES RELATIVE PERCENT: 6.5 %
NEUTROPHILS ABSOLUTE: 18 K/UL (ref 1.7–7.7)
NEUTROPHILS RELATIVE PERCENT: 88.8 %
NITRITE, URINE: NEGATIVE
PDW BLD-RTO: 15.8 % (ref 12.4–15.4)
PERFORMED ON: ABNORMAL
PERFORMED ON: ABNORMAL
PH UA: 5.5 (ref 5–8)
PLATELET # BLD: 200 K/UL (ref 135–450)
PMV BLD AUTO: 8.9 FL (ref 5–10.5)
POTASSIUM SERPL-SCNC: 4.4 MMOL/L (ref 3.5–5.1)
PROTEIN UA: >=300 MG/DL
RBC # BLD: 3.71 M/UL (ref 4.2–5.9)
RBC UA: >900 /HPF (ref 0–4)
SARS-COV-2, NAAT: NOT DETECTED
SODIUM BLD-SCNC: 138 MMOL/L (ref 136–145)
SPECIFIC GRAVITY UA: 1.02 (ref 1–1.03)
TOTAL PROTEIN: 7.6 G/DL (ref 6.4–8.2)
TROPONIN: 0.01 NG/ML
URINE REFLEX TO CULTURE: YES
URINE TYPE: ABNORMAL
UROBILINOGEN, URINE: 0.2 E.U./DL
WBC # BLD: 20.2 K/UL (ref 4–11)
WBC UA: >900 /HPF (ref 0–5)

## 2021-06-13 PROCEDURE — 2580000003 HC RX 258: Performed by: EMERGENCY MEDICINE

## 2021-06-13 PROCEDURE — 6370000000 HC RX 637 (ALT 250 FOR IP): Performed by: INTERNAL MEDICINE

## 2021-06-13 PROCEDURE — 87186 SC STD MICRODIL/AGAR DIL: CPT

## 2021-06-13 PROCEDURE — 87040 BLOOD CULTURE FOR BACTERIA: CPT

## 2021-06-13 PROCEDURE — 6360000002 HC RX W HCPCS: Performed by: PHYSICIAN ASSISTANT

## 2021-06-13 PROCEDURE — 87635 SARS-COV-2 COVID-19 AMP PRB: CPT

## 2021-06-13 PROCEDURE — 1200000000 HC SEMI PRIVATE

## 2021-06-13 PROCEDURE — 87077 CULTURE AEROBIC IDENTIFY: CPT

## 2021-06-13 PROCEDURE — 99285 EMERGENCY DEPT VISIT HI MDM: CPT

## 2021-06-13 PROCEDURE — 83605 ASSAY OF LACTIC ACID: CPT

## 2021-06-13 PROCEDURE — 72125 CT NECK SPINE W/O DYE: CPT

## 2021-06-13 PROCEDURE — 85025 COMPLETE CBC W/AUTO DIFF WBC: CPT

## 2021-06-13 PROCEDURE — 73564 X-RAY EXAM KNEE 4 OR MORE: CPT

## 2021-06-13 PROCEDURE — 96374 THER/PROPH/DIAG INJ IV PUSH: CPT

## 2021-06-13 PROCEDURE — 81001 URINALYSIS AUTO W/SCOPE: CPT

## 2021-06-13 PROCEDURE — U0003 INFECTIOUS AGENT DETECTION BY NUCLEIC ACID (DNA OR RNA); SEVERE ACUTE RESPIRATORY SYNDROME CORONAVIRUS 2 (SARS-COV-2) (CORONAVIRUS DISEASE [COVID-19]), AMPLIFIED PROBE TECHNIQUE, MAKING USE OF HIGH THROUGHPUT TECHNOLOGIES AS DESCRIBED BY CMS-2020-01-R: HCPCS

## 2021-06-13 PROCEDURE — 96372 THER/PROPH/DIAG INJ SC/IM: CPT

## 2021-06-13 PROCEDURE — 70450 CT HEAD/BRAIN W/O DYE: CPT

## 2021-06-13 PROCEDURE — 6360000002 HC RX W HCPCS: Performed by: INTERNAL MEDICINE

## 2021-06-13 PROCEDURE — 73502 X-RAY EXAM HIP UNI 2-3 VIEWS: CPT

## 2021-06-13 PROCEDURE — 93005 ELECTROCARDIOGRAM TRACING: CPT | Performed by: PHYSICIAN ASSISTANT

## 2021-06-13 PROCEDURE — 71045 X-RAY EXAM CHEST 1 VIEW: CPT

## 2021-06-13 PROCEDURE — U0005 INFEC AGEN DETEC AMPLI PROBE: HCPCS

## 2021-06-13 PROCEDURE — 2580000003 HC RX 258: Performed by: INTERNAL MEDICINE

## 2021-06-13 PROCEDURE — 84484 ASSAY OF TROPONIN QUANT: CPT

## 2021-06-13 PROCEDURE — 80053 COMPREHEN METABOLIC PANEL: CPT

## 2021-06-13 PROCEDURE — 87086 URINE CULTURE/COLONY COUNT: CPT

## 2021-06-13 RX ORDER — ATORVASTATIN CALCIUM 80 MG/1
80 TABLET, FILM COATED ORAL NIGHTLY
Status: DISCONTINUED | OUTPATIENT
Start: 2021-06-13 | End: 2021-06-15 | Stop reason: HOSPADM

## 2021-06-13 RX ORDER — CARVEDILOL 3.12 MG/1
3.12 TABLET ORAL 2 TIMES DAILY
Status: DISCONTINUED | OUTPATIENT
Start: 2021-06-13 | End: 2021-06-15 | Stop reason: HOSPADM

## 2021-06-13 RX ORDER — SODIUM CHLORIDE, SODIUM LACTATE, POTASSIUM CHLORIDE, CALCIUM CHLORIDE 600; 310; 30; 20 MG/100ML; MG/100ML; MG/100ML; MG/100ML
INJECTION, SOLUTION INTRAVENOUS CONTINUOUS
Status: DISCONTINUED | OUTPATIENT
Start: 2021-06-13 | End: 2021-06-15 | Stop reason: HOSPADM

## 2021-06-13 RX ORDER — ONDANSETRON 2 MG/ML
4 INJECTION INTRAMUSCULAR; INTRAVENOUS EVERY 6 HOURS PRN
Status: DISCONTINUED | OUTPATIENT
Start: 2021-06-13 | End: 2021-06-15 | Stop reason: HOSPADM

## 2021-06-13 RX ORDER — NICOTINE POLACRILEX 4 MG
15 LOZENGE BUCCAL PRN
Status: DISCONTINUED | OUTPATIENT
Start: 2021-06-13 | End: 2021-06-15 | Stop reason: HOSPADM

## 2021-06-13 RX ORDER — QUETIAPINE FUMARATE 25 MG/1
12.5 TABLET, FILM COATED ORAL 2 TIMES DAILY
COMMUNITY

## 2021-06-13 RX ORDER — CLOPIDOGREL BISULFATE 75 MG/1
75 TABLET ORAL DAILY
Status: DISCONTINUED | OUTPATIENT
Start: 2021-06-14 | End: 2021-06-14

## 2021-06-13 RX ORDER — ACETAMINOPHEN 325 MG/1
650 TABLET ORAL EVERY 6 HOURS PRN
Status: DISCONTINUED | OUTPATIENT
Start: 2021-06-13 | End: 2021-06-15 | Stop reason: HOSPADM

## 2021-06-13 RX ORDER — HEPARIN SODIUM 5000 [USP'U]/ML
5000 INJECTION, SOLUTION INTRAVENOUS; SUBCUTANEOUS EVERY 8 HOURS SCHEDULED
Status: DISCONTINUED | OUTPATIENT
Start: 2021-06-13 | End: 2021-06-15 | Stop reason: HOSPADM

## 2021-06-13 RX ORDER — DEXTROSE MONOHYDRATE 50 MG/ML
100 INJECTION, SOLUTION INTRAVENOUS PRN
Status: DISCONTINUED | OUTPATIENT
Start: 2021-06-13 | End: 2021-06-15 | Stop reason: HOSPADM

## 2021-06-13 RX ORDER — SODIUM CHLORIDE 9 MG/ML
25 INJECTION, SOLUTION INTRAVENOUS PRN
Status: DISCONTINUED | OUTPATIENT
Start: 2021-06-13 | End: 2021-06-15 | Stop reason: HOSPADM

## 2021-06-13 RX ORDER — INSULIN LISPRO 100 [IU]/ML
0-12 INJECTION, SOLUTION INTRAVENOUS; SUBCUTANEOUS
Status: DISCONTINUED | OUTPATIENT
Start: 2021-06-13 | End: 2021-06-15 | Stop reason: HOSPADM

## 2021-06-13 RX ORDER — PANTOPRAZOLE SODIUM 40 MG/1
40 TABLET, DELAYED RELEASE ORAL
Status: DISCONTINUED | OUTPATIENT
Start: 2021-06-14 | End: 2021-06-15 | Stop reason: HOSPADM

## 2021-06-13 RX ORDER — SODIUM CHLORIDE 0.9 % (FLUSH) 0.9 %
5-40 SYRINGE (ML) INJECTION EVERY 12 HOURS SCHEDULED
Status: DISCONTINUED | OUTPATIENT
Start: 2021-06-13 | End: 2021-06-15 | Stop reason: HOSPADM

## 2021-06-13 RX ORDER — POLYETHYLENE GLYCOL 3350 17 G/17G
17 POWDER, FOR SOLUTION ORAL DAILY PRN
Status: DISCONTINUED | OUTPATIENT
Start: 2021-06-13 | End: 2021-06-15 | Stop reason: HOSPADM

## 2021-06-13 RX ORDER — INSULIN LISPRO 100 [IU]/ML
0-6 INJECTION, SOLUTION INTRAVENOUS; SUBCUTANEOUS NIGHTLY
Status: DISCONTINUED | OUTPATIENT
Start: 2021-06-13 | End: 2021-06-15 | Stop reason: HOSPADM

## 2021-06-13 RX ORDER — ACETAMINOPHEN 650 MG/1
650 SUPPOSITORY RECTAL EVERY 6 HOURS PRN
Status: DISCONTINUED | OUTPATIENT
Start: 2021-06-13 | End: 2021-06-15 | Stop reason: HOSPADM

## 2021-06-13 RX ORDER — SODIUM CHLORIDE 0.9 % (FLUSH) 0.9 %
5-40 SYRINGE (ML) INJECTION PRN
Status: DISCONTINUED | OUTPATIENT
Start: 2021-06-13 | End: 2021-06-15 | Stop reason: HOSPADM

## 2021-06-13 RX ORDER — 0.9 % SODIUM CHLORIDE 0.9 %
30 INTRAVENOUS SOLUTION INTRAVENOUS ONCE
Status: COMPLETED | OUTPATIENT
Start: 2021-06-13 | End: 2021-06-13

## 2021-06-13 RX ORDER — DEXTROSE MONOHYDRATE 25 G/50ML
12.5 INJECTION, SOLUTION INTRAVENOUS PRN
Status: DISCONTINUED | OUTPATIENT
Start: 2021-06-13 | End: 2021-06-15 | Stop reason: HOSPADM

## 2021-06-13 RX ORDER — TRAZODONE HYDROCHLORIDE 50 MG/1
100 TABLET ORAL NIGHTLY
Status: DISCONTINUED | OUTPATIENT
Start: 2021-06-13 | End: 2021-06-15 | Stop reason: HOSPADM

## 2021-06-13 RX ORDER — ONDANSETRON 4 MG/1
4 TABLET, ORALLY DISINTEGRATING ORAL EVERY 8 HOURS PRN
Status: DISCONTINUED | OUTPATIENT
Start: 2021-06-13 | End: 2021-06-15 | Stop reason: HOSPADM

## 2021-06-13 RX ORDER — ASPIRIN 81 MG/1
81 TABLET ORAL DAILY
Status: DISCONTINUED | OUTPATIENT
Start: 2021-06-14 | End: 2021-06-15 | Stop reason: HOSPADM

## 2021-06-13 RX ORDER — PAROXETINE HYDROCHLORIDE 20 MG/1
40 TABLET, FILM COATED ORAL DAILY
Status: DISCONTINUED | OUTPATIENT
Start: 2021-06-14 | End: 2021-06-15 | Stop reason: HOSPADM

## 2021-06-13 RX ADMIN — TRAZODONE HYDROCHLORIDE 100 MG: 50 TABLET ORAL at 21:00

## 2021-06-13 RX ADMIN — SODIUM CHLORIDE 1905 ML: 9 INJECTION, SOLUTION INTRAVENOUS at 10:42

## 2021-06-13 RX ADMIN — HEPARIN SODIUM 5000 UNITS: 5000 INJECTION INTRAVENOUS; SUBCUTANEOUS at 20:59

## 2021-06-13 RX ADMIN — INSULIN LISPRO 3 UNITS: 100 INJECTION, SOLUTION INTRAVENOUS; SUBCUTANEOUS at 21:19

## 2021-06-13 RX ADMIN — ATORVASTATIN CALCIUM 80 MG: 80 TABLET, FILM COATED ORAL at 21:00

## 2021-06-13 RX ADMIN — SODIUM CHLORIDE, POTASSIUM CHLORIDE, SODIUM LACTATE AND CALCIUM CHLORIDE: 600; 310; 30; 20 INJECTION, SOLUTION INTRAVENOUS at 17:38

## 2021-06-13 RX ADMIN — CARVEDILOL 3.12 MG: 3.12 TABLET, FILM COATED ORAL at 21:00

## 2021-06-13 RX ADMIN — INSULIN GLARGINE 30 UNITS: 100 INJECTION, SOLUTION SUBCUTANEOUS at 21:20

## 2021-06-13 RX ADMIN — Medication 1000 MG: at 10:30

## 2021-06-13 ASSESSMENT — PAIN SCALES - GENERAL
PAINLEVEL_OUTOF10: 7
PAINLEVEL_OUTOF10: 0
PAINLEVEL_OUTOF10: 0

## 2021-06-13 ASSESSMENT — PAIN DESCRIPTION - FREQUENCY: FREQUENCY: INTERMITTENT

## 2021-06-13 ASSESSMENT — ENCOUNTER SYMPTOMS
CONSTIPATION: 0
BACK PAIN: 0
COUGH: 0
SHORTNESS OF BREATH: 0
VOMITING: 0
ABDOMINAL PAIN: 0
DIARRHEA: 0
COLOR CHANGE: 0
NAUSEA: 0

## 2021-06-13 ASSESSMENT — PAIN DESCRIPTION - LOCATION: LOCATION: NECK

## 2021-06-13 NOTE — ED PROVIDER NOTES
I independently performed a history and physical on Chauncey Mario. All diagnostic, treatment, and disposition decisions were made by myself in conjunction with the advanced practice provider. I have participated in the medical decision making and directed the treatment plan and disposition of the patient. For further details of [facility] emergency department encounter, please see the advanced practice provider's documentation. CHIEF COMPLAINT  Chief Complaint   Patient presents with    Fall     from [facility] home from [facility]  fell yest and today. not much information from [facility] home    Neck Pain    Hip Pain     left.  had surgery before     Briefly, Evert Gottron is a 79 y.o. male  who presents to the ED complaining of falls x3 over the past few days. Patient complains of some neck pain and some left hip pain and landed on his buttocks per the nurse. The patient is hard of hearing and a difficult historian. He has no chest pain coughing shortness of breath or abdominal pain currently. He says of the past 3 days he has declined from a functionality standpoint significantly and is generally weak. FOCUSED PHYSICAL EXAMINATION  /70   Pulse 92   Temp 97.8 °F (36.6 °C)   Resp 19   Ht 5' 3\" (1.6 m)   Wt 140 lb (63.5 kg)   SpO2 96%   BMI 24.80 kg/m²    Focused physical examination notable for no acute distress, well-appearing, well-nourished, normal speech and mentation without obvious facial droop, no obvious rash. No obvious cranial nerve deficits on my initial exam.  Dry mucous membranes. Regular rate and rhythm, clear to auscultation bilaterally. Abdomen is soft nontender nondistended. Normal strength and sensation in the extremities when laying supine but appears fatigued.     The 12 lead EKG was interpreted by me as follows:  Rate: normal with a rate of 93  Rhythm: sinus with PVC's  Axis: normal  Intervals: right bundle branch block  ST segments: no ST elevations or depressions  T waves: no abnormal inversions  Non-specific T wave changes: not present  Prior EKG comparison: EKG dated 4/1/20 is not significantly different    MDM:  ED course was notable for urinary tract infection with generalized weakness and frequent falls. From a traumatic standpoint his work-up is generally benign. He does have a minimal acute on chronic kidney injury with creatinine of 2.1, baseline closer to 1.4-1.8. He is afebrile and not hypotensive. He does have a leukocytosis. Lactate and blood cultures will be ordered as well as IV antibiotics and IV fluids and admission to the hospital.  COVID swab will be ordered due to nursing home status not due to clinical suspicion for this condition. 12:00pm - Called to bedside by RN concerned that the RUE PIV infiltrated with saline running. On my exam, distal 2+ radial pulse intact, brisk cap refill in all 5 digits of R hand with normal strength/sensation there, compartments are swollen but soft and only mildly tender at the proximal forearm and olecranon. No erythema. Told RN to apply ace wrap. Will update hospitalist.  Nothing but saline was infiltrated. Will need to be monitored closely but no further actions needed at this time. During the patient's ED course, the patient was given:  Medications   cefTRIAXone (ROCEPHIN) 1000 mg in sterile water 10 mL IV syringe (has no administration in time range)   0.9 % sodium chloride IV bolus 1,905 mL (has no administration in time range)        CLINICAL IMPRESSION  1. Acute cystitis without hematuria    2. General weakness    3. Frequent falls    4. Acute renal failure superimposed on chronic kidney disease, unspecified CKD stage, unspecified acute renal failure type ([facility] [address]        DISPOSITION  Benjamin Bradshaw was admitted in fair condition. The plan is to admit to the hospital at this time under the hospitalist service. Hospitalist accepted the patient and will take over the patient's care.     This chart was created using [facility]. Efforts were made by me to ensure accuracy, however some errors may be present due to limitations of this technology.             Vicky Lowe MD  06/13/21 3610       Vicky Lowe MD  06/13/21 9030

## 2021-06-13 NOTE — ED NOTES
Report to Oscar Liao on [facility].   Put in for transport     Maryellen Mason RN  06/13/21 1329

## 2021-06-13 NOTE — ED NOTES
Penis pouch placed on patient because he is unable to use urinal.  Eating breakfast     Roxane Padilla RN  06/13/21 0208

## 2021-06-13 NOTE — ED PROVIDER NOTES
[facility]        Pt Name: Desire Bray  MRN: [id number]  [place] 1950  Date of evaluation: 6/13/2021  Provider: Pilar Dickens PA-C  PCP: No primary care provider on file. Note Started: 8:08 AM EDT        I have seen and evaluated this patient with my supervising physician Kenyetta Bella MD.    [address] [place] [address]       Chief Complaint   Patient presents with    Fall     from [facility] home from [place] [facility]  fell yest and today. not much information from [facility] home    Neck Pain    Hip Pain     left.  had surgery before       HISTORY OF PRESENT ILLNESS   (Location, Timing/Onset, Context/Setting, Quality, Duration, Modifying Factors, Severity, Associated Signs and Symptoms)  Note limiting factors. Desire Bray is a 79 y.o. male who presents with a Chief Complaint of who presents to the emergency department with multiple areas of discomfort, some chronic and subacute, from recent falls. He does have history of muscle weakness, cognitive deficits, dementia, schizoaffective disorder. He resides at a nursing home, called [facility]. According to nursing home, he fell 2 times, once yesterday and once today that were both unwitnessed. Patient states that he has fallen several times in the past 3 days. He claims that he hit the back of his head and neck and reinjured his left hip and left knee which are both chronic issues. Nursing Notes were all reviewed and agreed with or any disagreements were addressed in the HPI. REVIEW OF SYSTEMS    (2-9 systems for level 4, 10 or more for level 5)     Review of Systems   Constitutional: Negative for chills and fever. HENT: Negative. Eyes: Negative for visual disturbance. Respiratory: Negative for cough and shortness of breath. Cardiovascular: Negative for chest pain.    Gastrointestinal: Negative for abdominal pain, constipation, diarrhea, nausea and vomiting. Endocrine: Negative. Genitourinary: Negative. Musculoskeletal: Positive for gait problem, myalgias and neck pain. Negative for back pain and neck stiffness. Skin: Negative for color change, pallor, rash and wound. Neurological: Positive for weakness. Negative for dizziness, tremors, seizures, syncope, facial asymmetry, speech difficulty, light-headedness, numbness and headaches. Psychiatric/Behavioral: Positive for confusion. All other systems reviewed and are negative. Positives and Pertinent negatives as per HPI. Except as noted above in the ROS, all other systems were reviewed and negative. PAST MEDICAL HISTORY     Past Medical History:   Diagnosis Date    Arthritis     Ataxia 3/14/2014    BPH (benign prostatic hyperplasia)     CAD (coronary artery disease)     4 stents    Coronary atherosclerosis of native coronary artery 5/11/2013    Diabetes mellitus ([facility]     Environmental allergies     Hepatitis     Hydronephrosis 3/14/2014    Hyperlipidemia     Hypertension     Kidney disease     Neuropathy     Parkinson's disease ([facility]     ?     S/P coronary artery stent placement x 4     x 4    Schizoaffective disorder, bipolar type [facility])          SURGICAL HISTORY     Past Surgical History:   Procedure Laterality Date    ANKLE SURGERY Right 06/09/2018    ORIF of R ankle     COLONOSCOPY N/A 12/13/2018    COLONOSCOPY CONTROL HEMORRHAGE performed by Reynaldo Corey MD at [facility]      x 4    CYSTOSCOPY  4/4/14    transurethral vaporization of prostate    HERNIA REPAIR      X2    ORIF DISTAL RADIUS FRACTURE Left 6/20/2019    LEFT DISTAL RADIUS OPEN REDUCTION INTERNAL FIXATION   performed by Seema Jj MD at [facility] N/A 12/13/2018    EGD BIOPSY performed by Reynaldo Corey MD at [facility] PHYSICAL EXAM    (up to 7 for level 4, 8 or more for level 5)     ED Triage Vitals [06/13/21 0751]   BP Temp Temp src Pulse Resp SpO2 Height Weight   (!) 120/49 97.8 °F (36.6 °C) -- 97 16 97 % 5' 3\" (1.6 m) 140 lb (63.5 kg)       Physical Exam  Vitals and nursing note reviewed. Constitutional:       Appearance: Normal appearance. He is well-developed. He is not toxic-appearing or diaphoretic. HENT:      Head: Normocephalic and atraumatic. Right Ear: External ear normal.      Left Ear: External ear normal.      Nose: Nose normal.      Mouth/Throat:      Mouth: Mucous membranes are moist.      Pharynx: Oropharynx is clear. Eyes:      General:         Right eye: No discharge. Left eye: No discharge. Extraocular Movements: Extraocular movements intact. Conjunctiva/sclera: Conjunctivae normal.      Pupils: Pupils are equal, round, and reactive to light. Cardiovascular:      Rate and Rhythm: Normal rate. Pulmonary:      Effort: Pulmonary effort is normal.      Breath sounds: Normal breath sounds. Abdominal:      General: Bowel sounds are normal.      Palpations: Abdomen is soft. Tenderness: There is no abdominal tenderness. Musculoskeletal:         General: Normal range of motion. Right shoulder: Normal.      Left shoulder: Normal.      Right elbow: Normal.      Left elbow: Normal.      Right wrist: Normal.      Left wrist: Normal.      Right hand: Normal.      Left hand: Normal.      Cervical back: Normal and normal range of motion. Thoracic back: Normal.      Lumbar back: Normal.      Right hip: Normal.      Left hip: Tenderness present. No deformity, lacerations, bony tenderness or crepitus. Normal range of motion. Normal strength. Right knee: Normal.      Left knee: Normal.      Right ankle: Normal.      Left ankle: Normal.   Skin:     General: Skin is warm and dry. Capillary Refill: Capillary refill takes less than 2 seconds.       Coloration: Skin is not jaundiced or pale. Findings: No bruising, erythema, lesion or rash. Neurological:      Mental Status: He is alert. Mental status is at baseline.       Comments: Oriented to person  Symmetrical upper and lower extremity strength but feels generally weak all over  No pronator drift, facial droop or slurred speech     Psychiatric:         Mood and Affect: Mood normal.         Behavior: Behavior normal.         DIAGNOSTIC RESULTS   LABS:    Labs Reviewed   CBC WITH AUTO DIFFERENTIAL - Abnormal; Notable for the following components:       Result Value    WBC 20.2 (*)     RBC 3.71 (*)     Hemoglobin 11.1 (*)     Hematocrit 34.5 (*)     RDW 15.8 (*)     Neutrophils Absolute 18.0 (*)     Lymphocytes Absolute 0.9 (*)     All other components within normal limits    Narrative:     Performed at:  [facility] [address], [place], [place]!   Phone [phone]   COMPREHENSIVE METABOLIC PANEL - Abnormal; Notable for the following components:    CO2 17 (*)     Glucose 122 (*)     BUN 47 (*)     CREATININE 2.1 (*)     GFR Non- 31 (*)     GFR  38 (*)     Alkaline Phosphatase 132 (*)     All other components within normal limits    Narrative:     Performed at:  [facility]  [address] [facility], [facility] [place]   Phone [phone]   URINE RT REFLEX TO CULTURE - Abnormal; Notable for the following components:    Color, UA MIRANDA (*)     Clarity, UA TURBID (*)     Bilirubin Urine SMALL (*)     Blood, Urine LARGE (*)     Protein, UA >=300 (*)     Leukocyte Esterase, Urine LARGE (*)     All other components within normal limits    Narrative:     Performed at:  [facility]  [address] [facility], [facility] [place]   Phone [phone]   CULTURE, BLOOD 2   CULTURE, BLOOD 1   TROPONIN    Narrative:     Performed at:  [facility]  [address] [facility]), flexor tenosynovitis, thoracic outlet obstruction, SVC syndrome, foreign body, tendon rupture, compartment syndrome, acute fracture, dislocation, DVT, arterial compromise or occlusion, limb ischemia, gout, septic joint, abscess, cellulitis, osteomyelitis, or other concerning pathology. Nurse praveena informed myself and my attending that the patient's right forearm iv did infiltrate with normal saline. He is neurovascularly intact in this extremity. Mildly cooler from the cold normal saline. No poikilothermia, pallor, paresthesia, pain with passive ROM, pain out of proportion to physical findings. Advised for warm compresses and compression. Informed hospitalist.    FINAL IMPRESSION      1. Acute cystitis without hematuria    2. General weakness    3. Frequent falls    4. Acute renal failure superimposed on chronic kidney disease, unspecified CKD stage, unspecified acute renal failure type ([facility]          DISPOSITION/PLAN   DISPOSITION        PATIENT REFERRED TO:  No follow-up provider specified.     DISCHARGE MEDICATIONS:  New Prescriptions    No medications on file       DISCONTINUED MEDICATIONS:  Discontinued Medications    No medications on file              (Please note that portions of this note were completed with a voice recognition program.  Efforts were made to edit the dictations but occasionally words are mis-transcribed.)    Veena Ross PA-C (electronically signed)           Veena Ross PA-C  06/13/21 1215

## 2021-06-13 NOTE — H&P
HOSPITALISTS HISTORY AND PHYSICAL    6/13/2021 1:12 PM    Patient Information:  Shayna Warren is a 79 y.o. male [id number]  PCP:  No primary care provider on file. (Tel: None )    Chief complaint:    Chief Complaint   Patient presents with    [facility]     [facility] [place] [facility]  fell yest and today. not much information from [facility] home    Neck Pain    Hip Pain     left.  had surgery before       History of Present Illness:  Angi Carmen is a 79 y.o. male lives at a nursing home. Patient has had multiple falls over the last 3 days. Before the falls patient denies any lightheadedness dizziness chest pain shortness of breath palpitations or any focal weakness. Patient denies any loss of consciousness with the falls. Patient does have chills and some sweats but no fever cough nausea vomiting or dysuria. REVIEW OF SYSTEMS:   Constitutional:see above  ENT: Negative for rhinorrhea, epistaxis, hoarseness, sore throat. Respiratory: Negative for shortness of breath,wheezing  Cardiovascular: Negative for chest pain, palpitations   Gastrointestinal: Negative for nausea, vomiting, diarrhea  Genitourinary: Negative for polyuria, dysuria   Hematologic/Lymphatic: Negative for bleeding tendency, easy bruising  Musculoskeletal: Negative for myalgias and arthralgias  Neurologic: Negative for confusion,dysarthria. Skin: Negative for itching,rash  Psychiatric: Negative for depression,anxiety, agitation. Endocrine: Negative for polydipsia,polyuria,heat /cold intolerance.     Past Medical History:   has a past medical history of Arthritis, Ataxia, BPH (benign prostatic hyperplasia), CAD (coronary artery disease), Coronary atherosclerosis of native coronary artery, Diabetes mellitus ([facility] [address] Environmental allergies, Hepatitis, Hydronephrosis, Hyperlipidemia, Hypertension, Kidney disease, Neuropathy, spray by Nasal route daily as needed. Allergies:  No Known Allergies     Social History:  Patient Lives at home   reports that he quit smoking about 8 years ago. His smoking use included cigarettes. He has a 25.00 pack-year smoking history. He has never used smokeless tobacco. He reports that he does not drink alcohol and does not use drugs. Family History:  family history includes Heart Disease in his father; Other in his mother. ,    Physical Exam:  BP (!) 133/56   Pulse 89   Temp 98.1 °F (36.7 °C) (Temporal)   Resp 18   Ht 5' 3\" (1.6 m)   Wt 140 lb (63.5 kg)   SpO2 97%   BMI 24.80 kg/m²     General appearance:  Appears comfortable. AAOx3  HEENT: atraumatic, Pupils equal, muscous membranes moist, no masses appreciated  Cardiovascular:tachycardic  Regular rhythm no murmurs appreciated  Respiratory: CTAB no wheezing  Gastrointestinal: Abdomen soft, non-tender, BS+  EXT: no edema  Neurology: no gross focal deficts  Psychiatry: Appropriate affect. Not agitated  Skin: Warm, dry, no rashes appreciated    Labs:  CBC:   Lab Results   Component Value Date    WBC 20.2 06/13/2021    RBC 3.71 06/13/2021    HGB 11.1 06/13/2021    HCT 34.5 06/13/2021    MCV 93.0 06/13/2021    MCH 30.0 06/13/2021    MCHC 32.2 06/13/2021    RDW 15.8 06/13/2021     06/13/2021    MPV 8.9 06/13/2021     BMP:    Lab Results   Component Value Date     06/13/2021    K 4.4 06/13/2021    K 4.6 04/02/2020     06/13/2021    CO2 17 06/13/2021    BUN 47 06/13/2021    CREATININE 2.1 06/13/2021    CALCIUM 9.5 06/13/2021    GFRAA 38 06/13/2021    GFRAA >60 05/11/2013    LABGLOM 31 06/13/2021    GLUCOSE 122 06/13/2021     XR KNEE LEFT (MIN 4 VIEWS)   Final Result   Osteopenia limits detection of subtle abnormalities. No acute osseous abnormality of the pelvis. Prior ORIF proximal left femur noted without acute osseous abnormality given   limitations of study.       No acute osseous abnormality given limitations of study of the left knee. XR CHEST PORTABLE   Final Result   No acute cardiopulmonary disease. CT CERVICAL SPINE WO CONTRAST   Preliminary Result   Multilevel degenerative changes of the cervical spine with no radiographic   findings to suggest presence of acute fracture. XR HIP LEFT (2-3 VIEWS)   Final Result   Osteopenia limits detection of subtle abnormalities. No acute osseous abnormality of the pelvis. Prior ORIF proximal left femur noted without acute osseous abnormality given   limitations of study. No acute osseous abnormality given limitations of study of the left knee. CT HEAD WO CONTRAST   Final Result   No acute intracranial abnormality. Recent imaging reviewed    Problem List  Active Problems:    Sepsis ([address]  Resolved Problems:    * No resolved hospital problems. *        Assessment/Plan:   Sepsis secondary to UTI( elevated wbc and tachycardic)  - rocephin  - ivf  - fu cultures    romina onckd3: baseline 1.4: IVF    CAD s/p stents many years ago per patient: on asa and plavix will get cardiology input about dc one agent given recurrent admisions fof falls    Mechanical fall: secondary above: pt ot    dm2 lantus and lispro      DVT prophylaxis hep sub q  Code status full code        Admit as inpatient I anticipate hospitalization spanning more than two midnights for investigation and treatment of the above medically necessary diagnoses. Please note that some part of this chart was generated using Dragon dictation software. Although every effort was made to ensure the accuracy of this automated transcription, some errors in transcription may have occurred inadvertently. If you may need any clarification, please do not hesitate to contact me through [facility].        Foreign Garcia MD    6/13/2021 1:12 PM

## 2021-06-13 NOTE — ACP (ADVANCE CARE PLANNING)
Advanced Care Planning Note.     Purpose of Encounter: Advanced care planning in light of hospitalization  Parties In Attendance: Patient,    Decisional Capacity: Yes  Subjective: Patient  understand that this conversation is to address long term care goal  Objective: Patient mated to hospital with mechanical fall OCTAVIO and sepsis secondary to UTI  Goals of Care Determination: Patient pursue CPR intubation PEG tube and dialysis if required no tracheostomy long-term ventilation  Code Status: full code  Time spent on Advanced care Plannin minutes  Mike Boone [address].: documented patient's wishes, would like Cam Singh phone  to make medical decisions if unable to make decisions    Wanda Chavez MD  2021 1:16 PM

## 2021-06-13 NOTE — ED NOTES
Stuck for IV and didn't get IV but did get blood. Patient didn't want me to sick again.   PA notified     Audelia Garibay RN  06/13/21 9712

## 2021-06-13 NOTE — ED NOTES
Tried to call report to [facility]. . nurse will call back     Tiana Haile RN  06/13/21 0373

## 2021-06-14 LAB
ANION GAP SERPL CALCULATED.3IONS-SCNC: 10 MMOL/L (ref 3–16)
BASOPHILS ABSOLUTE: 0 K/UL (ref 0–0.2)
BASOPHILS RELATIVE PERCENT: 0.2 %
BUN BLDV-MCNC: 43 MG/DL (ref 7–20)
CALCIUM SERPL-MCNC: 8.7 MG/DL (ref 8.3–10.6)
CHLORIDE BLD-SCNC: 109 MMOL/L (ref 99–110)
CO2: 19 MMOL/L (ref 21–32)
CREAT SERPL-MCNC: 1.6 MG/DL (ref 0.8–1.3)
EOSINOPHILS ABSOLUTE: 0.1 K/UL (ref 0–0.6)
EOSINOPHILS RELATIVE PERCENT: 1.1 %
GFR AFRICAN AMERICAN: 52
GFR NON-AFRICAN AMERICAN: 43
GLUCOSE BLD-MCNC: 104 MG/DL (ref 70–99)
GLUCOSE BLD-MCNC: 173 MG/DL (ref 70–99)
GLUCOSE BLD-MCNC: 214 MG/DL (ref 70–99)
GLUCOSE BLD-MCNC: 91 MG/DL (ref 70–99)
GLUCOSE BLD-MCNC: 92 MG/DL (ref 70–99)
HCT VFR BLD CALC: 27.1 % (ref 40.5–52.5)
HEMOGLOBIN: 9 G/DL (ref 13.5–17.5)
LYMPHOCYTES ABSOLUTE: 1.3 K/UL (ref 1–5.1)
LYMPHOCYTES RELATIVE PERCENT: 9.3 %
MCH RBC QN AUTO: 30.1 PG (ref 26–34)
MCHC RBC AUTO-ENTMCNC: 33.2 G/DL (ref 31–36)
MCV RBC AUTO: 90.5 FL (ref 80–100)
MONOCYTES ABSOLUTE: 0.9 K/UL (ref 0–1.3)
MONOCYTES RELATIVE PERCENT: 6.9 %
NEUTROPHILS ABSOLUTE: 11.2 K/UL (ref 1.7–7.7)
NEUTROPHILS RELATIVE PERCENT: 82.5 %
PDW BLD-RTO: 14.7 % (ref 12.4–15.4)
PERFORMED ON: ABNORMAL
PERFORMED ON: NORMAL
PLATELET # BLD: 165 K/UL (ref 135–450)
PMV BLD AUTO: 8.9 FL (ref 5–10.5)
POTASSIUM REFLEX MAGNESIUM: 4.2 MMOL/L (ref 3.5–5.1)
RBC # BLD: 2.99 M/UL (ref 4.2–5.9)
SARS-COV-2: NORMAL
SODIUM BLD-SCNC: 138 MMOL/L (ref 136–145)
WBC # BLD: 13.6 K/UL (ref 4–11)

## 2021-06-14 PROCEDURE — 97162 PT EVAL MOD COMPLEX 30 MIN: CPT

## 2021-06-14 PROCEDURE — 97530 THERAPEUTIC ACTIVITIES: CPT

## 2021-06-14 PROCEDURE — 96374 THER/PROPH/DIAG INJ IV PUSH: CPT

## 2021-06-14 PROCEDURE — 99222 1ST HOSP IP/OBS MODERATE 55: CPT | Performed by: INTERNAL MEDICINE

## 2021-06-14 PROCEDURE — 36415 COLL VENOUS BLD VENIPUNCTURE: CPT

## 2021-06-14 PROCEDURE — 1200000000 HC SEMI PRIVATE

## 2021-06-14 PROCEDURE — 80048 BASIC METABOLIC PNL TOTAL CA: CPT

## 2021-06-14 PROCEDURE — 85025 COMPLETE CBC W/AUTO DIFF WBC: CPT

## 2021-06-14 PROCEDURE — 6360000002 HC RX W HCPCS: Performed by: INTERNAL MEDICINE

## 2021-06-14 PROCEDURE — 6370000000 HC RX 637 (ALT 250 FOR IP): Performed by: INTERNAL MEDICINE

## 2021-06-14 PROCEDURE — 97535 SELF CARE MNGMENT TRAINING: CPT

## 2021-06-14 PROCEDURE — 97166 OT EVAL MOD COMPLEX 45 MIN: CPT

## 2021-06-14 PROCEDURE — 2580000003 HC RX 258: Performed by: INTERNAL MEDICINE

## 2021-06-14 RX ADMIN — INSULIN LISPRO 4 UNITS: 100 INJECTION, SOLUTION INTRAVENOUS; SUBCUTANEOUS at 17:39

## 2021-06-14 RX ADMIN — CARVEDILOL 3.12 MG: 3.12 TABLET, FILM COATED ORAL at 21:59

## 2021-06-14 RX ADMIN — INSULIN GLARGINE 30 UNITS: 100 INJECTION, SOLUTION SUBCUTANEOUS at 22:01

## 2021-06-14 RX ADMIN — TRAZODONE HYDROCHLORIDE 100 MG: 50 TABLET ORAL at 21:59

## 2021-06-14 RX ADMIN — ATORVASTATIN CALCIUM 80 MG: 80 TABLET, FILM COATED ORAL at 21:59

## 2021-06-14 RX ADMIN — CLOPIDOGREL BISULFATE 75 MG: 75 TABLET ORAL at 09:37

## 2021-06-14 RX ADMIN — HEPARIN SODIUM 5000 UNITS: 5000 INJECTION INTRAVENOUS; SUBCUTANEOUS at 22:02

## 2021-06-14 RX ADMIN — Medication 1000 MG: at 09:37

## 2021-06-14 RX ADMIN — PANTOPRAZOLE SODIUM 40 MG: 40 TABLET, DELAYED RELEASE ORAL at 06:28

## 2021-06-14 RX ADMIN — SODIUM CHLORIDE, POTASSIUM CHLORIDE, SODIUM LACTATE AND CALCIUM CHLORIDE: 600; 310; 30; 20 INJECTION, SOLUTION INTRAVENOUS at 09:35

## 2021-06-14 RX ADMIN — HEPARIN SODIUM 5000 UNITS: 5000 INJECTION INTRAVENOUS; SUBCUTANEOUS at 14:35

## 2021-06-14 RX ADMIN — CARVEDILOL 3.12 MG: 3.12 TABLET, FILM COATED ORAL at 09:36

## 2021-06-14 RX ADMIN — INSULIN LISPRO 1 UNITS: 100 INJECTION, SOLUTION INTRAVENOUS; SUBCUTANEOUS at 22:02

## 2021-06-14 RX ADMIN — ACETAMINOPHEN 650 MG: 325 TABLET ORAL at 14:39

## 2021-06-14 RX ADMIN — Medication 10 ML: at 22:01

## 2021-06-14 RX ADMIN — HEPARIN SODIUM 5000 UNITS: 5000 INJECTION INTRAVENOUS; SUBCUTANEOUS at 06:28

## 2021-06-14 RX ADMIN — PAROXETINE HYDROCHLORIDE 40 MG: 20 TABLET, FILM COATED ORAL at 09:36

## 2021-06-14 RX ADMIN — ASPIRIN 81 MG: 81 TABLET, COATED ORAL at 09:37

## 2021-06-14 ASSESSMENT — PAIN SCALES - GENERAL
PAINLEVEL_OUTOF10: 4
PAINLEVEL_OUTOF10: 0

## 2021-06-14 NOTE — PROGRESS NOTES
[facility]    6/14/2021 10:26 AM        Name: Martínez Vora . Admitted: 6/13/2021  Primary Care Provider: No primary care provider on file.  (Tel: None)        Subjective:    Patient lying in bed resting comfortably no chest pain shortness breath nausea vomiting    Reviewed interval ancillary notes    Current Medications  aspirin EC tablet 81 mg, Daily  atorvastatin (LIPITOR) tablet 80 mg, Nightly  carvedilol (COREG) tablet 3.125 mg, BID  clopidogrel (PLAVIX) tablet 75 mg, Daily  insulin glargine (LANTUS;BASAGLAR) injection pen 30 Units, Nightly  pantoprazole (PROTONIX) tablet 40 mg, QAM AC  PARoxetine (PAXIL) tablet 40 mg, Daily  traZODone (DESYREL) tablet 100 mg, Nightly  lactated ringers infusion, Continuous  insulin lispro (1 Unit Dial) 0-12 Units, TID WC  insulin lispro (1 Unit Dial) 0-6 Units, Nightly  sodium chloride flush 0.9 % injection 5-40 mL, 2 times per day  sodium chloride flush 0.9 % injection 5-40 mL, PRN  0.9 % sodium chloride infusion, PRN  ondansetron (ZOFRAN-ODT) disintegrating tablet 4 mg, Q8H PRN   Or  ondansetron (ZOFRAN) injection 4 mg, Q6H PRN  polyethylene glycol (GLYCOLAX) packet 17 g, Daily PRN  acetaminophen (TYLENOL) tablet 650 mg, Q6H PRN   Or  acetaminophen (TYLENOL) suppository 650 mg, Q6H PRN  heparin (porcine) injection 5,000 Units, 3 times per day  glucose (GLUTOSE) 40 % oral gel 15 g, PRN  dextrose 50 % IV solution, PRN  glucagon (rDNA) injection 1 mg, PRN  dextrose 5 % solution, PRN  cefTRIAXone (ROCEPHIN) 1000 mg in sterile water 10 mL IV syringe, Q24H        Objective:  BP (!) 128/51   Pulse 83   Temp 99.2 °F (37.3 °C) (Temporal)   Resp 18   Ht 5' 3\" (1.6 m)   Wt 134 lb 11.2 oz (61.1 kg)   SpO2 94%   BMI 23.86 kg/m²     Intake/Output Summary (Last 24 hours) at 6/14/2021 1026  Last data filed at 6/14/2021 0358  Gross per 24 hour   Intake 240 ml   Output 200 ml Net 40 ml      Wt Readings from Last 3 Encounters:   06/14/21 134 lb 11.2 oz (61.1 kg)   04/01/20 140 lb (63.5 kg)   02/04/20 130 lb 12.8 oz (59.3 kg)       General appearance:  Appears comfortable. AAOx3  HEENT: atraumatic, Pupils equal, muscous membranes moist, no masses appreciated  Cardiovascular: Regular rate and rhythm no murmurs appreciated  Respiratory: CTAB no wheezing  Gastrointestinal: Abdomen soft, non-tender, BS+  EXT: no edema  Neurology: no gross focal deficts  Psychiatry: Appropriate affect. Not agitated  Skin: Warm, dry, no rashes appreciated    Labs and Tests:  CBC:   Recent Labs     06/13/21  0814 06/14/21  0826   WBC 20.2* 13.6*   HGB 11.1* 9.0*    165     BMP:    Recent Labs     06/13/21  0814 06/14/21  0826    138   K 4.4 4.2    109   CO2 17* 19*   BUN 47* 43*   CREATININE 2.1* 1.6*   GLUCOSE 122* 91     Hepatic:   Recent Labs     06/13/21  0814   AST 16   ALT 15   BILITOT 0.4   ALKPHOS 132*     XR KNEE LEFT (MIN 4 VIEWS)   Final Result   Osteopenia limits detection of subtle abnormalities. No acute osseous abnormality of the pelvis. Prior ORIF proximal left femur noted without acute osseous abnormality given   limitations of study. No acute osseous abnormality given limitations of study of the left knee. XR CHEST PORTABLE   Final Result   No acute cardiopulmonary disease. CT CERVICAL SPINE WO CONTRAST   Final Result   Multilevel degenerative changes of the cervical spine with no radiographic   findings to suggest presence of acute fracture. XR HIP LEFT (2-3 VIEWS)   Final Result   Osteopenia limits detection of subtle abnormalities. No acute osseous abnormality of the pelvis. Prior ORIF proximal left femur noted without acute osseous abnormality given   limitations of study. No acute osseous abnormality given limitations of study of the left knee.          CT HEAD WO CONTRAST   Final Result   No acute intracranial abnormality. Recent imaging reviewed    Problem List  Active Problems:    Sepsis ([address]  Resolved Problems:    * No resolved hospital problems.  *        Assessment/Plan:   Sepsis secondary to UTI( elevated wbc and tachycardic)  - rocephin  - fu cultures pending   romina onckd3: baseline 1.4: IVF improving     CAD s/p stents many years ago per patient: on asa and plavix will get cardiology input about dc one agent given recurrent admisions fof falls  - cards consult      Mechanical fall: secondary above: pt ot     dm2 lantus and lispro        DVT prophylaxis hep sub q  Code status full code         Héctor Huber MD   6/14/2021 10:26 AM

## 2021-06-14 NOTE — PROGRESS NOTES
Physical Therapy    Facility/Department: [address]  Initial Assessment    NAME: Angi Carmen  : 1950  MRN: [id number]    Date of Service: 2021    Discharge Recommendations:  3-5 sessions per week   PT Equipment Recommendations  Other: defer until further assessment    Angi Carmen scored a  on the AM-PAC short mobility form. Current research shows that an AM-PAC score of 17 or less is typically not associated with a discharge to the patient's home setting. Based on the patient's AM-PAC score and their current functional mobility deficits, it is recommended that the patient have 3-5 sessions per week of Physical Therapy at d/c to increase the patient's independence. Please see assessment section for further patient specific details. If patient discharges prior to next session this note will serve as a discharge summary. Please see below for the latest assessment towards goals. Assessment   Body structures, Functions, Activity limitations: Decreased functional mobility ; Decreased strength;Decreased endurance;Decreased balance  Assessment: Pt tolerates therapy evaluations well this date, presents with decreased activity tolerance and impaired strength hindering independence. Pt requires mod A for bed mobility and STS/stand pivot transfers to recliner chair. Pt will continue to benefit from skilled PT services to continue progressing independence to improve quality of life.   Treatment Diagnosis: impaired functional mobility, decreased activity tolerance  Prognosis: Good  Decision Making: Medium Complexity  History: see above  Exam: MMT, bed mobility, transfers  Clinical Presentation: evolving  PT Education: Goals;PT Role;Plan of Care;Transfer Training  Patient Education: D/c recommendations, pt verbalizes understanding  REQUIRES PT FOLLOW UP: Yes  Activity Tolerance  Activity Tolerance: Patient Tolerated treatment well       Patient Diagnosis(es): The primary encounter diagnosis was Acute Screening  Patient Currently in Pain: Denies  Vital Signs  Patient Currently in Pain: Denies       Orientation  Orientation  Overall Orientation Status: Within Normal Limits  Social/Functional History  Social/Functional History  Type of Home: Facility (has lived at nursing home since december)  Home Layout: One level  Bathroom Shower/Tub: Walk-in shower  Bathroom Toilet: Standard  Bathroom Equipment: Shower chair  Home Equipment: [employer] (has been using w/c last few days prior to hospitalization due to recurrent falls)  Receives Help From: Other (comment) (staff)  ADL Assistance: Needs assistance (toileting, showering,)  Homemaking Assistance: Needs assistance (staff assists with meal prep. cleaning)  Ambulation Assistance: Independent (was independent prior to recent falls x 4 days)  Transfer Assistance: Independent  Additional Comments: Pt may be poor historian and information should be confirmed, intermittenty states conflicting information. Cognition   Cognition  Overall Cognitive Status: Exceptions  Arousal/Alertness: Delayed responses to stimuli  Following Commands: Follows one step commands with repetition; Follows one step commands with increased time  Attention Span: Difficulty attending to directions; Difficulty dividing attention  Memory: Decreased recall of recent events;Decreased short term memory  Safety Judgement: Decreased awareness of need for assistance;Decreased awareness of need for safety  Problem Solving: Decreased awareness of errors  Insights: Decreased awareness of deficits  Initiation: Requires cues for some  Sequencing: Requires cues for some    Objective             Strength RLE  Strength RLE: Exception  Comment: 3/5 strength  Strength LLE  Strength LLE: Exception  Comment: grossly 3-/5 strength     Sensation  Overall Sensation Status: WFL  Bed mobility  Supine to Sit: Moderate assistance (cues for hand placement)  Scooting: Minimal assistance  Transfers  Sit to Stand:  Moderate Assistance  Stand to sit: Moderate Assistance  Stand Pivot Transfers:  Moderate Assistance  Ambulation  Ambulation?: No     Balance  Posture: Fair  Sitting - Static: Fair (CGA-min A)  Sitting - Dynamic: Fair (min A with posterior lean)  Standing - Static: Fair (min A)  Standing - Dynamic: Poor (mod A)        Plan   Plan  Times per week: 3-5  Times per day: Daily  Current Treatment Recommendations: Strengthening, Transfer Training, Endurance Training, Gait Training, Balance Training, Safety Education & Training  Safety Devices  Type of devices: Call light within reach, Chair alarm in place, Left in chair, Nurse notified    AM-PAC Score  AM-PAC Inpatient Mobility Raw Score : 12 (06/14/21 [facility])  AM-PAC Inpatient T-Scale Score : 35.33 (06/14/21 [facility])  Mobility Inpatient CMS 0-100% Score: 68.66 (06/14/21 [facility])  Mobility Inpatient CMS G-Code Modifier : CL (06/14/21 [facility])          Goals  Short term goals  Time Frame for Short term goals: to be met by d/c  Short term goal 1: pt will complete bed mobility with mod I  Short term goal 2: pt will complete STS with SBA  Short term goal 3: pt will complete stand step transfer with SBA  Short term goal 4: Pt will ambulate x 50' with SBA and use of LRAD  Patient Goals   Patient goals : to get better       Therapy Time   Individual Concurrent Group Co-treatment   Time In       1049   Time Out       1142   Minutes       53         Timed code minutes: 38 minutes  Total treatment minutes: [address], [address] [facility] PT, DPT [id number]

## 2021-06-14 NOTE — PROGRESS NOTES
Shift assessment completed. Pt is a/o X 4. VSS. Medications administered, see MAR. POC discussed and all questions answered. Pt provided with fresh ice water. Pt has belongings and call light in reach. Bed is locked and in lowest position, bed alarm is active and pt agrees to call for assistance OOB. Pt denies further needs, will continue to monitor.

## 2021-06-14 NOTE — CARE COORDINATION
Discharge Planning Assessment    SW discharge planner met with patient/family member to discuss reason for admission, current living situation, and potential needs at the time of discharge. Demographics/Insurance verified: Yes  Current type of dwelling: [facility] at the [facility]  Patient from ECF/SW confirmed with: since December 2020. SW spoke to [facility] [phone] / [phone] in admissions, facility can accept patient back when ready. Requires a covid test within 72 hours of return. Living arrangements: LTC  Steps to enter home / inside home: 0  Hx of fall/s: Yes   Level of function/support: Independent   Work: N/A  PCP: facility  Pharmacy: facility   Medication compliance issues:  DME: Cane    Patient did not provide emergency contacts. Patient is . Patient does not have children, has one step-child (no contact). Active with any community resources/agencies/skilled home care/oxygen/dialysis: None   Financial issues that could impact healthcare: None    Transportation at time of d/c (Discussed w/pt/family that on the day of discharge home, tentative time of discharge will be between 10am and noon): Medical    Patient agreeable to going back to [facility] at [facility]. Tentative discharge plan: Return to SNF. Anticipated discharge tomorrow 6/15.     Delfina OLIVO, [address]

## 2021-06-14 NOTE — PROGRESS NOTES
Assessment completed and documented. Pt wouldn't open eyes and when asked about taking AM meds he wanted to wait. New bag of IVFs hung. Will continue to monitor.

## 2021-06-14 NOTE — PLAN OF CARE
Problem: Falls - Risk of:  Goal: Will remain free from falls  Description: Will remain free from falls  Outcome: Met This Shift  Note: Pt is wearing the fall bracelet and yellow nonskid socks. Bed is in lowest position, locked, side rails up 2/4, and call light is within reach. Pt informed of fall risks, verbalizes understanding, and agrees to ask for help to ambulate. Will monitor. Problem: Skin Integrity:  Goal: Absence of new skin breakdown  Description: Absence of new skin breakdown  Outcome: Met This Shift  Note: No breakdown noted on this shift. Turning pt q2h and PRN. Incontinent care being done PRN. Heels elevated off of bed. Pt on low air loss mattress. Will monitor.

## 2021-06-14 NOTE — PROGRESS NOTES
Occupational Therapy   Occupational Therapy Initial Assessment  Date: 2021   Patient Name: Savage Ag  MRN: [id number]     : 1950    Date of Service: 2021    Discharge Recommendations:Chauncey Mario scored a  on the AM-PAC ADL Inpatient form. Current research shows that an AM-PAC score of 17 or less is typically not associated with a discharge to the patient's home setting. Based on the patient's AM-PAC score and their current ADL deficits, it is recommended that the patient have 3-5 sessions per week of Occupational Therapy at d/c to increase the patient's independence. Please see assessment section for further patient specific details. If patient discharges prior to next session this note will serve as a discharge summary. Please see below for the latest assessment towards goals. OT Equipment Recommendations  Equipment Needed: No    Assessment   Performance deficits / Impairments: Decreased functional mobility ; Decreased ADL status; Decreased cognition;Decreased endurance;Decreased balance;Decreased safe awareness;Decreased strength  Assessment: Patient presents with the above deficits impacting occupational performance and functioning below baseline level. Recommend skilled OT to address deficits and promote functional independence. Treatment Diagnosis: Decreased functional mobility, ADLs cognition, endurnace strength and balance associated with sepsis. Prognosis: Good  Decision Making: Medium Complexity  OT Education: OT Role;Plan of Care;Transfer Training;ADL Adaptive Strategies  Patient Education: Patient is not an independent learner. Recommend reinforcement for all new learning. Barriers to Learning: Cognition  REQUIRES OT FOLLOW UP: Yes  Activity Tolerance  Activity Tolerance: Patient Tolerated treatment well;Treatment limited secondary to decreased cognition  Activity Tolerance: Patient limited by intermittant command following.   Complains of dizziness but BP WFL sitting EOB  similar to prior BP supine. Safety Devices  Safety Devices in place: Yes  Type of devices: All fall risk precautions in place;Call light within reach; Chair alarm in place; Left in chair;Patient at risk for falls;Gait belt;Nurse notified (Recommend Lorenzo Gang back to bed.)           Patient Diagnosis(es): The primary encounter diagnosis was Acute cystitis without hematuria. Diagnoses of General weakness, Frequent falls, and Acute renal failure superimposed on chronic kidney disease, unspecified CKD stage, unspecified acute renal failure type [facility]) were also pertinent to this visit. has a past medical history of Arthritis, Ataxia, BPH (benign prostatic hyperplasia), CAD (coronary artery disease), Coronary atherosclerosis of native coronary artery, Diabetes mellitus ([facility] [address] Environmental allergies, Hepatitis, Hydronephrosis, Hyperlipidemia, Hypertension, Kidney disease, Neuropathy, Parkinson's disease ([facility] [address] S/P coronary artery stent placement x 4, and Schizoaffective disorder, bipolar type ([facility] [address] has a past surgical history that includes Coronary angioplasty; Coronary angioplasty with stent; hernia repair; Vasectomy; Cystocopy (4/4/14); Ankle surgery (Right, 06/09/2018); Upper gastrointestinal endoscopy (N/A, 12/13/2018); Colonoscopy (N/A, 12/13/2018); Upper gastrointestinal endoscopy (N/A, 5/3/2019); and ORIF DISTAL RADIUS FRACTURE (Left, 6/20/2019). Treatment Diagnosis: Decreased functional mobility, ADLs cognition, endurnace strength and balance associated with sepsis. Restrictions  Restrictions/Precautions  Restrictions/Precautions: Fall Risk, Isolation (High fall risk, 3 carb choices, droplet plus)  Position Activity Restriction  Other position/activity restrictions: 78 yo male presents from nursing home with with multiple areas of discomfort (head and neck, L hip and L knee), some chronic and subacute from recent falls.  Dx: acute cystitis without hematuria PMH: muscle weakness, cognitive deficits, dementia, schizoaffective disorder, PD, coronary artery stent placement x 4. Imaging: negative. Subjective   General  Chart Reviewed: Yes  Additional Pertinent Hx: Schizoaffective disorder, CAD with stent, DM  Family / Caregiver Present: No  Diagnosis: Sepsis  Subjective  Subjective: Patient in bed, agreeable to therapy with encouragement. Denies pain. Later mentions shoulder discomfort with movement    Social/Functional History  Social/Functional History  Type of Home: Facility (has lived at nursing home since december)  Home Layout: One level  Bathroom Shower/Tub: Walk-in shower  Bathroom Toilet: Standard  Bathroom Equipment: Shower chair  Home Equipment: [address] (has been using w/c last few days prior to hospitalization due to recurrent falls)  Receives Help From: Other (comment) (staff)  ADL Assistance: Needs assistance (toileting, showering,)  Homemaking Assistance: Needs assistance (staff assists with meal prep. cleaning)  Ambulation Assistance: Independent (was independent prior to recent falls x 4 days)  Transfer Assistance: Independent  Additional Comments: Pt may be poor historian and information should be confirmed, intermittenty states conflicting information. Objective   Vision: Impaired  Vision Exceptions: Wears glasses for reading  Hearing: Within functional limits    Orientation  Overall Orientation Status: Within Functional Limits     Balance  Sitting Balance: Moderate assistance  Standing Balance: Moderate assistance (Attempted stance with RW. Performed Stand step transfers to chair without device.)  ADL  Feeding: Moderate assistance  Grooming: Stand by assistance (seated set up for face)  Toileting: None (purewick in place)  Additional Comments: Patient with decreased balance EOB with prolonged sit. C/O dizziness with sitting EOB. BP taken and WFL compared to prior BP when in bed.   Tone RUE  RUE Tone: Normotonic  Tone LUE  LUE Tone: (06/14/21 1308)  ADL Inpatient CMS G-Code Modifier : CK (06/14/21 1308)    Goals  Short term goals  Time Frame for Short term goals: Discharge  Short term goal 1: UB ADLs with supervision  Short term goal 2: LB ADLs with Min A  Short term goal 3: Grooming with set up  Short term goal 4: Functional ADL mobility with appropriate device and Mod of 1  Short term goal 5: Functional ADL transfers with appropriate device and Mod of 1  Long term goals  Time Frame for Long term goals : LTG=STG       Therapy Time   Individual Concurrent Group Co-treatment   Time In 1049         Time Out 1142         Minutes 53              Timed Code Treatment Minutes:   38    Total Treatment Minutes:  [address], OT   [facility]  Alli [address]

## 2021-06-15 VITALS
HEIGHT: 63 IN | HEART RATE: 70 BPM | SYSTOLIC BLOOD PRESSURE: 135 MMHG | RESPIRATION RATE: 18 BRPM | OXYGEN SATURATION: 100 % | DIASTOLIC BLOOD PRESSURE: 55 MMHG | WEIGHT: 138.89 LBS | BODY MASS INDEX: 24.61 KG/M2 | TEMPERATURE: 97.6 F

## 2021-06-15 LAB
ANION GAP SERPL CALCULATED.3IONS-SCNC: 11 MMOL/L (ref 3–16)
BASOPHILS ABSOLUTE: 0 K/UL (ref 0–0.2)
BASOPHILS RELATIVE PERCENT: 0.4 %
BUN BLDV-MCNC: 30 MG/DL (ref 7–20)
CALCIUM SERPL-MCNC: 9.2 MG/DL (ref 8.3–10.6)
CHLORIDE BLD-SCNC: 113 MMOL/L (ref 99–110)
CO2: 20 MMOL/L (ref 21–32)
CREAT SERPL-MCNC: 1.3 MG/DL (ref 0.8–1.3)
EKG ATRIAL RATE: 93 BPM
EKG DIAGNOSIS: NORMAL
EKG P AXIS: 72 DEGREES
EKG P-R INTERVAL: 166 MS
EKG Q-T INTERVAL: 384 MS
EKG QRS DURATION: 130 MS
EKG QTC CALCULATION (BAZETT): 477 MS
EKG R AXIS: -19 DEGREES
EKG T AXIS: 21 DEGREES
EKG VENTRICULAR RATE: 93 BPM
EOSINOPHILS ABSOLUTE: 0.3 K/UL (ref 0–0.6)
EOSINOPHILS RELATIVE PERCENT: 4.1 %
GFR AFRICAN AMERICAN: >60
GFR NON-AFRICAN AMERICAN: 55
GLUCOSE BLD-MCNC: 128 MG/DL (ref 70–99)
GLUCOSE BLD-MCNC: 131 MG/DL (ref 70–99)
GLUCOSE BLD-MCNC: 165 MG/DL (ref 70–99)
HCT VFR BLD CALC: 26.7 % (ref 40.5–52.5)
HEMOGLOBIN: 9.1 G/DL (ref 13.5–17.5)
LYMPHOCYTES ABSOLUTE: 0.7 K/UL (ref 1–5.1)
LYMPHOCYTES RELATIVE PERCENT: 10 %
MAGNESIUM: 1.7 MG/DL (ref 1.8–2.4)
MCH RBC QN AUTO: 30.6 PG (ref 26–34)
MCHC RBC AUTO-ENTMCNC: 34.1 G/DL (ref 31–36)
MCV RBC AUTO: 89.7 FL (ref 80–100)
MONOCYTES ABSOLUTE: 0.5 K/UL (ref 0–1.3)
MONOCYTES RELATIVE PERCENT: 7.3 %
NEUTROPHILS ABSOLUTE: 5.9 K/UL (ref 1.7–7.7)
NEUTROPHILS RELATIVE PERCENT: 78.2 %
ORGANISM: ABNORMAL
PDW BLD-RTO: 15.1 % (ref 12.4–15.4)
PERFORMED ON: ABNORMAL
PERFORMED ON: ABNORMAL
PLATELET # BLD: 181 K/UL (ref 135–450)
PMV BLD AUTO: 8.9 FL (ref 5–10.5)
POTASSIUM SERPL-SCNC: 4 MMOL/L (ref 3.5–5.1)
RBC # BLD: 2.97 M/UL (ref 4.2–5.9)
SODIUM BLD-SCNC: 144 MMOL/L (ref 136–145)
URINE CULTURE, ROUTINE: ABNORMAL
WBC # BLD: 7.5 K/UL (ref 4–11)

## 2021-06-15 PROCEDURE — 2580000003 HC RX 258: Performed by: INTERNAL MEDICINE

## 2021-06-15 PROCEDURE — 83735 ASSAY OF MAGNESIUM: CPT

## 2021-06-15 PROCEDURE — 97116 GAIT TRAINING THERAPY: CPT

## 2021-06-15 PROCEDURE — 6360000002 HC RX W HCPCS: Performed by: INTERNAL MEDICINE

## 2021-06-15 PROCEDURE — 97530 THERAPEUTIC ACTIVITIES: CPT

## 2021-06-15 PROCEDURE — 93010 ELECTROCARDIOGRAM REPORT: CPT | Performed by: INTERNAL MEDICINE

## 2021-06-15 PROCEDURE — 96374 THER/PROPH/DIAG INJ IV PUSH: CPT

## 2021-06-15 PROCEDURE — 36415 COLL VENOUS BLD VENIPUNCTURE: CPT

## 2021-06-15 PROCEDURE — 97535 SELF CARE MNGMENT TRAINING: CPT

## 2021-06-15 PROCEDURE — 6370000000 HC RX 637 (ALT 250 FOR IP): Performed by: INTERNAL MEDICINE

## 2021-06-15 PROCEDURE — 85025 COMPLETE CBC W/AUTO DIFF WBC: CPT

## 2021-06-15 PROCEDURE — 80048 BASIC METABOLIC PNL TOTAL CA: CPT

## 2021-06-15 RX ORDER — CIPROFLOXACIN 500 MG/1
500 TABLET, FILM COATED ORAL 2 TIMES DAILY
Qty: 10 TABLET | Refills: 0 | Status: SHIPPED | OUTPATIENT
Start: 2021-06-15 | End: 2021-06-20

## 2021-06-15 RX ORDER — MAGNESIUM SULFATE 1 G/100ML
1000 INJECTION INTRAVENOUS ONCE
Status: DISCONTINUED | OUTPATIENT
Start: 2021-06-15 | End: 2021-06-15 | Stop reason: SDUPTHER

## 2021-06-15 RX ORDER — CARVEDILOL 6.25 MG/1
6.25 TABLET ORAL 2 TIMES DAILY WITH MEALS
Qty: 30 TABLET | Refills: 0 | Status: SHIPPED | OUTPATIENT
Start: 2021-06-15

## 2021-06-15 RX ADMIN — SODIUM CHLORIDE, POTASSIUM CHLORIDE, SODIUM LACTATE AND CALCIUM CHLORIDE: 600; 310; 30; 20 INJECTION, SOLUTION INTRAVENOUS at 01:17

## 2021-06-15 RX ADMIN — MAGNESIUM GLUCONATE 500 MG ORAL TABLET 400 MG: 500 TABLET ORAL at 11:16

## 2021-06-15 RX ADMIN — PANTOPRAZOLE SODIUM 40 MG: 40 TABLET, DELAYED RELEASE ORAL at 06:09

## 2021-06-15 RX ADMIN — PAROXETINE HYDROCHLORIDE 40 MG: 20 TABLET, FILM COATED ORAL at 10:06

## 2021-06-15 RX ADMIN — CARVEDILOL 3.12 MG: 3.12 TABLET, FILM COATED ORAL at 10:06

## 2021-06-15 RX ADMIN — HEPARIN SODIUM 5000 UNITS: 5000 INJECTION INTRAVENOUS; SUBCUTANEOUS at 06:09

## 2021-06-15 RX ADMIN — ASPIRIN 81 MG: 81 TABLET, COATED ORAL at 10:06

## 2021-06-15 ASSESSMENT — PAIN SCALES - GENERAL
PAINLEVEL_OUTOF10: 0

## 2021-06-15 NOTE — DISCHARGE INSTR - COC
Continuity of Care Form    Patient Name: Bladimir Mancilla   :  1950  MRN:  [id number]    Admit date:  2021  Discharge date:  6/15/2021    Code Status Order: Full Code   Advance Directives:   [facility] Documentation       Date/Time Healthcare Directive Type of Healthcare Directive Copy in [address] Agent's Name Healthcare Agent's Phone Number    [phone]  No, patient does not have an advance directive for healthcare treatment -- -- -- -- --            Admitting Physician:  Manuela White MD  PCP: No primary care provider on file. Discharging Nurse: [facility] Unit/Room#: U5H-7263/7663-94  Discharging Unit Phone Number: [phone]    Emergency Contact:   No emergency contact information on file.     Past Surgical History:  Past Surgical History:   Procedure Laterality Date    ANKLE SURGERY Right 2018    ORIF of R ankle     COLONOSCOPY N/A 2018    COLONOSCOPY CONTROL HEMORRHAGE performed by L[facility] at [facility]      x 4    CYSTOSCOPY  14    transurethral vaporization of prostate    HERNIA REPAIR      X2    ORIF DISTAL RADIUS FRACTURE Left 2019    LEFT DISTAL RADIUS OPEN REDUCTION INTERNAL FIXATION   performed by David Kennedy MD at [facility] N/A 2018    EGD BIOPSY performed by Lola Guevara MD at [facility] 5/3/2019    EGD WITH ANESTHESIA performed by Lola Guevara MD at [address]         Immunization History:   Immunization History   Administered Date(s) Administered    Influenza Virus Vaccine 10/10/2014    Pneumococcal Conjugate 13-valent ([id number]) 2017    Tdap (Boostrix, Adacel) 2020       Active Problems:  Patient Active Problem List   Diagnosis Code    DM2 (diabetes mellitus, type 2) ([facility] [address] E11.9    Dizziness R42    Hematuria R31.9    Hyponatremia E87.1    S/P coronary artery stent placement Z95.5    S/P coronary artery stent placement x 4 Z95.5    Orthostatic hypotension I95.1    Acute posthemorrhagic anemia D62    Contusion, knee S80.00XA    DM hyperosmolarity type II, uncontrolled ([facility]) E11.00, E11.65    Coronary artery disease involving native coronary artery of native heart without angina pectoris I25.10    HTN (hypertension) I10    Chest pain R07.9    Ischemic chest pain ([facility]) I20.9    Near syncope R55    CKD (chronic kidney disease), stage III ([facility]) N18.30    Anemia D64.9    CHF (congestive heart failure) ([facility]) I50.9    DM (diabetes mellitus) ([facility]) E11.9    Closed trimalleolar fracture of right ankle S82.851A    Closed fracture of fifth metacarpal bone of left hand S62.307A    Closed nondisplaced fracture of fifth metatarsal bone of left foot S92.355A    Hypokalemia E87.6    Hyperkalemia E87.5    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 K55.21    OCTAVIO (acute kidney injury) ([facility]) N17.9    Heartburn R12    Esophageal dysphagia R13.10    Esophagitis, [place] grade B K20.80    Closed fracture of metatarsal neck S92.309A    Closed fracture of metatarsal neck, left, initial encounter S92.302A    Ataxia R27.0    Sepsis ([facility]) A41.9       Isolation/Infection:   Isolation            Droplet Plus          Patient Infection Status       Infection Onset Added Last Indicated Last Indicated By Review Planned Expiration Resolved Resolved By    None active    Resolved    COVID-19 Rule Out 06/13/21 06/13/21 06/13/21 COVID-19 (Ordered)   06/14/21 Rule-Out Test Resulted    COVID-19 Rule Out 06/13/21 06/13/21 06/13/21 COVID-19, Rapid (Ordered)   06/13/21 Rule-Out Test Resulted            Nurse Assessment:  Last Vital Signs: BP (!) 139/54   Pulse 69   Temp 98.3 °F (36.8 °C) (Temporal)   Resp 18   Ht EDT    CASE MANAGEMENT/SOCIAL WORK SECTION    Inpatient Status Date: ***    Readmission Risk Assessment Score:  Readmission Risk              Risk of Unplanned Readmission:  21           Discharging to Facility/ Agency   · Name: [facility]  · Address: [address] [place] [address] Karen Rolle, [address]   · Report: [phone] Lisa Citizen)  · Fax:  [phone]    / signature: Ortega Haley MSW LSW     PHYSICIAN SECTION    Prognosis: Fair    Condition at Discharge: Stable    Rehab Potential (if transferring to Rehab): Fair    Recommended Labs or Other Treatments After Discharge:     Physician Certification: I certify the above information and transfer of Sukhdev Osborn  is necessary for the continuing treatment of the diagnosis listed and that he requires Aquilino Stearns for greater 30 days.      Update Admission H&P: No change in H&P    PHYSICIAN SIGNATURE:  Electronically signed by Kia Mancini MD on 6/15/21 at 7:35 AM EDT

## 2021-06-15 NOTE — PROGRESS NOTES
Physical Therapy  Facility/Department: [address]  Daily Treatment Note  NAME: Pamela Rouse  : 1950  MRN: [id number]    Date of Service: 6/15/2021    Discharge Recommendations:  3-5 sessions per week   PT Equipment Recommendations  Equipment Needed: No    Pamela Rouse scored a 17/24 on the AM-PAC short mobility form. Current research shows that an AM-PAC score of 17 or less is typically not associated with a discharge to the patient's home setting. Based on the patient's AM-PAC score and their current functional mobility deficits, it is recommended that the patient have 3-5 sessions per week of Physical Therapy at d/c to increase the patient's independence. Please see assessment section for further patient specific details. If patient discharges prior to next session this note will serve as a discharge summary. Please see below for the latest assessment towards goals. Assessment   Body structures, Functions, Activity limitations: Decreased functional mobility ; Decreased strength;Decreased endurance;Decreased balance  Assessment: Pt tolerates treatment session well this date, continues to present with decreased activity tolerance but presents with improved mobility. Pt intermittently demos LOB while ambulating with RW requiring min A to correct. Pt will continue to benefit from skilled PT services to progress independence. PT Education: Goals;PT Role;Plan of Care;Transfer Training  Patient Education: D/c recommendations, pt verbalizes understanding  REQUIRES PT FOLLOW UP: Yes  Activity Tolerance  Activity Tolerance: Patient Tolerated treatment well     Patient Diagnosis(es): The primary encounter diagnosis was Acute cystitis without hematuria. Diagnoses of General weakness, Frequent falls, and Acute renal failure superimposed on chronic kidney disease, unspecified CKD stage, unspecified acute renal failure type [facility]) were also pertinent to this visit.      has a past medical history of Arthritis, Ataxia, BPH (benign prostatic hyperplasia), CAD (coronary artery disease), Coronary atherosclerosis of native coronary artery, Diabetes mellitus ([facility] [address] Environmental allergies, Hepatitis, Hydronephrosis, Hyperlipidemia, Hypertension, Kidney disease, Neuropathy, Parkinson's disease ([facility] [address] S/P coronary artery stent placement x 4, and Schizoaffective disorder, bipolar type ([facility] [address] has a past surgical history that includes Coronary angioplasty; Coronary angioplasty with stent; hernia repair; Vasectomy; Cystocopy (4/4/14); Ankle surgery (Right, 06/09/2018); Upper gastrointestinal endoscopy (N/A, 12/13/2018); Colonoscopy (N/A, 12/13/2018); Upper gastrointestinal endoscopy (N/A, 5/3/2019); and ORIF DISTAL RADIUS FRACTURE (Left, 6/20/2019). Restrictions  Restrictions/Precautions  Restrictions/Precautions: Fall Risk, Isolation  Position Activity Restriction  Other position/activity restrictions: 80 yo male presents from nursing home with with multiple areas of discomfort (head and neck, L hip and L knee), some chronic and subacute from recent falls. Dx: acute cystitis without hematuria PMH: muscle weakness, cognitive deficits, dementia, schizoaffective disorder, PD, coronary artery stent placement x 4. Imaging: negative. Subjective   General  Chart Reviewed: Yes  General Comment  Comments: Pt standing at recliner chair with nursing staff upon entry, denies pain and is agreeable to tx session. Pain Screening  Patient Currently in Pain: Denies  Vital Signs  Patient Currently in Pain: Denies            Cognition   Cognition  Overall Cognitive Status: Exceptions  Attention Span: Difficulty attending to directions; Difficulty dividing attention  Safety Judgement: Decreased awareness of need for assistance;Decreased awareness of need for safety  Insights: Decreased awareness of deficits  Initiation: Requires cues for some  Sequencing: Requires cues for some  Objective      Transfers  Sit to Stand: Contact guard

## 2021-06-15 NOTE — PLAN OF CARE
Problem: Falls - Risk of:  Goal: Will remain free from falls  Description: Will remain free from falls  6/15/2021 1201 by Eliza Young RN  Outcome: Ongoing  Note: Pt remains free of falls this shift. Fall precautions in place per protocol. Will continue to monitor.       Problem: Skin Integrity:  Goal: Will show no infection signs and symptoms  Description: Will show no infection signs and symptoms  6/15/2021 1201 by Eliza Young RN  Outcome: Ongoing

## 2021-06-15 NOTE — PROGRESS NOTES
Report given to [facility] @ [facility] at [facility]

## 2021-06-15 NOTE — DISCHARGE SUMMARY
PHOS 3.8 09/12/2017    ALKPHOS 132 06/13/2021    ALT 15 06/13/2021    AST 16 06/13/2021    BILITOT 0.4 06/13/2021    LABALBU 4.1 06/13/2021    LDLCALC 22 12/07/2015    TRIG 146 12/07/2015     Lab Results   Component Value Date    INR 1.13 05/26/2019    INR 1.02 06/08/2018    INR 0.96 10/08/2014       Radiology:  XR HIP LEFT (2-3 VIEWS)    Result Date: 6/13/2021  EXAMINATION: TWO XRAY VIEWS OF THE LEFT HIP; FOUR XRAY VIEWS OF THE LEFT KNEE 6/13/2021 8:42 am COMPARISON: None. HISTORY: ORDERING SYSTEM PROVIDED HISTORY: Injury TECHNOLOGIST PROVIDED HISTORY: Reason for exam:->Injury Acuity: Unknown FINDINGS: Pelvis and left hip: Study is limited by diffuse osteopenia. The bony pelvis demonstrates no acute abnormality given limitations of the study. Limited imaging of the right hip is grossly unremarkable. Left hip demonstrates prior ORIF of a left femur fracture. Orthopedic hardware appears intact with a intramedullary nail, dynamic blade and locking screw noted. Minimal degenerative changes noted at the left hip joint. Peripheral vascular calcification noted bilaterally. Left knee: No acute osseous abnormality noted given the limitations from underlying osteopenia. No significant joint effusion noted. Degenerative changes noted of the anterior compartment. Diffuse peripheral vascular calcifications noted. Osteopenia limits detection of subtle abnormalities. No acute osseous abnormality of the pelvis. Prior ORIF proximal left femur noted without acute osseous abnormality given limitations of study. No acute osseous abnormality given limitations of study of the left knee. XR KNEE LEFT (MIN 4 VIEWS)    Result Date: 6/13/2021  EXAMINATION: TWO XRAY VIEWS OF THE LEFT HIP; FOUR XRAY VIEWS OF THE LEFT KNEE 6/13/2021 8:42 am COMPARISON: None.  HISTORY: ORDERING SYSTEM PROVIDED HISTORY: Injury TECHNOLOGIST PROVIDED HISTORY: Reason for exam:->Injury Acuity: Unknown FINDINGS: Pelvis and left hip: Study is limited by diffuse osteopenia. The bony pelvis demonstrates no acute abnormality given limitations of the study. Limited imaging of the right hip is grossly unremarkable. Left hip demonstrates prior ORIF of a left femur fracture. Orthopedic hardware appears intact with a intramedullary nail, dynamic blade and locking screw noted. Minimal degenerative changes noted at the left hip joint. Peripheral vascular calcification noted bilaterally. Left knee: No acute osseous abnormality noted given the limitations from underlying osteopenia. No significant joint effusion noted. Degenerative changes noted of the anterior compartment. Diffuse peripheral vascular calcifications noted. Osteopenia limits detection of subtle abnormalities. No acute osseous abnormality of the pelvis. Prior ORIF proximal left femur noted without acute osseous abnormality given limitations of study. No acute osseous abnormality given limitations of study of the left knee. CT HEAD WO CONTRAST    Result Date: 6/13/2021  EXAMINATION: CT OF THE HEAD WITHOUT CONTRAST  6/13/2021 8:38 am TECHNIQUE: CT of the head was performed without the administration of intravenous contrast. Dose modulation, iterative reconstruction, and/or weight based adjustment of the mA/kV was utilized to reduce the radiation dose to as low as reasonably achievable. COMPARISON: 01/13/2020 and prior HISTORY: ORDERING SYSTEM PROVIDED HISTORY: head injury TECHNOLOGIST PROVIDED HISTORY: Reason for exam:->head injury Has a \"code stroke\" or \"stroke alert\" been called? ->No Decision Support Exception - unselect if not a suspected or confirmed emergency medical condition->Emergency Medical Condition (MA) Reason for Exam: Fall (from [facility] home from [facility]  fell yest and today. not much information from [facility] home) Acuity: Unknown Type of Exam: Unknown FINDINGS: There is mild motion limitation.  BRAIN/VENTRICLES: No acute intracranial hemorrhage or mass effect is noted The ventricles, cisterns and sulci appears stable. No suspicious areas of low attenuation are appreciated within the brain parenchyma. Small vessel ischemic changes again noted. ORBITS: The visualized portion of the orbits demonstrate no acute abnormality. SINUSES: The visualized paranasal sinuses and mastoid air cells demonstrate no acute abnormality. SOFT TISSUES/SKULL:  No acute abnormality of the visualized skull or soft tissues. No acute intracranial abnormality. CT CERVICAL SPINE WO CONTRAST    Result Date: 6/13/2021  EXAMINATION: CT OF THE CERVICAL SPINE WITHOUT CONTRAST 6/13/2021 8:38 am TECHNIQUE: CT of the cervical spine was performed without the administration of intravenous contrast. Multiplanar reformatted images are provided for review. Dose modulation, iterative reconstruction, and/or weight based adjustment of the mA/kV was utilized to reduce the radiation dose to as low as reasonably achievable. COMPARISON: None. HISTORY: ORDERING SYSTEM PROVIDED HISTORY: fall TECHNOLOGIST PROVIDED HISTORY: Reason for exam:->fall Decision Support Exception - unselect if not a suspected or confirmed emergency medical condition->Emergency Medical Condition (MA) Reason for Exam: Fall (from care core [facility] home from [facility]  fell yest and today. not much information from [facility] home) Acuity: Unknown Type of Exam: Unknown FINDINGS: BONES/ALIGNMENT: There is mild straightening of the cervical spine with loss of the normal lordosis. There is very minimal retrolisthesis of C4 on C5 which is likely degenerative. Alignment is otherwise unremarkable. Prevertebral soft tissues are unremarkable. Cervical vertebral body heights are well maintained. Focal calcification is identified within midline posterior soft tissues of the neck at the C5 level (sagittal image 24). Finding may be degenerative in nature versus changes of remote trauma. No acute fracture is identified.  DEGENERATIVE CHANGES: There is minimal degenerative/arthropathic change of the atlantoaxial joint. There is minimal-mild multilevel degenerative spondylosis throughout the cervical/upper thoracic spine. There are multilevel degenerative changes of the facet joints most pronounced on the left at the C2-C3 through C4-C5 levels. SOFT TISSUES: There is no prevertebral soft tissue swelling. Multilevel degenerative changes of the cervical spine with no radiographic findings to suggest presence of acute fracture. XR CHEST PORTABLE    Result Date: 6/13/2021  EXAMINATION: ONE XRAY VIEW OF THE CHEST 6/13/2021 8:42 am COMPARISON: 04/01/2020. HISTORY: ORDERING SYSTEM PROVIDED HISTORY: fall TECHNOLOGIST PROVIDED HISTORY: Reason for exam:->fall Acuity: Unknown FINDINGS: The cardiomediastinal silhouette is unremarkable. The lungs are clear. No infiltrate, pleural fluid or evidence of overt failure. No pneumothorax. Small granuloma in the right upper lobe is again noted. No acute osseous findings. No acute cardiopulmonary disease.          Signed:    Adilene Blair MD   6/15/2021

## 2021-06-15 NOTE — PROGRESS NOTES
Order to d/c patient. Notified Dr. Demetri Edge of covid result, and Mag 1.7. New order for iV mag replacement. PIV infiltrated. Notified Gaby Salcedo, new order for oral replacement. Pt educated on POC and medication, discharge instructions. PIV and tele removed, belongings collected for discharge. Transport set for 12pm. Will monitor.

## 2021-06-15 NOTE — CARE COORDINATION
Social Work Discharge Summary    Date of Discharge: 6/15/21  Disposition: [place] at [facility]:  Report: [phone] Marisa HALL)  Fax:  [phone]  Precert Obtained: N/A  MELANIE/PASARR: N/A  IMM: N/A    Transportation:   The patient will be transported via: [facility]   Patient will be picked up at (1 PM) via ([facility]).     Jenniffer Mack MSW, [address]

## 2021-06-15 NOTE — PROGRESS NOTES
Occupational Therapy  Facility/Department: [address]  Daily Treatment Note  NAME: Geeta Gonzalez  : 1950  MRN: [id number]    Date of Service: 6/15/2021    Discharge Recommendations:  Geeta Gonzalez scored a 17/24 on the AM-PAC ADL Inpatient form. Current research shows that an AM-PAC score of 17 or less is typically not associated with a discharge to the patient's home setting. Based on the patient's AM-PAC score and their current ADL deficits, it is recommended that the patient have 3-5 sessions per week of Occupational Therapy at d/c to increase the patient's independence. Please see assessment section for further patient specific details. If patient discharges prior to next session this note will serve as a discharge summary. Please see below for the latest assessment towards goals. OT Equipment Recommendations  Equipment Needed: No    Assessment   Performance deficits / Impairments: Decreased functional mobility ; Decreased ADL status; Decreased cognition;Decreased endurance;Decreased balance;Decreased safe awareness;Decreased strength  Assessment: Patient presents with the above deficits impacting occupational performance and functioning below baseline level. Recommend skilled OT to address deficits and promote functional independence. Treatment Diagnosis: Decreased functional mobility, ADLs cognition, endurnace strength and balance associated with sepsis. Prognosis: Good  OT Education: OT Role;Plan of Care  Patient Education: reinforce as needed  Barriers to Learning: Cognition  REQUIRES OT FOLLOW UP: Yes  Activity Tolerance  Activity Tolerance: Patient Tolerated treatment well  Safety Devices  Safety Devices in place: Yes  Type of devices: All fall risk precautions in place;Call light within reach; Chair alarm in place; Left in chair;Patient at risk for falls;Gait belt;Nurse notified         Patient Diagnosis(es): The primary encounter diagnosis was Acute cystitis without hematuria.  Diagnoses of General weakness, Frequent falls, and Acute renal failure superimposed on chronic kidney disease, unspecified CKD stage, unspecified acute renal failure type [facility]) were also pertinent to this visit. has a past medical history of Arthritis, Ataxia, BPH (benign prostatic hyperplasia), CAD (coronary artery disease), Coronary atherosclerosis of native coronary artery, Diabetes mellitus ([address] Environmental allergies, Hepatitis, Hydronephrosis, Hyperlipidemia, Hypertension, Kidney disease, Neuropathy, Parkinson's disease ([address] S/P coronary artery stent placement x 4, and Schizoaffective disorder, bipolar type ([address] has a past surgical history that includes Coronary angioplasty; Coronary angioplasty with stent; hernia repair; Vasectomy; Cystocopy (4/4/14); Ankle surgery (Right, 06/09/2018); Upper gastrointestinal endoscopy (N/A, 12/13/2018); Colonoscopy (N/A, 12/13/2018); Upper gastrointestinal endoscopy (N/A, 5/3/2019); and ORIF DISTAL RADIUS FRACTURE (Left, 6/20/2019). Restrictions  Restrictions/Precautions  Restrictions/Precautions: Fall Risk, Isolation  Position Activity Restriction  Other position/activity restrictions: 80 yo male presents from nursing home with with multiple areas of discomfort (head and neck, L hip and L knee), some chronic and subacute from recent falls. Dx: acute cystitis without hematuria PMH: muscle weakness, cognitive deficits, dementia, schizoaffective disorder, PD, coronary artery stent placement x 4. Imaging: negative. Subjective   General  Chart Reviewed: Yes  Patient assessed for rehabilitation services?: Yes  Additional Pertinent Hx: Schizoaffective disorder, CAD with stent, DM  Response to previous treatment: Patient with no complaints from previous session  Family / Caregiver Present: No  Diagnosis: Sepsis  Subjective  Subjective: Pt in stance with PCAs upon entry, pt agreeable to therapy session. General Comment  Comments: Pt in stance CGA, pt stand to sit CGA.  Pt donned brief CGA. Pt ambulated in room wtih rw at CGA ~60 ft total (chair to sofa to bathroom sink to sofa to door to sofa to annamaria). Pt in stance at sink CGA with 1 LOB and self correct ~6 min for oral care/wash face/comb hair and conversation. Pt at EOS with call light in reach and chair alarm on. Vital Signs  Patient Currently in Pain: Denies   Orientation  Orientation  Overall Orientation Status: Within Functional Limits  Objective    ADL  Grooming: Contact guard assistance (in stance at sink)  LE Dressing: Contact guard assistance        Balance  Standing Balance: Contact guard assistance  Standing Balance  Time: ~10 min total  Activity: ~60 ft in room and in stance to groom  Functional Mobility  Functional - Mobility Device: Rolling Walker  Activity: To/from bathroom; Other  Assist Level: Contact guard assistance     Transfers  Sit to stand: Contact guard assistance  Stand to sit: Contact guard assistance                       Cognition  Overall Cognitive Status: Exceptions  Attention Span: Difficulty attending to directions; Difficulty dividing attention  Safety Judgement: Decreased awareness of need for assistance;Decreased awareness of need for safety  Insights: Decreased awareness of deficits  Initiation: Requires cues for some  Sequencing: Requires cues for some                                         Plan   Plan  Times per week: 3-5x  Times per day: Daily  Current Treatment Recommendations: Strengthening, Endurance Training, Self-Care / ADL, Safety Education & Training, Functional Mobility Training, Balance Training, Patient/Caregiver Education & Training, Equipment Evaluation, Education, & procurement  G-Code     OutComes Score                                                  AM-PAC Score        AM-[facility] Inpatient Daily Activity Raw Score: 17 (06/15/21 1216)  AM-PAC Inpatient ADL T-Scale Score : 37.26 (06/15/21 1216)  ADL Inpatient CMS 0-100% Score: 50.11 (06/15/21 1216)  ADL Inpatient CMS G-Code Modifier : SULY (06/15/21 1216)    Goals  Short term goals  Time Frame for Short term goals: Discharge  Short term goal 1: UB ADLs with supervision - goal not addressed 6/15  Short term goal 2: LB ADLs with Min A - goal met brief, continue goal 6/15  Short term goal 3: Grooming with set up - CGA in stance 6/15  Short term goal 4: Functional ADL mobility with appropriate device and Mod of 1 - goal met CGA 8/15  Short term goal 5: Functional ADL transfers with appropriate device and Mod of 1 - goal met CGA 8/15  Long term goals  Time Frame for Long term goals : LTG=STG       Therapy Time   Individual Concurrent Group Co-treatment   Time In       4486   Time Out       1156   Minutes       25   Timed Code Treatment Minutes: 19 [facility], R [id number]    Occupational Therapist read and agrees to the above written documentation  Gabi Jorgensen OTR/L [id number]

## 2021-06-15 NOTE — CONSULTS
[address] [facility]  [phone]      Chief Complaint   Patient presents with    [place]     [place] [facility] [place] [facility]  fell yest and today. not much information [place] [facility] [place]    Neck Pain    Hip Pain     left.  had surgery before      History of Present Illness:  Daysi Ott is a 79 y.o. patient who presented to the hospital with complaints of fall. He has had recurrent mechanical falls and weakness. He reports that he has had many fractures over the last 5 years. He states that he is weak all over and falls as a result. No history of syncope. No palpitations, chest pain or shortness of breath. He states that he recently broken his left hip. The pain was severe. He has been living at a nursing facility as a result and is mostly bed bound. Past Medical History:   has a past medical history of Arthritis, Ataxia, BPH (benign prostatic hyperplasia), CAD (coronary artery disease), Coronary atherosclerosis of native coronary artery, Diabetes mellitus ([facility] [address] Environmental allergies, Hepatitis, Hydronephrosis, Hyperlipidemia, Hypertension, Kidney disease, Neuropathy, Parkinson's disease ([address] S/P coronary artery stent placement x 4, and Schizoaffective disorder, bipolar type ([facility] [address] Surgical History:   has a past surgical history that includes Coronary angioplasty; Coronary angioplasty with stent; hernia repair; Vasectomy; Cystocopy (4/4/14); Ankle surgery (Right, 06/09/2018); Upper gastrointestinal endoscopy (N/A, 12/13/2018); Colonoscopy (N/A, 12/13/2018); Upper gastrointestinal endoscopy (N/A, 5/3/2019); and ORIF DISTAL RADIUS FRACTURE (Left, 6/20/2019). Social History:   reports that he quit smoking about 8 years ago. His smoking use included cigarettes. He has a 25.00 pack-year smoking history. He has never used smokeless tobacco. He reports that he does not drink alcohol and does not use drugs.      Family History:  Father with cad    Home Medications:  Were reviewed and are listed in nursing record. and/or listed below  Prior to Admission medications    Medication Sig Start Date End Date Taking? Authorizing Provider   carvedilol (COREG) 3.125 MG tablet TAKE ONE (1) TABLET BY MOUTH TWICE DAILY WITH MEALS 4/10/20  Yes Jens Govea MD   clopidogrel (PLAVIX) 75 MG tablet TAKE 1 TABLET BY MOUTH ONCE DAILY 3/18/20  Yes Marilee Osborne MD   pantoprazole (PROTONIX) 40 MG tablet Take one tablet daily 12/20/19  Yes Marilee Osborne MD   ASPIRIN LOW DOSE 81 MG EC tablet TAKE 1 TABLET BY MOUTH ONCE DAILY 8/23/19  Yes Jens Govea MD   metFORMIN (GLUCOPHAGE) 1000 MG tablet Take 1 tablet by mouth daily   Yes Historical Provider, MD   PARoxetine (PAXIL) 40 MG tablet Take 1 tablet by mouth daily 12/6/18  Yes Historical Provider, MD   ferrous sulfate 325 (65 Fe) MG tablet Take 1 tablet by mouth 2 times daily 5/3/19  Yes Valdez Sorto MD   Dulaglutide (TRULICITY SC) Inject 1.5 mg into the skin once a week    Yes Historical Provider, MD   atorvastatin (LIPITOR) 80 MG tablet TAKE 1 TABLET BY MOUTH AT BEDTIME 10/31/18  Yes Marilee Osborne MD   insulin glargine (LANTUS) 100 UNIT/ML injection vial Inject 30 Units into the skin nightly 6/13/18  Yes PRESTON Finnegan - CNP   pioglitazone (ACTOS) 45 MG tablet Take 45 mg by mouth daily 6/7/17  Yes Historical Provider, MD   traZODone (DESYREL) 100 MG tablet Take 100 mg by mouth nightly   Yes Historical Provider, MD   QUEtiapine (SEROQUEL) 25 MG tablet Take 12.5 mg by mouth 2 times daily    Historical Provider, MD   gabapentin (NEURONTIN) 300 MG capsule Take 1 capsule by mouth 3 times daily for 3 days. 4/2/20 4/5/20  Bhaskar Boo MD   lisinopril (PRINIVIL;ZESTRIL) 5 MG tablet Take 1 tablet by mouth daily    Historical Provider, MD   fluticasone (FLONASE) 50 MCG/ACT nasal spray 1 spray by Nasal route daily as needed.     Historical Provider, MD        Current Medications:  Current Facility-Administered Medications   Medication Dose Route Frequency Provider Last Rate Last Admin    aspirin EC tablet 81 mg  81 mg Oral Daily Yulia Owen MD   81 mg at 06/14/21 0937    atorvastatin (LIPITOR) tablet 80 mg  80 mg Oral Nightly Yulia Owen MD   80 mg at 06/14/21 2159    carvedilol (COREG) tablet 3.125 mg  3.125 mg Oral BID Yulia Owen MD   3.125 mg at 06/14/21 2159    clopidogrel (PLAVIX) tablet 75 mg  75 mg Oral Daily Yulia Owen MD   75 mg at 06/14/21 0937    insulin glargine (LANTUS;BASAGLAR) injection pen 30 Units  30 Units Subcutaneous Nightly Yulia Owen MD   30 Units at 06/14/21 2201    pantoprazole (PROTONIX) tablet 40 mg  40 mg Oral QAM AC Yulia Owen MD   40 mg at 06/14/21 6246    PARoxetine (PAXIL) tablet 40 mg  40 mg Oral Daily Yulia Owen MD   40 mg at 06/14/21 0936    traZODone (DESYREL) tablet 100 mg  100 mg Oral Nightly Yulia Owen MD   100 mg at 06/14/21 2159    lactated ringers infusion   Intravenous Continuous Yulia Owen MD 75 mL/hr at 06/14/21 0935 New Bag at 06/14/21 0935    insulin lispro (1 Unit Dial) 0-12 Units  0-12 Units Subcutaneous TID WC Yulia Owen MD   4 Units at 06/14/21 1739    insulin lispro (1 Unit Dial) 0-6 Units  0-6 Units Subcutaneous Nightly Yulia Owen MD   1 Units at 06/14/21 2202    sodium chloride flush 0.9 % injection 5-40 mL  5-40 mL Intravenous 2 times per day Yulia Owen MD   10 mL at 06/14/21 2201    sodium chloride flush 0.9 % injection 5-40 mL  5-40 mL Intravenous PRN Yulia Owen MD        0.9 % sodium chloride infusion  25 mL Intravenous PRN Yulia Owen MD        ondansetron (ZOFRAN-ODT) disintegrating tablet 4 mg  4 mg Oral Q8H PRN Patricia Otto MD        Or    ondansetron (ZOFRAN) injection 4 mg  4 mg Intravenous Q6H PRN Yulia Owen MD        polyethylene glycol (GLYCOLAX) packet 17 g  17 g Oral Daily PRN Yulia Owen MD        acetaminophen (TYLENOL) tablet 650 mg  650 mg Oral Q6H PRN Mark Cardoso MD   650 mg at 06/14/21 1439    Or    acetaminophen (TYLENOL) suppository 650 mg  650 mg Rectal Q6H PRN Mark Cardoso MD        heparin (porcine) injection 5,000 Units  5,000 Units Subcutaneous 3 times per day Mark Cardoso MD   5,000 Units at 06/14/21 2202    glucose (GLUTOSE) 40 % oral gel 15 g  15 g Oral PRN Mark Cardoso MD        dextrose 50 % IV solution  12.5 g Intravenous PRN Mark Cardoso MD        glucagon (rDNA) injection 1 mg  1 mg Intramuscular PRN Mark Cardoso MD        dextrose 5 % solution  100 mL/hr Intravenous PRN Mark Cardoso MD        cefTRIAXone (ROCEPHIN) 1000 mg in sterile water 10 mL IV syringe  1,000 mg Intravenous Q24H Mark Cardoso MD   1,000 mg at 06/14/21 0181        Allergies:  Patient has no known allergies. Review of Systems:     · Constitutional: there has been no unanticipated weight loss. There's been no change in energy level, sleep pattern, or activity level. · Eyes: No visual changes or diplopia. No scleral icterus. · ENT: No Headaches, hearing loss or vertigo. No mouth sores or sore throat. · Cardiovascular: Reviewed in HPI  · Respiratory: No cough or wheezing, no sputum production. No hematemesis. · Gastrointestinal: No abdominal pain, appetite loss, blood in stools. No change in bowel or bladder habits. · Genitourinary: No dysuria, trouble voiding, or hematuria. · Musculoskeletal:  +joint pain  · Integumentary: No rash or pruritis. · Neurological: No headache, diplopia, change in muscle strength, numbness or tingling. No change in gait, balance, coordination, mood, affect, memory, mentation, behavior. · Psychiatric: No anxiety, no depression. · Endocrine: No malaise, fatigue or temperature intolerance. No excessive thirst, fluid intake, or urination. No tremor. · Hematologic/Lymphatic: No abnormal bruising or bleeding, blood clots or swollen lymph nodes.   · Allergic/Immunologic: No nasal congestion or hives.   ·     Physical Examination:    Vitals:    06/14/21 2159   BP: 138/64   Pulse: 88   Resp:    Temp:    SpO2:     Weight: 134 lb 11.2 oz (61.1 kg)         General Appearance:  Alert, cooperative, no distress, appears stated age   Head:  Normocephalic, without obvious abnormality, atraumatic   Eyes:  PERRL, conjunctiva/corneas clear       Nose: Nares normal, no drainage or sinus tenderness   Throat: Lips, mucosa, and tongue normal   Neck: Supple, symmetrical, trachea midline, no adenopathy, thyroid: not enlarged, symmetric, no tenderness/mass/nodules, no carotid bruit or JVD       Lungs:   Clear to auscultation bilaterally, respirations unlabored   Chest Wall:  No tenderness or deformity   Heart:  Irregular heart rate, S1, S2 normal, no murmur, rub or gallop   Abdomen:   Soft, non-tender, bowel sounds active all four quadrants,  no masses, no organomegaly           Extremities: Extremities normal, atraumatic, no cyanosis or edema   Pulses: 2+ and symmetric   Skin: Skin color, texture, turgor normal, no rashes or lesions   Pysch: Normal mood and affect   Neurologic: Normal gross motor and sensory exam.         Labs  CBC:   Lab Results   Component Value Date    WBC 13.6 06/14/2021    RBC 2.99 06/14/2021    HGB 9.0 06/14/2021    HCT 27.1 06/14/2021    MCV 90.5 06/14/2021    RDW 14.7 06/14/2021     06/14/2021     CMP:    Lab Results   Component Value Date     06/14/2021    K 4.2 06/14/2021     06/14/2021    CO2 19 06/14/2021    BUN 43 06/14/2021    CREATININE 1.6 06/14/2021    GFRAA 52 06/14/2021    GFRAA >60 05/11/2013    AGRATIO 1.2 06/13/2021    LABGLOM 43 06/14/2021    GLUCOSE 91 06/14/2021    PROT 7.6 06/13/2021    CALCIUM 8.7 06/14/2021    BILITOT 0.4 06/13/2021    ALKPHOS 132 06/13/2021    AST 16 06/13/2021    ALT 15 06/13/2021     PT/INR:  No results found for: PTINR  Lab Results   Component Value Date    TROPONINI 0.01 06/13/2021       EKG:  I have reviewed EKG with the following interpretation:  Impression:  Sinus rhythm with frequent pvcs    Echo: Normal left ventricle systolic function with an estimated ejection fraction   of 55%. No regional wall motion abnormalities are seen. Normal left ventricular diastolic filling pressure. Mild mitral regurgitation. Systolic pulmonary artery pressure (SPAP) is normal and estimated at 22 mmHg   (RA pressure 3 mmHg). Stress:    Normal myocardial perfusion study.    There is normal perfusion at rest and stress.    There is no inducible ischemia or infarct.    There are no regional wall motion abnormalities.    Normal left ventricular systolic function with ejection fraction of 64 %. Cath 2014: 1. Left main is normal.  It bifurcates into an LAD and the circumflex. 2.  The left anterior descending artery has previously placed stents in the  proximal and mid segment. There is no critical disease in the LAD. The LAD  has 2 areas of in-stent restenosis that are mild to moderate and 20% to 30%  in severity. 3.  The circumflex coronary artery is a moderate-sized vessel with a  previously placed stent within the midcircumflex. There is a jailed first  obtuse marginal branch that is unchanged from previous angiography that has  about a 90% stenosis. 4.  The right coronary artery is a large vessel. It has mild disease in its  midproximal segment but is otherwise free of disease. 5.  Left ventriculography was performed demonstrating an ejection fraction of  60%. Left ventricular end diastolic pressure is 16. Aortic pressure is  132/57. Left ventricular pressure is 130/16. No gradient on pullback. Wall  motion is normal.  No mitral insufficiency.         Old notes reviewed  Telemetry reviewd  Ekg personally reviewed and reviewed  Chest xray personally reviewed  Echo and cath personally interpreted  Medications and labs reviewed  moderate complexity/medical decision making due to extensive data review, extensive history plan  - continue statin   - follow up outpatient with Dr. Simi Pagan    2. Bigeminy  - new problem  Plan:  - check mag  - increase coreg to 6.25 mg bid if continued    3. UTI  - new problem  Plan:  = continue antibiotics      Cardiology will sign off. All questions and concerns were addressed to the patient/family. Alternatives to my treatment were discussed. The note was completed using EMR. Every effort was made to ensure accuracy; however, inadvertent computerized transcription errors may be present.   Chrisandra Buerger, DO, MD 6/14/2021 10:25 PM

## 2021-06-17 LAB
BLOOD CULTURE, ROUTINE: NORMAL
CULTURE, BLOOD 2: NORMAL

## 2021-06-30 ENCOUNTER — OFFICE VISIT (OUTPATIENT)
Dept: CARDIOLOGY CLINIC | Age: 71
End: 2021-06-30
Payer: COMMERCIAL

## 2021-06-30 VITALS
OXYGEN SATURATION: 98 % | WEIGHT: 135.5 LBS | HEART RATE: 70 BPM | DIASTOLIC BLOOD PRESSURE: 61 MMHG | HEIGHT: 63 IN | BODY MASS INDEX: 24.01 KG/M2 | SYSTOLIC BLOOD PRESSURE: 110 MMHG

## 2021-06-30 DIAGNOSIS — I25.10 CORONARY ARTERY DISEASE INVOLVING NATIVE CORONARY ARTERY OF NATIVE HEART WITHOUT ANGINA PECTORIS: Primary | ICD-10-CM

## 2021-06-30 DIAGNOSIS — I10 ESSENTIAL HYPERTENSION: ICD-10-CM

## 2021-06-30 PROCEDURE — 1123F ACP DISCUSS/DSCN MKR DOCD: CPT | Performed by: INTERNAL MEDICINE

## 2021-06-30 PROCEDURE — 1036F TOBACCO NON-USER: CPT | Performed by: INTERNAL MEDICINE

## 2021-06-30 PROCEDURE — G8420 CALC BMI NORM PARAMETERS: HCPCS | Performed by: INTERNAL MEDICINE

## 2021-06-30 PROCEDURE — 3017F COLORECTAL CA SCREEN DOC REV: CPT | Performed by: INTERNAL MEDICINE

## 2021-06-30 PROCEDURE — G8427 DOCREV CUR MEDS BY ELIG CLIN: HCPCS | Performed by: INTERNAL MEDICINE

## 2021-06-30 PROCEDURE — 4040F PNEUMOC VAC/ADMIN/RCVD: CPT | Performed by: INTERNAL MEDICINE

## 2021-06-30 PROCEDURE — 1111F DSCHRG MED/CURRENT MED MERGE: CPT | Performed by: INTERNAL MEDICINE

## 2021-06-30 PROCEDURE — 99214 OFFICE O/P EST MOD 30 MIN: CPT | Performed by: INTERNAL MEDICINE

## 2021-06-30 RX ORDER — ACETAMINOPHEN 500 MG
1000 TABLET ORAL EVERY 6 HOURS PRN
COMMUNITY

## 2021-06-30 NOTE — PROGRESS NOTES
[address] [facility]  Cardiovascular Follow Up    PATIENT: Angi Carmen  DATE: 2021  MRN: [id number]  CSN: [id number]  : 1950    Follow-Up from Hospital, Chest Pain, and Shortness of Breath     Subjective:     History of present illness on initial date of evaluation:   Angi Carmen is a 79 y.o. patient who presents for follow up. Pt was discharged from the hospital on 06/15/21 for urinary tract infection and acute kidney injury. At this hospital admission plavix was discontinued due to patient falls. Today patient states he is living at a nursing facility at [facility]. Jarod Gore states he does not feel happy at this facility and hopes to get out and resume caring for himself independently.  Denies chest pain, shortness of breath, edema, dizziness, palpitations and syncope.     Patient Active Problem List   Diagnosis    DM2 (diabetes mellitus, type 2) ([address]    Dizziness    Hematuria    Hyponatremia    S/P coronary artery stent placement    S/P coronary artery stent placement x 4    Orthostatic hypotension    Acute posthemorrhagic anemia    Contusion, knee    DM hyperosmolarity type II, uncontrolled ([address]    Coronary artery disease involving native coronary artery of native heart without angina pectoris    HTN (hypertension)    Chest pain    Ischemic chest pain ([address]    Near syncope    CKD (chronic kidney disease), stage III (HCC)    Anemia    CHF (congestive heart failure) ([address]    DM (diabetes mellitus) ([address]    Closed trimalleolar fracture of right ankle    Closed fracture of fifth metacarpal bone of left hand    Closed nondisplaced fracture of fifth metatarsal bone of left foot    Hypokalemia    Hyperkalemia    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    OCTAVIO (acute kidney injury) ([address]    Heartburn    Esophageal dysphagia    Esophagitis, [place] grade B    Closed fracture of metatarsal neck    Closed fracture of function with an ejection fraction of 50%. 3.  Normal hemodynamics. 5/10/2013,   PCI of the LAD and CIRC with [facility] (jailed OM1 and unable to re-cross)    Arterial Duplex: 8/4/15  Summary    1. There is no evidence to suggest arterial insufficiency at rest involving  the right lower extremity. 2. There is no evidence to suggest arterial insufficiency at rest involving  the left lower extremity. Past Medical History:   has a past medical history of Arthritis, Ataxia, BPH (benign prostatic hyperplasia), CAD (coronary artery disease), Coronary atherosclerosis of native coronary artery, Diabetes mellitus ([facility] [address] Environmental allergies, Hepatitis, Hydronephrosis, Hyperlipidemia, Hypertension, Kidney disease, Neuropathy, Parkinson's disease ([facility] [address] S/P coronary artery stent placement x 4, and Schizoaffective disorder, bipolar type ([facility] [address] Surgical History:   has a past surgical history that includes Coronary angioplasty; Coronary angioplasty with stent; hernia repair; Vasectomy; Cystocopy (4/4/14); Ankle surgery (Right, 06/09/2018); Upper gastrointestinal endoscopy (N/A, 12/13/2018); Colonoscopy (N/A, 12/13/2018); Upper gastrointestinal endoscopy (N/A, 5/3/2019); and ORIF DISTAL RADIUS FRACTURE (Left, 6/20/2019). Social History:   reports that he quit smoking about 8 years ago. His smoking use included cigarettes. He has a 25.00 pack-year smoking history. He has never used smokeless tobacco. He reports that he does not drink alcohol and does not use drugs. Family History:  No evidence for sudden cardiac death or premature CAD    Home Medications:  Reviewed and are listed in nursing record.  and/or listed below  Current Outpatient Medications   Medication Sig Dispense Refill    acetaminophen (TYLENOL) 500 MG tablet Take 500 mg by mouth      carvedilol (COREG) 6.25 MG tablet Take 1 tablet by mouth 2 times daily (with meals) (Patient taking differently: Take 3.125 mg by mouth 2 times daily (with meals) ) 30 tablet 0    QUEtiapine (SEROQUEL) 25 MG tablet Take 12.5 mg by mouth 2 times daily      gabapentin (NEURONTIN) 300 MG capsule Take 1 capsule by mouth 3 times daily for 3 days. 9 capsule 0    pantoprazole (PROTONIX) 40 MG tablet Take one tablet daily 90 tablet 0    ASPIRIN LOW DOSE 81 MG EC tablet TAKE 1 TABLET BY MOUTH ONCE DAILY 30 tablet 10    metFORMIN (GLUCOPHAGE) 1000 MG tablet Take 1 tablet by mouth 2 times daily (with meals)       PARoxetine (PAXIL) 40 MG tablet Take 1 tablet by mouth daily      ferrous sulfate 325 (65 Fe) MG tablet Take 1 tablet by mouth 2 times daily (Patient taking differently: Take 325 mg by mouth daily (with breakfast) ) 60 tablet 11    atorvastatin (LIPITOR) 80 MG tablet TAKE 1 TABLET BY MOUTH AT BEDTIME 90 tablet 0    insulin glargine (LANTUS) 100 UNIT/ML injection vial Inject 30 Units into the skin nightly 1 vial 3    pioglitazone (ACTOS) 45 MG tablet Take 45 mg by mouth daily      traZODone (DESYREL) 100 MG tablet Take 100 mg by mouth nightly      fluticasone (FLONASE) 50 MCG/ACT nasal spray 1 spray by Nasal route daily as needed.  lisinopril (PRINIVIL;ZESTRIL) 5 MG tablet Take 1 tablet by mouth daily (Patient not taking: Reported on 6/30/2021)      Dulaglutide (TRULICITY SC) Inject 1.5 mg into the skin once a week  (Patient not taking: Reported on 6/30/2021)       No current facility-administered medications for this visit. Allergies:  Patient has no known allergies. Review of Systems:   All 14 point review of symptoms completed. Pertinent positives identified in the HPI, all other review of symptoms negative as below.     Review of Systems - History obtained from the patient  General ROS: negative for - chills, fever or night sweats  Psychological ROS: negative for - disorientation or hallucinations  Ophthalmic ROS: negative for - dry eyes, eye pain or loss of vision  ENT ROS: negative for - nasal discharge or sore throat  Allergy and Immunology ROS: negative for - hives or itchy/watery eyes  Hematological and Lymphatic ROS: negative for - jaundice or night sweats  Endocrine ROS: negative for - mood swings or temperature intolerance  Breast ROS: deferred  Respiratory ROS: negative for - hemoptysis or stridor  Cardiovascular ROS: negative for - chest pain, dyspnea on exertion or palpitations  Gastrointestinal ROS: no abdominal pain, change in bowel habits, or black or bloody stools  Genito-Urinary ROS: no dysuria, trouble voiding, or hematuria  Musculoskeletal ROS: negative for - gait disturbance, joint pain or joint stiffness  Neurological ROS: negative for - seizures or speech problems  Dermatological ROS: negative for - rash or skin lesion changes        Physical Examination:    Filed Vitals:    05/24/13 0928   BP: 82/0   Repeat  110/70   Pulse: 73      Weight: 135 lb 8 oz (61.5 kg)     Wt Readings from Last 3 Encounters:   06/30/21 135 lb 8 oz (61.5 kg)   06/15/21 138 lb 14.2 oz (63 kg)   04/01/20 140 lb (63.5 kg)         General Appearance:  Alert, cooperative, no distress, appears stated age   Head:  Normocephalic, without obvious abnormality, atraumatic   Eyes:  PERRL, conjunctiva/corneas clear       Nose: Nares normal, no drainage or sinus tenderness   Throat: Lips, mucosa, and tongue normal   Neck: Supple, symmetrical, trachea midline, no adenopathy, thyroid: not enlarged, symmetric, no tenderness/mass/nodules, no carotid bruit or JVD       Lungs:   Clear to auscultation bilaterally, respirations unlabored   Chest Wall:  No tenderness or deformity   Heart:  Regular rhythm and normal rate; S1, S2 are normal; no murmur noted; no rub or gallop   Abdomen:   Soft, non-tender, bowel sounds active all four quadrants,  no masses, no organomegaly           Extremities: Extremities normal, atraumatic, no cyanosis or edema   Pulses:  faint and not palpable in the lower extremities bilaterally.      Skin: Skin color, texture, turgor normal, no rashes or lesions Pysch: Normal mood and unusual affect   Neurologic: Normal gross motor and sensory exam.         Labs  CBC:   Lab Results   Component Value Date    WBC 7.5 06/15/2021    RBC 2.97 06/15/2021    HGB 9.1 06/15/2021    HCT 26.7 06/15/2021    MCV 89.7 06/15/2021    RDW 15.1 06/15/2021     06/15/2021     CMP:    Lab Results   Component Value Date     06/15/2021    K 4.0 06/15/2021    K 4.2 06/14/2021     06/15/2021    CO2 20 06/15/2021    BUN 30 06/15/2021    CREATININE 1.3 06/15/2021    GFRAA >60 06/15/2021    GFRAA >60 05/11/2013    AGRATIO 1.2 06/13/2021    LABGLOM 55 06/15/2021    GLUCOSE 131 06/15/2021    PROT 7.6 06/13/2021    CALCIUM 9.2 06/15/2021    BILITOT 0.4 06/13/2021    ALKPHOS 132 06/13/2021    AST 16 06/13/2021    ALT 15 06/13/2021     PT/INR:    No components found for: PTPATIENT,  PTINR  Lab Results   Component Value Date    TROPONINI 0.01 06/13/2021     No components found for: CHLPL  Lab Results   Component Value Date    TRIG 146 12/07/2015    TRIG 97 10/09/2014    TRIG 115 03/14/2014     Lab Results   Component Value Date    HDL 30 (L) 12/07/2015    HDL 32 (L) 10/09/2014    HDL 4 (L) 03/14/2014     Lab Results   Component Value Date    LDLCALC 22 12/07/2015    LDLCALC 32 10/09/2014    LDLCALC 31 03/14/2014     Lab Results   Component Value Date    LABVLDL 29 12/07/2015    LABVLDL 19 10/09/2014    LABVLDL 23 03/14/2014     Lab Results   Component Value Date    ALT 15 06/13/2021    AST 16 06/13/2021    ALKPHOS 132 (H) 06/13/2021    BILITOT 0.4 06/13/2021       Assessment:  79 y.o. patient with:   Diagnosis Orders   1. Coronary artery disease involving native coronary artery of native heart without angina pectoris     2. Essential hypertension         Plan:        1. Continue medication compliance and regimen.      2. >50% of the time was devoted to giving the patient detailed instructions instructions on addressing diet, regular exercise, weight control, smoking abstention, medication compliance, and stress minimization. The patient was provided written and verbal instructions regarding risk factor modification. 3. Please follow up as needed. Salome Orozco RN    I, Dr. Linden Blizzard, personally performed the services described in this documentation, as scribed by the above signed scribe in my presence. It is both accurate and complete to my knowledge. I agree with the details independently gathered by the clinical support staff, while the remaining scribed note accurately describes my personal service to the patient. Medical Decision Making: The following items were considered in medical decision making:  Independent review of images  Review / order clinical lab tests  Review / order radiology tests  Decision to obtain old records  Review and summation of old records as accessed through [employer] (a summary of my findings in these old records)          All questions and concerns were addressed to the patient/family. Alternatives to my treatment were discussed. The note was completed using EMR. Every effort was made to ensure accuracy; however, inadvertent computerized transcription errors may be present.     Linden Blizzard, MD, [address], [place], [place]  [phone] [facility]  [phone] [facility]  6/30/2021  2:08 PM

## 2021-09-29 ENCOUNTER — APPOINTMENT (OUTPATIENT)
Dept: CT IMAGING | Age: 71
End: 2021-09-29
Payer: COMMERCIAL

## 2021-09-29 ENCOUNTER — HOSPITAL ENCOUNTER (EMERGENCY)
Age: 71
Discharge: HOME | End: 2021-09-29
Payer: COMMERCIAL

## 2021-09-29 VITALS
RESPIRATION RATE: 18 BRPM | SYSTOLIC BLOOD PRESSURE: 112 MMHG | TEMPERATURE: 97.3 F | BODY MASS INDEX: 24 KG/M2 | WEIGHT: 135.5 LBS | OXYGEN SATURATION: 95 % | DIASTOLIC BLOOD PRESSURE: 49 MMHG | HEART RATE: 101 BPM

## 2021-09-29 DIAGNOSIS — S09.90XA CLOSED HEAD INJURY, INITIAL ENCOUNTER: Primary | ICD-10-CM
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
## 2021-09-29 PROCEDURE — 72125 CT NECK SPINE W/O DYE: CPT

## 2021-09-29 PROCEDURE — 99284 EMERGENCY DEPT VISIT MOD MDM: CPT

## 2021-09-29 PROCEDURE — 70450 CT HEAD/BRAIN W/O DYE: CPT

## 2021-09-29 ASSESSMENT — PAIN SCALES - GENERAL: PAINLEVEL_OUTOF10: 5

## 2021-09-29 ASSESSMENT — PAIN DESCRIPTION - DESCRIPTORS: DESCRIPTORS: ACHING

## 2021-09-29 ASSESSMENT — PAIN DESCRIPTION - PAIN TYPE: TYPE: ACUTE PAIN

## 2021-09-29 ASSESSMENT — PAIN DESCRIPTION - LOCATION: LOCATION: NECK;BACK

## 2021-09-29 ASSESSMENT — PAIN DESCRIPTION - ORIENTATION: ORIENTATION: POSTERIOR
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
## 2021-09-29 NOTE — ED NOTES
Pt care transferred to [address] [place] [address] EMS. Pt report was given at the bedside. Pt was able to slide from one bed to the the EMS stretcher without incident. Pt was then secured to the stretcher and wheeled from Ed without incident.      Shirin Pineda RN  09/29/21 8971

## 2021-09-29 NOTE — CARE COORDINATION
Patient was not scheduled for New patient/ED Follow-Up appointment with [facility] today. Mr. Kyle Romero has a current PCP Evens Hogan MD.   The [place] [id number] Service Department phone number ([phone]) will be provided to patient for any further questions/ concerns.

## 2021-09-29 NOTE — ED NOTES
Bed: 11  Expected date:   Expected time:   Means of arrival:   Comments:  Ede Stewart RN  09/29/21 [id number]

## 2021-09-29 NOTE — ED PROVIDER NOTES
[facility]        Pt Name: Megha Gifford  MRN: [id number]  [place] 1950  Date of evaluation: 9/29/2021  Provider: Oziel Howard PA-C  PCP: No primary care provider on file. Note Started: 12:06 PM EDT       JAYEM. I have evaluated this patient. My supervising physician was available for consultation. Eduardo Massey MD      CHIEF COMPLAINT       Chief Complaint   Patient presents with   Robertha Rumps Fall     pt coming in via ems from [facility]. pt fell out of wheel chair no loc, complains of pain in knees and head. HISTORY OF PRESENT ILLNESS   (Location, Timing/Onset, Context/Setting, Quality, Duration, Modifying Factors, Severity, Associated Signs and Symptoms)  Note limiting factors. Chief Complaint: Head injury    Megha Gifford is a 79 y.o. male who presents by EMS from [facility] rehab facility. He is there due to rehab on fracture left hip. Patient had left hip pinning by [facility] physician. Patient apparently fell out of wheelchair striking the left forehead. Indicates his head is still sore neck still sore from the fall from same wheelchair occurring about a week ago. No evaluation he reports at that time. Patient reports no LOC. No paresthesia. No visual change. Slight headache. He presents for evaluation of fall from wheelchair and head injury. He states he bumped both knees and these are not concerning to him. I do see in the system the patient was evaluated for closed inner troches fracture December 16, 2020 underwent pinning on the 17th. Nursing Notes were all reviewed and agreed with or any disagreements were addressed in the HPI. REVIEW OF SYSTEMS    (2-9 systems for level 4, 10 or more for level 5)     Review of Systems    Positives and Pertinent negatives as per HPI. Except as noted above in the ROS, all other systems were reviewed and negative.        PAST MEDICAL HISTORY Past Medical History:   Diagnosis Date    Arthritis     Ataxia 3/14/2014    BPH (benign prostatic hyperplasia)     CAD (coronary artery disease)     4 stents    Coronary atherosclerosis of native coronary artery 5/11/2013    Diabetes mellitus ([facility]     Environmental allergies     Hepatitis     Hydronephrosis 3/14/2014    Hyperlipidemia     Hypertension     Kidney disease     Neuropathy     Parkinson's disease ([facility]     ?     S/P coronary artery stent placement x 4     x 4    Schizoaffective disorder, bipolar type [facility])          SURGICAL HISTORY     Past Surgical History:   Procedure Laterality Date    ANKLE SURGERY Right 06/09/2018    ORIF of R ankle     COLONOSCOPY N/A 12/13/2018    COLONOSCOPY CONTROL HEMORRHAGE performed by Keena [facility] at [facility]      x 4    CYSTOSCOPY  4/4/14    transurethral vaporization of prostate    HERNIA REPAIR      X2    ORIF DISTAL RADIUS FRACTURE Left 6/20/2019    LEFT DISTAL RADIUS OPEN REDUCTION INTERNAL FIXATION   performed by Kodi Griffith MD at [address] N/A 12/13/2018    EGD BIOPSY performed by Keena Bailey MD at [address] 5/3/2019    EGD WITH ANESTHESIA performed by Keena Bailey MD at [address]        Previous Medications    ACETAMINOPHEN (TYLENOL) 500 MG TABLET    Take 500 mg by mouth    ASPIRIN LOW DOSE 81 MG EC TABLET    TAKE 1 TABLET BY MOUTH ONCE DAILY    ATORVASTATIN (LIPITOR) 80 MG TABLET    TAKE 1 TABLET BY MOUTH AT BEDTIME    CARVEDILOL (COREG) 6.25 MG TABLET    Take 1 tablet by mouth 2 times daily (with meals)    DULAGLUTIDE (TRULICITY SC)    Inject 1.5 mg into the skin once a week     FERROUS SULFATE 325 (65 FE) MG TABLET    Take 1 tablet by mouth 2 times daily FLUTICASONE (FLONASE) 50 MCG/ACT NASAL SPRAY    1 spray by Nasal route daily as needed. GABAPENTIN (NEURONTIN) 300 MG CAPSULE    Take 1 capsule by mouth 3 times daily for 3 days. INSULIN GLARGINE (LANTUS) 100 UNIT/ML INJECTION VIAL    Inject 30 Units into the skin nightly    LISINOPRIL (PRINIVIL;ZESTRIL) 5 MG TABLET    Take 1 tablet by mouth daily    METFORMIN (GLUCOPHAGE) 1000 MG TABLET    Take 1 tablet by mouth 2 times daily (with meals)     PANTOPRAZOLE (PROTONIX) 40 MG TABLET    Take one tablet daily    PAROXETINE (PAXIL) 40 MG TABLET    Take 1 tablet by mouth daily    PIOGLITAZONE (ACTOS) 45 MG TABLET    Take 45 mg by mouth daily    QUETIAPINE (SEROQUEL) 25 MG TABLET    Take 12.5 mg by mouth 2 times daily    TRAZODONE (DESYREL) 100 MG TABLET    Take 100 mg by mouth nightly         ALLERGIES     Patient has no known allergies. FAMILYHISTORY       Family History   Problem Relation Age of Onset    Other Mother         CEREBRAL PALSY    Heart Disease Father         CHF          SOCIAL HISTORY       Social History     Tobacco Use    Smoking status: Former Smoker     Packs/day: 1.00     Years: 25.00     Pack years: 25.00     Types: Cigarettes     Quit date: 5/10/2013     Years since quittin.3    Smokeless tobacco: Never Used   Vaping Use    Vaping Use: Never used   Substance Use Topics    Alcohol use: No    Drug use: No       SCREENINGS    S[place]yer Coma Scale  Eye Opening: Spontaneous  Best Verbal Response: Oriented  Best Motor Response: Obeys commands  S[place]yer Coma Scale Score: 15        PHYSICAL EXAM    (up to 7 for level 4, 8 or more for level 5)     ED Triage Vitals [21 1141]   BP Temp Temp Source Pulse Resp SpO2 Height Weight   (!) 127/58 97.3 °F (36.3 °C) Oral 101 18 95 % -- 135 lb 8 oz (61.5 kg)       Physical Exam  Vitals and nursing note reviewed. Constitutional:       Appearance: Normal appearance. He is well-developed and normal weight. HENT:      Head: Normocephalic. Comments: Patient without swelling but with a small area of erythema on the right superior forehead. See picture. Right Ear: External ear normal.      Left Ear: External ear normal.   Eyes:      General: No scleral icterus. Right eye: No discharge. Left eye: No discharge. Conjunctiva/sclera: Conjunctivae normal.   Neck:      Comments: The patient with minor cervical spine tenderness with pressure applied. He does voluntarily wrap his neck and move good range of motion. No paresthesia reported. Cardiovascular:      Rate and Rhythm: Normal rate and regular rhythm. Heart sounds: Normal heart sounds. Pulmonary:      Effort: Pulmonary effort is normal.      Breath sounds: Normal breath sounds. Musculoskeletal:         General: Normal range of motion. Cervical back: Normal range of motion and neck supple. Tenderness present. Right lower leg: No edema. Left lower leg: No edema. Comments: I did raise both Legs to expose both knees. Alignment area but no erythema no abrasion. No joint effusion. Joints are stable. Skin:     General: Skin is warm and dry. Neurological:      Mental Status: He is alert and oriented to person, place, and time. Mental status is at baseline. Psychiatric:         Mood and Affect: Mood normal.         Behavior: Behavior normal.         Thought Content: Thought content normal.         Judgment: Judgment normal.               DIAGNOSTIC RESULTS   LABS:    Labs Reviewed - No data to display    When ordered only abnormal lab results are displayed. All other labs were within normal range or not returned as of this dictation. EKG: When ordered, EKG's are interpreted by the Emergency Department Physician in the absence of a cardiologist.  Please see their note for interpretation of EKG.     RADIOLOGY:   Non-plain film images such as CT, Ultrasound and MRI are read by the radiologist. Plain radiographic images are visualized and preliminarily interpreted by the ED Provider with the below findings:        Interpretation per the Radiologist below, if available at the time of this note:    CT Cervical Spine WO Contrast   Final Result   Mild to moderate multilevel cervical spine degenerative changes with bony   neural foraminal narrowing as above. No acute fracture or subluxation. CT Head WO Contrast   Final Result   No evidence of acute intracranial process. Mild atrophy and chronic small   vessel ischemic changes noted. No results found. PROCEDURES   Unless otherwise noted below, none     Procedures    CRITICAL CARE TIME   N/A    CONSULTS:  None      EMERGENCY DEPARTMENT COURSE and DIFFERENTIAL DIAGNOSIS/MDM:   Vitals:    Vitals:    09/29/21 1200 09/29/21 1215 09/29/21 1230 09/29/21 1245   BP: (!) 120/53 (!) 137/56 (!) 112/47 (!) 112/49   Pulse:       Resp:       Temp:       TempSrc:       SpO2: 94% 94% 95% 95%   Weight:           Patient was given the following medications:  Medications - No data to display        The patient presenting by EMS from [facility] where he resides for rehab on a left hip pinning. Vevelyn Huh from wheelchair. No hip complaint. Struck the right forehead with a small area of erythema, no abrasion or hematoma. I did obtain CT scan head and neck and these are unremarkable. This patient is safe to be return back to the [facility] for continued rehabilitation on his left hip. The patient did express that understanding. FINAL IMPRESSION      1. Closed head injury, initial encounter    2.  Fall from wheelchair, initial encounter          DISPOSITION/PLAN   DISPOSITION Decision To Discharge 09/29/2021 02:56:45 PM      PATIENT REFERRED TO:  Your healthcare provider    Schedule an appointment as soon as possible for a visit on 10/1/2021      [facility] Emergency Department  [address] [place] [address]  [phone]  Go to   If symptoms worsen      DISCHARGE MEDICATIONS:  New Prescriptions No medications on file       DISCONTINUED MEDICATIONS:  Discontinued Medications    No medications on file              (Please note that portions of this note were completed with a voice recognition program.  Efforts were made to edit the dictations but occasionally words are mis-transcribed. )    Kathy Bey PA-C (electronically signed)           Kathy Bey PA-C  09/29/21 1454

## 2022-02-11 ENCOUNTER — APPOINTMENT (OUTPATIENT)
Dept: CT IMAGING | Age: 72
End: 2022-02-11
Payer: COMMERCIAL

## 2022-02-11 ENCOUNTER — HOSPITAL ENCOUNTER (EMERGENCY)
Age: 72
Discharge: HOME | End: 2022-02-12
Attending: EMERGENCY MEDICINE
Payer: COMMERCIAL

## 2022-02-11 DIAGNOSIS — S16.1XXA ACUTE STRAIN OF NECK MUSCLE, INITIAL ENCOUNTER: ICD-10-CM

## 2022-02-11 DIAGNOSIS — N28.9 ACUTE ON CHRONIC RENAL INSUFFICIENCY: ICD-10-CM

## 2022-02-11 DIAGNOSIS — S09.90XA CLOSED HEAD INJURY, INITIAL ENCOUNTER: Primary | ICD-10-CM

## 2022-02-11 DIAGNOSIS — E86.0 MILD DEHYDRATION: ICD-10-CM

## 2022-02-11 DIAGNOSIS — E83.42 HYPOMAGNESEMIA: ICD-10-CM

## 2022-02-11 DIAGNOSIS — N18.9 ACUTE ON CHRONIC RENAL INSUFFICIENCY: ICD-10-CM

## 2022-02-11 LAB
ANION GAP SERPL CALCULATED.3IONS-SCNC: 11 MMOL/L (ref 3–16)
BASOPHILS ABSOLUTE: 0 K/UL (ref 0–0.2)
BASOPHILS RELATIVE PERCENT: 0.7 %
BILIRUBIN URINE: ABNORMAL
BLOOD, URINE: NEGATIVE
BUN BLDV-MCNC: 32 MG/DL (ref 7–20)
CALCIUM SERPL-MCNC: 8.6 MG/DL (ref 8.3–10.6)
CHLORIDE BLD-SCNC: 106 MMOL/L (ref 99–110)
CLARITY: CLEAR
CO2: 16 MMOL/L (ref 21–32)
COLOR: YELLOW
COMMENT UA: ABNORMAL
CREAT SERPL-MCNC: 1.8 MG/DL (ref 0.8–1.3)
CRYSTALS, UA: ABNORMAL /HPF
EOSINOPHILS ABSOLUTE: 0.3 K/UL (ref 0–0.6)
EOSINOPHILS RELATIVE PERCENT: 4.7 %
EPITHELIAL CELLS, UA: 3 /HPF (ref 0–5)
ETHANOL: NORMAL MG/DL (ref 0–0.08)
GFR AFRICAN AMERICAN: 45
GFR NON-AFRICAN AMERICAN: 37
GLUCOSE BLD-MCNC: 174 MG/DL (ref 70–99)
GLUCOSE URINE: NEGATIVE MG/DL
HCT VFR BLD CALC: 32 % (ref 40.5–52.5)
HEMOGLOBIN: 10.5 G/DL (ref 13.5–17.5)
HYALINE CASTS: 16 /LPF (ref 0–8)
KETONES, URINE: ABNORMAL MG/DL
LEUKOCYTE ESTERASE, URINE: NEGATIVE
LYMPHOCYTES ABSOLUTE: 0.8 K/UL (ref 1–5.1)
LYMPHOCYTES RELATIVE PERCENT: 14.5 %
MCH RBC QN AUTO: 30.4 PG (ref 26–34)
MCHC RBC AUTO-ENTMCNC: 32.8 G/DL (ref 31–36)
MCV RBC AUTO: 92.6 FL (ref 80–100)
MICROSCOPIC EXAMINATION: YES
MONOCYTES ABSOLUTE: 0.5 K/UL (ref 0–1.3)
MONOCYTES RELATIVE PERCENT: 8.5 %
NEUTROPHILS ABSOLUTE: 3.9 K/UL (ref 1.7–7.7)
NEUTROPHILS RELATIVE PERCENT: 71.6 %
NITRITE, URINE: NEGATIVE
PDW BLD-RTO: 15.2 % (ref 12.4–15.4)
PH UA: 5 (ref 5–8)
PLATELET # BLD: 163 K/UL (ref 135–450)
PMV BLD AUTO: 9.5 FL (ref 5–10.5)
POTASSIUM REFLEX MAGNESIUM: 5.8 MMOL/L (ref 3.5–5.1)
PROTEIN UA: 30 MG/DL
RBC # BLD: 3.46 M/UL (ref 4.2–5.9)
RBC UA: 2 /HPF (ref 0–4)
SODIUM BLD-SCNC: 133 MMOL/L (ref 136–145)
SPECIFIC GRAVITY UA: 1.02 (ref 1–1.03)
TROPONIN: <0.01 NG/ML
URINE REFLEX TO CULTURE: ABNORMAL
URINE TYPE: ABNORMAL
UROBILINOGEN, URINE: 0.2 E.U./DL
WBC # BLD: 5.4 K/UL (ref 4–11)
WBC UA: 2 /HPF (ref 0–5)

## 2022-02-11 PROCEDURE — 96361 HYDRATE IV INFUSION ADD-ON: CPT

## 2022-02-11 PROCEDURE — 96366 THER/PROPH/DIAG IV INF ADDON: CPT

## 2022-02-11 PROCEDURE — 93005 ELECTROCARDIOGRAM TRACING: CPT | Performed by: EMERGENCY MEDICINE

## 2022-02-11 PROCEDURE — 70450 CT HEAD/BRAIN W/O DYE: CPT

## 2022-02-11 PROCEDURE — 96365 THER/PROPH/DIAG IV INF INIT: CPT

## 2022-02-11 PROCEDURE — 85025 COMPLETE CBC W/AUTO DIFF WBC: CPT

## 2022-02-11 PROCEDURE — 81001 URINALYSIS AUTO W/SCOPE: CPT

## 2022-02-11 PROCEDURE — 80048 BASIC METABOLIC PNL TOTAL CA: CPT

## 2022-02-11 PROCEDURE — 2580000003 HC RX 258: Performed by: EMERGENCY MEDICINE

## 2022-02-11 PROCEDURE — 36415 COLL VENOUS BLD VENIPUNCTURE: CPT

## 2022-02-11 PROCEDURE — 84484 ASSAY OF TROPONIN QUANT: CPT

## 2022-02-11 PROCEDURE — 82077 ASSAY SPEC XCP UR&BREATH IA: CPT

## 2022-02-11 PROCEDURE — 72125 CT NECK SPINE W/O DYE: CPT

## 2022-02-11 PROCEDURE — 6370000000 HC RX 637 (ALT 250 FOR IP): Performed by: EMERGENCY MEDICINE

## 2022-02-11 PROCEDURE — 99283 EMERGENCY DEPT VISIT LOW MDM: CPT

## 2022-02-11 RX ORDER — ACETAMINOPHEN 325 MG/1
650 TABLET ORAL ONCE
Status: COMPLETED | OUTPATIENT
Start: 2022-02-11 | End: 2022-02-11

## 2022-02-11 RX ORDER — 0.9 % SODIUM CHLORIDE 0.9 %
1000 INTRAVENOUS SOLUTION INTRAVENOUS ONCE
Status: COMPLETED | OUTPATIENT
Start: 2022-02-11 | End: 2022-02-12

## 2022-02-11 RX ADMIN — SODIUM CHLORIDE 1000 ML: 9 INJECTION, SOLUTION INTRAVENOUS at 23:14

## 2022-02-11 RX ADMIN — ACETAMINOPHEN 650 MG: 325 TABLET ORAL at 22:44

## 2022-02-11 ASSESSMENT — PAIN DESCRIPTION - PAIN TYPE: TYPE: ACUTE PAIN

## 2022-02-11 ASSESSMENT — PAIN SCALES - GENERAL: PAINLEVEL_OUTOF10: 5

## 2022-02-12 VITALS
OXYGEN SATURATION: 98 % | DIASTOLIC BLOOD PRESSURE: 62 MMHG | SYSTOLIC BLOOD PRESSURE: 147 MMHG | BODY MASS INDEX: 24.8 KG/M2 | HEIGHT: 63 IN | RESPIRATION RATE: 18 BRPM | TEMPERATURE: 97.3 F | HEART RATE: 65 BPM | WEIGHT: 140 LBS

## 2022-02-12 LAB
ANION GAP SERPL CALCULATED.3IONS-SCNC: 11 MMOL/L (ref 3–16)
ANION GAP SERPL CALCULATED.3IONS-SCNC: 8 MMOL/L (ref 3–16)
BUN BLDV-MCNC: 18 MG/DL (ref 7–20)
BUN BLDV-MCNC: 29 MG/DL (ref 7–20)
CALCIUM SERPL-MCNC: 4.4 MG/DL (ref 8.3–10.6)
CALCIUM SERPL-MCNC: 8.5 MG/DL (ref 8.3–10.6)
CHLORIDE BLD-SCNC: 106 MMOL/L (ref 99–110)
CHLORIDE BLD-SCNC: 122 MMOL/L (ref 99–110)
CO2: 18 MMOL/L (ref 21–32)
CO2: 7 MMOL/L (ref 21–32)
CREAT SERPL-MCNC: 0.8 MG/DL (ref 0.8–1.3)
CREAT SERPL-MCNC: 1.5 MG/DL (ref 0.8–1.3)
EKG ATRIAL RATE: 82 BPM
EKG DIAGNOSIS: NORMAL
EKG P AXIS: 29 DEGREES
EKG P-R INTERVAL: 134 MS
EKG Q-T INTERVAL: 394 MS
EKG QRS DURATION: 134 MS
EKG QTC CALCULATION (BAZETT): 460 MS
EKG R AXIS: -28 DEGREES
EKG T AXIS: 9 DEGREES
EKG VENTRICULAR RATE: 82 BPM
GFR AFRICAN AMERICAN: 56
GFR AFRICAN AMERICAN: >60
GFR NON-AFRICAN AMERICAN: 46
GFR NON-AFRICAN AMERICAN: >60
GLUCOSE BLD-MCNC: 111 MG/DL (ref 70–99)
GLUCOSE BLD-MCNC: 155 MG/DL (ref 70–99)
MAGNESIUM: 1.1 MG/DL (ref 1.8–2.4)
MAGNESIUM: 1.4 MG/DL (ref 1.8–2.4)
POTASSIUM REFLEX MAGNESIUM: 3 MMOL/L (ref 3.5–5.1)
POTASSIUM SERPL-SCNC: 4.6 MMOL/L (ref 3.5–5.1)
SODIUM BLD-SCNC: 135 MMOL/L (ref 136–145)
SODIUM BLD-SCNC: 137 MMOL/L (ref 136–145)

## 2022-02-12 PROCEDURE — 93010 ELECTROCARDIOGRAM REPORT: CPT | Performed by: INTERNAL MEDICINE

## 2022-02-12 PROCEDURE — 83735 ASSAY OF MAGNESIUM: CPT

## 2022-02-12 PROCEDURE — 80048 BASIC METABOLIC PNL TOTAL CA: CPT

## 2022-02-12 PROCEDURE — 99283 EMERGENCY DEPT VISIT LOW MDM: CPT

## 2022-02-12 PROCEDURE — 36415 COLL VENOUS BLD VENIPUNCTURE: CPT

## 2022-02-12 PROCEDURE — 6360000002 HC RX W HCPCS: Performed by: EMERGENCY MEDICINE

## 2022-02-12 RX ORDER — MAGNESIUM 200 MG
200 TABLET ORAL DAILY
Qty: 10 TABLET | Refills: 0 | Status: SHIPPED | OUTPATIENT
Start: 2022-02-12 | End: 2022-05-27

## 2022-02-12 RX ORDER — MAGNESIUM SULFATE IN WATER 40 MG/ML
2000 INJECTION, SOLUTION INTRAVENOUS ONCE
Status: COMPLETED | OUTPATIENT
Start: 2022-02-12 | End: 2022-02-12

## 2022-02-12 RX ADMIN — MAGNESIUM SULFATE HEPTAHYDRATE 2000 MG: 40 INJECTION, SOLUTION INTRAVENOUS at 03:20

## 2022-02-12 NOTE — ED PROVIDER NOTES
EMERGENCY DEPARTMENT PROVIDER NOTE    Patient Identification  Pt Name: Enrique Wolf  MRN: [id number]  [place] 1950  Date of evaluation: 2/11/2022  Provider: Wilda Monsalve DO  PCP: No primary care provider on file. Chief Complaint  Fall (Patient in via [facility] for a fall. Patient states he was getting up, got dizzy and fell. Patient did hit head, denies LOC. C/o Head and neck pain.)      HPI  (History provided by patient)  This is a 70 y.o. male with pertinent past medical history of CAD, diastolic CHF, orthostatic hypotension, CKD, diabetes who was brought in by EMS transportation from [address] [place] [address] for fall. Patient states that he was bending over to pick something up, upon standing he felt dizzy and fell down, striking the back of his head against the floor. He denies any loss of consciousness before or after falling. Associated with aching lower neck pain. Patient reports issues with dizziness ongoing off and on for the past year, has caused multiple falls in the past.  States he is not supposed to walk without assistance in the nursing home due to this. He denies any fevers, chills, chest pain, vision changes, muscle weakness or numbness. Denies any alcohol or recreational drug use. He is not on any anticoagulation. .     ROS    Const:  No fevers, no chills, no generalized weakness, +dizziness  Skin:  No rash, no lesions  Eyes:  No visual changes, no blurry or double vision  ENT:  No sore throat, no ear pain, no sinus pain or congestion  Card:  No chest pain, no palpitations, no edema  Resp:  No shortness of breath, no cough, no wheezing  Abd:  No abdominal pain, no nausea, no vomiting, no diarrhea  Genitourinary:  No dysuria, no hematuria  MSK:  +joint pain, +myalgia, +neck pain  Neuro:  No focal weakness, no headache, no paresthesia    All other systems reviewed and negative unless otherwise noted in HPI        I have reviewed the following nursing documentation:  Allergies: Patient has no known allergies. Past medical history:   Past Medical History:   Diagnosis Date    Arthritis     Ataxia 3/14/2014    BPH (benign prostatic hyperplasia)     CAD (coronary artery disease)     4 stents    Coronary atherosclerosis of native coronary artery 5/11/2013    Diabetes mellitus ([facility]     Environmental allergies     Hepatitis     Hydronephrosis 3/14/2014    Hyperlipidemia     Hypertension     Kidney disease     Neuropathy     Parkinson's disease ([facility]     ?     S/P coronary artery stent placement x 4     x 4    Schizoaffective disorder, bipolar type ([facility]      Past surgical history:   Past Surgical History:   Procedure Laterality Date    ANKLE SURGERY Right 06/09/2018    ORIF of R ankle     COLONOSCOPY N/A 12/13/2018    COLONOSCOPY CONTROL HEMORRHAGE performed by Caleb Lovett [facility] at [facility]      x 4    CYSTOSCOPY  4/4/14    transurethral vaporization of prostate    HERNIA REPAIR      X2    ORIF DISTAL RADIUS FRACTURE Left 6/20/2019    LEFT DISTAL RADIUS OPEN REDUCTION INTERNAL FIXATION   performed by Tarah Francis MD at [address] [place] [address] N/A 12/13/2018    EGD BIOPSY performed by Caleb Lovett MD at [facility] 5/3/2019    EGD WITH ANESTHESIA performed by Caleb Lovett MD at [facility] medications:   Previous Medications    ACETAMINOPHEN (TYLENOL) 500 MG TABLET    Take 500 mg by mouth    ASPIRIN LOW DOSE 81 MG EC TABLET    TAKE 1 TABLET BY MOUTH ONCE DAILY    ATORVASTATIN (LIPITOR) 80 MG TABLET    TAKE 1 TABLET BY MOUTH AT BEDTIME    CARVEDILOL (COREG) 6.25 MG TABLET    Take 1 tablet by mouth 2 times daily (with meals)    DULAGLUTIDE (TRULICITY SC)    Inject 1.5 mg into the skin once a week FERROUS SULFATE 325 (65 FE) MG TABLET    Take 1 tablet by mouth 2 times daily    FLUTICASONE (FLONASE) 50 MCG/ACT NASAL SPRAY    1 spray by Nasal route daily as needed. GABAPENTIN (NEURONTIN) 300 MG CAPSULE    Take 1 capsule by mouth 3 times daily for 3 days. INSULIN GLARGINE (LANTUS) 100 UNIT/ML INJECTION VIAL    Inject 30 Units into the skin nightly    LISINOPRIL (PRINIVIL;ZESTRIL) 5 MG TABLET    Take 1 tablet by mouth daily     METFORMIN (GLUCOPHAGE) 1000 MG TABLET    Take 1 tablet by mouth 2 times daily (with meals)     PANTOPRAZOLE (PROTONIX) 40 MG TABLET    Take one tablet daily    PAROXETINE (PAXIL) 40 MG TABLET    Take 1 tablet by mouth daily    PIOGLITAZONE (ACTOS) 45 MG TABLET    Take 45 mg by mouth daily    QUETIAPINE (SEROQUEL) 25 MG TABLET    Take 12.5 mg by mouth 2 times daily    TRAZODONE (DESYREL) 100 MG TABLET    Take 100 mg by mouth nightly       Social history:  reports that he quit smoking about 8 years ago. His smoking use included cigarettes. He has a 25.00 pack-year smoking history. He has never used smokeless tobacco. He reports that he does not drink alcohol and does not use drugs. Family history:    Family History   Problem Relation Age of Onset    Other Mother         CEREBRAL PALSY    Heart Disease Father         CHF         Exam  ED Triage Vitals [02/11/22 2017]   BP Temp Temp Source Pulse Resp SpO2 Height Weight   130/62 97.3 °F (36.3 °C) Oral 82 20 96 % 5' 3\" (1.6 m) 140 lb (63.5 kg)     Nursing note and vitals reviewed. Constitutional: Well developed, well nourished. Non-toxic in appearance. HENT:      Head: Normocephalic and atraumatic. Ears: External ears normal.      Nose: Nose normal.     Mouth: Membrane mucosa moist and pink. Eyes: Anicteric sclera. No discharge. PERRL, no nystagmus, normal test of skew  Neck: Supple. Trachea midline. C7 midline tenderness with diffuse cervical paraspinal tenderness. No overlying skin changes.   Cardiovascular: RRR; no murmurs, rubs, or gallops. Pulmonary/Chest: Effort normal. No respiratory distress. CTAB. No stridor. No wheezes. No rales. Abdominal: Soft. No distension. Musculoskeletal: Moves all extremities. No gross deformity. No tenderness elicited with midline palpation of thoracic and lumbar spine. No pain elicited with compression and translation of pelvis. Full range of motion bilateral shoulders, elbows and wrists without pain. Neurological: Alert and orientedx4. Face symmetric. Speech is clear. CN 2-12 intact. 5/5 motor and sensation grossly intact all extremities. No pronator drift. Normal finger to nose, normal heel to shin. Skin: Warm and dry. No rash. Psychiatric: Normal mood and affect. Behavior is normal.    Procedures      EKG    EKG was reviewed by emergency department physician in the absence of a cardiologist    Wide complex sinus rhythm, rate 82, normal axis, normal OR and wide QRS intervals, normal Qtc, no ST elevations or depressions, normal t-wave morphology, impression sinus rhythm with sinus arrhythmia, right bundle branch block and frequent PVCs, no STEMI, no significant change from comparison 6/13/2021      Radiology  CT Cervical Spine WO Contrast   Final Result   Addendum 1 of 1   ADDENDUM:   The cervical spine demonstrates normal alignment and configuration. No   evidence of acute fracture or traumatic malalignment. Multilevel   degenerative changes are noted throughout the cervical levels. Final   No acute intracranial abnormality. RECOMMENDATIONS:   Unavailable            CT Head WO Contrast   Final Result   Addendum 1 of 1   ADDENDUM:   The cervical spine demonstrates normal alignment and configuration. No   evidence of acute fracture or traumatic malalignment. Multilevel   degenerative changes are noted throughout the cervical levels. Final   No acute intracranial abnormality.       RECOMMENDATIONS:   Unavailable                Labs  Results for orders placed or performed during the hospital encounter of 02/11/22   CBC Auto Differential   Result Value Ref Range    WBC 5.4 4.0 - 11.0 K/uL    RBC 3.46 (L) 4.20 - 5.90 M/uL    Hemoglobin 10.5 (L) 13.5 - 17.5 g/dL    Hematocrit 32.0 (L) 40.5 - 52.5 %    MCV 92.6 80.0 - 100.0 fL    MCH 30.4 26.0 - 34.0 pg    MCHC 32.8 31.0 - 36.0 g/dL    RDW 15.2 12.4 - 15.4 %    Platelets 976 295 - 656 K/uL    MPV 9.5 5.0 - 10.5 fL    Neutrophils % 71.6 %    Lymphocytes % 14.5 %    Monocytes % 8.5 %    Eosinophils % 4.7 %    Basophils % 0.7 %    Neutrophils Absolute 3.9 1.7 - 7.7 K/uL    Lymphocytes Absolute 0.8 (L) 1.0 - 5.1 K/uL    Monocytes Absolute 0.5 0.0 - 1.3 K/uL    Eosinophils Absolute 0.3 0.0 - 0.6 K/uL    Basophils Absolute 0.0 0.0 - 0.2 K/uL   Basic Metabolic Panel w/ Reflex to MG   Result Value Ref Range    Sodium 133 (L) 136 - 145 mmol/L    Potassium reflex Magnesium 5.8 (H) 3.5 - 5.1 mmol/L    Chloride 106 99 - 110 mmol/L    CO2 16 (L) 21 - 32 mmol/L    Anion Gap 11 3 - 16    Glucose 174 (H) 70 - 99 mg/dL    BUN 32 (H) 7 - 20 mg/dL    CREATININE 1.8 (H) 0.8 - 1.3 mg/dL    GFR Non- 37 (A) >60    GFR  45 (A) >60    Calcium 8.6 8.3 - 10.6 mg/dL   Troponin   Result Value Ref Range    Troponin <0.01 <0.01 ng/mL   Urinalysis Reflex to Culture    Specimen: Urine, clean catch   Result Value Ref Range    Color, UA YELLOW Straw/Yellow    Clarity, UA Clear Clear    Glucose, Ur Negative Negative mg/dL    Bilirubin Urine SMALL (A) Negative    Ketones, Urine TRACE (A) Negative mg/dL    Specific Gravity, UA 1.019 1.005 - 1.030    Blood, Urine Negative Negative    pH, UA 5.0 5.0 - 8.0    Protein, UA 30 (A) Negative mg/dL    Urobilinogen, Urine 0.2 <2.0 E.U./dL    Nitrite, Urine Negative Negative    Leukocyte Esterase, Urine Negative Negative    Microscopic Examination YES     Urine Type Voided     Urine Reflex to Culture Not Indicated    Ethanol   Result Value Ref Range    Ethanol Lvl None Detected mg/dL Microscopic Urinalysis   Result Value Ref Range    Crystals, UA Few Ca. Oxalate (A) None Seen /HPF    Urinalysis Comments see below     Hyaline Casts, UA 16 (H) 0 - 8 /LPF    WBC, UA 2 0 - 5 /HPF    RBC, UA 2 0 - 4 /HPF    Epithelial Cells, UA 3 0 - 5 /HPF   Basic Metabolic Panel w/ Reflex to MG   Result Value Ref Range    Sodium 137 136 - 145 mmol/L    Potassium reflex Magnesium 3.0 (L) 3.5 - 5.1 mmol/L    Chloride 122 (H) 99 - 110 mmol/L    CO2 7 (LL) 21 - 32 mmol/L    Anion Gap 8 3 - 16    Glucose 111 (H) 70 - 99 mg/dL    BUN 18 7 - 20 mg/dL    CREATININE 0.8 0.8 - 1.3 mg/dL    GFR Non-African American >60 >60    GFR African American >60 >60    Calcium 4.4 (LL) 8.3 - 10.6 mg/dL   Magnesium   Result Value Ref Range    Magnesium 1.10 (L) 1.80 - 2.40 mg/dL   Basic metabolic panel   Result Value Ref Range    Sodium 135 (L) 136 - 145 mmol/L    Potassium 4.6 3.5 - 5.1 mmol/L    Chloride 106 99 - 110 mmol/L    CO2 18 (L) 21 - 32 mmol/L    Anion Gap 11 3 - 16    Glucose 155 (H) 70 - 99 mg/dL    BUN 29 (H) 7 - 20 mg/dL    CREATININE 1.5 (H) 0.8 - 1.3 mg/dL    GFR Non- 46 (A) >60    GFR  56 (A) >60    Calcium 8.5 8.3 - 10.6 mg/dL   Magnesium   Result Value Ref Range    Magnesium 1.40 (L) 1.80 - 2.40 mg/dL   EKG 12 Lead   Result Value Ref Range    Ventricular Rate 82 BPM    Atrial Rate 82 BPM    P-R Interval 134 ms    QRS Duration 134 ms    Q-T Interval 394 ms    QTc Calculation (Bazett) 460 ms    P Axis 29 degrees    R Axis -28 degrees    T Axis 9 degrees    Diagnosis       Sinus rhythm with marked sinus arrhythmia with Premature atrial complexesRight bundle branch block       Screenings           MDM and ED Course    Patient afebrile and nontoxic. No distress. EKG no STEMI, troponin normal, ACS or malignant dysrhythmia is not evident. Patient denies any chest pains. Neuro exam is nonfocal, no findings to suggest CVA/TIA.   Given patient's dizziness, posterior CVA was especially considered however there are no exam findings to support this diagnosis. Patient also reports dizziness ongoing for at least 1 year, does not seem significantly changed or worsened today. CT head shows no evidence of hemorrhage or mass-effect. CT cervical spine without fracture or dislocation. No findings on exam to suggest cord injury. Laboratory work-up with acute on chronic renal insufficiency with hypomagnesemia. No other critical electrolyte abnormalities. Patient received IV fluids, repeat BMP was improved with resolution of OCTAVIO. Of note, patient did have BMP at 0122 with markedly abnormal bicarbonate and calcium, this was immediately repeated with resolution of these findings and is factitious. No findings to suggest infection, patient is not septic. Also received magnesium and will start supplementation. Patient's symptoms are largely chronic, he is felt safe for return to his nursing home where he will be closely monitored. Patient is agreeable with plan and feels comfortable returning to [place]. Strict return precautions were discussed. Management decisions relating to this patient encounter were made during the [id number] public health emergency. As a result, admission to the hospital and further ED observation/treatment pose increased risk due to multiple factors. At this point in time, the risk of hospital admission or further ED observation/treatment of this patient is deemed to be greater than the risk of discharge. I engaged in a shared decision-making conversation with the patient. The patient agrees and wishes to go home. Final Impression  1. Closed head injury, initial encounter    2. Acute strain of neck muscle, initial encounter    3. Mild dehydration    4. Acute on chronic renal insufficiency    5. Hypomagnesemia        Blood pressure (!) 147/62, pulse 65, temperature 97.3 °F (36.3 °C), temperature source Oral, resp.  rate 18, height 5' 3\" (1.6 m), weight 140 lb (63.5 kg), SpO2 98 %. Disposition:  DISPOSITION Decision To Discharge 02/12/2022 04:50:10 AM      Patient Referrals:  Pam Rubin  [phone]  In 2 days        Discharge Medications:  New Prescriptions    MAGNESIUM 200 MG TABS TABLET    Take 1 tablet by mouth daily for 10 days       Discontinued Medications:  Discontinued Medications    No medications on file       This chart was generated using the [address] [place] [address] dictation system. I created this record but it may contain dictation errors given the limitations of this technology.     Pool Swanson DO (electronically signed)  Attending Emergency Physician       Pool Swanson DO  02/12/22 0583

## 2022-02-12 NOTE — ED NOTES
MD notified of repeat BMP criticals.  Will redraw at this time     Lisa Anthony, [address] [facility]  02/12/22 [id number]

## 2022-04-15 NOTE — PROGRESS NOTES
[address] [facility]  Cardiovascular Follow Up    PATIENT: Sophie Ford  DATE: 2022  MRN: [id number]  [facility]: [id number]  : 1950    Bradycardia, Coronary Artery Disease, Hypertension, and Congestive Heart Failure (shortness of breath off and on for past couple weeks)      History of present illness on initial date of evaluation:   Sophie Ford is a 70 y.o. patient who presents for follow up. Pt was discharged from the hospital on 06/15/21 for urinary tract infection and acute kidney injury. At this hospital admission plavix was discontinued due to patient falls. At [facility] 2021 patient stated he is living at a nursing facility at [facility]. Patient was in the Emergency Department 2021 and 2022 for falls. Today, 2022, patient reports his heart has been bothering him. He states he was recently told his heart rate was irregular. Patient states he doesn't like being in a nursing home, he doesn't like the food there so he hasn't been eating much. Nursing home assistant is present in visit today and reports patient has had frequent falls. He has been attending physical therapy to attempts to strengthen his legs. He states when he falls he just feels like his legs are weak and they give out. He sees a psychologist that visits him in the nursing home.      Patient Active Problem List   Diagnosis    DM2 (diabetes mellitus, type 2) ([address]    Dizziness    Hematuria    Hyponatremia    S/P coronary artery stent placement    S/P coronary artery stent placement x 4    Orthostatic hypotension    Acute posthemorrhagic anemia    Contusion, knee    DM hyperosmolarity type II, uncontrolled ([address]    Coronary artery disease involving native coronary artery of native heart without angina pectoris    Essential hypertension    Chest pain    Ischemic chest pain ([address]    Syncope and collapse    CKD (chronic kidney disease), stage III (HCC)    Anemia    CHF (congestive heart failure) ([facility]    DM (diabetes mellitus) ([address]    Closed trimalleolar fracture of right ankle    Closed fracture of fifth metacarpal bone of left hand    Closed nondisplaced fracture of fifth metatarsal bone of left foot    Hypokalemia    Hyperkalemia    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    OCTAVIO (acute kidney injury) (HCC)    Heartburn    Esophageal dysphagia    Esophagitis, [place] grade B    Closed fracture of metatarsal neck    Closed fracture of metatarsal neck, left, initial encounter    Ataxia    Sepsis ([address]         Cardiac Testing: I have reviewed the findings below. EKG:  MRI of brain  Impression:    1. Chronic inflammatory involvement and complete opacification of the right maxillary sinus. 2. Mild patchy periatrial white matter disease and abnormal signal in the left carolann, which are most consistent with chronic small vessel ischemic changes. 3. Mild atrophic changes. 2/2/2015 4:14 PM    ECHO: 5/13:Ejection fraction is visually estimated to be 55 %. wall motion is normal.  The mitral valve is normal in structure and function. No evidence of mitral  regurgitation. The aortic valve is normal in structure and function. No evidence of aortic  valve regurgitation. no intracardiac mass, vegetations or thrombi. inter atrial septum is intact with normal IVC    Echo: 12/07/2015  Normal left ventricle size, wall thickness and systolic function with an  estimated ejection fraction of 55%. No regional wall motion abnormalities  are seen. Diastolic filling parameters suggests grade I diastolic dysfunction. The mitral valve is mildly thickend. Trace mitral and tricuspid regurgitation. Systolic pulmonary artery pressure (SPAP) is normal and estimated at 28 mmHg  (RA pressure 3 mmHg). STRESS TEST:5/13-Normal pharmacological stress study. NM Stress Test: 12/07/2015  1. Small focal area of pharmacologically induced cardiac ischemia in the   lateral apex.    2. Ejection fraction of 60%   3. No focal wall motion abnormality. CATH:  10/10/14  PROCEDURE FINDINGS:  1.  Mild to moderate coronary artery disease. There are patent stents noted  within the circumflex coronary artery. The left anterior descending has some  in-stent restenosis in 2 segments that is mild to moderate, 20% to 30%. The  circumflex stent has minimal in-stent restenosis. The right coronary artery  has mild disease in its proximal segment. 2.  Normal left ventricular function with an ejection fraction of 50%. 3.  Normal hemodynamics. 5/10/2013,   PCI of the LAD and CIRC with AMY (jailed OM1 and unable to re-cross)    Arterial Duplex: 8/4/15  Summary    1. There is no evidence to suggest arterial insufficiency at rest involving  the right lower extremity. 2. There is no evidence to suggest arterial insufficiency at rest involving  the left lower extremity. Past Medical History:   has a past medical history of Arthritis, Ataxia, BPH (benign prostatic hyperplasia), CAD (coronary artery disease), Coronary atherosclerosis of native coronary artery, Diabetes mellitus ([address] Environmental allergies, Hepatitis, Hydronephrosis, Hyperlipidemia, Hypertension, Kidney disease, Neuropathy, Parkinson's disease ([address] S/P coronary artery stent placement x 4, and Schizoaffective disorder, bipolar type ([address] Surgical History:   has a past surgical history that includes Coronary angioplasty; Coronary angioplasty with stent; hernia repair; Vasectomy; Cystocopy (4/4/14); Ankle surgery (Right, 06/09/2018); Upper gastrointestinal endoscopy (N/A, 12/13/2018); Colonoscopy (N/A, 12/13/2018); Upper gastrointestinal endoscopy (N/A, 5/3/2019); and ORIF DISTAL RADIUS FRACTURE (Left, 6/20/2019). Social History:   reports that he quit smoking about 8 years ago. His smoking use included cigarettes. He has a 25.00 pack-year smoking history.  He has never used smokeless tobacco. He reports that he does not drink alcohol and does not use drugs. Family History:  No evidence for sudden cardiac death or premature CAD    Home Medications:  Reviewed and are listed in nursing record. and/or listed below  Current Outpatient Medications   Medication Sig Dispense Refill    hydrOXYzine (ATARAX) 25 MG tablet Take 25 mg by mouth as needed for Itching      acetaminophen (TYLENOL) 500 MG tablet Take 500 mg by mouth      carvedilol (COREG) 6.25 MG tablet Take 1 tablet by mouth 2 times daily (with meals) (Patient taking differently: Take 3.125 mg by mouth 2 times daily (with meals) ) 30 tablet 0    QUEtiapine (SEROQUEL) 25 MG tablet Take 12.5 mg by mouth 2 times daily      pantoprazole (PROTONIX) 40 MG tablet Take one tablet daily 90 tablet 0    ASPIRIN LOW DOSE 81 MG EC tablet TAKE 1 TABLET BY MOUTH ONCE DAILY 30 tablet 10    metFORMIN (GLUCOPHAGE) 1000 MG tablet Take 1 tablet by mouth 2 times daily (with meals)       PARoxetine (PAXIL) 40 MG tablet Take 1 tablet by mouth daily      ferrous sulfate 325 (65 Fe) MG tablet Take 1 tablet by mouth 2 times daily (Patient taking differently: Take 325 mg by mouth daily (with breakfast) ) 60 tablet 11    atorvastatin (LIPITOR) 80 MG tablet TAKE 1 TABLET BY MOUTH AT BEDTIME 90 tablet 0    insulin glargine (LANTUS) 100 UNIT/ML injection vial Inject 30 Units into the skin nightly 1 vial 3    pioglitazone (ACTOS) 45 MG tablet Take 45 mg by mouth daily      traZODone (DESYREL) 100 MG tablet Take 100 mg by mouth nightly      fluticasone (FLONASE) 50 MCG/ACT nasal spray 1 spray by Nasal route daily as needed.  magnesium 200 MG TABS tablet Take 1 tablet by mouth daily for 10 days 10 tablet 0    gabapentin (NEURONTIN) 300 MG capsule Take 1 capsule by mouth 3 times daily for 3 days.  9 capsule 0    lisinopril (PRINIVIL;ZESTRIL) 5 MG tablet Take 1 tablet by mouth daily  (Patient not taking: Reported on 4/21/2022)      Dulaglutide (TRULICITY SC) Inject 1.5 mg into the skin once a week  (Patient not taking: Reported on 4/21/2022)       No current facility-administered medications for this visit. Allergies:  Patient has no known allergies. Review of Systems:   All 14 point review of symptoms completed. Pertinent positives identified in the HPI, all other review of symptoms negative as below.     Review of Systems - History obtained from the patient  General ROS: negative for - chills, fever or night sweats  Psychological ROS: negative for - disorientation or hallucinations  Ophthalmic ROS: negative for - dry eyes, eye pain or loss of vision  ENT ROS: negative for - nasal discharge or sore throat  Allergy and Immunology ROS: negative for - hives or itchy/watery eyes  Hematological and Lymphatic ROS: negative for - jaundice or night sweats  Endocrine ROS: negative for - mood swings or temperature intolerance  Breast ROS: deferred  Respiratory ROS: negative for - hemoptysis or stridor  Cardiovascular ROS: negative for - chest pain, dyspnea on exertion or palpitations  Gastrointestinal ROS: no abdominal pain, change in bowel habits, or black or bloody stools  Genito-Urinary ROS: no dysuria, trouble voiding, or hematuria  Musculoskeletal ROS: negative for - gait disturbance, joint pain or joint stiffness  Neurological ROS: negative for - seizures or speech problems  Dermatological ROS: negative for - rash or skin lesion changes        Physical Examination:    Filed Vitals:    05/24/13 0928   BP: 82/0   Repeat  110/70   Pulse: 73      Weight: 146 lb (66.2 kg)     Wt Readings from Last 3 Encounters:   04/21/22 146 lb (66.2 kg)   02/11/22 140 lb (63.5 kg)   09/29/21 135 lb 8 oz (61.5 kg)         General Appearance:  Alert, cooperative, no distress, appears stated age   Head:  Normocephalic, without obvious abnormality, atraumatic   Eyes:  PERRL, conjunctiva/corneas clear       Nose: Nares normal, no drainage or sinus tenderness   Throat: Lips, mucosa, and tongue normal   Neck: Supple, symmetrical, trachea midline, no adenopathy, thyroid: not enlarged, symmetric, no tenderness/mass/nodules, no carotid bruit or JVD       Lungs:   Clear to auscultation bilaterally, respirations unlabored   Chest Wall:  No tenderness or deformity   Heart:  Regular rhythm and normal rate; S1, S2 are normal; no murmur noted; no rub or gallop   Abdomen:   Soft, non-tender, bowel sounds active all four quadrants,  no masses, no organomegaly           Extremities: Extremities normal, atraumatic, no cyanosis or edema   Pulses:  faint and not palpable in the lower extremities bilaterally.      Skin: Skin color, texture, turgor normal, no rashes or lesions   Pysch: Normal mood and unusual affect   Neurologic: Normal gross motor and sensory exam.         Labs  CBC:   Lab Results   Component Value Date    WBC 5.4 02/11/2022    RBC 3.46 02/11/2022    HGB 10.5 02/11/2022    HCT 32.0 02/11/2022    MCV 92.6 02/11/2022    RDW 15.2 02/11/2022     02/11/2022     CMP:    Lab Results   Component Value Date     02/12/2022    K 4.6 02/12/2022    K 3.0 02/12/2022     02/12/2022    CO2 18 02/12/2022    BUN 29 02/12/2022    CREATININE 1.5 02/12/2022    GFRAA 56 02/12/2022    GFRAA >60 05/11/2013    AGRATIO 1.2 06/13/2021    LABGLOM 46 02/12/2022    GLUCOSE 155 02/12/2022    PROT 7.6 06/13/2021    CALCIUM 8.5 02/12/2022    BILITOT 0.4 06/13/2021    ALKPHOS 132 06/13/2021    AST 16 06/13/2021    ALT 15 06/13/2021     PT/INR:    No components found for: PTPATIENT,  PTINR  Lab Results   Component Value Date    TROPONINI <0.01 02/11/2022     No components found for: CHLPL  Lab Results   Component Value Date    TRIG 146 12/07/2015    TRIG 97 10/09/2014    TRIG 115 03/14/2014     Lab Results   Component Value Date    HDL 30 (L) 12/07/2015    HDL 32 (L) 10/09/2014    HDL 4 (L) 03/14/2014     Lab Results   Component Value Date    LDLCALC 22 12/07/2015    LDLCALC 32 10/09/2014    [address] 03/14/2014     Lab Results Component Value Date    LABVLDL 29 12/07/2015    LABVLDL 19 10/09/2014    LABVLDL 23 03/14/2014     Lab Results   Component Value Date    ALT 15 06/13/2021    AST 16 06/13/2021    ALKPHOS 132 (H) 06/13/2021    BILITOT 0.4 06/13/2021       Assessment:  70 y.o. patient with:   Diagnosis Orders   1. Coronary artery disease involving native coronary artery of native heart without angina pectoris     2. Essential hypertension     3. Syncope and collapse         Plan:    1. Recommend engaging in activities at nursing home. ~appears he has lost significant strength and mobility from deconditioning/aging. ~no obvious cardiac source for syncope   ~dehydration? Anemia? 2.  I recommend that the patient continue their currently prescribed medications. Their drug modifiable risk factors appear to be well controlled. I will continue to address the need/dosing of medications in future visits. 3. PCP at nursing home following medical issues. ~will contact to coordinate cardiac concerns,  4. Follow up with me in 1 year or as needed. Scribe's attestation: This note was scribed in the presence of Dr. Carlos Elliott MD by Harsha Wong, Dr. Carlos Elliott, personally performed the services described in this documentation, as scribed by the above signed scribe in my presence. It is both accurate and complete to my knowledge. I agree with the details independently gathered by the clinical support staff, while the remaining scribed note accurately describes my personal service to the patient. Medical Decision Making:   The following items were considered in medical decision making:  Independent review of images  Review / order clinical lab tests  Review / order radiology tests  Decision to obtain old records  Review and summation of old records as accessed through [address] [place] [address] (a summary of my findings in these old records)      Time Based Itemization  A total of 30 minutes was spent on today's patient encounter. If applicable, non-patient-facing activities:  (X) Preparing to see the patient and reviewing records  (X) Individual interpretation of results  ( ) Discussion or coordination of care with other health care professionals  (X) Ordering of unique tests, medications, or procedures  (X) Documentation within the EHR      All questions and concerns were addressed to the patient/family. Alternatives to my treatment were discussed. The note was completed using EMR. Every effort was made to ensure accuracy; however, inadvertent computerized transcription errors may be present.     Kayce Amaya MD, [address], [place], [place]  [phone] [facility]  [phone] [facility]  4/21/2022  2:11 PM

## 2022-04-21 ENCOUNTER — OFFICE VISIT (OUTPATIENT)
Dept: CARDIOLOGY CLINIC | Age: 72
End: 2022-04-21
Payer: COMMERCIAL

## 2022-04-21 VITALS
DIASTOLIC BLOOD PRESSURE: 50 MMHG | OXYGEN SATURATION: 98 % | SYSTOLIC BLOOD PRESSURE: 118 MMHG | HEIGHT: 64 IN | WEIGHT: 146 LBS | BODY MASS INDEX: 24.92 KG/M2 | HEART RATE: 61 BPM

## 2022-04-21 DIAGNOSIS — R55 SYNCOPE AND COLLAPSE: ICD-10-CM

## 2022-04-21 DIAGNOSIS — I10 ESSENTIAL HYPERTENSION: ICD-10-CM

## 2022-04-21 DIAGNOSIS — I25.10 CORONARY ARTERY DISEASE INVOLVING NATIVE CORONARY ARTERY OF NATIVE HEART WITHOUT ANGINA PECTORIS: Primary | ICD-10-CM

## 2022-04-21 PROCEDURE — 4040F PNEUMOC VAC/ADMIN/RCVD: CPT | Performed by: INTERNAL MEDICINE

## 2022-04-21 PROCEDURE — 3017F COLORECTAL CA SCREEN DOC REV: CPT | Performed by: INTERNAL MEDICINE

## 2022-04-21 PROCEDURE — G8417 CALC BMI ABV UP PARAM F/U: HCPCS | Performed by: INTERNAL MEDICINE

## 2022-04-21 PROCEDURE — 1123F ACP DISCUSS/DSCN MKR DOCD: CPT | Performed by: INTERNAL MEDICINE

## 2022-04-21 PROCEDURE — 1036F TOBACCO NON-USER: CPT | Performed by: INTERNAL MEDICINE

## 2022-04-21 PROCEDURE — G8427 DOCREV CUR MEDS BY ELIG CLIN: HCPCS | Performed by: INTERNAL MEDICINE

## 2022-04-21 PROCEDURE — 99214 OFFICE O/P EST MOD 30 MIN: CPT | Performed by: INTERNAL MEDICINE

## 2022-04-21 RX ORDER — HYDROXYZINE HYDROCHLORIDE 25 MG/1
25 TABLET, FILM COATED ORAL PRN
COMMUNITY
End: 2022-05-27

## 2022-04-21 NOTE — PATIENT INSTRUCTIONS
Plan:    1. Recommend engaging in activities at nursing home   2. I recommend that the patient continue their currently prescribed medications. Their drug modifiable risk factors appear to be well controlled. I will continue to address the need/dosing of medications in future visits.    3. Follow up with me in 1 year

## 2022-05-27 ENCOUNTER — APPOINTMENT (OUTPATIENT)
Dept: GENERAL RADIOLOGY | Age: 72
DRG: 690 | End: 2022-05-27
Payer: COMMERCIAL

## 2022-05-27 ENCOUNTER — APPOINTMENT (OUTPATIENT)
Dept: CT IMAGING | Age: 72
DRG: 690 | End: 2022-05-27
Payer: COMMERCIAL

## 2022-05-27 ENCOUNTER — HOSPITAL ENCOUNTER (INPATIENT)
Age: 72
LOS: 4 days | Discharge: NURSING, REHAB OR LONG-TERM CARE | DRG: 690 | End: 2022-05-31
Attending: EMERGENCY MEDICINE | Admitting: INTERNAL MEDICINE
Payer: COMMERCIAL

## 2022-05-27 DIAGNOSIS — E83.42 HYPOMAGNESEMIA: ICD-10-CM

## 2022-05-27 DIAGNOSIS — S42.292A HUMERAL HEAD FRACTURE, LEFT, CLOSED, INITIAL ENCOUNTER: ICD-10-CM

## 2022-05-27 DIAGNOSIS — N28.9 ACUTE ON CHRONIC RENAL INSUFFICIENCY: ICD-10-CM

## 2022-05-27 DIAGNOSIS — N18.9 ACUTE ON CHRONIC RENAL INSUFFICIENCY: ICD-10-CM

## 2022-05-27 DIAGNOSIS — T68.XXXA HYPOTHERMIA, INITIAL ENCOUNTER: ICD-10-CM

## 2022-05-27 DIAGNOSIS — E16.2 HYPOGLYCEMIA: Primary | ICD-10-CM

## 2022-05-27 DIAGNOSIS — N39.0 URINARY TRACT INFECTION WITHOUT HEMATURIA, SITE UNSPECIFIED: ICD-10-CM

## 2022-05-27 LAB
A/G RATIO: 1.3 (ref 1.1–2.2)
ALBUMIN SERPL-MCNC: 4.1 G/DL (ref 3.4–5)
ALP BLD-CCNC: 132 U/L (ref 40–129)
ALT SERPL-CCNC: 10 U/L (ref 10–40)
ANION GAP SERPL CALCULATED.3IONS-SCNC: 12 MMOL/L (ref 3–16)
AST SERPL-CCNC: 16 U/L (ref 15–37)
BACTERIA: ABNORMAL /HPF
BASOPHILS ABSOLUTE: 0 K/UL (ref 0–0.2)
BASOPHILS RELATIVE PERCENT: 0.3 %
BILIRUB SERPL-MCNC: 0.3 MG/DL (ref 0–1)
BILIRUBIN URINE: NEGATIVE
BLOOD, URINE: ABNORMAL
BUN BLDV-MCNC: 39 MG/DL (ref 7–20)
CALCIUM SERPL-MCNC: 9.2 MG/DL (ref 8.3–10.6)
CHLORIDE BLD-SCNC: 110 MMOL/L (ref 99–110)
CLARITY: CLEAR
CO2: 17 MMOL/L (ref 21–32)
COLOR: YELLOW
COMMENT UA: ABNORMAL
CREAT SERPL-MCNC: 1.7 MG/DL (ref 0.8–1.3)
EKG ATRIAL RATE: 65 BPM
EKG DIAGNOSIS: NORMAL
EKG P AXIS: 68 DEGREES
EKG P-R INTERVAL: 134 MS
EKG Q-T INTERVAL: 444 MS
EKG QRS DURATION: 142 MS
EKG QTC CALCULATION (BAZETT): 461 MS
EKG R AXIS: -36 DEGREES
EKG T AXIS: 39 DEGREES
EKG VENTRICULAR RATE: 65 BPM
EOSINOPHILS ABSOLUTE: 0.1 K/UL (ref 0–0.6)
EOSINOPHILS RELATIVE PERCENT: 2.3 %
EPITHELIAL CELLS, UA: 3 /HPF (ref 0–5)
GFR AFRICAN AMERICAN: 48
GFR NON-AFRICAN AMERICAN: 40
GLUCOSE BLD-MCNC: 112 MG/DL (ref 70–99)
GLUCOSE BLD-MCNC: 138 MG/DL (ref 70–99)
GLUCOSE BLD-MCNC: 143 MG/DL (ref 70–99)
GLUCOSE BLD-MCNC: 91 MG/DL (ref 70–99)
GLUCOSE BLD-MCNC: 91 MG/DL (ref 70–99)
GLUCOSE BLD-MCNC: 94 MG/DL (ref 70–99)
GLUCOSE URINE: NEGATIVE MG/DL
HCT VFR BLD CALC: 33.8 % (ref 40.5–52.5)
HEMOGLOBIN: 10.6 G/DL (ref 13.5–17.5)
HYALINE CASTS: 1 /LPF (ref 0–8)
KETONES, URINE: NEGATIVE MG/DL
LACTIC ACID, SEPSIS: 0.9 MMOL/L (ref 0.4–1.9)
LEUKOCYTE ESTERASE, URINE: ABNORMAL
LYMPHOCYTES ABSOLUTE: 0.6 K/UL (ref 1–5.1)
LYMPHOCYTES RELATIVE PERCENT: 10.2 %
MAGNESIUM: 1.6 MG/DL (ref 1.8–2.4)
MCH RBC QN AUTO: 29.6 PG (ref 26–34)
MCHC RBC AUTO-ENTMCNC: 31.4 G/DL (ref 31–36)
MCV RBC AUTO: 94.4 FL (ref 80–100)
MICROSCOPIC EXAMINATION: YES
MONOCYTES ABSOLUTE: 0.3 K/UL (ref 0–1.3)
MONOCYTES RELATIVE PERCENT: 5.9 %
NEUTROPHILS ABSOLUTE: 4.7 K/UL (ref 1.7–7.7)
NEUTROPHILS RELATIVE PERCENT: 81.3 %
NITRITE, URINE: NEGATIVE
PDW BLD-RTO: 15.7 % (ref 12.4–15.4)
PERFORMED ON: ABNORMAL
PERFORMED ON: NORMAL
PERFORMED ON: NORMAL
PH UA: 5 (ref 5–8)
PLATELET # BLD: 212 K/UL (ref 135–450)
PMV BLD AUTO: 8.3 FL (ref 5–10.5)
POTASSIUM REFLEX MAGNESIUM: 4.5 MMOL/L (ref 3.5–5.1)
PROCALCITONIN: 0.21 NG/ML (ref 0–0.15)
PROTEIN UA: ABNORMAL MG/DL
RBC # BLD: 3.58 M/UL (ref 4.2–5.9)
RBC UA: ABNORMAL /HPF (ref 0–4)
SODIUM BLD-SCNC: 139 MMOL/L (ref 136–145)
SPECIFIC GRAVITY UA: 1.01 (ref 1–1.03)
TOTAL PROTEIN: 7.3 G/DL (ref 6.4–8.2)
TROPONIN: <0.01 NG/ML
URINE REFLEX TO CULTURE: YES
URINE TYPE: ABNORMAL
UROBILINOGEN, URINE: 0.2 E.U./DL
WBC # BLD: 5.8 K/UL (ref 4–11)
WBC UA: 24 /HPF (ref 0–5)

## 2022-05-27 PROCEDURE — 96374 THER/PROPH/DIAG INJ IV PUSH: CPT

## 2022-05-27 PROCEDURE — 84540 ASSAY OF URINE/UREA-N: CPT

## 2022-05-27 PROCEDURE — 99285 EMERGENCY DEPT VISIT HI MDM: CPT

## 2022-05-27 PROCEDURE — 36415 COLL VENOUS BLD VENIPUNCTURE: CPT

## 2022-05-27 PROCEDURE — 72125 CT NECK SPINE W/O DYE: CPT

## 2022-05-27 PROCEDURE — 71045 X-RAY EXAM CHEST 1 VIEW: CPT

## 2022-05-27 PROCEDURE — 83735 ASSAY OF MAGNESIUM: CPT

## 2022-05-27 PROCEDURE — 6360000002 HC RX W HCPCS: Performed by: PHYSICIAN ASSISTANT

## 2022-05-27 PROCEDURE — 84300 ASSAY OF URINE SODIUM: CPT

## 2022-05-27 PROCEDURE — 93010 ELECTROCARDIOGRAM REPORT: CPT | Performed by: INTERNAL MEDICINE

## 2022-05-27 PROCEDURE — 93005 ELECTROCARDIOGRAM TRACING: CPT | Performed by: PHYSICIAN ASSISTANT

## 2022-05-27 PROCEDURE — 80053 COMPREHEN METABOLIC PANEL: CPT

## 2022-05-27 PROCEDURE — 87086 URINE CULTURE/COLONY COUNT: CPT

## 2022-05-27 PROCEDURE — 83605 ASSAY OF LACTIC ACID: CPT

## 2022-05-27 PROCEDURE — 84145 PROCALCITONIN (PCT): CPT

## 2022-05-27 PROCEDURE — 87186 SC STD MICRODIL/AGAR DIL: CPT

## 2022-05-27 PROCEDURE — 83036 HEMOGLOBIN GLYCOSYLATED A1C: CPT

## 2022-05-27 PROCEDURE — 84484 ASSAY OF TROPONIN QUANT: CPT

## 2022-05-27 PROCEDURE — 82436 ASSAY OF URINE CHLORIDE: CPT

## 2022-05-27 PROCEDURE — 87077 CULTURE AEROBIC IDENTIFY: CPT

## 2022-05-27 PROCEDURE — 2580000003 HC RX 258: Performed by: INTERNAL MEDICINE

## 2022-05-27 PROCEDURE — 94760 N-INVAS EAR/PLS OXIMETRY 1: CPT

## 2022-05-27 PROCEDURE — 73030 X-RAY EXAM OF SHOULDER: CPT

## 2022-05-27 PROCEDURE — 87040 BLOOD CULTURE FOR BACTERIA: CPT

## 2022-05-27 PROCEDURE — 1200000000 HC SEMI PRIVATE

## 2022-05-27 PROCEDURE — 81001 URINALYSIS AUTO W/SCOPE: CPT

## 2022-05-27 PROCEDURE — 84133 ASSAY OF URINE POTASSIUM: CPT

## 2022-05-27 PROCEDURE — 82570 ASSAY OF URINE CREATININE: CPT

## 2022-05-27 PROCEDURE — 6370000000 HC RX 637 (ALT 250 FOR IP): Performed by: INTERNAL MEDICINE

## 2022-05-27 PROCEDURE — 6360000002 HC RX W HCPCS: Performed by: INTERNAL MEDICINE

## 2022-05-27 PROCEDURE — 70450 CT HEAD/BRAIN W/O DYE: CPT

## 2022-05-27 PROCEDURE — 96375 TX/PRO/DX INJ NEW DRUG ADDON: CPT

## 2022-05-27 PROCEDURE — 85025 COMPLETE CBC W/AUTO DIFF WBC: CPT

## 2022-05-27 RX ORDER — DEXTROSE MONOHYDRATE 50 MG/ML
100 INJECTION, SOLUTION INTRAVENOUS PRN
Status: DISCONTINUED | OUTPATIENT
Start: 2022-05-27 | End: 2022-05-31 | Stop reason: SDUPTHER

## 2022-05-27 RX ORDER — FLUTICASONE PROPIONATE 50 MCG
1 SPRAY, SUSPENSION (ML) NASAL DAILY
Status: DISCONTINUED | OUTPATIENT
Start: 2022-05-28 | End: 2022-05-31 | Stop reason: HOSPADM

## 2022-05-27 RX ORDER — CARVEDILOL 6.25 MG/1
6.25 TABLET ORAL 2 TIMES DAILY WITH MEALS
Status: DISCONTINUED | OUTPATIENT
Start: 2022-05-27 | End: 2022-05-31 | Stop reason: HOSPADM

## 2022-05-27 RX ORDER — LORATADINE 10 MG/1
10 TABLET ORAL DAILY
COMMUNITY

## 2022-05-27 RX ORDER — HEPARIN SODIUM 5000 [USP'U]/ML
5000 INJECTION, SOLUTION INTRAVENOUS; SUBCUTANEOUS EVERY 8 HOURS SCHEDULED
Status: DISCONTINUED | OUTPATIENT
Start: 2022-05-27 | End: 2022-05-31 | Stop reason: HOSPADM

## 2022-05-27 RX ORDER — INSULIN LISPRO 100 [IU]/ML
0-6 INJECTION, SOLUTION INTRAVENOUS; SUBCUTANEOUS
Status: DISCONTINUED | OUTPATIENT
Start: 2022-05-27 | End: 2022-05-28

## 2022-05-27 RX ORDER — SODIUM CHLORIDE 9 MG/ML
INJECTION, SOLUTION INTRAVENOUS CONTINUOUS
Status: DISCONTINUED | OUTPATIENT
Start: 2022-05-27 | End: 2022-05-28

## 2022-05-27 RX ORDER — LORATADINE 10 MG/1
10 TABLET ORAL DAILY
Status: DISCONTINUED | OUTPATIENT
Start: 2022-05-28 | End: 2022-05-27 | Stop reason: CLARIF

## 2022-05-27 RX ORDER — MIRTAZAPINE 15 MG/1
7.5 TABLET, FILM COATED ORAL NIGHTLY
Status: DISCONTINUED | OUTPATIENT
Start: 2022-05-27 | End: 2022-05-27

## 2022-05-27 RX ORDER — ATORVASTATIN CALCIUM 80 MG/1
80 TABLET, FILM COATED ORAL NIGHTLY
Status: DISCONTINUED | OUTPATIENT
Start: 2022-05-27 | End: 2022-05-31 | Stop reason: HOSPADM

## 2022-05-27 RX ORDER — SODIUM CHLORIDE 0.9 % (FLUSH) 0.9 %
5-40 SYRINGE (ML) INJECTION PRN
Status: DISCONTINUED | OUTPATIENT
Start: 2022-05-27 | End: 2022-05-31 | Stop reason: HOSPADM

## 2022-05-27 RX ORDER — MAGNESIUM SULFATE IN WATER 40 MG/ML
2000 INJECTION, SOLUTION INTRAVENOUS ONCE
Status: COMPLETED | OUTPATIENT
Start: 2022-05-27 | End: 2022-05-27

## 2022-05-27 RX ORDER — ONDANSETRON 2 MG/ML
4 INJECTION INTRAMUSCULAR; INTRAVENOUS EVERY 6 HOURS PRN
Status: DISCONTINUED | OUTPATIENT
Start: 2022-05-27 | End: 2022-05-31 | Stop reason: HOSPADM

## 2022-05-27 RX ORDER — GUAIFENESIN 100 MG/5ML
400 SOLUTION ORAL EVERY 4 HOURS PRN
COMMUNITY

## 2022-05-27 RX ORDER — MIRTAZAPINE 7.5 MG/1
7.5 TABLET, FILM COATED ORAL NIGHTLY
COMMUNITY

## 2022-05-27 RX ORDER — INSULIN LISPRO 100 [IU]/ML
0-3 INJECTION, SOLUTION INTRAVENOUS; SUBCUTANEOUS NIGHTLY
Status: DISCONTINUED | OUTPATIENT
Start: 2022-05-27 | End: 2022-05-28

## 2022-05-27 RX ORDER — ACETAMINOPHEN 325 MG/1
650 TABLET ORAL EVERY 6 HOURS PRN
Status: DISCONTINUED | OUTPATIENT
Start: 2022-05-27 | End: 2022-05-31 | Stop reason: HOSPADM

## 2022-05-27 RX ORDER — INSULIN GLARGINE 100 [IU]/ML
35 INJECTION, SOLUTION SUBCUTANEOUS NIGHTLY
Status: ON HOLD | COMMUNITY
End: 2022-05-31 | Stop reason: HOSPADM

## 2022-05-27 RX ORDER — QUETIAPINE FUMARATE 25 MG/1
12.5 TABLET, FILM COATED ORAL 2 TIMES DAILY
Status: DISCONTINUED | OUTPATIENT
Start: 2022-05-27 | End: 2022-05-31 | Stop reason: HOSPADM

## 2022-05-27 RX ORDER — CIPROFLOXACIN HYDROCHLORIDE 3.5 MG/ML
1 SOLUTION/ DROPS TOPICAL
Status: DISCONTINUED | OUTPATIENT
Start: 2022-05-27 | End: 2022-05-31 | Stop reason: HOSPADM

## 2022-05-27 RX ORDER — ASPIRIN 81 MG/1
81 TABLET ORAL DAILY
Status: DISCONTINUED | OUTPATIENT
Start: 2022-05-28 | End: 2022-05-31 | Stop reason: HOSPADM

## 2022-05-27 RX ORDER — PAROXETINE HYDROCHLORIDE 20 MG/1
40 TABLET, FILM COATED ORAL DAILY
Status: DISCONTINUED | OUTPATIENT
Start: 2022-05-28 | End: 2022-05-31 | Stop reason: HOSPADM

## 2022-05-27 RX ORDER — CHOLECALCIFEROL (VITAMIN D3) 1250 MCG
50000 CAPSULE ORAL WEEKLY
Status: ON HOLD | COMMUNITY
End: 2022-05-31 | Stop reason: HOSPADM

## 2022-05-27 RX ORDER — SODIUM CHLORIDE 0.9 % (FLUSH) 0.9 %
5-40 SYRINGE (ML) INJECTION EVERY 12 HOURS SCHEDULED
Status: DISCONTINUED | OUTPATIENT
Start: 2022-05-27 | End: 2022-05-31 | Stop reason: HOSPADM

## 2022-05-27 RX ORDER — ONDANSETRON 4 MG/1
4 TABLET, ORALLY DISINTEGRATING ORAL EVERY 8 HOURS PRN
Status: DISCONTINUED | OUTPATIENT
Start: 2022-05-27 | End: 2022-05-31 | Stop reason: HOSPADM

## 2022-05-27 RX ORDER — ACETAMINOPHEN 650 MG/1
650 SUPPOSITORY RECTAL EVERY 6 HOURS PRN
Status: DISCONTINUED | OUTPATIENT
Start: 2022-05-27 | End: 2022-05-31 | Stop reason: HOSPADM

## 2022-05-27 RX ORDER — SODIUM CHLORIDE 9 MG/ML
INJECTION, SOLUTION INTRAVENOUS PRN
Status: DISCONTINUED | OUTPATIENT
Start: 2022-05-27 | End: 2022-05-31 | Stop reason: HOSPADM

## 2022-05-27 RX ORDER — SIMETHICONE 125 MG
125 TABLET,CHEWABLE ORAL EVERY MORNING
COMMUNITY

## 2022-05-27 RX ORDER — CETIRIZINE HYDROCHLORIDE 10 MG/1
10 TABLET ORAL DAILY
Status: DISCONTINUED | OUTPATIENT
Start: 2022-05-28 | End: 2022-05-31 | Stop reason: HOSPADM

## 2022-05-27 RX ADMIN — SODIUM CHLORIDE, PRESERVATIVE FREE 10 ML: 5 INJECTION INTRAVENOUS at 21:35

## 2022-05-27 RX ADMIN — CARVEDILOL 6.25 MG: 6.25 TABLET, FILM COATED ORAL at 21:34

## 2022-05-27 RX ADMIN — SODIUM CHLORIDE: 9 INJECTION, SOLUTION INTRAVENOUS at 18:15

## 2022-05-27 RX ADMIN — HEPARIN SODIUM 5000 UNITS: 5000 INJECTION INTRAVENOUS; SUBCUTANEOUS at 21:34

## 2022-05-27 RX ADMIN — ATORVASTATIN CALCIUM 80 MG: 80 TABLET, FILM COATED ORAL at 21:34

## 2022-05-27 RX ADMIN — MAGNESIUM SULFATE HEPTAHYDRATE 2000 MG: 40 INJECTION, SOLUTION INTRAVENOUS at 13:38

## 2022-05-27 RX ADMIN — CIPROFLOXACIN 1 DROP: 3 SOLUTION OPHTHALMIC at 21:34

## 2022-05-27 RX ADMIN — QUETIAPINE FUMARATE 12.5 MG: 25 TABLET ORAL at 21:34

## 2022-05-27 RX ADMIN — Medication 1000 MG: at 13:33

## 2022-05-27 ASSESSMENT — PAIN SCALES - GENERAL: PAINLEVEL_OUTOF10: 0

## 2022-05-27 NOTE — ED PROVIDER NOTES
[address]        Pt Name: Primitivo Godfrey  MRN: [id number]  [place] 1950  Date of evaluation: 5/27/2022  Provider: Willian Anton PA-C  PCP: No primary care provider on file. Note Started: 10:59 AM EDT        I have seen and evaluated this patient with my supervising physician Santosh Gee MD.    I personally saw the patient and independently provided 31 minutes of non-concurrent critical care time out of the total critical care time provided. This excludes time spent doing separately billable procedures. This includes time at the bedside, data interpretation, medication management, obtaining critical history from collateral sources if the patient is unable to provide it directly, and physician consultation. Specifics of interventions taken and potentially life-threatening diagnostic considerations are listed above in the medical decision making. CHIEF COMPLAINT       Chief Complaint   Patient presents with    Hypoglycemia     [facility] from [facility] d/t hypoglycemia. pt was found down at nursing home. BS was 32 at the [facility]. EMS gave 25g dextrose en route and amp of sodium bicarb.  during triage pt alert and oriented at this time        HISTORY OF PRESENT ILLNESS   (Location, Timing/Onset, Context/Setting, Quality, Duration, Modifying Factors, Severity, Associated Signs and Symptoms)  Note limiting factors. Chief Complaint: Hypoglycemia    Primitivo Godfrey is a 70 y.o. male who presents to the emergency department after he was found to have a glucose of 32 at nursing home. He was given 25 g of dextrose in route and now has a blood sugar of 138. He is alert and oriented x4 by the time he gets here. Patient states he feels generally fatigued. He states he did not eat dinner because he does not like the way the food taste yesterday. He lives at [facility].   He believes that he still got his insulin last night but he is unsure what his medicines are if he actually received it. Medicine list was not sent by nursing home. Unsure what medicines he received and when the last time he received these. Unsure what his baseline is. He states he has been having some dysuria for the past week. Denies hematuria, diarrhea, bloody stool, chest pain, shortness breath abdominal pain, nausea or any other symptoms. Nursing Notes were all reviewed and agreed with or any disagreements were addressed in the HPI. REVIEW OF SYSTEMS    (2-9 systems for level 4, 10 or more for level 5)     Review of Systems    Positives and Pertinent negatives as per HPI. Except as noted above in the ROS, all other systems were reviewed and negative. PAST MEDICAL HISTORY     Past Medical History:   Diagnosis Date    Arthritis     Ataxia 3/14/2014    BPH (benign prostatic hyperplasia)     CAD (coronary artery disease)     4 stents    Coronary atherosclerosis of native coronary artery 5/11/2013    Diabetes mellitus ([facility] [address]     Environmental allergies     Hepatitis     Hydronephrosis 3/14/2014    Hyperlipidemia     Hypertension     Kidney disease     Neuropathy     Parkinson's disease ([facility] [address] [address]     ?     S/P coronary artery stent placement x 4     x 4    Schizoaffective disorder, bipolar type [facility])          SURGICAL HISTORY     Past Surgical History:   Procedure Laterality Date    ANKLE SURGERY Right 06/09/2018    ORIF of R ankle     COLONOSCOPY N/A 12/13/2018    COLONOSCOPY CONTROL HEMORRHAGE performed by [facility]a Carey [facility] at [facility]      x 4    CYSTOSCOPY  4/4/14    transurethral vaporization of prostate    HERNIA REPAIR      X2    ORIF DISTAL RADIUS FRACTURE Left 6/20/2019    LEFT DISTAL RADIUS OPEN REDUCTION INTERNAL FIXATION   performed by Gertrude Damico MD at [address] [place] [address] N/A 12/13/2018    EGD BIOPSY performed by Jasmina Kevin MD at [facility] N/A 5/3/2019    EGD WITH ANESTHESIA performed by Jasmina Kevin MD at [address]        Current Discharge Medication List      CONTINUE these medications which have NOT CHANGED    Details   insulin glargine (BASAGLAR KWIKPEN) 100 UNIT/ML injection pen Inject 35 Units into the skin nightly      simethicone (MYLICON) 408 MG chewable tablet Take 125 mg by mouth every morning      Cholecalciferol (VITAMIN D3) 1.25 MG (31125 UT) CAPS Take 50,000 Units by mouth once a week      guaiFENesin (ROBITUSSIN) 100 MG/5ML SOLN oral solution Take 400 mg by mouth every 4 hours as needed for Cough      loratadine (CLARITIN) 10 MG tablet Take 10 mg by mouth daily      mirtazapine (REMERON) 7.5 MG tablet Take 7.5 mg by mouth nightly      acetaminophen (TYLENOL) 500 MG tablet Take 1,000 mg by mouth every 6 hours as needed       carvedilol (COREG) 6.25 MG tablet Take 1 tablet by mouth 2 times daily (with meals)  Qty: 30 tablet, Refills: 0      QUEtiapine (SEROQUEL) 25 MG tablet Take 12.5 mg by mouth 2 times daily      gabapentin (NEURONTIN) 300 MG capsule Take 1 capsule by mouth 3 times daily for 3 days.   Qty: 9 capsule, Refills: 0      pantoprazole (PROTONIX) 40 MG tablet Take one tablet daily  Qty: 90 tablet, Refills: 0      ASPIRIN LOW DOSE 81 MG EC tablet TAKE 1 TABLET BY MOUTH ONCE DAILY  Qty: 30 tablet, Refills: 10      metFORMIN (GLUCOPHAGE) 1000 MG tablet Take 1 tablet by mouth 2 times daily (with meals)       PARoxetine (PAXIL) 40 MG tablet Take 1 tablet by mouth daily      ferrous sulfate 325 (65 Fe) MG tablet Take 1 tablet by mouth 2 times daily  Qty: 60 tablet, Refills: 11      atorvastatin (LIPITOR) 80 MG tablet TAKE 1 TABLET BY MOUTH AT BEDTIME  Qty: 90 tablet, Refills: 0      pioglitazone (ACTOS) 45 MG tablet Take 45 mg by mouth daily      traZODone (DESYREL) 100 MG tablet Take 150 mg by mouth nightly       fluticasone (FLONASE) 50 MCG/ACT nasal spray 1 spray by Nasal route daily                ALLERGIES     Patient has no known allergies. FAMILYHISTORY       Family History   Problem Relation Age of Onset    Other Mother         CEREBRAL PALSY    Heart Disease Father         CHF          SOCIAL HISTORY       Social History     Tobacco Use    Smoking status: Former Smoker     Packs/day: 1.00     Years: 25.00     Pack years: 25.00     Types: Cigarettes     Quit date: 5/10/2013     Years since quittin.0    Smokeless tobacco: Never Used   Vaping Use    Vaping Use: Never used   Substance Use Topics    Alcohol use: No    Drug use: No       SCREENINGS    [place] Coma Scale  Eye Opening: Spontaneous  Best Verbal Response: Oriented  Best Motor Response: Obeys commands  [place] Coma Scale Score: 15        PHYSICAL EXAM    (up to 7 for level 4, 8 or more for level 5)     ED Triage Vitals   BP Temp Temp src Heart Rate Resp SpO2 Height Weight   22 1030 -- -- 22 1030 22 1034 22 1030 -- 22 1033   (!) 145/74   75 19 99 %  146 lb (66.2 kg)       Physical Exam  Vitals and nursing note reviewed. Constitutional:       Appearance: He is well-developed. He is not diaphoretic. Comments: Chronically ill-appearing   HENT:      Head: Atraumatic. Nose: Nose normal.   Eyes:      General:         Right eye: No discharge. Left eye: No discharge. Cardiovascular:      Rate and Rhythm: Normal rate and regular rhythm. Heart sounds: No murmur heard. No friction rub. No gallop. Pulmonary:      Effort: Pulmonary effort is normal. No respiratory distress. Breath sounds: No stridor. No wheezing, rhonchi or rales. Abdominal:      General: Bowel sounds are normal. There is no distension. Palpations: Abdomen is soft. There is no mass. Tenderness: There is no abdominal tenderness. There is no guarding or rebound.       Hernia: No hernia is present. Musculoskeletal:         General: No swelling. Cervical back: Normal range of motion. Comments: Some decreased range of motion of bilateral shoulders without any obvious deformity or sign of trauma. Equal range of motion in bilateral upper and lower extremities. Skin:     General: Skin is warm and dry. Findings: No erythema or rash. Neurological:      Mental Status: He is alert and oriented to person, place, and time. Cranial Nerves: No cranial nerve deficit.    Psychiatric:         Behavior: Behavior normal.         DIAGNOSTIC RESULTS   LABS:    Labs Reviewed   CBC WITH AUTO DIFFERENTIAL - Abnormal; Notable for the following components:       Result Value    RBC 3.58 (*)     Hemoglobin 10.6 (*)     Hematocrit 33.8 (*)     RDW 15.7 (*)     Lymphocytes Absolute 0.6 (*)     All other components within normal limits   COMPREHENSIVE METABOLIC PANEL W/ REFLEX TO MG FOR LOW K - Abnormal; Notable for the following components:    CO2 17 (*)     BUN 39 (*)     CREATININE 1.7 (*)     GFR Non- 40 (*)     GFR African American 48 (*)     Alkaline Phosphatase 132 (*)     All other components within normal limits   MAGNESIUM - Abnormal; Notable for the following components:    Magnesium 1.60 (*)     All other components within normal limits   URINALYSIS WITH REFLEX TO CULTURE - Abnormal; Notable for the following components:    Blood, Urine SMALL (*)     Protein, UA TRACE (*)     Leukocyte Esterase, Urine MODERATE (*)     All other components within normal limits   PROCALCITONIN - Abnormal; Notable for the following components:    Procalcitonin 0.21 (*)     All other components within normal limits   MICROSCOPIC URINALYSIS - Abnormal; Notable for the following components:    Bacteria, UA 4+ (*)     WBC, UA 24 (*)     All other components within normal limits   POCT GLUCOSE - Abnormal; Notable for the following components:    POC Glucose 138 (*)     All other components within normal limits   POCT GLUCOSE - Abnormal; Notable for the following components:    POC Glucose 143 (*)     All other components within normal limits   POCT GLUCOSE - Abnormal; Notable for the following components:    POC Glucose 112 (*)     All other components within normal limits   CULTURE, BLOOD 2   CULTURE, BLOOD 1   CULTURE, URINE   GASTROINTESTINAL PANEL, MOLECULAR   TROPONIN   LACTATE, SEPSIS   HEMOGLOBIN A1C   CREATININE, RANDOM URINE   ELECTROLYTES URINE RANDOM   UREA NITROGEN, URINE   COMPREHENSIVE METABOLIC PANEL W/ REFLEX TO MG FOR LOW K   CBC WITH AUTO DIFFERENTIAL   RENAL FUNCTION PANEL   POCT GLUCOSE   POCT GLUCOSE   POCT GLUCOSE   POCT GLUCOSE       When ordered only abnormal lab results are displayed. All other labs were within normal range or not returned as of this dictation. EKG: When ordered, EKG's are interpreted by the Emergency Department Physician in the absence of a cardiologist.  Please see their note for interpretation of EKG. RADIOLOGY:   Non-plain film images such as CT, Ultrasound and MRI are read by the radiologist. Plain radiographic images are visualized and preliminarily interpreted by the ED Provider with the below findings:        Interpretation per the Radiologist below, if available at the time of this note:    XR SHOULDER LEFT (MIN 2 VIEWS)   Final Result   Abnormal morphology of the humeral head. A nondisplaced humeral head   fracture is suspected. CT Cervical Spine WO Contrast   Final Result   No acute abnormality of the cervical spine. RECOMMENDATIONS:   Unavailable         CT Head WO Contrast   Final Result   No acute intracranial abnormality. XR CHEST PORTABLE   Final Result   No radiographic evidence of acute cardiopulmonary disease. New left humeral neck fracture of uncertain acuity.                  PROCEDURES   Unless otherwise noted below, none     Procedures    CRITICAL CARE TIME       CONSULTS:  IP CONSULT TO HOSPITALIST  IP CONSULT TO ORTHOPEDIC SURGERY  IP CONSULT TO NEPHROLOGY  IP CONSULT TO PHARMACY      EMERGENCY DEPARTMENT COURSE and DIFFERENTIAL DIAGNOSIS/MDM:   Vitals:    Vitals:    05/27/22 1300 05/27/22 1339 05/27/22 1603 05/27/22 1804   BP: (!) 152/78 136/84 (!) 142/62 112/77   Pulse: 78 81 75 70   Resp: 13 16 18 18   Temp:  97.5 °F (36.4 °C) 97.3 °F (36.3 °C)    TempSrc:  Oral Oral    SpO2: 99% 95% 97% 98%   Weight:   143 lb 9.6 oz (65.1 kg)    Height:   5' 4\" (1.626 m)        Patient was given the following medications:  Medications   sodium chloride flush 0.9 % injection 5-40 mL (has no administration in time range)   sodium chloride flush 0.9 % injection 5-40 mL (has no administration in time range)   0.9 % sodium chloride infusion (has no administration in time range)   ondansetron (ZOFRAN-ODT) disintegrating tablet 4 mg (has no administration in time range)     Or   ondansetron (ZOFRAN) injection 4 mg (has no administration in time range)   acetaminophen (TYLENOL) tablet 650 mg (has no administration in time range)     Or   acetaminophen (TYLENOL) suppository 650 mg (has no administration in time range)   dextrose bolus 10% 125 mL (has no administration in time range)     Or   dextrose bolus 10% 250 mL (has no administration in time range)   glucagon (rDNA) injection 1 mg (has no administration in time range)   dextrose 5 % solution (has no administration in time range)   insulin lispro (HUMALOG) injection vial 0-6 Units (0 Units SubCUTAneous Not Given 5/27/22 1712)   insulin lispro (HUMALOG) injection vial 0-3 Units (has no administration in time range)   cefTRIAXone (ROCEPHIN) 1000 mg in sterile water 10 mL IV syringe (has no administration in time range)   heparin (porcine) injection 5,000 Units (has no administration in time range)   aspirin EC tablet 81 mg (has no administration in time range)   atorvastatin (LIPITOR) tablet 80 mg (has no administration in time range)   carvedilol (COREG) tablet 6.25 mg (has no administration in time range)   fluticasone (FLONASE) 50 MCG/ACT nasal spray 1 spray (has no administration in time range)   QUEtiapine (SEROQUEL) tablet 12.5 mg (has no administration in time range)   cetirizine (ZYRTEC) tablet 10 mg (has no administration in time range)   PARoxetine (PAXIL) tablet 40 mg (has no administration in time range)   0.9 % sodium chloride infusion ( IntraVENous New Bag 5/27/22 4075)   cefTRIAXone (ROCEPHIN) 1000 mg in sterile water 10 mL IV syringe (1,000 mg IntraVENous Given 5/27/22 1333)   magnesium sulfate 2000 mg in 50 mL IVPB premix (0 mg IntraVENous Stopped 5/27/22 1609)         Is this patient to be included in the SEP-1 Core Measure due to severe sepsis or septic shock? No   Exclusion criteria - the patient is NOT to be included for SEP-1 Core Measure due to:  2+ SIRS criteria are not met    Patient presented after being found hypoglycemic. There was a delay in care of patient's hypothermia has bradycardia did not get temperature or until patient was here for about 1.5 hours. After we finally got a rectal temp he was placed on a Philly hugger. He was alert and oriented by the time he got here but slightly somnolent. His glucose initially went up and then came back down to 91. He was able to eat and went back up. Has mildly elevated creatinine of 1.7 with evidence of UTI with 4+ bacteria and white blood cells in the urine. He was started on Rocephin. Had some mild hypomagnesemia and was also started on IV magnesium. Given his hypothermia with hypoglycemia, hypomagnesemia and infection will be admitted for further work-up and treatment. He is not tachycardic, tachypneic and has no leukocytosis. Has a normal lactic acid. Nothing to suggest sepsis. He was stable time of admission. FINAL IMPRESSION      1. Hypoglycemia    2. Hypothermia, initial encounter    3. Hypomagnesemia    4. Acute on chronic renal insufficiency    5.  Urinary tract infection without hematuria, site unspecified    6. Humeral head fracture, left, closed, initial encounter          DISPOSITION/PLAN   DISPOSITION Admitted 05/27/2022 01:58:18 PM      PATIENT REFERRED TO:  No follow-up provider specified.     DISCHARGE MEDICATIONS:  Current Discharge Medication List          DISCONTINUED MEDICATIONS:  Current Discharge Medication List      STOP taking these medications       hydrOXYzine (ATARAX) 25 MG tablet Comments:   Reason for Stopping:         magnesium 200 MG TABS tablet Comments:   Reason for Stopping:         lisinopril (PRINIVIL;ZESTRIL) 5 MG tablet Comments:   Reason for Stopping:         Dulaglutide (TRULICITY SC) Comments:   Reason for Stopping:         insulin glargine (LANTUS) 100 UNIT/ML injection vial Comments:   Reason for Stopping:                      (Please note that portions of this note were completed with a voice recognition program.  Efforts were made to edit the dictations but occasionally words are mis-transcribed.)    Amie Chawla PA-C (electronically signed)           Amie Chawla PA-C  05/27/22 8934

## 2022-05-27 NOTE — H&P
HOSPITALISTS HISTORY AND PHYSICAL    5/27/2022 1:59 PM    Patient Information:  Yvonne Marin is a 70 y.o. male [id number]  PCP:  No primary care provider on file. (Tel: None )    Chief complaint:    Chief Complaint   Patient presents with    Hypoglycemia     [facility] from [facility] d/t hypoglycemia. pt was found down at nursing home. BS was 32 at the [facility]. EMS gave 25g dextrose en route and amp of sodium bicarb.  during triage pt alert and oriented at this time         History of Present Illness:  Anshu Santoro is a 70 y.o. male  with PMHx of CAD s/p PCI, hypertension, hyperlipidemia, diabetes mellitus, CKD and recurrent falls presented from nursing home after he was found down on the floor and was noted to have BG of 32. Patient was given 25 g of dextrose 50 enroute to the hospital.  On admission, patient was alert and oriented x4 and repeat blood glucose was 138. Patient reported that he feels fatigued all the time and did not eat dinner last night because food did not taste good but still got his insulin dose. Patient reported that he was started on antibiotic last week for dysuria in the nursing home and have been experiencing diarrhea for the past 3 days. Patient stated that he continues to have dysuria but denied any fever, chills, chest pain, shortness of breath, palpitation nausea, vomiting, abdominal pain, hematuria, hematemesis, hematemesis, melena or sick contacts. Initial diagnostic lab work showed creatinine elevated to 1.7, UA suggestive of UTI. CT head and cervical spine negative for any acute pathology. CXR is negative for any acute abdominal pathology but showed new left humeral neck fracture of uncertain acuity. Follow-up x-ray left shoulder suggestive of nondisplaced humeral head fracture.   Per patient's report, he has been wheelchair-bound since his left hip surgery last year        REVIEW OF SYSTEMS:   Detailed 12 point ROS obtained which were negative except what mentioned above in HPI    Past Medical History:   has a past medical history of Arthritis, Ataxia, BPH (benign prostatic hyperplasia), CAD (coronary artery disease), Coronary atherosclerosis of native coronary artery, Diabetes mellitus ([facility] [address] Environmental allergies, Hepatitis, Hydronephrosis, Hyperlipidemia, Hypertension, Kidney disease, Neuropathy, Parkinson's disease ([facility] [address] S/P coronary artery stent placement x 4, and Schizoaffective disorder, bipolar type ([facility] [address] Past Surgical History:   has a past surgical history that includes Coronary angioplasty; Coronary angioplasty with stent; hernia repair; Vasectomy; Cystocopy (4/4/14); Ankle surgery (Right, 06/09/2018); Upper gastrointestinal endoscopy (N/A, 12/13/2018); Colonoscopy (N/A, 12/13/2018); Upper gastrointestinal endoscopy (N/A, 5/3/2019); and ORIF DISTAL RADIUS FRACTURE (Left, 6/20/2019). Medications:  No current facility-administered medications on file prior to encounter.      Current Outpatient Medications on File Prior to Encounter   Medication Sig Dispense Refill    insulin glargine (BASAGLAR KWIKPEN) 100 UNIT/ML injection pen Inject 35 Units into the skin nightly      simethicone (MYLICON) 737 MG chewable tablet Take 125 mg by mouth every morning      Cholecalciferol (VITAMIN D3) 1.25 MG (46553 UT) CAPS Take 50,000 Units by mouth once a week      guaiFENesin (ROBITUSSIN) 100 MG/5ML SOLN oral solution Take 400 mg by mouth every 4 hours as needed for Cough      loratadine (CLARITIN) 10 MG tablet Take 10 mg by mouth daily      mirtazapine (REMERON) 7.5 MG tablet Take 7.5 mg by mouth nightly      acetaminophen (TYLENOL) 500 MG tablet Take 1,000 mg by mouth every 6 hours as needed       carvedilol (COREG) 6.25 MG tablet Take 1 tablet by mouth 2 times daily (with meals) 30 tablet 0    QUEtiapine (SEROQUEL) 25 MG tablet Take 12.5 mg by mouth 2 times daily      gabapentin (NEURONTIN) 300 MG capsule Take 1 capsule by mouth 3 times daily for 3 days. 9 capsule 0    pantoprazole (PROTONIX) 40 MG tablet Take one tablet daily 90 tablet 0    ASPIRIN LOW DOSE 81 MG EC tablet TAKE 1 TABLET BY MOUTH ONCE DAILY 30 tablet 10    metFORMIN (GLUCOPHAGE) 1000 MG tablet Take 1 tablet by mouth 2 times daily (with meals)       PARoxetine (PAXIL) 40 MG tablet Take 1 tablet by mouth daily      ferrous sulfate 325 (65 Fe) MG tablet Take 1 tablet by mouth 2 times daily (Patient taking differently: Take 325 mg by mouth 3 times daily (with meals) ) 60 tablet 11    atorvastatin (LIPITOR) 80 MG tablet TAKE 1 TABLET BY MOUTH AT BEDTIME 90 tablet 0    pioglitazone (ACTOS) 45 MG tablet Take 45 mg by mouth daily      traZODone (DESYREL) 100 MG tablet Take 150 mg by mouth nightly       fluticasone (FLONASE) 50 MCG/ACT nasal spray 1 spray by Nasal route daily          Allergies:  No Known Allergies     Social History:   reports that he quit smoking about 9 years ago. His smoking use included cigarettes. He has a 25.00 pack-year smoking history. He has never used smokeless tobacco. He reports that he does not drink alcohol and does not use drugs. Family History:  family history includes Heart Disease in his father; Other in his mother. Physical Exam:  /84   Pulse 81   Temp 97.5 °F (36.4 °C) (Oral)   Resp 16   Wt 146 lb (66.2 kg)   SpO2 95%   BMI 25.06 kg/m²     General appearance: No apparent distress appears stated age and cooperative. HEENT Normal cephalic, atraumatic without obvious deformity. Pupils equal, round, and reactive to light. Neck: Supple, No jugular venous distention/bruits. Trachea midline without thyromegaly or adenopathyLungs: Clear to auscultation, bilaterally without Rales/Wheezes/Rhonchi with good respiratory effort.   Heart: Regular rate and rhythm with Normal S1/S2 without murmurs, rubs or gallops  Abdomen: Soft, non-tender or non-distended without rigidity or guarding and positive bowel sounds all four quadrants. Extremities: No clubbing, cyanosis, or edema bilaterally. Skin: Skin color, texture, turgor normal.  No rashes or lesions. Neurologic: Alert and oriented X 3. Cranial nerves: II-XII intact, grossly non-focal.  Psychiatry: Appropriate affect. Not agitated  Skin: Warm, dry, normal turgor, no rash  Brisk capillary refill, peripheral pulses palpable     Labs:  CBC:   Lab Results   Component Value Date    WBC 5.8 05/27/2022    RBC 3.58 05/27/2022    HGB 10.6 05/27/2022    HCT 33.8 05/27/2022    MCV 94.4 05/27/2022    MCH 29.6 05/27/2022    MCHC 31.4 05/27/2022    RDW 15.7 05/27/2022     05/27/2022    MPV 8.3 05/27/2022     BMP:    Lab Results   Component Value Date     05/27/2022    K 4.5 05/27/2022     05/27/2022    CO2 17 05/27/2022    BUN 39 05/27/2022    CREATININE 1.7 05/27/2022    CALCIUM 9.2 05/27/2022    GFRAA 48 05/27/2022    GFRAA >60 05/11/2013    LABGLOM 40 05/27/2022    GLUCOSE 94 05/27/2022     XR SHOULDER LEFT (MIN 2 VIEWS)   Final Result   Abnormal morphology of the humeral head. A nondisplaced humeral head   fracture is suspected. CT Cervical Spine WO Contrast   Final Result   No acute abnormality of the cervical spine. RECOMMENDATIONS:   Unavailable         CT Head WO Contrast   Final Result   No acute intracranial abnormality. XR CHEST PORTABLE   Final Result   No radiographic evidence of acute cardiopulmonary disease. New left humeral neck fracture of uncertain acuity. Discussed case  with ED physician    Problem List  Principal Problem:    UTI (urinary tract infection)  Resolved Problems:    * No resolved hospital problems.  *        Assessment/Plan:     UTI: Urine suggestive of UTI  Started on ceftriaxone awaiting for culture and sensitivity    Hypoglycemia: Likely secondary to decreased p.o. intake: Resolved  Monitor blood glucose closely    Fall: Likely secondary to hypoglycemia/generalized weakness  PT/OT consulted orthostatic vitals ordered    OCTAVIO ; Serum creatinine elevated 1.7 on admission; (baseline around 0.8)  Nephrology consulted; appreciate their recommendations  Avoid nephrotoxins  Continue IV fluids   Strict intake and output  Daily weights     Diarrhea: Reports diarrhea from past 3 to 4 days  GI PCR and C. difficile ordered    Hypomagnesemia: Likely secondary to diarrhea. Replacement ordered  Monitor mag level closely and replace as needed    Hx of diabetes mellitus: Hold oral antidiabetic medication  Hemoglobin A1c ordered. Started on Lantus low-dose sliding scale  Monitor blood glucose closely and will adjust insulin dose as needed    Left humeral nondisplaced fracture  X-ray left shoulder suggestive of nondisplaced humeral head fracture. Orthopedic surgery consulted  Continue as needed pain medication    Hx of CAD status post PCI: Continue aspirin statin and beta-blockers    DVT prophylaxis:  subcu heparin  Code status: Full    Admit as inpatient I anticipate hospitalization spanning less than two midnights for investigation and treatment of the above medically necessary diagnoses. Please note that some part of this chart was generated using Dragon dictation software. Although every effort was made to ensure the accuracy of this automated transcription, some errors in transcription may have occurred inadvertently. If you may need any clarification, please do not hesitate to contact me through [facility].        Jose David Benton MD    5/27/2022 1:59 PM

## 2022-05-27 NOTE — PROGRESS NOTES
Pharmacy Home Medication Reconciliation Note    A medication reconciliation has been completed for Lucrecia Curiel 1950    Information provided by: facility med list ([facility] [place])    The patient's home medication list is as follows:  Prior to Admission medications    Medication Sig Start Date End Date Taking? Authorizing Provider   insulin glargine (BASAGLAR KWIKPEN) 100 UNIT/ML injection pen Inject 35 Units into the skin nightly   Yes Historical Provider, MD   simethicone (MYLICON) 476 MG chewable tablet Take 125 mg by mouth every morning   Yes Historical Provider, MD   Cholecalciferol (VITAMIN D3) 1.25 MG (93078 UT) CAPS Take 50,000 Units by mouth once a week   Yes Historical Provider, MD   guaiFENesin (ROBITUSSIN) 100 MG/5ML SOLN oral solution Take 400 mg by mouth every 4 hours as needed for Cough   Yes Historical Provider, MD   loratadine (CLARITIN) 10 MG tablet Take 10 mg by mouth daily   Yes Historical Provider, MD   mirtazapine (REMERON) 7.5 MG tablet Take 7.5 mg by mouth nightly   Yes Historical Provider, MD   hydrOXYzine (ATARAX) 25 MG tablet Take 25 mg by mouth as needed for Itching  5/27/22  Historical Provider, MD   magnesium 200 MG TABS tablet Take 1 tablet by mouth daily for 10 days 2/12/22 5/27/22  Ludmila Pascual,    acetaminophen (TYLENOL) 500 MG tablet Take 1,000 mg by mouth every 6 hours as needed     Historical Provider, MD   carvedilol (COREG) 6.25 MG tablet Take 1 tablet by mouth 2 times daily (with meals) 6/15/21   Ella Trujillo MD   QUEtiapine (SEROQUEL) 25 MG tablet Take 12.5 mg by mouth 2 times daily    Historical Provider, MD   gabapentin (NEURONTIN) 300 MG capsule Take 1 capsule by mouth 3 times daily for 3 days.  4/2/20 6/30/21  Arlie Hashimoto, MD   pantoprazole (PROTONIX) 40 MG tablet Take one tablet daily 12/20/19   Sarahi Witt MD   ASPIRIN LOW DOSE 81 MG EC tablet TAKE 1 TABLET BY MOUTH ONCE DAILY 8/23/19   Sindy Downing MD   metFORMIN (GLUCOPHAGE) 1000 MG tablet Take 1 tablet by mouth 2 times daily (with meals)     Historical Provider, MD   PARoxetine (PAXIL) 40 MG tablet Take 1 tablet by mouth daily 12/6/18   Historical Provider, MD   lisinopril (PRINIVIL;ZESTRIL) 5 MG tablet Take 1 tablet by mouth daily   Patient not taking: Reported on 4/21/2022 5/27/22  Historical Provider, MD   ferrous sulfate 325 (65 Fe) MG tablet Take 1 tablet by mouth 2 times daily  Patient taking differently: Take 325 mg by mouth 3 times daily (with meals)  5/3/19   Elizabeth Zepeda MD   Dulaglutide (TRULICITY SC) Inject 1.5 mg into the skin once a week   Patient not taking: Reported on 4/21/2022 5/27/22  Historical Provider, MD   atorvastatin (LIPITOR) 80 MG tablet TAKE 1 TABLET BY MOUTH AT BEDTIME 10/31/18   Willy Dunham MD   insulin glargine (LANTUS) 100 UNIT/ML injection vial Inject 30 Units into the skin nightly 6/13/18 5/27/22  Rafaela Fang APRN - CNP   pioglitazone (ACTOS) 45 MG tablet Take 45 mg by mouth daily 6/7/17   Historical Provider, MD   traZODone (DESYREL) 100 MG tablet Take 150 mg by mouth nightly     Historical Provider, MD   fluticasone (FLONASE) 50 MCG/ACT nasal spray 1 spray by Nasal route daily     Historical Provider, MD        Timing of last doses updated.     Thank you,  Nadine Ramirez, PharmD, BCCCP

## 2022-05-27 NOTE — ED PROVIDER NOTES
In addition to the advanced practice provider, I personally saw Antwan Anderson and performed a substantive portion of the visit including all aspects of the medical decision making. Medical Decision Making  Patient was sent in from the nursing home for hypoglycemia and confusion. On arrival, patient's complaints of generalized and nonspecific, including generalized fatigue and poor appetite. Patient was also hypothermic on arrival.  We began warming measures with a Philly hugger. He was found to have acute on chronic renal insufficiency as well as hypomagnesemia. In addition, he had a humeral head fracture on the left from apparent fall as well as acute on chronic renal sufficiency. Finally, he has a urinary tract infection which we treated with IV antibiotics. I personally saw the patient and independently provided 10 minutes of non-concurrent critical care out of the total shared critical care time provided. EKG  The Ekg interpreted by me in the absence of a cardiologist shows. normal sinus rhythm with a rate of 65  Axis is   Left axis deviation  QTc is  normal  PAC, RBBB     No specific ST-T wave changes appreciated. No evidence of acute ischemia. No significant change from prior EKG dated 2/11/2022      Screenings  Is this patient to be included in the SEP-1 Core Measure due to severe sepsis or septic shock? No   Exclusion criteria - the patient is NOT to be included for SEP-1 Core Measure due to:  2+ SIRS criteria are not met     Myron Coma Scale  Eye Opening: Spontaneous  Best Verbal Response: Oriented  Best Motor Response: Obeys commands  Myron Coma Scale Score: 15               FINAL IMPRESSION  1. Hypoglycemia    2. Hypothermia, initial encounter    3. Hypomagnesemia    4. Acute on chronic renal insufficiency    5. Urinary tract infection without hematuria, site unspecified    6.  Humeral head fracture, left, closed, initial encounter        Blood pressure 136/84, pulse 81, temperature 97.5 °F (36.4 °C), temperature source Oral, resp. rate 16, weight 146 lb (66.2 kg), SpO2 95 %.      For further details of [facility] emergency department encounter, please see documentation by advanced practice provider, Glorious Cogan, PA.       Rick Garcia MD  05/28/22 [id number]

## 2022-05-27 NOTE — CONSULTS
MD Kia Matthews MD Sixto Burows, MD                Office: [phone]                      Fax: [phone]               [email or web address]                   Nephrology consult received -- full consult report will follow. Chart reviewed. Discussed with Dr Renny Eisenmenger - as we will be happy to cover [facility] nephrology patients/. Thank you for allowing me to participate in this patient's care. Please do not hesitate to contact us anytime. We will follow along with you. Gian Sanchez MD  [facility] [address] [place] [address]   [phone] or [employer]    =====================================================           Principal Problem:    UTI (urinary tract infection)  Resolved Problems:    * No resolved hospital problems. *          Labs reviewed by me     CBC: Recent Labs     05/27/22  1129   WBC 5.8   HGB 10.6*   HCT 33.8*   MCV 94.4        BMP:   Recent Labs     05/27/22  1129      K 4.5      CO2 17*   BUN 39*   CREATININE 1.7*     Magnesium:   Lab Results   Component Value Date    MG 1.60 05/27/2022    MG 1.40 02/12/2022    MG 1.10 02/12/2022       Arterial Blood Gasses  No results for input(s): PH, PCO2, PO2 in the last 72 hours.     Invalid input(s): [id number], INSPIREDO2    UA:  Recent Labs     05/27/22  1129   COLORU Yellow   PHUR 5.0   WBCUA 24*   RBCUA None seen   BACTERIA 4+*   CLARITYU Clear   SPECGRAV 1.010   LEUKOCYTESUR MODERATE*   UROBILINOGEN 0.2   BILIRUBINUR Negative   BLOODU SMALL*   GLUCOSEU Negative       LIVER PROFILE:   Recent Labs     05/27/22  1129   AST 16   ALT 10   BILITOT 0.3   ALKPHOS 132*     PT/INR:    Lab Results   Component Value Date    PROTIME 12.9 05/26/2019    PROTIME 11.6 06/08/2018    PROTIME 10.4 10/08/2014    INR 1.13 05/26/2019    INR 1.02 06/08/2018    INR 0.96 10/08/2014     PTT:    Lab Results   Component Value Date    APTT 27.3 10/08/2014    APTT 24.7 05/08/2013     CHRISTINA:  No results found for: ANATITER, CHRISTINA        RADIOLOGY:    CT Head WO Contrast    Result Date: 5/27/2022  EXAMINATION: CT OF THE HEAD WITHOUT CONTRAST  5/27/2022 11:47 am TECHNIQUE: CT of the head was performed without the administration of intravenous contrast. Automated exposure control, iterative reconstruction, and/or weight based adjustment of the mA/kV was utilized to reduce the radiation dose to as low as reasonably achievable. COMPARISON: None. HISTORY: ORDERING SYSTEM PROVIDED HISTORY: head injury TECHNOLOGIST PROVIDED HISTORY: Reason for exam:->head injury Has a \"code stroke\" or \"stroke alert\" been called? ->No Decision Support Exception - unselect if not a suspected or confirmed emergency medical condition->Emergency Medical Condition (MA) Reason for Exam: head injury FINDINGS: BRAIN/VENTRICLES: There is no acute intracranial hemorrhage, mass effect or midline shift. No abnormal extra-axial fluid collection. The gray-white differentiation is maintained without evidence of an acute infarct. There is no evidence of hydrocephalus. ORBITS: The visualized portion of the orbits demonstrate no acute abnormality. SINUSES: The visualized paranasal sinuses and mastoid air cells demonstrate no acute abnormality. SOFT TISSUES/SKULL:  No acute abnormality of the visualized skull or soft tissues. No acute intracranial abnormality. CT Cervical Spine WO Contrast    Result Date: 5/27/2022  EXAMINATION: CT OF THE CERVICAL SPINE WITHOUT CONTRAST 5/27/2022 11:47 am TECHNIQUE: CT of the cervical spine was performed without the administration of intravenous contrast. Multiplanar reformatted images are provided for review. Automated exposure control, iterative reconstruction, and/or weight based adjustment of the mA/kV was utilized to reduce the radiation dose to as low as reasonably achievable. COMPARISON: None.  HISTORY: ORDERING SYSTEM PROVIDED HISTORY: head injury TECHNOLOGIST PROVIDED HISTORY: Reason for exam:->head injury Decision Support Exception - unselect if not a suspected or confirmed emergency medical condition->Emergency Medical Condition (MA) Reason for Exam:  head injury FINDINGS: BONES/ALIGNMENT: There is no acute fracture or traumatic malalignment. DEGENERATIVE CHANGES: No significant degenerative changes. SOFT TISSUES: There is no prevertebral soft tissue swelling. No acute abnormality of the cervical spine. RECOMMENDATIONS: Unavailable     XR SHOULDER LEFT (MIN 2 VIEWS)    Result Date: 5/27/2022  EXAMINATION: THREE XRAY VIEWS OF THE LEFT SHOULDER 5/27/2022 12:11 pm COMPARISON: None. HISTORY: ORDERING SYSTEM PROVIDED HISTORY: injury TECHNOLOGIST PROVIDED HISTORY: Reason for exam:->injury Reason for Exam: Left shoulder injury FINDINGS: There is an abnormal morphology of the humeral head. Humeral head is normally articulated with the glenoid however. Abnormal morphology of the humeral head. A nondisplaced humeral head fracture is suspected. XR CHEST PORTABLE    Result Date: 5/27/2022  EXAMINATION: ONE XRAY VIEW OF THE CHEST 5/27/2022 11:09 am COMPARISON: June 13, 2021 HISTORY: ORDERING SYSTEM PROVIDED HISTORY: general weakness TECHNOLOGIST PROVIDED HISTORY: Reason for exam:->general weakness Reason for Exam: weakness FINDINGS: Mild cardiomegaly. Cardiomediastinal silhouette otherwise within normal limits. Lungs and costophrenic sulci are clear. No pneumothorax or subdiaphragmatic free air. New left humeral neck fracture. No radiographic evidence of acute cardiopulmonary disease. New left humeral neck fracture of uncertain acuity. Imaging Results.   Chest X Ray reviwed by me

## 2022-05-27 NOTE — ED NOTES
Pt comes in today from [address] home d/t being found down in the bathroom this morning. Unsure for how long. Per EMS, BS was 36 upon their arrival. EMS gav amp of sodium bicarb and 25g of D50 en route. Upon triage, pt BS was 138. Pt appears drowsy upon arrival but able to state month, name and where he is. Will continue to monitor.       Rosenda Hawkins, MAYRA  05/27/22 [address] [facility], RN  05/27/22 [facility]5

## 2022-05-27 NOTE — ACP (ADVANCE CARE PLANNING)
Advance Care Planning Note       Purpose of Encounter: Advanced care planning in light of hospitalization for hypoglycemia  Parties in attendance: :Mi Scherer MD  Decisional Capacity:Yes  Objective: Patient admitted after fall at nursing home and was found to be hypoglycemic  Goals of Care Determinations: Patient wants full support  including CPR, intubation, trach, PEG tube, dialysis   Code Status:   Time Spent on Advanced Planning Documents: 18 mins. Advanced Care Planning Documents:  Completed advances directives, advanced directives in chart.       Electronically signed by Valentina Salomon MD on 5/27/2022 at 6:10 PM

## 2022-05-27 NOTE — CONSULTS
MD Aida Jimenez MD Bianca Jaeger, MD               Office: [phone]                      Fax: [phone]             [facility]                   NEPHROLOGY INITIAL CONSULT NOTE:     PATIENT NAME: Mesha Weber  : 1950  MRN: [id number]  REASON FOR CONSULT: For evaluation and management of Acute Kidney Injury . (My recommendations will be communicated by way of shared medical record.)  Ordered By Felicity Vieira MD         RECOMMENDATIONS:   -Start Renee@[employer] cc/h*  -Empirical antibiotic, follow urine culture  - check urine lytes,  -Trend lactic  - monitor PVR w/ bladder scan for siddiqui insertion need. - ISS for diabetes control, hold metformin  - at higher risk for decompensation, needing closer monitoring. D/C plan from renal stand point:  -Expect about 48 hours,    Discussed with patient, referring hospitalist Dr Middleton Fat:       Admitted for:  UTI (urinary tract infection) [N39.0]    ICD-10-CM    1. Hypoglycemia  E16.2    2. Hypothermia, initial encounter  T68. XXXA    3. Hypomagnesemia  E83.42    4. Acute on chronic renal insufficiency  N28.9     N18.9    5. Urinary tract infection without hematuria, site unspecified  N39.0    6. Humeral head fracture, left, closed, initial encounter  S42.292A            OCTAVIO (no history of CKD:  ):   - BL Scr-lowest 0.8, as of 2022 ---> 1.7 on admission  -: Etiology of OCTAVIO -presumed prerenal, high risk of ATN, with UTI     Associated problems:   - Volume status: hypo-volemic  : BP: no need for tight control   : Na:  WNL  - Azotemia: pre-renal  - Electrolytes: K: WNL  Hypomagnesemia, mild,  - Acid-Base: Acidosis, non-anion gap, metabolic  - Anemia: Of chronic disease, mild, follow      Other major problems: Management per primary and other consulting teams.   //         Hospital Problems           Last Modified POA    * (Principal) UTI (urinary tract infection) 2022 Yes        : other supportive care :   - Check daily renal function panel with electrolytes-phosphorus  - Strict monitoring of I/Os, daily weight  - Renal feeds/diet  - Current medications reviewed. - Nephrotoxic medications have been discontinued. - Dose adjusted and appropriate. - Dose meds for eGFR <15 mL/min/1.73m2 during OCTAVIO    - Avoid heavy opioids due to renal failure - may use very low dose dilaudid / fentanyl with close monitoring of CNS and respiratory depression. Please refer to the orders. High Complexity. Multiple complex problems. Discussed with patient and treatment team-    Time spent > 30 (~35) minutes. Thank you for allowing me to participate in this patient's care. Please do not hesitate to contact me anytime. We will follow along with you. Jeb Bolaños MD,  [facility] [address] [place] [address] [place]: [phone] or [address] [employer]  Fax: [phone]        =======================================================================================   =======================================================================================      CHIEF COMPLAINT:   Chief Complaint   Patient presents with    Hypoglycemia     [facility] d/t hypoglycemia. pt was found down at nursing home. BS was 32 at the [facility]. EMS gave 25g dextrose en route and amp of sodium bicarb.   during triage pt alert and oriented at this time      History Obtained From:  patient + treatment team + Electronic Medical Records    HPI: Mr. Sanjay Harper is a 70 y.o. male with significant past medical history as below:   Past Medical History:   Diagnosis Date    Arthritis     Ataxia 3/14/2014    BPH (benign prostatic hyperplasia)     CAD (coronary artery disease)     4 stents    Coronary atherosclerosis of native coronary artery 5/11/2013    Diabetes mellitus [facility] [facility]     Environmental allergies     Hepatitis     Hydronephrosis 3/14/2014    Hyperlipidemia     Hypertension     Kidney disease     Neuropathy     Parkinson's disease ([facility] [address] [facility]     ?  S/P coronary artery stent placement x 4     x 4    Schizoaffective disorder, bipolar type ([facility] [address]       Presents with Hypoglycemia ([facility] d/t hypoglycemia. pt was found down at nursing home. BS was 32 at the nuring home. EMS gave 25g dextrose en route and amp of sodium bicarb.  during triage pt alert and oriented at this time )    Admitted with UTI (urinary tract infection) [N39.0]   Found to have   OCTAVIO , so we are called for that. No current active complaints. Patient denied chest pain / dizziness/lightheadedness/syncope/ SOB / leg edema. *  Regarding: OCTAVIO   · Duration: acute   · Location: kidneys  · Severity: Severe     · Timing: continous  · Context (ex: related to condition):   , refer to my assessment / impression. · Modifying factors (ex: medications, interventions):   , refer to my plan / recommendation. · Associated signs & symptoms (ex: edema, SOB): As mentioned above in CC and HPI      Past medical, Surgical, Social, Family medical history reviewed by me. PAST MEDICAL HISTORY:   Past Medical History:   Diagnosis Date    Arthritis     Ataxia 3/14/2014    BPH (benign prostatic hyperplasia)     CAD (coronary artery disease)     4 stents    Coronary atherosclerosis of native coronary artery 5/11/2013    Diabetes mellitus ([address]     Environmental allergies     Hepatitis     Hydronephrosis 3/14/2014    Hyperlipidemia     Hypertension     Kidney disease     Neuropathy     Parkinson's disease ([address]     ?     S/P coronary artery stent placement x 4     x 4    Schizoaffective disorder, bipolar type ([facility] [address] [facility]      PAST SURGICAL HISTORY:   Past Surgical History:   Procedure Laterality Date    ANKLE SURGERY Right 06/09/2018    ORIF of R ankle     COLONOSCOPY N/A 12/13/2018    COLONOSCOPY CONTROL HEMORRHAGE performed by Rodo Hinton MD at [address] [facility] PLACEMENT      x 4    CYSTOSCOPY  14    transurethral vaporization of prostate    HERNIA REPAIR      X2    ORIF DISTAL RADIUS FRACTURE Left 2019    LEFT DISTAL RADIUS OPEN REDUCTION INTERNAL FIXATION   performed by Yoana Miller MD at [address] [place] [address] N/A 2018    EGD BIOPSY performed by Mariana Bray MD at [place] [address] N/A 5/3/2019    EGD WITH ANESTHESIA performed by Mariana Bray MD at [facility]:   Family History   Problem Relation Age of Onset    Other Mother         CEREBRAL PALSY    Heart Disease Father         CHF     SOCIAL HISTORY:   Social History     Socioeconomic History    Marital status:      Spouse name: None    Number of children: None    Years of education: None    Highest education level: None   Occupational History    None   Tobacco Use    Smoking status: Former Smoker     Packs/day: 1.00     Years: 25.00     Pack years: 25.00     Types: Cigarettes     Quit date: 5/10/2013     Years since quittin.0    Smokeless tobacco: Never Used   Vaping Use    Vaping Use: Never used   Substance and Sexual Activity    Alcohol use: No    Drug use: No    Sexual activity: Not Currently   Other Topics Concern    None   Social History Narrative    None     Social Determinants of Health     Financial Resource Strain:     Difficulty of Paying Living Expenses: Not on file   Food Insecurity:     Worried About Running Out of Food in the Last Year: Not on file    Rip of Food in the Last Year: Not on file   Transportation Needs:     Lack of Transportation (Medical): Not on file    Lack of Transportation (Non-Medical):  Not on file   Physical Activity:     Days of Exercise per Week: Not on file    Minutes of Exercise per Session: Not on file   Stress:     Feeling of Stress : Not on file   Social Connections:     Frequency of Communication with Friends and Family: Not on file    Frequency of Social Gatherings with Friends and Family: Not on file    Attends Nondenominational Services: Not on file    Active Member of Clubs or Organizations: Not on file    Attends Club or Organization Meetings: Not on file    Marital Status: Not on file   Intimate Partner Violence:     Fear of Current or Ex-Partner: Not on file    Emotionally Abused: Not on file    Physically Abused: Not on file    Sexually Abused: Not on file   Housing Stability:     Unable to Pay for Housing in the Last Year: Not on file    Number of [place] in the Last Year: Not on file    Unstable Housing in the Last Year: Not on file          MEDICATIONS: reviewed by me. Medications Prior to Admission:  No current facility-administered medications on file prior to encounter. Current Outpatient Medications on File Prior to Encounter   Medication Sig Dispense Refill    insulin glargine (BASAGLAR KWIKPEN) 100 UNIT/ML injection pen Inject 35 Units into the skin nightly      simethicone (MYLICON) 546 MG chewable tablet Take 125 mg by mouth every morning      Cholecalciferol (VITAMIN D3) 1.25 MG (94944 UT) CAPS Take 50,000 Units by mouth once a week      guaiFENesin (ROBITUSSIN) 100 MG/5ML SOLN oral solution Take 400 mg by mouth every 4 hours as needed for Cough      loratadine (CLARITIN) 10 MG tablet Take 10 mg by mouth daily      mirtazapine (REMERON) 7.5 MG tablet Take 7.5 mg by mouth nightly      acetaminophen (TYLENOL) 500 MG tablet Take 1,000 mg by mouth every 6 hours as needed       carvedilol (COREG) 6.25 MG tablet Take 1 tablet by mouth 2 times daily (with meals) 30 tablet 0    QUEtiapine (SEROQUEL) 25 MG tablet Take 12.5 mg by mouth 2 times daily      gabapentin (NEURONTIN) 300 MG capsule Take 1 capsule by mouth 3 times daily for 3 days.  9 capsule 0    pantoprazole (PROTONIX) 40 MG tablet Take one tablet daily 90 tablet 0    ASPIRIN LOW DOSE 81 MG EC tablet TAKE 1 TABLET BY MOUTH ONCE DAILY 30 tablet 10    metFORMIN (GLUCOPHAGE) 1000 MG tablet Take 1 tablet by mouth 2 times daily (with meals)       PARoxetine (PAXIL) 40 MG tablet Take 1 tablet by mouth daily      ferrous sulfate 325 (65 Fe) MG tablet Take 1 tablet by mouth 2 times daily (Patient taking differently: Take 325 mg by mouth 3 times daily (with meals) ) 60 tablet 11    atorvastatin (LIPITOR) 80 MG tablet TAKE 1 TABLET BY MOUTH AT BEDTIME 90 tablet 0    pioglitazone (ACTOS) 45 MG tablet Take 45 mg by mouth daily      traZODone (DESYREL) 100 MG tablet Take 150 mg by mouth nightly       fluticasone (FLONASE) 50 MCG/ACT nasal spray 1 spray by Nasal route daily            Current Facility-Administered Medications:     magnesium sulfate 2000 mg in 50 mL IVPB premix, 2,000 mg, IntraVENous, Once, Sandra Rivera PA-C, Last Rate: 25 mL/hr at 05/27/22 1338, 2,000 mg at 05/27/22 1338    sodium chloride flush 0.9 % injection 5-40 mL, 5-40 mL, IntraVENous, 2 times per day, Marcela Arroyo MD    sodium chloride flush 0.9 % injection 5-40 mL, 5-40 mL, IntraVENous, PRN, Marcela Arroyo MD    0.9 % sodium chloride infusion, , IntraVENous, PRN, Marcela Arroyo MD    ondansetron (ZOFRAN-ODT) disintegrating tablet 4 mg, 4 mg, Oral, Q8H PRN **OR** ondansetron (ZOFRAN) injection 4 mg, 4 mg, IntraVENous, Q6H PRN, Marcela Arroyo MD    acetaminophen (TYLENOL) tablet 650 mg, 650 mg, Oral, Q6H PRN **OR** acetaminophen (TYLENOL) suppository 650 mg, 650 mg, Rectal, Q6H PRN, Marcela Arroyo MD    dextrose bolus 10% 125 mL, 125 mL, IntraVENous, PRN **OR** dextrose bolus 10% 250 mL, 250 mL, IntraVENous, PRN, Marcela Arroyo MD    glucagon (rDNA) injection 1 mg, 1 mg, IntraMUSCular, PRN, Marcela Arroyo MD    dextrose 5 % solution, 100 mL/hr, IntraVENous, PRN, Marcela Arroyo MD    insulin lispro (HUMALOG) injection vial 0-6 Units, 0-6 Units, SubCUTAneous, TID Marcela HERNÁNDEZ MD    insulin lispro (HUMALOG) injection vial 0-3 Units, 0-3 Units, SubCUTAneous, Nightly, Rajesh Calvo MD  Narendra Dobbins  [START ON 5/28/2022] cefTRIAXone (ROCEPHIN) 1000 mg in sterile water 10 mL IV syringe, 1,000 mg, IntraVENous, Q24H, Rajesh Calvo MD    heparin (porcine) injection 5,000 Units, 5,000 Units, SubCUTAneous, 3 times per day, Rajesh Calvo MD    Current Outpatient Medications:     insulin glargine (BASAGLAR KWIKPEN) 100 UNIT/ML injection pen, Inject 35 Units into the skin nightly, Disp: , Rfl:     simethicone (MYLICON) 681 MG chewable tablet, Take 125 mg by mouth every morning, Disp: , Rfl:     Cholecalciferol (VITAMIN D3) 1.25 MG (86128 UT) CAPS, Take 50,000 Units by mouth once a week, Disp: , Rfl:     guaiFENesin (ROBITUSSIN) 100 MG/5ML SOLN oral solution, Take 400 mg by mouth every 4 hours as needed for Cough, Disp: , Rfl:     loratadine (CLARITIN) 10 MG tablet, Take 10 mg by mouth daily, Disp: , Rfl:     mirtazapine (REMERON) 7.5 MG tablet, Take 7.5 mg by mouth nightly, Disp: , Rfl:     acetaminophen (TYLENOL) 500 MG tablet, Take 1,000 mg by mouth every 6 hours as needed , Disp: , Rfl:     carvedilol (COREG) 6.25 MG tablet, Take 1 tablet by mouth 2 times daily (with meals), Disp: 30 tablet, Rfl: 0    QUEtiapine (SEROQUEL) 25 MG tablet, Take 12.5 mg by mouth 2 times daily, Disp: , Rfl:     gabapentin (NEURONTIN) 300 MG capsule, Take 1 capsule by mouth 3 times daily for 3 days. , Disp: 9 capsule, Rfl: 0    pantoprazole (PROTONIX) 40 MG tablet, Take one tablet daily, Disp: 90 tablet, Rfl: 0    ASPIRIN LOW DOSE 81 MG EC tablet, TAKE 1 TABLET BY MOUTH ONCE DAILY, Disp: 30 tablet, Rfl: 10    metFORMIN (GLUCOPHAGE) 1000 MG tablet, Take 1 tablet by mouth 2 times daily (with meals) , Disp: , Rfl:     PARoxetine (PAXIL) 40 MG tablet, Take 1 tablet by mouth daily, Disp: , Rfl:     ferrous sulfate 325 (65 Fe) MG tablet, Take 1 tablet by mouth 2 times daily (Patient taking differently: Take 325 mg by mouth 3 times daily (with meals) ), Disp: 60 tablet, Rfl: 11    atorvastatin (LIPITOR) 80 MG tablet, TAKE 1 TABLET BY MOUTH AT BEDTIME, Disp: 90 tablet, Rfl: 0    pioglitazone (ACTOS) 45 MG tablet, Take 45 mg by mouth daily, Disp: , Rfl:     traZODone (DESYREL) 100 MG tablet, Take 150 mg by mouth nightly , Disp: , Rfl:     fluticasone (FLONASE) 50 MCG/ACT nasal spray, 1 spray by Nasal route daily , Disp: , Rfl:       Allergies reviewed by me: Patient has no known allergies. REVIEW OF SYSTEMS:  As mentioned in chief complaint and HPI , Subjective   All other 10-point review of systems: negative.          =======================================================================================     PHYSICAL EXAM:  Recent vital signs and recent I/Os reviewed by me. Wt Readings from Last 3 Encounters:   05/27/22 146 lb (66.2 kg)   04/21/22 146 lb (66.2 kg)   02/11/22 140 lb (63.5 kg)     BP Readings from Last 3 Encounters:   05/27/22 136/84   04/21/22 (!) 118/50   02/12/22 (!) 147/62     Patient Vitals for the past 24 hrs:   BP Temp Temp src Pulse Resp SpO2 Weight   05/27/22 1339 136/84 97.5 °F (36.4 °C) Oral 81 16 95 % --   05/27/22 1300 (!) 152/78 -- -- 78 13 99 % --   05/27/22 1157 -- (!) 92.4 °F (33.6 °C) Rectal -- -- -- --   05/27/22 1136 -- (!) 92.6 °F (33.7 °C) Oral -- -- -- --   05/27/22 1111 (!) 153/80 -- -- 82 20 99 % --   05/27/22 1056 (!) 170/94 -- -- 63 -- 100 % --   05/27/22 1034 -- -- -- -- 19 -- --   05/27/22 1033 -- -- -- -- -- -- 146 lb (66.2 kg)   05/27/22 1030 (!) 145/74 -- -- 75 -- 99 % --     No intake or output data in the 24 hours ending 05/27/22 1425      Physical Exam  Vitals reviewed. Constitutional:       Appearance: He is well-developed. HENT:      Head: Normocephalic and atraumatic. Nose: Nose normal.      Mouth/Throat:      Mouth: Mucous membranes are not dry. Eyes:      General: No scleral icterus. Conjunctiva/sclera: Conjunctivae normal.   Neck:      Thyroid: No thyroid mass. Vascular: No JVD. Cardiovascular:      Rate and Rhythm: Regular rhythm. Heart sounds: Normal heart sounds. Pulmonary:      Effort: Pulmonary effort is normal. No respiratory distress. Breath sounds: Normal breath sounds. Abdominal:      General: Bowel sounds are normal.      Palpations: Abdomen is soft. Tenderness: There is no abdominal tenderness. Musculoskeletal:         General: No deformity. Cervical back: Neck supple. Skin:     General: Skin is warm and dry. Neurological:      Mental Status: He is alert and oriented to person, place, and time. Psychiatric:         Behavior: Behavior normal.              =======================================================================================     DATA:  Diagnostic tests reviewed by me for today's visit:   (AS NEEDED FOR MY EVALUATION AND MANAGEMENT). Recent Labs     05/27/22  1129   WBC 5.8   HCT 33.8*        Iron Saturation:  No components found for: PERCENTFE  FERRITIN:    Lab Results   Component Value Date    FERRITIN 45.7 11/30/2018     IRON:    Lab Results   Component Value Date    IRON 95 11/30/2018     TIBC:    Lab Results   Component Value Date    TIBC 354 11/30/2018       Recent Labs     05/27/22  1129      K 4.5      CO2 17*   BUN 39*   CREATININE 1.7*     Recent Labs     05/27/22  1129   CALCIUM 9.2   MG 1.60*     No results for input(s): PH, PCO2, PO2 in the last 72 hours.     Invalid input(s): Boo Seaman    ABG:  No results found for: PH, PCO2, PO2, HCO3, BE, THGB, TCO2, O2SAT  VBG:    Lab Results   Component Value Date    PHVEN 7.378 04/01/2020    UIZ2HAU 44.0 04/01/2020    BEVEN 0.0 04/01/2020    K6JIBJOW 61 04/01/2020       LDH:  No results found for: LDH  Uric Acid:    Lab Results   Component Value Date    LABURIC 6.2 04/05/2019       PT/INR:    Lab Results   Component Value Date    PROTIME 12.9 05/26/2019    INR 1.13 05/26/2019     Warfarin PT/INR:  No components found for: Romi Smith  PTT:    Lab Results   Component Value Date    APTT 27.3 10/08/2014   [APTT}  Last 3 Troponin:    Lab Results   Component Value Date    TROPONINI <0.01 05/27/2022    TROPONINI <0.01 02/11/2022    TROPONINI 0.01 06/13/2021       U/A:    Lab Results   Component Value Date    NITRITE neg 05/08/2013    COLORU Yellow 05/27/2022    PROTEINU TRACE 05/27/2022    PHUR 5.0 05/27/2022    WBCUA 24 05/27/2022    RBCUA None seen 05/27/2022    MUCUS 1+ 04/23/2014    BACTERIA 4+ 05/27/2022    CLARITYU Clear 05/27/2022    SPECGRAV 1.010 05/27/2022    LEUKOCYTESUR MODERATE 05/27/2022    UROBILINOGEN 0.2 05/27/2022    BILIRUBINUR Negative 05/27/2022    BILIRUBINUR neg 05/08/2013    BLOODU SMALL 05/27/2022    GLUCOSEU Negative 05/27/2022    AMORPHOUS Rare 03/13/2014     Microalbumen/Creatinine ratio:  No components found for: RUCREAT  24 Hour Urine for Protein:  No components found for: RAWUPRO, UHRS3, BBSX05HU, UTV3  24 Hour Urine for Creatinine Clearance:  No components found for: CREAT4, UHRS10, UTV10  Urine Toxicology:  No components found for: IAMMENTA, IBARBIT, IBENZO, ICOCAINE, IMARTHC, IOPIATES, IPHENCYC    HgBA1c:    Lab Results   Component Value Date    LABA1C 14.4 04/01/2020     RPR:  No results found for: RPR  HIV:  No results found for: HIV  CHRISTINA:  No results found for: ANATITER, CHRISTINA  RF:  No results found for: RF  DSDNA:  No components found for: DNA  AMYLASE:  No results found for: AMYLASE  LIPASE:    Lab Results   Component Value Date    LIPASE 23.0 04/05/2019     Fibrinogen Level:  No components found for: FIB       BELOW MENTIONED RADIOLOGY STUDY RESULTS BY ME (AS NEEDED FOR MY EVALUATION AND MANAGEMENT).      CT Head WO Contrast    Result Date: 5/27/2022  EXAMINATION: CT OF THE HEAD WITHOUT CONTRAST  5/27/2022 11:47 am TECHNIQUE: CT of the head was performed without the administration of intravenous contrast. Automated exposure control, iterative reconstruction, and/or weight based adjustment of the mA/kV was utilized to reduce the radiation dose to as low as reasonably achievable. COMPARISON: None. HISTORY: ORDERING SYSTEM PROVIDED HISTORY: head injury TECHNOLOGIST PROVIDED HISTORY: Reason for exam:->head injury Has a \"code stroke\" or \"stroke alert\" been called? ->No Decision Support Exception - unselect if not a suspected or confirmed emergency medical condition->Emergency Medical Condition (MA) Reason for Exam: head injury FINDINGS: BRAIN/VENTRICLES: There is no acute intracranial hemorrhage, mass effect or midline shift. No abnormal extra-axial fluid collection. The gray-white differentiation is maintained without evidence of an acute infarct. There is no evidence of hydrocephalus. ORBITS: The visualized portion of the orbits demonstrate no acute abnormality. SINUSES: The visualized paranasal sinuses and mastoid air cells demonstrate no acute abnormality. SOFT TISSUES/SKULL:  No acute abnormality of the visualized skull or soft tissues. No acute intracranial abnormality. CT Cervical Spine WO Contrast    Result Date: 5/27/2022  EXAMINATION: CT OF THE CERVICAL SPINE WITHOUT CONTRAST 5/27/2022 11:47 am TECHNIQUE: CT of the cervical spine was performed without the administration of intravenous contrast. Multiplanar reformatted images are provided for review. Automated exposure control, iterative reconstruction, and/or weight based adjustment of the mA/kV was utilized to reduce the radiation dose to as low as reasonably achievable. COMPARISON: None. HISTORY: ORDERING SYSTEM PROVIDED HISTORY: head injury TECHNOLOGIST PROVIDED HISTORY: Reason for exam:->head injury Decision Support Exception - unselect if not a suspected or confirmed emergency medical condition->Emergency Medical Condition (MA) Reason for Exam:  head injury FINDINGS: BONES/ALIGNMENT: There is no acute fracture or traumatic malalignment. DEGENERATIVE CHANGES: No significant degenerative changes.  SOFT TISSUES: There is no prevertebral soft tissue swelling. No acute abnormality of the cervical spine. RECOMMENDATIONS: Unavailable     XR SHOULDER LEFT (MIN 2 VIEWS)    Result Date: 5/27/2022  EXAMINATION: THREE XRAY VIEWS OF THE LEFT SHOULDER 5/27/2022 12:11 pm COMPARISON: None. HISTORY: ORDERING SYSTEM PROVIDED HISTORY: injury TECHNOLOGIST PROVIDED HISTORY: Reason for exam:->injury Reason for Exam: Left shoulder injury FINDINGS: There is an abnormal morphology of the humeral head. Humeral head is normally articulated with the glenoid however. Abnormal morphology of the humeral head. A nondisplaced humeral head fracture is suspected. XR CHEST PORTABLE    Result Date: 5/27/2022  EXAMINATION: ONE XRAY VIEW OF THE CHEST 5/27/2022 11:09 am COMPARISON: June 13, 2021 HISTORY: ORDERING SYSTEM PROVIDED HISTORY: general weakness TECHNOLOGIST PROVIDED HISTORY: Reason for exam:->general weakness Reason for Exam: weakness FINDINGS: Mild cardiomegaly. Cardiomediastinal silhouette otherwise within normal limits. Lungs and costophrenic sulci are clear. No pneumothorax or subdiaphragmatic free air. New left humeral neck fracture. No radiographic evidence of acute cardiopulmonary disease. New left humeral neck fracture of uncertain acuity.

## 2022-05-27 NOTE — PROGRESS NOTES
Pt received from ED. A&Ox4. C/o pain to neck, lef tshoulder with certain movements- but pain free at rest. Assessment completed. Bathed and clothing changed to hospital gown. Redness noted to gray area. External catheter placed as pt states he is incontinent of urine frequently and has redness already. Updated on POC. Denies wanting RN to call anyone from home for update.

## 2022-05-28 ENCOUNTER — APPOINTMENT (OUTPATIENT)
Dept: GENERAL RADIOLOGY | Age: 72
DRG: 690 | End: 2022-05-28
Payer: COMMERCIAL

## 2022-05-28 LAB
A/G RATIO: 1.3 (ref 1.1–2.2)
ALBUMIN SERPL-MCNC: 3.3 G/DL (ref 3.4–5)
ALP BLD-CCNC: 109 U/L (ref 40–129)
ALT SERPL-CCNC: 11 U/L (ref 10–40)
ANION GAP SERPL CALCULATED.3IONS-SCNC: 8 MMOL/L (ref 3–16)
AST SERPL-CCNC: 13 U/L (ref 15–37)
BASOPHILS ABSOLUTE: 0 K/UL (ref 0–0.2)
BASOPHILS RELATIVE PERCENT: 0.4 %
BILIRUB SERPL-MCNC: <0.2 MG/DL (ref 0–1)
BUN BLDV-MCNC: 33 MG/DL (ref 7–20)
CALCIUM SERPL-MCNC: 8.7 MG/DL (ref 8.3–10.6)
CHLORIDE BLD-SCNC: 117 MMOL/L (ref 99–110)
CHLORIDE URINE RANDOM: 38 MMOL/L
CO2: 18 MMOL/L (ref 21–32)
CREAT SERPL-MCNC: 1.4 MG/DL (ref 0.8–1.3)
CREATININE URINE: 48.7 MG/DL (ref 39–259)
EOSINOPHILS ABSOLUTE: 0.2 K/UL (ref 0–0.6)
EOSINOPHILS RELATIVE PERCENT: 3.5 %
ESTIMATED AVERAGE GLUCOSE: 125.5 MG/DL
GFR AFRICAN AMERICAN: >60
GFR NON-AFRICAN AMERICAN: 50
GLUCOSE BLD-MCNC: 172 MG/DL (ref 70–99)
GLUCOSE BLD-MCNC: 172 MG/DL (ref 70–99)
GLUCOSE BLD-MCNC: 208 MG/DL (ref 70–99)
GLUCOSE BLD-MCNC: 49 MG/DL (ref 70–99)
GLUCOSE BLD-MCNC: 71 MG/DL (ref 70–99)
GLUCOSE BLD-MCNC: 85 MG/DL (ref 70–99)
HBA1C MFR BLD: 6 %
HCT VFR BLD CALC: 28 % (ref 40.5–52.5)
HEMOGLOBIN: 9.2 G/DL (ref 13.5–17.5)
LYMPHOCYTES ABSOLUTE: 1 K/UL (ref 1–5.1)
LYMPHOCYTES RELATIVE PERCENT: 16.9 %
MCH RBC QN AUTO: 29.9 PG (ref 26–34)
MCHC RBC AUTO-ENTMCNC: 32.9 G/DL (ref 31–36)
MCV RBC AUTO: 90.8 FL (ref 80–100)
MONOCYTES ABSOLUTE: 0.5 K/UL (ref 0–1.3)
MONOCYTES RELATIVE PERCENT: 8.8 %
NEUTROPHILS ABSOLUTE: 4.3 K/UL (ref 1.7–7.7)
NEUTROPHILS RELATIVE PERCENT: 70.4 %
PDW BLD-RTO: 15.3 % (ref 12.4–15.4)
PERFORMED ON: ABNORMAL
PERFORMED ON: NORMAL
PHOSPHORUS: 3.8 MG/DL (ref 2.5–4.9)
PLATELET # BLD: 212 K/UL (ref 135–450)
PMV BLD AUTO: 8.2 FL (ref 5–10.5)
POTASSIUM REFLEX MAGNESIUM: 5.3 MMOL/L (ref 3.5–5.1)
POTASSIUM, UR: 13.4 MMOL/L
RBC # BLD: 3.08 M/UL (ref 4.2–5.9)
SODIUM BLD-SCNC: 143 MMOL/L (ref 136–145)
SODIUM URINE: 41 MMOL/L
TOTAL PROTEIN: 5.8 G/DL (ref 6.4–8.2)
UREA NITROGEN, UR: 309 MG/DL (ref 800–1666)
WBC # BLD: 6.1 K/UL (ref 4–11)

## 2022-05-28 PROCEDURE — 6370000000 HC RX 637 (ALT 250 FOR IP): Performed by: INTERNAL MEDICINE

## 2022-05-28 PROCEDURE — 2500000003 HC RX 250 WO HCPCS: Performed by: INTERNAL MEDICINE

## 2022-05-28 PROCEDURE — 1200000000 HC SEMI PRIVATE

## 2022-05-28 PROCEDURE — 2580000003 HC RX 258: Performed by: INTERNAL MEDICINE

## 2022-05-28 PROCEDURE — 6360000002 HC RX W HCPCS: Performed by: INTERNAL MEDICINE

## 2022-05-28 PROCEDURE — 6370000000 HC RX 637 (ALT 250 FOR IP): Performed by: NURSE PRACTITIONER

## 2022-05-28 PROCEDURE — 80053 COMPREHEN METABOLIC PANEL: CPT

## 2022-05-28 PROCEDURE — 85025 COMPLETE CBC W/AUTO DIFF WBC: CPT

## 2022-05-28 PROCEDURE — 73060 X-RAY EXAM OF HUMERUS: CPT

## 2022-05-28 PROCEDURE — 84100 ASSAY OF PHOSPHORUS: CPT

## 2022-05-28 PROCEDURE — 36415 COLL VENOUS BLD VENIPUNCTURE: CPT

## 2022-05-28 PROCEDURE — 99221 1ST HOSP IP/OBS SF/LOW 40: CPT | Performed by: ORTHOPAEDIC SURGERY

## 2022-05-28 RX ORDER — TRAZODONE HYDROCHLORIDE 50 MG/1
100 TABLET ORAL NIGHTLY PRN
Status: DISCONTINUED | OUTPATIENT
Start: 2022-05-28 | End: 2022-05-31 | Stop reason: HOSPADM

## 2022-05-28 RX ORDER — DEXTROSE AND SODIUM CHLORIDE 5; .45 G/100ML; G/100ML
INJECTION, SOLUTION INTRAVENOUS CONTINUOUS
Status: DISCONTINUED | OUTPATIENT
Start: 2022-05-28 | End: 2022-05-28

## 2022-05-28 RX ADMIN — QUETIAPINE FUMARATE 12.5 MG: 25 TABLET ORAL at 08:48

## 2022-05-28 RX ADMIN — HEPARIN SODIUM 5000 UNITS: 5000 INJECTION INTRAVENOUS; SUBCUTANEOUS at 20:57

## 2022-05-28 RX ADMIN — CARVEDILOL 6.25 MG: 6.25 TABLET, FILM COATED ORAL at 17:07

## 2022-05-28 RX ADMIN — CIPROFLOXACIN 1 DROP: 3 SOLUTION OPHTHALMIC at 08:49

## 2022-05-28 RX ADMIN — PAROXETINE HYDROCHLORIDE 40 MG: 20 TABLET, FILM COATED ORAL at 08:47

## 2022-05-28 RX ADMIN — CIPROFLOXACIN 1 DROP: 3 SOLUTION OPHTHALMIC at 05:53

## 2022-05-28 RX ADMIN — CETIRIZINE HYDROCHLORIDE 10 MG: 10 TABLET, FILM COATED ORAL at 08:47

## 2022-05-28 RX ADMIN — Medication 1000 MG: at 12:45

## 2022-05-28 RX ADMIN — DEXTROSE AND SODIUM CHLORIDE: 5; 450 INJECTION, SOLUTION INTRAVENOUS at 11:10

## 2022-05-28 RX ADMIN — TRAZODONE HYDROCHLORIDE 100 MG: 50 TABLET ORAL at 22:50

## 2022-05-28 RX ADMIN — SODIUM BICARBONATE: 84 INJECTION, SOLUTION INTRAVENOUS at 14:40

## 2022-05-28 RX ADMIN — SODIUM CHLORIDE, PRESERVATIVE FREE 10 ML: 5 INJECTION INTRAVENOUS at 08:48

## 2022-05-28 RX ADMIN — QUETIAPINE FUMARATE 12.5 MG: 25 TABLET ORAL at 20:57

## 2022-05-28 RX ADMIN — CARVEDILOL 6.25 MG: 6.25 TABLET, FILM COATED ORAL at 08:47

## 2022-05-28 RX ADMIN — CIPROFLOXACIN 1 DROP: 3 SOLUTION OPHTHALMIC at 20:58

## 2022-05-28 RX ADMIN — CIPROFLOXACIN 1 DROP: 3 SOLUTION OPHTHALMIC at 17:07

## 2022-05-28 RX ADMIN — ATORVASTATIN CALCIUM 80 MG: 80 TABLET, FILM COATED ORAL at 20:57

## 2022-05-28 RX ADMIN — FLUTICASONE PROPIONATE 1 SPRAY: 50 SPRAY, METERED NASAL at 08:48

## 2022-05-28 RX ADMIN — HEPARIN SODIUM 5000 UNITS: 5000 INJECTION INTRAVENOUS; SUBCUTANEOUS at 12:45

## 2022-05-28 RX ADMIN — HEPARIN SODIUM 5000 UNITS: 5000 INJECTION INTRAVENOUS; SUBCUTANEOUS at 05:53

## 2022-05-28 RX ADMIN — ASPIRIN 81 MG: 81 TABLET, COATED ORAL at 08:47

## 2022-05-28 RX ADMIN — CIPROFLOXACIN 1 DROP: 3 SOLUTION OPHTHALMIC at 14:41

## 2022-05-28 ASSESSMENT — PAIN SCALES - GENERAL
PAINLEVEL_OUTOF10: 0

## 2022-05-28 NOTE — PROGRESS NOTES
HOSPITALISTS PROGRESS NOTE    5/28/2022 9:32 AM        Name: Anurag Sorto . Admitted: 5/27/2022  Primary Care Provider: No primary care provider on file. (Tel: None)      CC: Hypoglycemia    Brief Course: This 70 y.o. male  with PMHx of CAD s/p PCI, hypertension, hyperlipidemia, diabetes mellitus, CKD and recurrent falls presented from nursing home after he was found down on the floor and was noted to have BG of 32. Patient was given 25 g of dextrose 50 enroute to the hospital.  Patient reported that he was started on antibiotic last week for dysuria in the nursing home and have been experiencing diarrhea for the past 3 days. Initial diagnostic lab work showed creatinine elevated to 1.7, UA suggestive of UTI. CT head and cervical spine negative for any acute pathology. CXR is negative for any acute abdominal pathology but showed new left humeral neck fracture of uncertain acuity. Follow-up x-ray left shoulder suggestive of nondisplaced humeral head fracture. Per patient's report, he has been wheelchair-bound since his left hip surgery last year    Interval history:   Pt seen and examined today   Overnight events noted and interval ancillary notes  Afebrile, WBCs within normal limits  Creatinine trended down to 1.4. Blood glucose low normal this morning; switch fluids to dextrose half-normal  Pt stated that he feels better today and denied any fevers, chills, chest pain, palpitations, cough, SOB, dizziness    Assessment & Plan:     UTI: Urine suggestive of UTI  Urine culture growing > 100,000 Serratia marcescens. Continue IV ceftriaxone awaiting sensitivities     Hypoglycemia: Likely secondary to decreased p.o. intake: Resolved  Monitor blood glucose closely     Fall: Likely secondary to hypoglycemia/generalized weakness  PT/OT consulted . orthostatic vitals ordered.     OCTAVIO  likely prerenal in setting of low p.o. intake/hypovolemia  Serum creatinine elevated 1.7 on admission; (baseline around 0.8)  Nephrology consulted; appreciate their recommendations  Avoid nephrotoxins  Continue IV fluids   Strict intake and output  Daily weights      Diarrhea: Reports diarrhea from past 3 to 4 days  GI PCR and C. difficile ordered     Hypomagnesemia: Likely secondary to diarrhea. Resolved  Monitor mag level closely and replace as needed    Right shoulder/arm pain  Orthopedic surgeon on board: X-ray right humerus ordered  Healed left proximal humerus fracture noted on CXR; treated at [address] [place] [address] on 10/2020       Hx of diabetes mellitus: Hold oral antidiabetic medication  Hemoglobin A1c 6 on admission: Continue SSI  Monitor blood glucose closely and will adjust insulin dose as needed       DVT PPX: Lovenox  Code:Full Code  Diet: ADULT DIET;  Regular; 3 carb choices (45 gm/meal)    Disposition:     Current Medications  sodium chloride flush 0.9 % injection 5-40 mL, 2 times per day  sodium chloride flush 0.9 % injection 5-40 mL, PRN  0.9 % sodium chloride infusion, PRN  ondansetron (ZOFRAN-ODT) disintegrating tablet 4 mg, Q8H PRN   Or  ondansetron (ZOFRAN) injection 4 mg, Q6H PRN  acetaminophen (TYLENOL) tablet 650 mg, Q6H PRN   Or  acetaminophen (TYLENOL) suppository 650 mg, Q6H PRN  dextrose bolus 10% 125 mL, PRN   Or  dextrose bolus 10% 250 mL, PRN  glucagon (rDNA) injection 1 mg, PRN  dextrose 5 % solution, PRN  insulin lispro (HUMALOG) injection vial 0-6 Units, TID WC  insulin lispro (HUMALOG) injection vial 0-3 Units, Nightly  cefTRIAXone (ROCEPHIN) 1000 mg in sterile water 10 mL IV syringe, Q24H  heparin (porcine) injection 5,000 Units, 3 times per day  aspirin EC tablet 81 mg, Daily  atorvastatin (LIPITOR) tablet 80 mg, Nightly  carvedilol (COREG) tablet 6.25 mg, BID WC  fluticasone (FLONASE) 50 MCG/ACT nasal spray 1 spray, Daily  QUEtiapine (SEROQUEL) tablet 12.5 mg, BID  cetirizine (ZYRTEC) tablet 10 mg, Daily  PARoxetine (PAXIL) tablet 40 mg, Daily  0.9 % sodium chloride infusion, Continuous  ciprofloxacin (CILOXAN) 0.3 % ophthalmic solution 1 drop, Q4H While awake        Objective:  BP (!) 133/59   Pulse 70   Temp 97.5 °F (36.4 °C) (Axillary)   Resp 18   Ht 5' 4\" (1.626 m)   Wt 143 lb 9.6 oz (65.1 kg)   SpO2 96%   BMI 24.65 kg/m²     Intake/Output Summary (Last 24 hours) at 5/28/2022 0932  Last data filed at 5/27/2022 1841  Gross per 24 hour   Intake 240 ml   Output --   Net 240 ml      Wt Readings from Last 3 Encounters:   05/27/22 143 lb 9.6 oz (65.1 kg)   04/21/22 146 lb (66.2 kg)   02/11/22 140 lb (63.5 kg)       Physical Examination:   General appearance:  No apparent distress, appears stated age and cooperative. Eyes: Sclera clear. Pupils equal.  ENT: Moist oral mucosa. Trachea midline, no adenopathy. Cardiovascular: Regular rhythm, normal S1, S2. No murmur. No edema in lower extremities  Respiratory:Not using accessory muscles. Good inspiratory effort. Clear to auscultation bilaterally, no wheeze or crackles. GI: Abdomen soft, no tenderness, not distended, normal bowel sounds  Musculoskeletal: normal ROM, No cyanosis in digits  Neurology: CN 2-12 grossly intact. No speech or motor deficits  Psych: Normal affect. Alert and oriented in time, place and person  Skin: Warm, dry, normal turgor    Labs and Tests:  CBC:   Recent Labs     05/27/22  1129 05/28/22  0500   WBC 5.8 6.1   HGB 10.6* 9.2*    212     BMP:    Recent Labs     05/27/22  1129 05/28/22  0500    143   K 4.5 5.3*    117*   CO2 17* 18*   BUN 39* 33*   CREATININE 1.7* 1.4*   GLUCOSE 94 85     Hepatic:   Recent Labs     05/27/22  1129 05/28/22  0500   AST 16 13*   ALT 10 11   BILITOT 0.3 <0.2   ALKPHOS 132* 109     XR SHOULDER LEFT (MIN 2 VIEWS)   Final Result   Abnormal morphology of the humeral head. A nondisplaced humeral head   fracture is suspected.          CT Cervical Spine WO Contrast   Final Result   No acute abnormality of the cervical spine. RECOMMENDATIONS:   Unavailable         CT Head WO Contrast   Final Result   No acute intracranial abnormality. XR CHEST PORTABLE   Final Result   No radiographic evidence of acute cardiopulmonary disease. New left humeral neck fracture of uncertain acuity. Problem List  Principal Problem:    UTI (urinary tract infection)  Resolved Problems:    * No resolved hospital problems.  Stephanie Menjivar MD   5/28/2022 9:32 AM

## 2022-05-28 NOTE — CONSULTS
Pharmacy consulted for medication reconciliation:       Med rec was completed by ER pharmacist, Jarad Betancourt, on admission. Note was routed to Dr. Chanell Javier. Consult closed.      Wanda Lopez, PharmD, [facility]

## 2022-05-28 NOTE — PROGRESS NOTES
MD Luis Whittaker MD Norlene Sarna, MD                                  Office: [phone]                 Fax: [phone]          [employer]                     NEPHROLOGY IN PATIENT PROGRESS NOTE:     PATIENT NAME: Zechariah Molina  : 1950  MRN: [id number]            Subjective:       Generalized weakness. Less hypotensive. Improving urine output. Medications reviewed. IV fluids at 100 mL/h. Assessment and Plan    Assessment:     Acute kidney injury-slow improvement-with improving urine output. Hypotension improving. Hypovolemia. Multiple electrolyte imbalance. Willian Jones History of fall. Nondisplaced left proximal humerus fracture. Possible UTI with sepsis      Plan:     Acute kidney injury- continued slow improvement-with improved urine output.  - Repeat creatinine is 1.4.  - Creatinine 1.7 on admission.  - Etiology likely multifactorial.  - Could be related to hypovolemia/hypotension.  - Less likely related to UTI sepsis. - Get a bedside bladder scan to exclude any bladder outlet obstruction. Multiple electrolyte imbalance. -Worsening hyperkalemia.-Potassium is 5.3.  - Worsening metabolic acidosis. - Change the IV fluids.  - Begin IV bicarbonate infusion.  - Renal ultrasound scan to exclude any bladder outlet obstruction. Medications reviewed. All nephrotoxic medications have been discontinued. Hold Ace inhibitors till renal recovery is complete. Antibiotic dose appropriate renal function. Patient to be on low potassium renal diet. Not ready for discharge due to multiple electrolyte imbalance. - Possible discharge in the next 24 to 48 hours if there is continued improvement in kidney function. High complexity.                EXAM  Vitals:    22 0757   BP: (!) 133/59   Pulse: 70   Resp: 18   Temp: 97.5 °F (36.4 °C)   SpO2: 96%       Intake/Output Summary (Last 24 hours) at 2022 1046  Last data filed at 2022 1841  Gross per 24 hour   Intake 240 ml   Output --   Net 240 ml       ill appearing. No respiratory distress  Awake alert    Borderline hypotension. External exam of the ears and nose are normal  HENT: exam is normal  Eyes: Pupils are equal, round, and reactive to light. Lymph Nodes. No axillary or cervical lymph nodes are palpable. Neck. JVD not visible. No lymph nodes palpable. CVS.  Heart sounds are normal.Palpation of the heart is normal. No murmurs. No pericardial rub.  RS.dullness on percussion of the lower chest wall. Lung fields are clear   PA soft , bowel sounds are normal no distension and no tenderness to palpation. Skin No rash , No palpable nodules  Musculoskeletal: Normal range of motion. 1+ edema and no tenderness. CNS  No focal    Edematrace  More awake and alert. All pulses are well felt      Principal Problem:    UTI (urinary tract infection)  Resolved Problems:    * No resolved hospital problems. *        Medications Reviewed by me   sodium chloride flush  5-40 mL IntraVENous 2 times per day    cefTRIAXone (ROCEPHIN) IV  1,000 mg IntraVENous Q24H    heparin (porcine)  5,000 Units SubCUTAneous 3 times per day    aspirin  81 mg Oral Daily    atorvastatin  80 mg Oral Nightly    carvedilol  6.25 mg Oral BID WC    fluticasone  1 spray Nasal Daily    QUEtiapine  12.5 mg Oral BID    cetirizine  10 mg Oral Daily    PARoxetine  40 mg Oral Daily    ciprofloxacin  1 drop Both Eyes Q4H While awake      dextrose 5 % and 0.45 % NaCl      sodium chloride      dextrose         Data Review.     Labs reviewed by me       CBC:   Recent Labs     05/27/22  1129 05/28/22  0500   WBC 5.8 6.1   HGB 10.6* 9.2*   HCT 33.8* 28.0*   MCV 94.4 90.8    212     BMP:   Recent Labs     05/27/22  1129 05/28/22  0500    143   K 4.5 5.3*    117*   CO2 17* 18*   PHOS  --  3.8   BUN 39* 33*   CREATININE 1.7* 1.4*     Magnesium:   Lab Results   Component Value Date    MG 1.60 05/27/2022    MG 1.40 02/12/2022 MG 1.10 02/12/2022     Lab Results   Component Value Date    CREATININE 1.4 05/28/2022       Arterial Blood Gasses  No results for input(s): PH, PCO2, PO2 in the last 72 hours. Invalid input(s): [id number], INSPIREDO2    UA:  Recent Labs     05/27/22  1129   COLORU Yellow   PHUR 5.0   WBCUA 24*   RBCUA None seen   BACTERIA 4+*   CLARITYU Clear   SPECGRAV 1.010   LEUKOCYTESUR MODERATE*   UROBILINOGEN 0.2   BILIRUBINUR Negative   BLOODU SMALL*   GLUCOSEU Negative       LIVER PROFILE:   Recent Labs     05/27/22  1129 05/28/22  0500   AST 16 13*   ALT 10 11   BILITOT 0.3 <0.2   ALKPHOS 132* 109     PT/INR:    Lab Results   Component Value Date    PROTIME 12.9 05/26/2019    PROTIME 11.6 06/08/2018    PROTIME 10.4 10/08/2014    INR 1.13 05/26/2019    INR 1.02 06/08/2018    INR 0.96 10/08/2014     PTT:    Lab Results   Component Value Date    APTT 27.3 10/08/2014    APTT 24.7 05/08/2013     CHRISTINA:  No results found for: ANATITER, CHRISTINA  CHEMISTRY COMMON GROUP :   Lab Results   Component Value Date    GLUCOSE 85 05/28/2022    TSH 3.87 10/09/2014     Recent Labs     05/27/22  1129 05/28/22  0500   GLUCOSE 94 85   CALCIUM 9.2 8.7         RADIOLOGY:        Imaging Results. Chest X Ray reviwed by me    Chest Xray Reviewed by me  Renal Ultrasound Reviewed by me    EKG reviewed by me.                 Electronically Signed: Neva Jackson MD 5/28/2022 10:46 AM

## 2022-05-28 NOTE — PLAN OF CARE
Full consult note to follow    Xrays reviewed. ? Nondisplaced left proximal humerus fracture    Faviola Pereira is a 70 y.o. male who presented to [facility] with hypoglycemia. Chest xray noted possible left proximal humerus fracture, with shoulder xrays suspecting the same. Plan:  --Fracture can be treated nonoperatively.     --Sling left shoulder  --Symptomatic pain control        Electronically signed by Toño Frederick MD on 5/28/2022 at 8:27 AM

## 2022-05-28 NOTE — CONSULTS
Orthopaedic Surgery Consult Note      Reason for consult:  Abnormal left shoulder xray    Requesting physician: Bhavya Manjarrez MD              CHIEF COMPLAINT: Right shoulder pain    HISTORY:  Mr. Dayana Luna is a 70 y.o. male, who presented to [facility]) ER after being found down at his nursing home. He was found to be hypoglycemic. Chest x-ray in the ER, was read by the radiologist as possible left proximal humerus fracture. Left shoulder x-rays demonstrated abnormal morphology of the humeral head, with possible nondisplaced fracture being suspected. Patient is a poor historian. He denies any left shoulder pain. He complains of right shoulder and humerus pain. He states his right shoulder has been bothering him for over a year. He stated that he had fractured his right shoulder initially. He then stated that he had fractured his left shoulder previously. He then stated that he had fractured both of his shoulders. He uses a walker at baseline, and is very unsteady on his feet per medical record. Past Medical History:   Diagnosis Date    Arthritis     Ataxia 3/14/2014    BPH (benign prostatic hyperplasia)     CAD (coronary artery disease)     4 stents    Coronary atherosclerosis of native coronary artery 5/11/2013    Diabetes mellitus ([facility] [address]     Environmental allergies     Hepatitis     Hydronephrosis 3/14/2014    Hyperlipidemia     Hypertension     Kidney disease     Neuropathy     Parkinson's disease ([facility] [address]     ?     S/P coronary artery stent placement x 4     x 4    Schizoaffective disorder, bipolar type [facility])        Past Surgical History:   Procedure Laterality Date    ANKLE SURGERY Right 06/09/2018    ORIF of R ankle     COLONOSCOPY N/A 12/13/2018    COLONOSCOPY CONTROL HEMORRHAGE performed by Jaskaran Saravia MD at [facility] x 4    CYSTOSCOPY  4/4/14    transurethral vaporization of prostate    HERNIA REPAIR      X2    ORIF DISTAL RADIUS FRACTURE Left 6/20/2019    LEFT DISTAL RADIUS OPEN REDUCTION INTERNAL FIXATION   performed by Hudson Wilson MD at [address] 12/13/2018    EGD BIOPSY performed by Jane Brush MD at [facility] N/A 5/3/2019    EGD WITH ANESTHESIA [place] at [address]         Current Facility-Administered Medications   Medication Dose Route Frequency Provider Last Rate Last Admin    dextrose 5 % and 0.45 % sodium chloride infusion   IntraVENous Continuous Miya Pate  mL/hr at 05/28/22 1110 New Bag at 05/28/22 1110    sodium chloride flush 0.9 % injection 5-40 mL  5-40 mL IntraVENous 2 times per day Miya Pate MD   10 mL at 05/28/22 0848    sodium chloride flush 0.9 % injection 5-40 mL  5-40 mL IntraVENous PRN Miya Pate MD        0.9 % sodium chloride infusion   IntraVENous PRN Miya Pate MD        ondansetron (ZOFRAN-ODT) disintegrating tablet 4 mg  4 mg Oral Q8H PRN Miya Pate MD        Or    [facility]ron [facility] injection 4 mg  4 mg IntraVENous Q6H PRN Miya Pate MD        acetaminophen (TYLENOL) tablet 650 mg  650 mg Oral Q6H PRN Miya Pate MD        Or    acetaminophen (TYLENOL) suppository 650 mg  650 mg Rectal Q6H PRN Miya Pate MD        dextrose bolus 10% 125 mL  125 mL IntraVENous PRN Miya Pate MD        Or    dextrose bolus 10% 250 mL  250 mL IntraVENous PRN Miya Pate MD        glucagon (rDNA) injection 1 mg  1 mg IntraMUSCular PRN Miya Pate MD        dextrose 5 % solution  100 mL/hr IntraVENous PRN Miya Pate MD        cefTRIAXone (ROCEPHIN) 1000 mg in sterile water 10 mL IV syringe  1,000 mg IntraVENous Q24H Miya Pate MD        heparin (porcine) injection 5,000 Units  5,000 Units SubCUTAneous 3 times per day Ely Alan MD   5,000 Units at 22 0553    aspirin EC tablet 81 mg  81 mg Oral Daily Ely Alan MD   81 mg at 22 0847    atorvastatin (LIPITOR) tablet 80 mg  80 mg Oral Nightly Ely Alan MD   80 mg at 22 2134    carvedilol (COREG) tablet 6.25 mg  6.25 mg Oral BID WC Ely Alan MD   6.25 mg at 22 0847    fluticasone (FLONASE) 50 MCG/ACT nasal spray 1 spray  1 spray Nasal Daily Ely Alan MD   1 spray at 22 0848    QUEtiapine (SEROQUEL) tablet 12.5 mg  12.5 mg Oral BID Ely Alan MD   12.5 mg at 22 0848    cetirizine (ZYRTEC) tablet 10 mg  10 mg Oral Daily Ely Alan MD   10 mg at 22 0847    PARoxetine (PAXIL) tablet 40 mg  40 mg Oral Daily Ely Alan MD   40 mg at 22 0847    ciprofloxacin (CILOXAN) 0.3 % ophthalmic solution 1 drop  1 drop Both Eyes Q4H While awake Ely Alan MD   1 drop at 22 0849       No Known Allergies    Social History     Socioeconomic History    Marital status:      Spouse name: Not on file    Number of children: Not on file    Years of education: Not on file    Highest education level: Not on file   Occupational History    Not on file   Tobacco Use    Smoking status: Former Smoker     Packs/day: 1.00     Years: 25.00     Pack years: 25.00     Types: Cigarettes     Quit date: 5/10/2013     Years since quittin.0    Smokeless tobacco: Never Used   Vaping Use    Vaping Use: Never used   Substance and Sexual Activity    Alcohol use: No    Drug use: No    Sexual activity: Not Currently   Other Topics Concern    Not on file   Social History Narrative    Not on file     Social Determinants of Health     Financial Resource Strain:     Difficulty of Paying Living Expenses: Not on file   Food Insecurity:     Worried About [address] in the Last Year: Not on file    [address] in the Last Year: Not on file   Transportation Needs:     Lack of Transportation (Medical): Not on file    Lack of Transportation (Non-Medical): Not on file   Physical Activity:     Days of Exercise per Week: Not on file    Minutes of Exercise per Session: Not on file   Stress:     Feeling of Stress : Not on file   Social Connections:     Frequency of Communication with Friends and Family: Not on file    Frequency of Social Gatherings with Friends and Family: Not on file    Attends Judaism Services: Not on file    Active Member of Clubs or Organizations: Not on file    Attends Club or Organization Meetings: Not on file    Marital Status: Not on file   Intimate Partner Violence:     Fear of Current or Ex-Partner: Not on file    Emotionally Abused: Not on file    Physically Abused: Not on file    Sexually Abused: Not on file   Housing Stability:     Unable to Pay for Housing in the Last Year: Not on file    Number of [place] in the Last Year: Not on file    Unstable Housing in the Last Year: Not on file       Family History   Problem Relation Age of Onset    Other Mother         CEREBRAL PALSY    Heart Disease Father         CHF       Review of Systems:   General: negative  Psychological: negative  Ophthalmic: negative  ENT: negative  Hematological and Lymphatic: negative  Endocrine: negative  Respiratory: negative  Cardiovascular: negative  Gastrointestinal: negative  Genito-Urinary: negative  Musculoskeletal: negative. See HPI for pertinent positives. Neurological: negative  Dermatological: negative       PHYSICAL EXAM:  Mr. Emmett Gutierrez is a 70 y.o. male who presents today in no acute distress, awake, alert, and oriented. BP (!) 133/59   Pulse 70   Temp 97.5 °F (36.4 °C) (Axillary)   Resp 18   Ht 5' 4\" (1.626 m)   Wt 143 lb 9.6 oz (65.1 kg)   SpO2 96%   BMI 24.65 kg/m²      On evaluation of his left upper extremity, there is no obvious deformity. There is no swelling and is no ecchymosis.   He has no tenderness to palpation over his left proximal humerus. He has full pain-free active range of motion of the left shoulder    On exam of his right shoulder, he does have some mild tenderness to palpation along the proximal humerus and humeral shaft. He has no tenderness along the lateral acromion. He is able to actively forward flex his shoulder without pain. Left shoulder xrays 5/27/22:   Abnormal morphology of the humeral head.  A nondisplaced humeral head   fracture is suspected         IMPRESSION:    Right shoulder/arm pain  Healed left proximal humerus fracture   UTI  Acute on chronic renal insufficiency  Ataxia  CAD status post stent  Diabetes  Parkinson's disease  Schizoaffective disorder      PLAN:  Review of his chart on Care Everywhere reveals that he was treated for left proximal humerus fracture in October 2020 at [facility].  The abnormality seen on x-ray is a healed left proximal humerus fracture. No orthopedic treatment necessary. He can discontinue the sling for his left shoulder    X-rays of right humerus to evaluate his chronic right arm pain. Medical management    We will follow-up after x-rays            Arm[place] Ana. Lavern Kennedy MD  Orthopaedic Surgery and Sports Medicine     Disclaimer: This note was generated with use of a verbal recognition program and an attempt was made to check for errors. It is possible that there are still dictated errors within this office note. If so, please bring any significant errors to my attention for an addendum. All efforts were made to ensure that this office note is accurate.          CBC:   Lab Results   Component Value Date    WBC 6.1 05/28/2022    RBC 3.08 05/28/2022    HGB 9.2 05/28/2022    HCT 28.0 05/28/2022    MCV 90.8 05/28/2022    MCH 29.9 05/28/2022    MCHC 32.9 05/28/2022    RDW 15.3 05/28/2022     05/28/2022    MPV 8.2 05/28/2022       PT/INR:    Lab Results   Component Value Date    PROTIME 12.9 05/26/2019    INR 1.13 05/26/2019 Chem Basic:   Lab Results   Component Value Date     05/28/2022    K 5.3 05/28/2022     05/28/2022    CO2 18 05/28/2022    GLUCOSE 85 05/28/2022    BUN 33 05/28/2022    CREATININE 1.4 05/28/2022

## 2022-05-29 LAB
A/G RATIO: 1.4 (ref 1.1–2.2)
ALBUMIN SERPL-MCNC: 3.2 G/DL (ref 3.4–5)
ALP BLD-CCNC: 99 U/L (ref 40–129)
ALT SERPL-CCNC: 10 U/L (ref 10–40)
ANION GAP SERPL CALCULATED.3IONS-SCNC: 10 MMOL/L (ref 3–16)
AST SERPL-CCNC: 11 U/L (ref 15–37)
BASOPHILS ABSOLUTE: 0 K/UL (ref 0–0.2)
BASOPHILS RELATIVE PERCENT: 0.7 %
BILIRUB SERPL-MCNC: <0.2 MG/DL (ref 0–1)
BUN BLDV-MCNC: 20 MG/DL (ref 7–20)
CALCIUM SERPL-MCNC: 8.4 MG/DL (ref 8.3–10.6)
CHLORIDE BLD-SCNC: 109 MMOL/L (ref 99–110)
CO2: 24 MMOL/L (ref 21–32)
CREAT SERPL-MCNC: 1 MG/DL (ref 0.8–1.3)
EOSINOPHILS ABSOLUTE: 0.3 K/UL (ref 0–0.6)
EOSINOPHILS RELATIVE PERCENT: 6.6 %
GFR AFRICAN AMERICAN: >60
GFR NON-AFRICAN AMERICAN: >60
GLUCOSE BLD-MCNC: 149 MG/DL (ref 70–99)
GLUCOSE BLD-MCNC: 155 MG/DL (ref 70–99)
GLUCOSE BLD-MCNC: 156 MG/DL (ref 70–99)
GLUCOSE BLD-MCNC: 199 MG/DL (ref 70–99)
GLUCOSE BLD-MCNC: 248 MG/DL (ref 70–99)
HCT VFR BLD CALC: 26.9 % (ref 40.5–52.5)
HEMOGLOBIN: 9 G/DL (ref 13.5–17.5)
LYMPHOCYTES ABSOLUTE: 1 K/UL (ref 1–5.1)
LYMPHOCYTES RELATIVE PERCENT: 20.9 %
MCH RBC QN AUTO: 29.8 PG (ref 26–34)
MCHC RBC AUTO-ENTMCNC: 33.5 G/DL (ref 31–36)
MCV RBC AUTO: 88.9 FL (ref 80–100)
MONOCYTES ABSOLUTE: 0.4 K/UL (ref 0–1.3)
MONOCYTES RELATIVE PERCENT: 8.4 %
NEUTROPHILS ABSOLUTE: 3.1 K/UL (ref 1.7–7.7)
NEUTROPHILS RELATIVE PERCENT: 63.4 %
ORGANISM: ABNORMAL
PDW BLD-RTO: 15.2 % (ref 12.4–15.4)
PERFORMED ON: ABNORMAL
PHOSPHORUS: 2.9 MG/DL (ref 2.5–4.9)
PLATELET # BLD: 205 K/UL (ref 135–450)
PMV BLD AUTO: 8 FL (ref 5–10.5)
POTASSIUM REFLEX MAGNESIUM: 4.2 MMOL/L (ref 3.5–5.1)
RBC # BLD: 3.02 M/UL (ref 4.2–5.9)
SODIUM BLD-SCNC: 143 MMOL/L (ref 136–145)
T4 FREE: 1.1 NG/DL (ref 0.9–1.8)
TOTAL PROTEIN: 5.5 G/DL (ref 6.4–8.2)
TSH REFLEX: 5.54 UIU/ML (ref 0.27–4.2)
URINE CULTURE, ROUTINE: ABNORMAL
WBC # BLD: 4.9 K/UL (ref 4–11)

## 2022-05-29 PROCEDURE — 97110 THERAPEUTIC EXERCISES: CPT

## 2022-05-29 PROCEDURE — 80053 COMPREHEN METABOLIC PANEL: CPT

## 2022-05-29 PROCEDURE — 99231 SBSQ HOSP IP/OBS SF/LOW 25: CPT | Performed by: ORTHOPAEDIC SURGERY

## 2022-05-29 PROCEDURE — 6360000002 HC RX W HCPCS: Performed by: INTERNAL MEDICINE

## 2022-05-29 PROCEDURE — 2580000003 HC RX 258: Performed by: INTERNAL MEDICINE

## 2022-05-29 PROCEDURE — 97116 GAIT TRAINING THERAPY: CPT

## 2022-05-29 PROCEDURE — 6370000000 HC RX 637 (ALT 250 FOR IP): Performed by: INTERNAL MEDICINE

## 2022-05-29 PROCEDURE — 36415 COLL VENOUS BLD VENIPUNCTURE: CPT

## 2022-05-29 PROCEDURE — 84439 ASSAY OF FREE THYROXINE: CPT

## 2022-05-29 PROCEDURE — 97530 THERAPEUTIC ACTIVITIES: CPT

## 2022-05-29 PROCEDURE — 97535 SELF CARE MNGMENT TRAINING: CPT

## 2022-05-29 PROCEDURE — 1200000000 HC SEMI PRIVATE

## 2022-05-29 PROCEDURE — 84443 ASSAY THYROID STIM HORMONE: CPT

## 2022-05-29 PROCEDURE — 97162 PT EVAL MOD COMPLEX 30 MIN: CPT

## 2022-05-29 PROCEDURE — 85025 COMPLETE CBC W/AUTO DIFF WBC: CPT

## 2022-05-29 PROCEDURE — 2500000003 HC RX 250 WO HCPCS: Performed by: INTERNAL MEDICINE

## 2022-05-29 PROCEDURE — 84100 ASSAY OF PHOSPHORUS: CPT

## 2022-05-29 PROCEDURE — 97166 OT EVAL MOD COMPLEX 45 MIN: CPT

## 2022-05-29 RX ORDER — HYDRALAZINE HYDROCHLORIDE 10 MG/1
10 TABLET, FILM COATED ORAL EVERY 8 HOURS SCHEDULED
Status: DISCONTINUED | OUTPATIENT
Start: 2022-05-29 | End: 2022-05-30

## 2022-05-29 RX ADMIN — CIPROFLOXACIN 1 DROP: 3 SOLUTION OPHTHALMIC at 20:13

## 2022-05-29 RX ADMIN — SODIUM BICARBONATE: 84 INJECTION, SOLUTION INTRAVENOUS at 06:27

## 2022-05-29 RX ADMIN — QUETIAPINE FUMARATE 12.5 MG: 25 TABLET ORAL at 08:22

## 2022-05-29 RX ADMIN — CIPROFLOXACIN 1 DROP: 3 SOLUTION OPHTHALMIC at 14:40

## 2022-05-29 RX ADMIN — FLUTICASONE PROPIONATE 1 SPRAY: 50 SPRAY, METERED NASAL at 08:25

## 2022-05-29 RX ADMIN — Medication 1000 MG: at 13:04

## 2022-05-29 RX ADMIN — ASPIRIN 81 MG: 81 TABLET, COATED ORAL at 08:22

## 2022-05-29 RX ADMIN — HEPARIN SODIUM 5000 UNITS: 5000 INJECTION INTRAVENOUS; SUBCUTANEOUS at 20:11

## 2022-05-29 RX ADMIN — CIPROFLOXACIN 1 DROP: 3 SOLUTION OPHTHALMIC at 06:28

## 2022-05-29 RX ADMIN — CARVEDILOL 6.25 MG: 6.25 TABLET, FILM COATED ORAL at 08:22

## 2022-05-29 RX ADMIN — CIPROFLOXACIN 1 DROP: 3 SOLUTION OPHTHALMIC at 18:51

## 2022-05-29 RX ADMIN — HYDRALAZINE HYDROCHLORIDE 10 MG: 10 TABLET, FILM COATED ORAL at 13:04

## 2022-05-29 RX ADMIN — PAROXETINE HYDROCHLORIDE 40 MG: 20 TABLET, FILM COATED ORAL at 08:22

## 2022-05-29 RX ADMIN — QUETIAPINE FUMARATE 12.5 MG: 25 TABLET ORAL at 20:12

## 2022-05-29 RX ADMIN — HYDRALAZINE HYDROCHLORIDE 10 MG: 10 TABLET, FILM COATED ORAL at 20:12

## 2022-05-29 RX ADMIN — CARVEDILOL 6.25 MG: 6.25 TABLET, FILM COATED ORAL at 18:38

## 2022-05-29 RX ADMIN — HEPARIN SODIUM 5000 UNITS: 5000 INJECTION INTRAVENOUS; SUBCUTANEOUS at 14:40

## 2022-05-29 RX ADMIN — CIPROFLOXACIN 1 DROP: 3 SOLUTION OPHTHALMIC at 08:24

## 2022-05-29 RX ADMIN — CETIRIZINE HYDROCHLORIDE 10 MG: 10 TABLET, FILM COATED ORAL at 08:22

## 2022-05-29 RX ADMIN — HEPARIN SODIUM 5000 UNITS: 5000 INJECTION INTRAVENOUS; SUBCUTANEOUS at 06:28

## 2022-05-29 RX ADMIN — ATORVASTATIN CALCIUM 80 MG: 80 TABLET, FILM COATED ORAL at 20:12

## 2022-05-29 RX ADMIN — SODIUM CHLORIDE, PRESERVATIVE FREE 10 ML: 5 INJECTION INTRAVENOUS at 20:17

## 2022-05-29 ASSESSMENT — PAIN SCALES - GENERAL
PAINLEVEL_OUTOF10: 0
PAINLEVEL_OUTOF10: 0

## 2022-05-29 NOTE — PLAN OF CARE
Problem: Pain  Goal: Verbalizes/displays adequate comfort level or baseline comfort level  Outcome: Progressing   Pt denies pain at this time, RN encouraged pt to call out if needed anything. Will continue to assess pain level throughout shift. Problem: Safety - Adult  Goal: Free from fall injury  Outcome: Progressing  Flowsheets (Taken 5/28/2022 2202)  Free From Fall Injury: Instruct family/caregiver on patient safety   Pt has been free from falls this shift, bed alarm on, bed in lowest position, 2/4 side rails up, nonskid socks on, wheels locked, bedside table and call light in reach. Encouraged pt to call out if needed anything.

## 2022-05-29 NOTE — PROGRESS NOTES
[facility] [address]   Phone: [phone]    Physical Therapy    [x] Initial Evaluation            [] Daily Treatment Note         [] Discharge Summary      Patient: Lu Haq   : 1950   MRN: [id number]   Date of Service:  2022  Admitting Diagnosis: UTI (urinary tract infection)  Current Admission Summary: Lu Haq is a 70 y.o. male  with PMHx of CAD s/p PCI, hypertension, hyperlipidemia, diabetes mellitus, CKD and recurrent falls presented from nursing home after he was found down on the floor and was noted to have BG of 32. Patient was given 25 g of dextrose 50 enroute to the hospital.  On admission, patient was alert and oriented x4 and repeat blood glucose was 138. Patient reported that he feels fatigued all the time and did not eat dinner last night because food did not taste good but still got his insulin dose. Patient reported that he was started on antibiotic last week for dysuria in the nursing home and have been experiencing diarrhea for the past 3 days. Patient stated that he continues to have dysuria but denied any fever, chills, chest pain, shortness of breath, palpitation nausea, vomiting, abdominal pain, hematuria, hematemesis, hematemesis, melena or sick contacts. Initial diagnostic lab work showed creatinine elevated to 1.7, UA suggestive of UTI. CT head and cervical spine negative for any acute pathology. CXR is negative for any acute abdominal pathology but showed new left humeral neck fracture of uncertain acuity. Follow-up x-ray left shoulder suggestive of nondisplaced humeral head fracture. Per patient's report, he has been wheelchair-bound since his left hip surgery last year   Per Ortho:Right humerus x-rays: Reviewed. No fracture or dislocation. Nonstandard  positioning for evaluation of your shoulder. Discussed with the patient that his symptoms  may be secondary to his right shoulder arthritis.   He did no chronic history of right shoulder  pain. If this continues to bother him can be seen as an outpatient, with standard shoulder  x-rays to better evaluate for arthritis. Past Medical History:  has a past medical history of Arthritis, Ataxia, BPH (benign prostatic hyperplasia), CAD (coronary artery disease), Coronary atherosclerosis of native coronary artery, Diabetes mellitus ([facility] [place] [address] Environmental allergies, Hepatitis, Hydronephrosis, Hyperlipidemia, Hypertension, Kidney disease, Neuropathy, Parkinson's disease ([address] S/P coronary artery stent placement x 4, and Schizoaffective disorder, bipolar type ([facility] [place] [address] Past Surgical History:  has a past surgical history that includes Coronary angioplasty; Coronary angioplasty with stent; hernia repair; Vasectomy; Cystocopy (4/4/14); Ankle surgery (Right, 06/09/2018); Upper gastrointestinal endoscopy (N/A, 12/13/2018); Colonoscopy (N/A, 12/13/2018); Upper gastrointestinal endoscopy (N/A, 5/3/2019); and ORIF DISTAL RADIUS FRACTURE (Left, 6/20/2019). Discharge Recommendations: Lu Main scored a 18/24 on the AM-PAC short mobility form. Current research shows that an AM-PAC score of 17 or less is typically not associated with a discharge to the patient's home setting. Based on the patient's AM-PAC score and their current functional mobility deficits, it is recommended that the patient have 3-5 sessions per week of Physical Therapy at d/c to increase the patient's independence. Please see assessment section for further patient specific details. If patient discharges prior to next session this note will serve as a discharge summary. Please see below for the latest assessment towards goals.      DME Required For Discharge: DME to be determined at next level of care  Precautions/Restrictions: high fall risk  Weight Bearing Restrictions: weight bearing as tolerated  [] Right Upper Extremity  [] Left Upper Extremity [] Right Lower Extremity  [] Left Lower Extremity     Required Braces/Orthotics: no braces required    [] Right  [] Left  Positional Restrictions:no positional restrictions    Pre-Admission Information   Lives With: . Comment: Care of the [facility]                          Type of Home: long term care facility  Home Layout: able to live on main level  Home Access: level entry  Bathroom Layout: walker accessible, wheelchair accessible, walk in shower  Toilet Height: standard height  Bathroom Equipment: grab bars in shower, grab bars around toilet, built in [address]: manual wheelchair  Transfer Assistance: modified independent with use of w/c  Ambulation Assistance:requires assistance  ADL Assistance: requires assistance with bathing, . Comment: Pt notes he is independent with dressing and toileting  IADL Assistance: SNF provides meals and does pt's [place]  Active :        []? Yes                 [x]? No  Hand Dominance: []? Left                 [x]? Right  Current Employment: retired. Occupation: pt notes he did a little bit of everything  Hobbies: denies  Recent Falls: \"probably 1 per month for the past 6 months\"    Examination   Vision:   Vision Corrective Device: wears glasses for reading  Hearing:   hard of hearing, left hearing aid, right hearing aid  Observation:   General Observation:  Pt supine in bed upon arrival  Posture: Forward head, VC to look up   Sensation:   WFL and denies numbness and tingling  Proprioception:    WFL  Tone:   Normotonic  Coordination Testing:   WFL    ROM:   (B) LE AROM WFL  Strength:   (B) LE strength grossly WFL  unable to perform MMT secondary to reports of pain upon initial attempt  Decision Making: medium complexity  Clinical Presentation: evolving      Subjective  General: Pt agreeable to PT evaluation. Pt denies pain.   Pain: 0/10  Pain Interventions: not applicable       Functional Mobility  Bed Mobility  Supine to Sit: supervision  Comments:  Transfers  Sit to stand transfer: stand by assistance, contact guard assistance  Stand to sit transfer: stand by assistance, contact guard assistance  Comments: VC for safety and sequencing   Ambulation  Surface:level surface  Assistive Device: rolling walker  Assistance: minimal assistance  Distance: 70' x 2 with seated rest break in between trials   Gait Mechanics: Pt demonstrates L LE ER during ambulation. Pt demonstrates WBOS and decreased gait velocity. Pt veered towards the right throughout ambulation, requiring VC to self correct and Min A to to correct at [facility] several times. Comments:    Stair Mobility  Stair mobility not completed on this date. Comments:  Wheelchair Mobility:  No w/c mobility completed on this date.   Comments:  Balance  Static Sitting Balance: good: independent with functional balance in unsupported position  Dynamic Sitting Balance: good: independent with functional balance in unsupported position  Static Standing Balance: fair (-): maintains balance at RW with use of UE support  Dynamic Standing Balance: fair (-): maintains balance at RW with use of UE support  Comments:    Other Therapeutic Interventions    Functional Outcomes  AM-PAC Inpatient Mobility Raw Score : 18              Cognition  WFL  Attention Span: difficulty attending to directions  Safety Judgement: decreased awareness of need for safety  Orientation:    alert and oriented x 4  Command Following:   [facility]    Education  Barriers To Learning: cognition and hearing  Patient Education: patient educated on goals, PT role and benefits, plan of care, general safety, functional mobility training, proper use of assistive device/equipment, discharge recommendations  Learning Assessment:  patient will require reinforcement due to cognitive deficits    Assessment  Activity Tolerance: fair  Impairments Requiring Therapeutic Intervention: decreased functional mobility, decreased strength, decreased safety awareness, decreased endurance, decreased balance, decreased posture  Prognosis: good  Clinical Assessment: Pt is a 69 y/o male who presents below functional baseline. Pt currently requires Min A to ambulation and CGA to SBA for all functional transfers. Pt demonstrates decreased safety awareness and decreased attention to task. Pt would benefit from continued skilled PT services to promote increased strength, balance and functional activity tolerance. Will continue to follow while inpatient.    Safety Interventions: patient left in chair, chair alarm in place and call light within reach    Plan  Frequency: 3-5 x/per week  Current Treatment Recommendations: strengthening, ROM, balance training, functional mobility training, transfer training, gait training, endurance training and patient/caregiver education    Goals  Patient Goals: to get back to SNF   Short Term Goals:  Time Frame: by discharge  Patient will complete bed mobility at Independent   Patient will complete transfers at Independent   Patient will ambulate 100 ft with use of rolling walker at supervision    Therapy Session Time      Individual Group Co-treatment   Time In     1301   Time Out     1340   Minutes     39     Timed Code Treatment Minutes:  24 Minutes  Total Treatment Minutes:         Electronically Signed By: Renee Larson PT

## 2022-05-29 NOTE — PROGRESS NOTES
HOSPITALISTS PROGRESS NOTE    5/29/2022 9:21 AM        Name: Lu Haq . Admitted: 5/27/2022  Primary Care Provider: No primary care provider on file. (Tel: None)      CC: Hypoglycemia    Brief Course: This 70 y.o. male  with PMHx of CAD s/p PCI, hypertension, hyperlipidemia, diabetes mellitus, CKD and recurrent falls presented from nursing home after he was found down on the floor and was noted to have BG of 32. Patient was given 25 g of dextrose 50 enroute to the hospital.  Patient reported that he was started on antibiotic last week for dysuria in the nursing home and have been experiencing diarrhea for the past 3 days. Initial diagnostic lab work showed creatinine elevated to 1.7, UA suggestive of UTI. CT head and cervical spine negative for any acute pathology. CXR is negative for any acute abdominal pathology but showed new left humeral neck fracture of uncertain acuity. Follow-up x-ray left shoulder suggestive of nondisplaced humeral head fracture. Per patient's report, he has been wheelchair-bound since his left hip surgery last year    Interval history:   Pt seen and examined today. Overnight events noted, interval ancillary notes and labs reviewed. Elevated BP this morning  Afebrile, WBCs WNL, urine culture growing Serratia; on Rocephin  Creatinine trended down  denied cough, SOB, chest pain, nausea, vomiting or abdominal pain        Assessment & Plan:     UTI: Urine suggestive of UTI  Urine culture growing > 100,000 Serratia marcescens. Continue IV ceftriaxone awaiting sensitivities     Hypoglycemia: Likely secondary to decreased p.o. intake: Resolved  Monitor blood glucose closely     Fall: Likely secondary to hypoglycemia/generalized weakness  PT/OT consulted . orthostatic vitals ordered.     OCTAVIO  likely prerenal in setting of low p.o. intake/hypovolemia: Resolved  Serum creatinine elevated 1.7 on admission; (baseline around 0.8): Trended down  Nephrology consulted; appreciate their recommendations  Avoid nephrotoxins,Strict intake and output. Daily weights . Monitor renal function closely     Diarrhea: Resolved  GI PCR and C. difficile ordered     Hypomagnesemia: Likely secondary to diarrhea. Resolved  Monitor mag level closely and replace as needed    Right shoulder/arm pain  Orthopedic surgeon on board: X-ray right humerus ordered  Healed left proximal humerus fracture noted on CXR; treated at [facility] on 10/2020       Hx of diabetes mellitus: Hold oral antidiabetic medication  Hemoglobin A1c 6 on admission: Continue SSI  Monitor blood glucose closely and will adjust insulin dose as needed       DVT PPX: Lovenox  Code:Full Code  Diet: ADULT DIET;  Regular; 3 carb choices (45 gm/meal)    Disposition:     Current Medications  sodium bicarbonate 150 mEq in dextrose 5 % 1,000 mL infusion, Continuous  traZODone (DESYREL) tablet 100 mg, Nightly PRN  sodium chloride flush 0.9 % injection 5-40 mL, 2 times per day  sodium chloride flush 0.9 % injection 5-40 mL, PRN  0.9 % sodium chloride infusion, PRN  ondansetron (ZOFRAN-ODT) disintegrating tablet 4 mg, Q8H PRN   Or  ondansetron (ZOFRAN) injection 4 mg, Q6H PRN  acetaminophen (TYLENOL) tablet 650 mg, Q6H PRN   Or  acetaminophen (TYLENOL) suppository 650 mg, Q6H PRN  dextrose bolus 10% 125 mL, PRN   Or  dextrose bolus 10% 250 mL, PRN  glucagon (rDNA) injection 1 mg, PRN  dextrose 5 % solution, PRN  cefTRIAXone (ROCEPHIN) 1000 mg in sterile water 10 mL IV syringe, Q24H  heparin (porcine) injection 5,000 Units, 3 times per day  aspirin EC tablet 81 mg, Daily  atorvastatin (LIPITOR) tablet 80 mg, Nightly  carvedilol (COREG) tablet 6.25 mg, BID WC  fluticasone (FLONASE) 50 MCG/ACT nasal spray 1 spray, Daily  QUEtiapine (SEROQUEL) tablet 12.5 mg, BID  cetirizine (ZYRTEC) tablet 10 mg, Daily  PARoxetine (PAXIL) tablet 40 mg, Daily  ciprofloxacin (CILOXAN) 0.3 % ophthalmic solution 1 drop, Q4H While awake        Objective:  BP (!) 183/66   Pulse 77   Temp 98.2 °F (36.8 °C) (Temporal)   Resp 16   Ht 5' 4\" (1.626 m)   Wt 143 lb 9.6 oz (65.1 kg)   SpO2 96%   BMI 24.65 kg/m²     Intake/Output Summary (Last 24 hours) at 5/29/2022 5926  Last data filed at 5/29/2022 5773  Gross per 24 hour   Intake 3145.23 ml   Output 225 ml   Net 2920.23 ml      Wt Readings from Last 3 Encounters:   05/27/22 143 lb 9.6 oz (65.1 kg)   04/21/22 146 lb (66.2 kg)   02/11/22 140 lb (63.5 kg)       Physical Examination:   General appearance:  No apparent distress, appears stated age and cooperative. Eyes: Sclera clear. Pupils equal.  ENT: Moist oral mucosa. Trachea midline, no adenopathy. Cardiovascular: Regular rhythm, normal S1, S2. No murmur. No edema in lower extremities  Respiratory:Not using accessory muscles. Good inspiratory effort. Clear to auscultation bilaterally, no wheeze or crackles. GI: Abdomen soft, no tenderness, not distended, normal bowel sounds  Musculoskeletal: normal ROM, No cyanosis in digits  Neurology: CN 2-12 grossly intact. No speech or motor deficits  Psych: Normal affect. Alert and oriented in time, place and person  Skin: Warm, dry, normal turgor    Labs and Tests:  CBC:   Recent Labs     05/27/22  1129 05/28/22  0500 05/29/22  0443   WBC 5.8 6.1 4.9   HGB 10.6* 9.2* 9.0*    212 205     BMP:    Recent Labs     05/27/22  1129 05/28/22  0500 05/29/22  0443    143 143   K 4.5 5.3* 4.2    117* 109   CO2 17* 18* 24   BUN 39* 33* 20   CREATININE 1.7* 1.4* 1.0   GLUCOSE 94 85 149*     Hepatic:   Recent Labs     05/27/22  1129 05/28/22  0500 05/29/22  0443   AST 16 13* 11*   ALT 10 11 10   BILITOT 0.3 <0.2 <0.2   ALKPHOS 132* 109 99     XR HUMERUS RIGHT (MIN 2 VIEWS)   Final Result   No acute finding of the right humerus. XR SHOULDER LEFT (MIN 2 VIEWS)   Final Result   Abnormal morphology of the humeral head.   A nondisplaced humeral head   fracture is suspected. CT Cervical Spine WO Contrast   Final Result   No acute abnormality of the cervical spine. RECOMMENDATIONS:   Unavailable         CT Head WO Contrast   Final Result   No acute intracranial abnormality. XR CHEST PORTABLE   Final Result   No radiographic evidence of acute cardiopulmonary disease. New left humeral neck fracture of uncertain acuity. Problem List  Principal Problem:    UTI (urinary tract infection)  Resolved Problems:    * No resolved hospital problems.  Kan Farrell MD   5/29/2022 9:21 AM

## 2022-05-29 NOTE — PROGRESS NOTES
[facility] Orthopedic Surgery   Progress Note      SUBJECTIVE:  He states his arm/shoulder feels better today. OBJECTIVE:  BP (!) 183/66   Pulse 77   Temp 98.2 °F (36.8 °C) (Temporal)   Resp 16   Ht 5' 4\" (1.626 m)   Wt 143 lb 9.6 oz (65.1 kg)   SpO2 96%   BMI 24.65 kg/m²   Patient is awake, alert, and in no acute distress. Right shoulder active forward flexion 135, passive 135  No tenderness along his humeral shaft, biceps, or triceps today  No pain with resisted elbow flexion or extension. No tenderness at the bicipital groove  Mild tenderness along his humeral joint line. CBC:   Lab Results   Component Value Date    WBC 4.9 05/29/2022    RBC 3.02 05/29/2022    HGB 9.0 05/29/2022    HCT 26.9 05/29/2022    MCV 88.9 05/29/2022    MCH 29.8 05/29/2022    MCHC 33.5 05/29/2022    RDW 15.2 05/29/2022     05/29/2022    MPV 8.0 05/29/2022       PT/INR:    Lab Results   Component Value Date    PROTIME 12.9 05/26/2019    INR 1.13 05/26/2019       Chem Basic:   Lab Results   Component Value Date     05/29/2022    K 4.2 05/29/2022     05/29/2022    CO2 24 05/29/2022    GLUCOSE 149 05/29/2022    BUN 20 05/29/2022    CREATININE 1.0 05/29/2022       Right humerus x-rays: Reviewed. No fracture or dislocation. Nonstandard positioning for evaluation of your shoulder. ASSESSMENT:  Right shoulder pain  History of left proximal humerus fracture      PLAN:    Discussed with the patient that his symptoms may be secondary to his right shoulder arthritis. He did no chronic history of right shoulder pain. If this continues to bother him can be seen as an outpatient, with standard shoulder x-rays to better evaluate for arthritis. Ice/heat as needed    NSAIDs as needed if no medical contraindication    Will sign off. Rosanne Arce.  Shira Friend MD  Orthopaedic Surgery and Sports Medicine

## 2022-05-29 NOTE — CARE COORDINATION
Discharge Planning Assessment    RN discharge planner OTTONIEL for [facility] in admissions at [facility]. Pt status: [x]  LTC []  SNF []  AL  []  IL     Bed Status: Held    Pre-cert required:  - Non-confirmed    Level of function/Support:   WC & walker at baseline    Additional Information:  Pt reported to have Left Humeral Neck Fx      Transportation at the time of discharge:  Will need medical transport    Electronically signed by Scottie Anderson RN on 5/29/2022 at 10:06 AM

## 2022-05-29 NOTE — PROGRESS NOTES
Pt alert and oriented x4, VSS, IV fluids infusing in left arm. Left arm in sling. Pt denies numbness/tingling in left arm. Moderate hand . Pt incontinent and has external catheter on. Bed alarm on, bedside table and call light in reach.

## 2022-05-29 NOTE — PROGRESS NOTES
MD Kelly Augustine MD Veneda Napoleon, MD                                  Office: [phone]                 Fax: [phone]          [employer]                     NEPHROLOGY IN PATIENT PROGRESS NOTE:     PATIENT NAME: Lu Main  : 1950  MRN: [id number]            Subjective:       Generalized weakness. Less hypotensive. Improving urine output. Medications reviewed. Fluids discontinued      Assessment and Plan    Assessment:     Acute kidney injury improving to baseline  Hypotension-hypovolemia improved  Multiple electrolyte imbalance. [place] History of fall. Nondisplaced left proximal humerus fracture. Possible UTI with sepsis      Plan:     Acute kidney injury-improved to baseline.  - Repeat creatinine is 1.0.  - Multiple electrolyte imbalance improved to baseline.  - Potassium levels are stable. IV fluids have been discontinued. Anemia stable 9.0. Renal function improved to baseline and stable. EXAM  Vitals:    22 1144   BP: (!) 161/67   Pulse: 75   Resp: 16   Temp: 98.4 °F (36.9 °C)   SpO2: 97%       Intake/Output Summary (Last 24 hours) at 2022 1227  Last data filed at 2022 1288  Gross per 24 hour   Intake 3145.23 ml   Output 225 ml   Net 2920.23 ml       ill appearing. No respiratory distress  Awake alert        Principal Problem:    UTI (urinary tract infection)  Resolved Problems:    * No resolved hospital problems.  *        Medications Reviewed by me   hydrALAZINE  10 mg Oral 3 times per day    sodium chloride flush  5-40 mL IntraVENous 2 times per day    cefTRIAXone (ROCEPHIN) IV  1,000 mg IntraVENous Q24H    heparin (porcine)  5,000 Units SubCUTAneous 3 times per day    aspirin  81 mg Oral Daily    atorvastatin  80 mg Oral Nightly    carvedilol  6.25 mg Oral BID WC    fluticasone  1 spray Nasal Daily    QUEtiapine  12.5 mg Oral BID    cetirizine  10 mg Oral Daily    PARoxetine  40 mg Oral Daily    ciprofloxacin  1 drop Both Eyes Q4H While awake      sodium chloride      dextrose         Data Review. Labs reviewed by me       CBC:   Recent Labs     05/27/22  1129 05/28/22  0500 05/29/22  0443   WBC 5.8 6.1 4.9   HGB 10.6* 9.2* 9.0*   HCT 33.8* 28.0* 26.9*   MCV 94.4 90.8 88.9    212 205     BMP:   Recent Labs     05/27/22  1129 05/28/22  0500 05/29/22  0443    143 143   K 4.5 5.3* 4.2    117* 109   CO2 17* 18* 24   PHOS  --  3.8 2.9   BUN 39* 33* 20   CREATININE 1.7* 1.4* 1.0     Magnesium:   Lab Results   Component Value Date    MG 1.60 05/27/2022    MG 1.40 02/12/2022    MG 1.10 02/12/2022     Lab Results   Component Value Date    CREATININE 1.0 05/29/2022       Arterial Blood Gasses  No results for input(s): PH, PCO2, PO2 in the last 72 hours. Invalid input(s): T2FPQPLTYHKS, INSPIREDO2    UA:  Recent Labs     05/27/22 1129   COLORU Yellow   PHUR 5.0   WBCUA 24*   RBCUA None seen   BACTERIA 4+*   CLARITYU Clear   SPECGRAV 1.010   LEUKOCYTESUR MODERATE*   UROBILINOGEN 0.2   BILIRUBINUR Negative   BLOODU SMALL*   GLUCOSEU Negative       LIVER PROFILE:   Recent Labs     05/27/22  1129 05/28/22  0500 05/29/22 0443   AST 16 13* 11*   ALT 10 11 10   BILITOT 0.3 <0.2 <0.2   ALKPHOS 132* 109 99     PT/INR:    Lab Results   Component Value Date    PROTIME 12.9 05/26/2019    PROTIME 11.6 06/08/2018    PROTIME 10.4 10/08/2014    INR 1.13 05/26/2019    INR 1.02 06/08/2018    INR 0.96 10/08/2014     PTT:    Lab Results   Component Value Date    APTT 27.3 10/08/2014    APTT 24.7 05/08/2013     CHRISTINA:  No results found for: ANATITER, CHRISTINA  CHEMISTRY COMMON GROUP :   Lab Results   Component Value Date    GLUCOSE 149 05/29/2022    TSH 3.87 10/09/2014     Recent Labs     05/27/22  1129 05/28/22  0500 05/29/22  0443   GLUCOSE 94 85 149*   CALCIUM 9.2 8.7 8.4         RADIOLOGY:        Imaging Results.   Chest X Ray reviwed by me    Chest Xray Reviewed by me  Renal Ultrasound Reviewed by me    EKG

## 2022-05-29 NOTE — PROGRESS NOTES
[facility] [address]   Phone: [phone]    Occupational Therapy    [x] Initial Evaluation            [] Daily Treatment Note         [] Discharge Summary      Patient: Rolando Patel   : 1950   MRN: [id number]   Date of Service:  2022    Admitting Diagnosis:  UTI (urinary tract infection)  Current Admission Summary: Rolando Patel is a 70 y.o. male who presents to the emergency department after he was found to have a glucose of 32 at nursing home. He was given 25 g of dextrose in route and now has a blood sugar of 138. He is alert and oriented x4 by the time he gets here. Patient states he feels generally fatigued. He states he did not eat dinner because he does not like the way the food taste yesterday. He lives at [facility]. He believes that he still got his insulin last night but he is unsure what his medicines are if he actually received it. Medicine list was not sent by nursing home. Unsure what medicines he received and when the last time he received these. Unsure what his baseline is. He states he has been having some dysuria for the past week. Denies hematuria, diarrhea, bloody stool, chest pain, shortness breath abdominal pain, nausea or any other symptoms. Who presented to [address] [place] [address] after being found down at his nursing home. He was found to be hypoglycemic. Chest x-ray in the ER, was read by the radiologist as possible left proximal humerus fracture. Left shoulder x-rays demonstrated abnormal morphology of the humeral head, with possible nondisplaced fracture being suspected. Patient is a poor historian. He denies any left shoulder pain. He complains of right shoulder and humerus pain. He states his right shoulder has been bothering him for over a year. He stated that he had fractured his right shoulder initially. He then stated that he had fractured his left shoulder previously.   He then stated that he had fractured both of his shoulders. Review of his chart on Care Everywhere reveals that he was treated for left proximal humerus fracture in October 2020 at [facility].  The abnormality seen on x-ray is a healed left proximal humerus fracture. No orthopedic treatment necessary. He can discontinue the sling for his left shoulder. Discussed with the patient that his symptoms may be secondary to his right shoulder arthritis. He did no chronic history of right shoulder pain. If this continues to bother him can be seen as an outpatient, with standard shoulder x-rays to better evaluate for arthritis. Ice/heat as needed. NSAIDs as needed if no medical contraindication. Past Medical History:  has a past medical history of Arthritis, Ataxia, BPH (benign prostatic hyperplasia), CAD (coronary artery disease), Coronary atherosclerosis of native coronary artery, Diabetes mellitus ([address] Environmental allergies, Hepatitis, Hydronephrosis, Hyperlipidemia, Hypertension, Kidney disease, Neuropathy, Parkinson's disease ([address] S/P coronary artery stent placement x 4, and Schizoaffective disorder, bipolar type ([address] Past Surgical History:  has a past surgical history that includes Coronary angioplasty; Coronary angioplasty with stent; hernia repair; Vasectomy; Cystocopy (4/4/14); Ankle surgery (Right, 06/09/2018); Upper gastrointestinal endoscopy (N/A, 12/13/2018); Colonoscopy (N/A, 12/13/2018); Upper gastrointestinal endoscopy (N/A, 5/3/2019); and ORIF DISTAL RADIUS FRACTURE (Left, 6/20/2019). Discharge Recommendations: Sumit Ordonez scored a 16/24 on the AM-PAC ADL Inpatient form. Current research shows that an AM-PAC score of 17 or less is typically not associated with a discharge to the patient's home setting. Based on the patient's AM-PAC score and their current ADL deficits, it is recommended that the patient have 3-5 sessions per week of Occupational Therapy at d/c to increase the patient's independence.   Please see assessment section for further patient specific details. If patient discharges prior to next session this note will serve as a discharge summary. Please see below for the latest assessment towards goals. DME Required For Discharge: DME to be determined at next level of care    Precautions/Restrictions: high fall risk  Weight Bearing Restrictions: weight bearing as tolerated and no restrictions  [] Right Upper Extremity  [] Left Upper Extremity [] Right Lower Extremity  [] Left Lower Extremity     Required Braces/Orthotics: no braces required   [] Right  [] Left  Positional Restrictions:no positional restrictions    Pre-Admission Information   Lives With: . Comment: Care of the [facility] staff    Type of Home: long term care facility  Home Layout: able to live on main level  Home Access: level entry  Bathroom Layout: walker accessible, wheelchair accessible, walk in shower  Toilet Height: standard height  Bathroom Equipment: grab bars in shower, grab bars around toilet, built in [address]: manual wheelchair  Transfer Assistance: modified independent with use of w/c  Ambulation Assistance:requires assistance  ADL Assistance: requires assistance with bathing, . Comment: Pt notes he is independent with dressing and toileting  IADL Assistance: SNF provides meals and does pt's woodson  Active :        [] Yes  [x] No  Hand Dominance: [] Left  [x] Right  Current Employment: retired. Occupation: pt notes he did a little bit of everything  Hobbies: denies  Recent Falls: \"probably 1 per month for the past 6 months\"    Examination   Vision:   Vision Corrective Device: wears glasses for reading  Hearing:   hard of hearing, left hearing aid, right hearing aid  Perception:    WFL  Observation:   General Observation:  supine in bed upon arrival   Posture: Forward head and verbal cues to keep head up.    Sensation:   WFL  Proprioception:    WFL  Tone:   Normotonic  Coordination Testing:   WFL    ROM:   (B) UE AROM WFL  ROM was not formally assessed, however, pt demonstrated AROM WFL during therapy session stating \"my arm doesn't hurt\". Strength:   Formal MMT was held, however, during therapy evaluation pt demonstrated strength WFL while performing transfers and adl skills. Decision Making: medium complexity  Clinical Presentation: stable      Subjective  General: pt presented to OT/PT evaluation supine in bed and agreeable to therapy evaluation on this date. Pt w/o c/o pain. Pain: 0/10  Pain Interventions: not applicable        Activities of Daily Living  Basic Activities of Daily Living  Lower Extremity Dressing: modified independent . Comment: pt demonstrated the ability to don/doff sock on RLE, while seated in recliner, however he was unable to doff/jennifer sock on L due to hx of hip fx. .  Equipment: none  Toileting: moderate assistance . Comment: pt using a urinal at bedside. therapist had to empty . Equipment: none  General Comments: pt demonstrated the ability to wash face and hands w/ warm wipes during therapy session. Instrumental Activities of Daily Living  No IADL completed on this date. Functional Mobility  Bed Mobility  Supine to Sit: stand by assistance  Sit to Supine: stand by assistance  Scooting: stand by assistance  Comments:  Transfers  Sit to stand transfer:contact guard assistance, . Comment progressing to SBA w/ use of RW  Stand to sit transfer: contact guard assistance  Comments:  Functional Mobility:  Sitting Balance: stand by assistance. Standing Balance: contact guard assistance. Functional Mobility: rolling walker. contact guard assistance.   Activity: 70 ft x 2 trials with seated rest break between ambulation trials    Other Therapeutic Interventions    Functional Outcomes  AM-PAC Inpatient Daily Activity Raw Score: 16    Cognition  Overall Cognitive Status: Impaired  Following Commands: follows one step commands with repetition, follows one step commands with increased time  Attention Span: attends with cues to redirect, difficulty attending to directions  Memory: decreased recall of precautions, decreased short term memory  Safety Judgement: decreased awareness of need for assistance, decreased awareness of need for safety  Problem Solving: assistance required to generate solutions, assistance required to implement solutions, decreased awareness of errors, assistance required to identify errors made, assistance required to correct errors made  Insights: decreased awareness of deficits  Initiation: requires cues for all, requires cues for some  Sequencing: requires cues for all  Orientation:    alert and oriented x 4  Command Following:   impaired     Education  Barriers To Learning: cognition and hearing  Patient Education: patient educated on goals, OT role and benefits, plan of care, precautions, ADL adaptive strategies, discharge recommendations, safety  Learning Assessment:  patient verbalizes understanding, would benefit from continued reinforcement    Assessment  Activity Tolerance: pt tolerated activities during therapy session on this date with short rest break2  Impairments Requiring Therapeutic Intervention: decreased functional mobility, decreased ADL status, decreased safety awareness, decreased cognition, decreased endurance, decreased balance  Prognosis: good  Clinical Assessment: pt presents with the above deficits which impact his functional indep, functional transfers, and adl skills needed to return to his prior level of function. Pt will benefit from skilled OT to improve these skills and decrease caregiver burden as to return to OF.    Safety Interventions: patient left in chair, chair alarm in place, call light within reach, gait belt and nurse notified    Plan  Frequency: 3-5 x/per week  Current Treatment Recommendations: strengthening, balance training, functional mobility training, transfer training, endurance training, modalities, patient/caregiver education, ADL/self-care training and IADL training    Goals  Patient Goals: return to PLOF   Short Term Goals:  Time Frame: d/c   Patient will complete upper body ADL at modified independent   Patient will complete lower body ADL at supervision   Patient will complete toileting at supervision   Patient will complete functional transfers at supervision, stand by assistance   Patient will complete functional mobility at supervision, stand by assistance     Therapy Session Time     Individual Group Co-treatment   Time In    1301   Time Out    1340   Minutes    39        Timed Code Treatment Minutes:   24 min  Total Treatment Minutes:  39 min    Electronically Signed By: MANJINDER Zuniga OTR/L [id number]

## 2022-05-30 LAB
A/G RATIO: 1.5 (ref 1.1–2.2)
ALBUMIN SERPL-MCNC: 3.7 G/DL (ref 3.4–5)
ALP BLD-CCNC: 109 U/L (ref 40–129)
ALT SERPL-CCNC: 11 U/L (ref 10–40)
ANION GAP SERPL CALCULATED.3IONS-SCNC: 6 MMOL/L (ref 3–16)
AST SERPL-CCNC: 11 U/L (ref 15–37)
BASOPHILS ABSOLUTE: 0 K/UL (ref 0–0.2)
BASOPHILS RELATIVE PERCENT: 0.7 %
BILIRUB SERPL-MCNC: <0.2 MG/DL (ref 0–1)
BUN BLDV-MCNC: 15 MG/DL (ref 7–20)
CALCIUM SERPL-MCNC: 8.6 MG/DL (ref 8.3–10.6)
CHLORIDE BLD-SCNC: 109 MMOL/L (ref 99–110)
CO2: 27 MMOL/L (ref 21–32)
CREAT SERPL-MCNC: 1 MG/DL (ref 0.8–1.3)
EOSINOPHILS ABSOLUTE: 0.3 K/UL (ref 0–0.6)
EOSINOPHILS RELATIVE PERCENT: 6.7 %
GFR AFRICAN AMERICAN: >60
GFR NON-AFRICAN AMERICAN: >60
GLUCOSE BLD-MCNC: 109 MG/DL (ref 70–99)
GLUCOSE BLD-MCNC: 151 MG/DL (ref 70–99)
GLUCOSE BLD-MCNC: 177 MG/DL (ref 70–99)
GLUCOSE BLD-MCNC: 189 MG/DL (ref 70–99)
GLUCOSE BLD-MCNC: 215 MG/DL (ref 70–99)
GLUCOSE BLD-MCNC: 216 MG/DL (ref 70–99)
HCT VFR BLD CALC: 28 % (ref 40.5–52.5)
HEMOGLOBIN: 9.2 G/DL (ref 13.5–17.5)
LYMPHOCYTES ABSOLUTE: 1.2 K/UL (ref 1–5.1)
LYMPHOCYTES RELATIVE PERCENT: 23.7 %
MCH RBC QN AUTO: 29.2 PG (ref 26–34)
MCHC RBC AUTO-ENTMCNC: 32.8 G/DL (ref 31–36)
MCV RBC AUTO: 88.8 FL (ref 80–100)
MONOCYTES ABSOLUTE: 0.5 K/UL (ref 0–1.3)
MONOCYTES RELATIVE PERCENT: 9.8 %
NEUTROPHILS ABSOLUTE: 3 K/UL (ref 1.7–7.7)
NEUTROPHILS RELATIVE PERCENT: 59.1 %
PDW BLD-RTO: 14.7 % (ref 12.4–15.4)
PERFORMED ON: ABNORMAL
PHOSPHORUS: 3.5 MG/DL (ref 2.5–4.9)
PLATELET # BLD: 225 K/UL (ref 135–450)
PMV BLD AUTO: 7.6 FL (ref 5–10.5)
POTASSIUM REFLEX MAGNESIUM: 4.8 MMOL/L (ref 3.5–5.1)
RBC # BLD: 3.16 M/UL (ref 4.2–5.9)
SODIUM BLD-SCNC: 142 MMOL/L (ref 136–145)
TOTAL PROTEIN: 6.1 G/DL (ref 6.4–8.2)
WBC # BLD: 5 K/UL (ref 4–11)

## 2022-05-30 PROCEDURE — 94760 N-INVAS EAR/PLS OXIMETRY 1: CPT

## 2022-05-30 PROCEDURE — 87449 NOS EACH ORGANISM AG IA: CPT

## 2022-05-30 PROCEDURE — 36415 COLL VENOUS BLD VENIPUNCTURE: CPT

## 2022-05-30 PROCEDURE — 84100 ASSAY OF PHOSPHORUS: CPT

## 2022-05-30 PROCEDURE — 6370000000 HC RX 637 (ALT 250 FOR IP): Performed by: INTERNAL MEDICINE

## 2022-05-30 PROCEDURE — 1200000000 HC SEMI PRIVATE

## 2022-05-30 PROCEDURE — 80053 COMPREHEN METABOLIC PANEL: CPT

## 2022-05-30 PROCEDURE — 6370000000 HC RX 637 (ALT 250 FOR IP): Performed by: NURSE PRACTITIONER

## 2022-05-30 PROCEDURE — 85025 COMPLETE CBC W/AUTO DIFF WBC: CPT

## 2022-05-30 PROCEDURE — 2580000003 HC RX 258: Performed by: INTERNAL MEDICINE

## 2022-05-30 PROCEDURE — 6360000002 HC RX W HCPCS: Performed by: INTERNAL MEDICINE

## 2022-05-30 PROCEDURE — 83036 HEMOGLOBIN GLYCOSYLATED A1C: CPT

## 2022-05-30 RX ORDER — DEXTROSE MONOHYDRATE 50 MG/ML
100 INJECTION, SOLUTION INTRAVENOUS PRN
Status: DISCONTINUED | OUTPATIENT
Start: 2022-05-30 | End: 2022-05-31 | Stop reason: HOSPADM

## 2022-05-30 RX ORDER — HYDRALAZINE HYDROCHLORIDE 25 MG/1
25 TABLET, FILM COATED ORAL EVERY 8 HOURS SCHEDULED
Status: DISCONTINUED | OUTPATIENT
Start: 2022-05-30 | End: 2022-05-31 | Stop reason: HOSPADM

## 2022-05-30 RX ORDER — INSULIN LISPRO 100 [IU]/ML
0-6 INJECTION, SOLUTION INTRAVENOUS; SUBCUTANEOUS
Status: DISCONTINUED | OUTPATIENT
Start: 2022-05-30 | End: 2022-05-31 | Stop reason: HOSPADM

## 2022-05-30 RX ORDER — INSULIN LISPRO 100 [IU]/ML
0-3 INJECTION, SOLUTION INTRAVENOUS; SUBCUTANEOUS NIGHTLY
Status: DISCONTINUED | OUTPATIENT
Start: 2022-05-30 | End: 2022-05-31 | Stop reason: HOSPADM

## 2022-05-30 RX ADMIN — CARVEDILOL 6.25 MG: 6.25 TABLET, FILM COATED ORAL at 18:19

## 2022-05-30 RX ADMIN — CIPROFLOXACIN 1 DROP: 3 SOLUTION OPHTHALMIC at 20:47

## 2022-05-30 RX ADMIN — CIPROFLOXACIN 1 DROP: 3 SOLUTION OPHTHALMIC at 06:14

## 2022-05-30 RX ADMIN — CIPROFLOXACIN 1 DROP: 3 SOLUTION OPHTHALMIC at 18:20

## 2022-05-30 RX ADMIN — CIPROFLOXACIN 1 DROP: 3 SOLUTION OPHTHALMIC at 11:14

## 2022-05-30 RX ADMIN — CARVEDILOL 6.25 MG: 6.25 TABLET, FILM COATED ORAL at 11:12

## 2022-05-30 RX ADMIN — SODIUM CHLORIDE, PRESERVATIVE FREE 10 ML: 5 INJECTION INTRAVENOUS at 20:42

## 2022-05-30 RX ADMIN — HYDRALAZINE HYDROCHLORIDE 10 MG: 10 TABLET, FILM COATED ORAL at 13:48

## 2022-05-30 RX ADMIN — Medication 1000 MG: at 13:47

## 2022-05-30 RX ADMIN — HEPARIN SODIUM 5000 UNITS: 5000 INJECTION INTRAVENOUS; SUBCUTANEOUS at 20:45

## 2022-05-30 RX ADMIN — PAROXETINE HYDROCHLORIDE 40 MG: 20 TABLET, FILM COATED ORAL at 11:12

## 2022-05-30 RX ADMIN — FLUTICASONE PROPIONATE 1 SPRAY: 50 SPRAY, METERED NASAL at 11:14

## 2022-05-30 RX ADMIN — ATORVASTATIN CALCIUM 80 MG: 80 TABLET, FILM COATED ORAL at 20:44

## 2022-05-30 RX ADMIN — CETIRIZINE HYDROCHLORIDE 10 MG: 10 TABLET, FILM COATED ORAL at 11:13

## 2022-05-30 RX ADMIN — HEPARIN SODIUM 5000 UNITS: 5000 INJECTION INTRAVENOUS; SUBCUTANEOUS at 06:11

## 2022-05-30 RX ADMIN — HEPARIN SODIUM 5000 UNITS: 5000 INJECTION INTRAVENOUS; SUBCUTANEOUS at 13:49

## 2022-05-30 RX ADMIN — CIPROFLOXACIN 1 DROP: 3 SOLUTION OPHTHALMIC at 13:52

## 2022-05-30 RX ADMIN — HYDRALAZINE HYDROCHLORIDE 10 MG: 10 TABLET, FILM COATED ORAL at 06:11

## 2022-05-30 RX ADMIN — SODIUM CHLORIDE, PRESERVATIVE FREE 10 ML: 5 INJECTION INTRAVENOUS at 13:47

## 2022-05-30 RX ADMIN — HYDRALAZINE HYDROCHLORIDE 25 MG: 25 TABLET, FILM COATED ORAL at 20:44

## 2022-05-30 RX ADMIN — SODIUM CHLORIDE, PRESERVATIVE FREE 10 ML: 5 INJECTION INTRAVENOUS at 11:14

## 2022-05-30 RX ADMIN — QUETIAPINE FUMARATE 12.5 MG: 25 TABLET ORAL at 20:45

## 2022-05-30 RX ADMIN — TRAZODONE HYDROCHLORIDE 100 MG: 50 TABLET ORAL at 23:07

## 2022-05-30 RX ADMIN — QUETIAPINE FUMARATE 12.5 MG: 25 TABLET ORAL at 11:13

## 2022-05-30 RX ADMIN — INSULIN LISPRO 1 UNITS: 100 INJECTION, SOLUTION INTRAVENOUS; SUBCUTANEOUS at 20:43

## 2022-05-30 RX ADMIN — INSULIN LISPRO 1 UNITS: 100 INJECTION, SOLUTION INTRAVENOUS; SUBCUTANEOUS at 18:16

## 2022-05-30 RX ADMIN — TRAZODONE HYDROCHLORIDE 100 MG: 50 TABLET ORAL at 04:27

## 2022-05-30 RX ADMIN — ASPIRIN 81 MG: 81 TABLET, COATED ORAL at 11:12

## 2022-05-30 ASSESSMENT — PAIN SCALES - GENERAL
PAINLEVEL_OUTOF10: 0

## 2022-05-30 ASSESSMENT — PAIN SCALES - WONG BAKER: WONGBAKER_NUMERICALRESPONSE: 0

## 2022-05-30 NOTE — PROGRESS NOTES
Call light within reach. Assessment complete at this time. No signs of distress. Fall precautions in place. Scheduled medications given.

## 2022-05-30 NOTE — PROGRESS NOTES
MD Phil Carrillo MD Gaylyn Mourning, MD                                  Office: [phone]                 Fax: [phone]          [employer]                     NEPHROLOGY IN PATIENT PROGRESS NOTE:     PATIENT NAME: Terrie Bucio  : 1950  MRN: [id number]          Treatment plan update. Acute kidney injury-improved to baseline-- creatinine 1.0.  - Hypotension-improved. Hypovolemia-improved to baseline. Multiple electrolyte imbalance improved to baseline. Medications reviewed. Renal stable. Will sign off at this time. Assessment and Plan    Assessment:     Acute kidney injury improving to baseline  Hypotension-hypovolemia improved  Multiple electrolyte imbalance. Teo King History of fall. Nondisplaced left proximal humerus fracture. Possible UTI with sepsis      Plan:     Acute kidney injury-improved to baseline.  - Repeat creatinine is 1.0.  - Multiple electrolyte imbalance improved to baseline.  - Potassium levels are stable. IV fluids have been discontinued. Anemia stable 9.0. Renal function improved to baseline and stable. EXAM  Vitals:    22 1016   BP: (!) 147/61   Pulse: 69   Resp: 16   Temp: 97.9 °F (36.6 °C)   SpO2: 97%       Intake/Output Summary (Last 24 hours) at 2022 1021  Last data filed at 2022 0437  Gross per 24 hour   Intake 2041.25 ml   Output 1850 ml   Net 191.25 ml       ill appearing. No respiratory distress  Awake alert        Principal Problem:    UTI (urinary tract infection)  Resolved Problems:    * No resolved hospital problems.  *        Medications Reviewed by me   hydrALAZINE  10 mg Oral 3 times per day    sodium chloride flush  5-40 mL IntraVENous 2 times per day    cefTRIAXone (ROCEPHIN) IV  1,000 mg IntraVENous Q24H    heparin (porcine)  5,000 Units SubCUTAneous 3 times per day    aspirin  81 mg Oral Daily    atorvastatin  80 mg Oral Nightly    carvedilol 6.25 mg Oral BID     fluticasone  1 spray Nasal Daily    QUEtiapine  12.5 mg Oral BID    cetirizine  10 mg Oral Daily    PARoxetine  40 mg Oral Daily    ciprofloxacin  1 drop Both Eyes Q4H While awake      sodium chloride      dextrose         Data Review. Labs reviewed by me       CBC:   Recent Labs     05/28/22  0500 05/29/22  0443 05/30/22  0624   WBC 6.1 4.9 5.0   HGB 9.2* 9.0* 9.2*   HCT 28.0* 26.9* 28.0*   MCV 90.8 88.9 88.8    205 225     BMP:   Recent Labs     05/28/22  0500 05/29/22 0443 05/30/22  0624    143 142   K 5.3* 4.2 4.8   * 109 109   CO2 18* 24 27   PHOS 3.8 2.9 3.5   BUN 33* 20 15   CREATININE 1.4* 1.0 1.0     Magnesium:   Lab Results   Component Value Date    MG 1.60 05/27/2022    MG 1.40 02/12/2022    MG 1.10 02/12/2022     Lab Results   Component Value Date    CREATININE 1.0 05/30/2022       Arterial Blood Gasses  No results for input(s): PH, PCO2, PO2 in the last 72 hours.     Invalid input(s): [id number], INSPIREDO2    UA:  Recent Labs     05/27/22  1129   COLORU Yellow   PHUR 5.0   WBCUA 24*   RBCUA None seen   BACTERIA 4+*   CLARITYU Clear   SPECGRAV 1.010   LEUKOCYTESUR MODERATE*   UROBILINOGEN 0.2   BILIRUBINUR Negative   BLOODU SMALL*   GLUCOSEU Negative       LIVER PROFILE:   Recent Labs     05/28/22  0500 05/29/22 0443 05/30/22  0624   AST 13* 11* 11*   ALT 11 10 11   BILITOT <0.2 <0.2 <0.2   ALKPHOS 109 99 109     PT/INR:    Lab Results   Component Value Date    PROTIME 12.9 05/26/2019    PROTIME 11.6 06/08/2018    PROTIME 10.4 10/08/2014    INR 1.13 05/26/2019    INR 1.02 06/08/2018    INR 0.96 10/08/2014     PTT:    Lab Results   Component Value Date    APTT 27.3 10/08/2014    APTT 24.7 05/08/2013     CHRISTINA:  No results found for: CHRISTINA GREENE  CHEMISTRY COMMON GROUP :   Lab Results   Component Value Date    GLUCOSE 177 05/30/2022    TSH 3.87 10/09/2014     Recent Labs     05/27/22  1129 05/28/22  0500 05/29/22  0443 05/30/22  0624   GLUCOSE 94 85 149* 177*   CALCIUM 9.2 8.7 8.4 8.6         RADIOLOGY:        Imaging Results. Chest X Ray reviwed by me    Chest Xray Reviewed by me  Renal Ultrasound Reviewed by me    EKG reviewed by me.                 Electronically Signed: Albert Salguero MD 5/30/2022 10:21 AM

## 2022-05-30 NOTE — PROGRESS NOTES
.orthostatic vitals ordered.     OCTAVIO  likely prerenal in setting of low p.o. intake/hypovolemia: Resolved  Serum creatinine elevated 1.7 on admission; (baseline around 0.8): Trended down  Nephrology consulted; appreciate their recommendations  Avoid nephrotoxins,Strict intake and output. Daily weights . Monitor renal function closely     Diarrhea: Resolved  GI PCR and C. difficile ordered     Hypomagnesemia: Likely secondary to diarrhea. Resolved  Monitor mag level closely and replace as needed    Right shoulder/arm pain  Orthopedic surgeon on board: X-ray right humerus ordered  Healed left proximal humerus fracture noted on CXR; treated at [facility] on 10/2020       Hx of diabetes mellitus: Hold oral antidiabetic medication  Hemoglobin A1c 6 on admission: Continue SSI  Monitor blood glucose closely and will adjust insulin dose as needed       DVT PPX: Lovenox  Code:Full Code  Diet: ADULT DIET;  Regular; 3 carb choices (45 gm/meal)    Disposition: SNF if medically stable    Current Medications  hydrALAZINE (APRESOLINE) tablet 10 mg, 3 times per day  traZODone (DESYREL) tablet 100 mg, Nightly PRN  sodium chloride flush 0.9 % injection 5-40 mL, 2 times per day  sodium chloride flush 0.9 % injection 5-40 mL, PRN  0.9 % sodium chloride infusion, PRN  ondansetron (ZOFRAN-ODT) disintegrating tablet 4 mg, Q8H PRN   Or  ondansetron (ZOFRAN) injection 4 mg, Q6H PRN  acetaminophen (TYLENOL) tablet 650 mg, Q6H PRN   Or  acetaminophen (TYLENOL) suppository 650 mg, Q6H PRN  dextrose bolus 10% 125 mL, PRN   Or  dextrose bolus 10% 250 mL, PRN  glucagon (rDNA) injection 1 mg, PRN  dextrose 5 % solution, PRN  cefTRIAXone (ROCEPHIN) 1000 mg in sterile water 10 mL IV syringe, Q24H  heparin (porcine) injection 5,000 Units, 3 times per day  aspirin EC tablet 81 mg, Daily  atorvastatin (LIPITOR) tablet 80 mg, Nightly  carvedilol (COREG) tablet 6.25 mg, BID WC  fluticasone (FLONASE) 50 MCG/ACT nasal spray 1 spray, Daily  QUEtiapine (SEROQUEL) tablet 12.5 mg, BID  cetirizine (ZYRTEC) tablet 10 mg, Daily  PARoxetine (PAXIL) tablet 40 mg, Daily  ciprofloxacin (CILOXAN) 0.3 % ophthalmic solution 1 drop, Q4H While awake        Objective:  BP (!) 165/67   Pulse 71   Temp 97.5 °F (36.4 °C) (Oral)   Resp 16   Ht 5' 4\" (1.626 m)   Wt 143 lb 9.6 oz (65.1 kg)   SpO2 96%   BMI 24.65 kg/m²     Intake/Output Summary (Last 24 hours) at 5/30/2022 0940  Last data filed at 5/30/2022 3794  Gross per 24 hour   Intake 2041.25 ml   Output 1850 ml   Net 191.25 ml      Wt Readings from Last 3 Encounters:   05/27/22 143 lb 9.6 oz (65.1 kg)   04/21/22 146 lb (66.2 kg)   02/11/22 140 lb (63.5 kg)       Physical Examination:   General appearance:  No apparent distress, appears stated age and cooperative. Eyes: Sclera clear. Pupils equal.  ENT: Moist oral mucosa. Trachea midline, no adenopathy. Cardiovascular: Regular rhythm, normal S1, S2. No murmur. No edema in lower extremities  Respiratory:Not using accessory muscles. Good inspiratory effort. Clear to auscultation bilaterally, no wheeze or crackles. GI: Abdomen soft, no tenderness, not distended, normal bowel sounds  Musculoskeletal: normal ROM, No cyanosis in digits  Neurology: CN 2-12 grossly intact. No speech or motor deficits  Psych: Normal affect.  Alert and oriented in time, place and person  Skin: Warm, dry, normal turgor    Labs and Tests:  CBC:   Recent Labs     05/28/22  0500 05/29/22  0443 05/30/22  0624   WBC 6.1 4.9 5.0   HGB 9.2* 9.0* 9.2*    205 225     BMP:    Recent Labs     05/28/22  0500 05/29/22  0443 05/30/22  0624    143 142   K 5.3* 4.2 4.8   * 109 109   CO2 18* 24 27   BUN 33* 20 15   CREATININE 1.4* 1.0 1.0   GLUCOSE 85 149* 177*     Hepatic:   Recent Labs     05/28/22  0500 05/29/22  0443 05/30/22  0624   AST 13* 11* 11*   ALT 11 10 11   BILITOT <0.2 <0.2 <0.2   ALKPHOS 109 99 109     XR HUMERUS RIGHT (MIN 2 VIEWS)   Final Result   No acute finding of the right humerus. XR SHOULDER LEFT (MIN 2 VIEWS)   Final Result   Abnormal morphology of the humeral head. A nondisplaced humeral head   fracture is suspected. CT Cervical Spine WO Contrast   Final Result   No acute abnormality of the cervical spine. RECOMMENDATIONS:   Unavailable         CT Head WO Contrast   Final Result   No acute intracranial abnormality. XR CHEST PORTABLE   Final Result   No radiographic evidence of acute cardiopulmonary disease. New left humeral neck fracture of uncertain acuity. Problem List  Principal Problem:    UTI (urinary tract infection)  Resolved Problems:    * No resolved hospital problems.  Tiffany Najera MD   5/30/2022 9:40 AM

## 2022-05-30 NOTE — PLAN OF CARE
Problem: Pain  Goal: Verbalizes/displays adequate comfort level or baseline comfort level  Outcome: Progressing     Problem: Skin/Tissue Integrity  Goal: Absence of new skin breakdown  Description: 1. Monitor for areas of redness and/or skin breakdown  2. Assess vascular access sites hourly  3. Every 4-6 hours minimum:  Change oxygen saturation probe site  4. Every 4-6 hours:  If on nasal continuous positive airway pressure, respiratory therapy assess nares and determine need for appliance change or resting period.   Outcome: Progressing     Problem: Safety - Adult  Goal: Free from fall injury  Outcome: Progressing     Problem: Chronic Conditions and Co-morbidities  Goal: Patient's chronic conditions and co-morbidity symptoms are monitored and maintained or improved  Outcome: Progressing     Problem: Neurosensory - Adult  Goal: Achieves stable or improved neurological status  Outcome: Progressing  Goal: Achieves maximal functionality and self care  Outcome: Progressing     Problem: Respiratory - Adult  Goal: Achieves optimal ventilation and oxygenation  Outcome: Progressing     Problem: Cardiovascular - Adult  Goal: Maintains optimal cardiac output and hemodynamic stability  Outcome: Progressing  Goal: Absence of cardiac dysrhythmias or at baseline  Outcome: Progressing     Problem: Skin/Tissue Integrity - Adult  Goal: Skin integrity remains intact  Outcome: Progressing  Goal: Incisions, wounds, or drain sites healing without S/S of infection  Outcome: Progressing  Goal: Oral mucous membranes remain intact  Outcome: Progressing     Problem: Musculoskeletal - Adult  Goal: Return mobility to safest level of function  Outcome: Progressing  Goal: Maintain proper alignment of affected body part  Outcome: Progressing  Goal: Return ADL status to a safe level of function  Outcome: Progressing     Problem: Gastrointestinal - Adult  Goal: Minimal or absence of nausea and vomiting  Outcome: Progressing  Goal: Maintains or returns to baseline bowel function  Outcome: Progressing  Goal: Maintains adequate nutritional intake  Outcome: Progressing     Problem: Genitourinary - Adult  Goal: Absence of urinary retention  Outcome: Progressing     Problem: Infection - Adult  Goal: Absence of infection at discharge  Outcome: Progressing  Goal: Absence of infection during hospitalization  Outcome: Progressing  Goal: Absence of fever/infection during anticipated neutropenic period  Outcome: Progressing     Problem: Metabolic/Fluid and Electrolytes - Adult  Goal: Electrolytes maintained within normal limits  Outcome: Progressing  Goal: Hemodynamic stability and optimal renal function maintained  Outcome: Progressing  Goal: Glucose maintained within prescribed range  Outcome: Progressing     Problem: Hematologic - Adult  Goal: Maintains hematologic stability  Outcome: Progressing     Problem: ABCDS Injury Assessment  Goal: Absence of physical injury  Outcome: Progressing

## 2022-05-30 NOTE — PLAN OF CARE
Problem: Pain  Goal: Verbalizes/displays adequate comfort level or baseline comfort level  5/30/2022 1940 by Prem Moncada RN  Outcome: Progressing  5/30/2022 1745 by Justin Kramer RN  Outcome: Progressing     Problem: Skin/Tissue Integrity  Goal: Absence of new skin breakdown  Description: 1. Monitor for areas of redness and/or skin breakdown  2. Assess vascular access sites hourly  3. Every 4-6 hours minimum:  Change oxygen saturation probe site  4. Every 4-6 hours:  If on nasal continuous positive airway pressure, respiratory therapy assess nares and determine need for appliance change or resting period.   5/30/2022 1940 by Prem Moncada RN  Outcome: Progressing  5/30/2022 1745 by Justin Kramer RN  Outcome: Progressing     Problem: Safety - Adult  Goal: Free from fall injury  5/30/2022 1940 by Prem Moncada RN  Outcome: Progressing  5/30/2022 1745 by Justin Kramer RN  Outcome: Progressing     Problem: Chronic Conditions and Co-morbidities  Goal: Patient's chronic conditions and co-morbidity symptoms are monitored and maintained or improved  5/30/2022 1940 by Prem Moncada RN  Outcome: Progressing  5/30/2022 1745 by Justin Kramer RN  Outcome: Progressing     Problem: Neurosensory - Adult  Goal: Achieves stable or improved neurological status  5/30/2022 1940 by Prem Moncada RN  Outcome: Progressing  5/30/2022 1745 by Justin Kramer RN  Outcome: Progressing  Goal: Achieves maximal functionality and self care  5/30/2022 1940 by Prem Moncada RN  Outcome: Progressing  5/30/2022 1745 by Justin Kramer RN  Outcome: Progressing     Problem: Respiratory - Adult  Goal: Achieves optimal ventilation and oxygenation  5/30/2022 1940 by Prem Moncada RN  Outcome: Progressing  5/30/2022 1745 by Justin Kramer RN  Outcome: Progressing     Problem: Cardiovascular - Adult  Goal: Maintains optimal cardiac output and hemodynamic stability  5/30/2022 1940 by Prem Moncada RN  Outcome: Progressing  5/30/2022 1745 by Lucy Aldridge RN  Outcome: Progressing  Goal: Absence of cardiac dysrhythmias or at baseline  5/30/2022 1940 by Syd Villagomez RN  Outcome: Progressing  5/30/2022 1745 by Lucy Aldridge RN  Outcome: Progressing     Problem: Skin/Tissue Integrity - Adult  Goal: Skin integrity remains intact  5/30/2022 1940 by Syd Villagomez RN  Outcome: Progressing  5/30/2022 1745 by Lucy Aldridge RN  Outcome: Progressing  Goal: Incisions, wounds, or drain sites healing without S/S of infection  5/30/2022 1940 by Syd Villagomez RN  Outcome: Progressing  5/30/2022 1745 by Lucy Aldridge RN  Outcome: Progressing  Goal: Oral mucous membranes remain intact  5/30/2022 1940 by Syd Villagomez RN  Outcome: Progressing  5/30/2022 1745 by Lucy Aldridge RN  Outcome: Progressing     Problem: Musculoskeletal - Adult  Goal: Return mobility to safest level of function  5/30/2022 1940 by Syd Villagomez RN  Outcome: Progressing  5/30/2022 1745 by Lucy Aldridge RN  Outcome: Progressing  Goal: Maintain proper alignment of affected body part  5/30/2022 1940 by Syd Villagomez RN  Outcome: Progressing  5/30/2022 1745 by Lucy Aldridge RN  Outcome: Progressing  Goal: Return ADL status to a safe level of function  5/30/2022 1940 by Syd Villagomez RN  Outcome: Progressing  5/30/2022 1745 by Lucy Aldridge RN  Outcome: Progressing     Problem: Gastrointestinal - Adult  Goal: Minimal or absence of nausea and vomiting  5/30/2022 1940 by Syd Villagomez RN  Outcome: Progressing  5/30/2022 1745 by Lucy Aldridge RN  Outcome: Progressing  Goal: Maintains or returns to baseline bowel function  5/30/2022 1940 by Syd Villagomez RN  Outcome: Progressing  5/30/2022 1745 by Lucy Aldridge RN  Outcome: Progressing  Goal: Maintains adequate nutritional intake  5/30/2022 1940 by Velarde Bienenstock, RN  Outcome: Progressing  5/30/2022 1745 by Lucy Aldridge RN  Outcome: Progressing     Problem: Genitourinary - Adult  Goal: Absence of urinary retention  5/30/2022 1940 by Prem Moncada RN  Outcome: Progressing  5/30/2022 1745 by Justin Kramer RN  Outcome: Progressing     Problem: Infection - Adult  Goal: Absence of infection at discharge  5/30/2022 1940 by Prem Moncada RN  Outcome: Progressing  5/30/2022 1745 by Justin Kramer RN  Outcome: Progressing  Goal: Absence of infection during hospitalization  5/30/2022 1940 by Prem Moncada RN  Outcome: Progressing  5/30/2022 1745 by Justin Kramer RN  Outcome: Progressing  Goal: Absence of fever/infection during anticipated neutropenic period  5/30/2022 1940 by Prem Moncada RN  Outcome: Progressing  5/30/2022 1745 by Justin Kramer RN  Outcome: Progressing     Problem: Metabolic/Fluid and Electrolytes - Adult  Goal: Electrolytes maintained within normal limits  5/30/2022 1940 by Prem Moncada RN  Outcome: Progressing  5/30/2022 1745 by Justin Kramer RN  Outcome: Progressing  Goal: Hemodynamic stability and optimal renal function maintained  5/30/2022 1940 by Prem Moncada RN  Outcome: Progressing  5/30/2022 1745 by Justin Kramer RN  Outcome: Progressing  Goal: Glucose maintained within prescribed range  5/30/2022 1940 by Prem Moncada RN  Outcome: Progressing  5/30/2022 1745 by Justin Kramer RN  Outcome: Progressing     Problem: Hematologic - Adult  Goal: Maintains hematologic stability  5/30/2022 1940 by Prem Moncada RN  Outcome: Progressing  5/30/2022 1745 by Justin Kramer RN  Outcome: Progressing     Problem: ABCDS Injury Assessment  Goal: Absence of physical injury  5/30/2022 1940 by Prem Moncada RN  Outcome: Progressing  5/30/2022 1745 by Justin Kramer RN  Outcome: Progressing

## 2022-05-31 VITALS
BODY MASS INDEX: 24.92 KG/M2 | TEMPERATURE: 98.1 F | HEART RATE: 85 BPM | SYSTOLIC BLOOD PRESSURE: 141 MMHG | WEIGHT: 146 LBS | HEIGHT: 64 IN | DIASTOLIC BLOOD PRESSURE: 77 MMHG | OXYGEN SATURATION: 97 % | RESPIRATION RATE: 16 BRPM

## 2022-05-31 LAB
A/G RATIO: 1.3 (ref 1.1–2.2)
ALBUMIN SERPL-MCNC: 3.5 G/DL (ref 3.4–5)
ALP BLD-CCNC: 109 U/L (ref 40–129)
ALT SERPL-CCNC: 14 U/L (ref 10–40)
ANION GAP SERPL CALCULATED.3IONS-SCNC: 9 MMOL/L (ref 3–16)
AST SERPL-CCNC: 17 U/L (ref 15–37)
BASOPHILS ABSOLUTE: 0 K/UL (ref 0–0.2)
BASOPHILS RELATIVE PERCENT: 0.7 %
BILIRUB SERPL-MCNC: <0.2 MG/DL (ref 0–1)
BLOOD CULTURE, ROUTINE: NORMAL
BUN BLDV-MCNC: 17 MG/DL (ref 7–20)
CALCIUM SERPL-MCNC: 8.8 MG/DL (ref 8.3–10.6)
CHLORIDE BLD-SCNC: 107 MMOL/L (ref 99–110)
CO2: 26 MMOL/L (ref 21–32)
CREAT SERPL-MCNC: 1.1 MG/DL (ref 0.8–1.3)
CULTURE, BLOOD 2: NORMAL
EOSINOPHILS ABSOLUTE: 0.3 K/UL (ref 0–0.6)
EOSINOPHILS RELATIVE PERCENT: 6.3 %
ESTIMATED AVERAGE GLUCOSE: 137 MG/DL
GFR AFRICAN AMERICAN: >60
GFR NON-AFRICAN AMERICAN: >60
GLUCOSE BLD-MCNC: 157 MG/DL (ref 70–99)
GLUCOSE BLD-MCNC: 164 MG/DL (ref 70–99)
GLUCOSE BLD-MCNC: 173 MG/DL (ref 70–99)
GLUCOSE BLD-MCNC: 185 MG/DL (ref 70–99)
HBA1C MFR BLD: 6.4 %
HCT VFR BLD CALC: 28.4 % (ref 40.5–52.5)
HEMOGLOBIN: 9.6 G/DL (ref 13.5–17.5)
LYMPHOCYTES ABSOLUTE: 1.2 K/UL (ref 1–5.1)
LYMPHOCYTES RELATIVE PERCENT: 24 %
MCH RBC QN AUTO: 30 PG (ref 26–34)
MCHC RBC AUTO-ENTMCNC: 33.8 G/DL (ref 31–36)
MCV RBC AUTO: 88.7 FL (ref 80–100)
MONOCYTES ABSOLUTE: 0.5 K/UL (ref 0–1.3)
MONOCYTES RELATIVE PERCENT: 9.6 %
NEUTROPHILS ABSOLUTE: 2.9 K/UL (ref 1.7–7.7)
NEUTROPHILS RELATIVE PERCENT: 59.4 %
PDW BLD-RTO: 14.8 % (ref 12.4–15.4)
PERFORMED ON: ABNORMAL
PHOSPHORUS: 3.5 MG/DL (ref 2.5–4.9)
PLATELET # BLD: 216 K/UL (ref 135–450)
PMV BLD AUTO: 7.8 FL (ref 5–10.5)
POTASSIUM REFLEX MAGNESIUM: 4.6 MMOL/L (ref 3.5–5.1)
RBC # BLD: 3.21 M/UL (ref 4.2–5.9)
SODIUM BLD-SCNC: 142 MMOL/L (ref 136–145)
TOTAL PROTEIN: 6.1 G/DL (ref 6.4–8.2)
WBC # BLD: 4.9 K/UL (ref 4–11)

## 2022-05-31 PROCEDURE — 2580000003 HC RX 258: Performed by: INTERNAL MEDICINE

## 2022-05-31 PROCEDURE — 80053 COMPREHEN METABOLIC PANEL: CPT

## 2022-05-31 PROCEDURE — 6370000000 HC RX 637 (ALT 250 FOR IP): Performed by: INTERNAL MEDICINE

## 2022-05-31 PROCEDURE — 84100 ASSAY OF PHOSPHORUS: CPT

## 2022-05-31 PROCEDURE — 36415 COLL VENOUS BLD VENIPUNCTURE: CPT

## 2022-05-31 PROCEDURE — 97530 THERAPEUTIC ACTIVITIES: CPT

## 2022-05-31 PROCEDURE — 97535 SELF CARE MNGMENT TRAINING: CPT

## 2022-05-31 PROCEDURE — 85025 COMPLETE CBC W/AUTO DIFF WBC: CPT

## 2022-05-31 PROCEDURE — 6360000002 HC RX W HCPCS: Performed by: INTERNAL MEDICINE

## 2022-05-31 RX ORDER — HYDRALAZINE HYDROCHLORIDE 25 MG/1
25 TABLET, FILM COATED ORAL EVERY 8 HOURS SCHEDULED
Qty: 90 TABLET | Refills: 1
Start: 2022-05-31

## 2022-05-31 RX ADMIN — INSULIN LISPRO 1 UNITS: 100 INJECTION, SOLUTION INTRAVENOUS; SUBCUTANEOUS at 09:14

## 2022-05-31 RX ADMIN — HEPARIN SODIUM 5000 UNITS: 5000 INJECTION INTRAVENOUS; SUBCUTANEOUS at 06:03

## 2022-05-31 RX ADMIN — CARVEDILOL 6.25 MG: 6.25 TABLET, FILM COATED ORAL at 09:14

## 2022-05-31 RX ADMIN — QUETIAPINE FUMARATE 12.5 MG: 25 TABLET ORAL at 09:14

## 2022-05-31 RX ADMIN — CIPROFLOXACIN 1 DROP: 3 SOLUTION OPHTHALMIC at 09:18

## 2022-05-31 RX ADMIN — INSULIN LISPRO 1 UNITS: 100 INJECTION, SOLUTION INTRAVENOUS; SUBCUTANEOUS at 13:39

## 2022-05-31 RX ADMIN — HEPARIN SODIUM 5000 UNITS: 5000 INJECTION INTRAVENOUS; SUBCUTANEOUS at 13:39

## 2022-05-31 RX ADMIN — CIPROFLOXACIN 1 DROP: 3 SOLUTION OPHTHALMIC at 06:03

## 2022-05-31 RX ADMIN — HYDRALAZINE HYDROCHLORIDE 25 MG: 25 TABLET, FILM COATED ORAL at 06:03

## 2022-05-31 RX ADMIN — CIPROFLOXACIN 1 DROP: 3 SOLUTION OPHTHALMIC at 13:39

## 2022-05-31 RX ADMIN — FLUTICASONE PROPIONATE 1 SPRAY: 50 SPRAY, METERED NASAL at 09:18

## 2022-05-31 RX ADMIN — SODIUM CHLORIDE, PRESERVATIVE FREE 10 ML: 5 INJECTION INTRAVENOUS at 09:14

## 2022-05-31 RX ADMIN — HYDRALAZINE HYDROCHLORIDE 25 MG: 25 TABLET, FILM COATED ORAL at 13:39

## 2022-05-31 RX ADMIN — CETIRIZINE HYDROCHLORIDE 10 MG: 10 TABLET, FILM COATED ORAL at 09:14

## 2022-05-31 RX ADMIN — PAROXETINE HYDROCHLORIDE 40 MG: 20 TABLET, FILM COATED ORAL at 09:14

## 2022-05-31 RX ADMIN — Medication 1000 MG: at 11:28

## 2022-05-31 RX ADMIN — ASPIRIN 81 MG: 81 TABLET, COATED ORAL at 09:14

## 2022-05-31 ASSESSMENT — PAIN SCALES - WONG BAKER
WONGBAKER_NUMERICALRESPONSE: 0

## 2022-05-31 NOTE — DISCHARGE INSTR - COC
Continuity of Care Form    Patient Name: Sarahy Merritt   :  1950  MRN:  [id number]    Admit date:  2022  Discharge date:  22      Code Status Order: Full Code   Advance Directives:      Admitting Physician:  David Kevin MD  PCP: No primary care provider on file.     Discharging Nurse: [facility] Unit/Room#: 2YD-3170/9893-83  Discharging Unit Phone Number: [phone]    Emergency Contact:   Extended Emergency Contact Information  Primary Emergency Contact: gil alicia  Home Phone: [phone]  Relation: Other    Past Surgical History:  Past Surgical History:   Procedure Laterality Date    ANKLE SURGERY Right 2018    ORIF of R ankle     COLONOSCOPY N/A 2018    COLONOSCOPY CONTROL HEMORRHAGE performed by Gagan Dias MD at [facility]      x 4    CYSTOSCOPY  14    transurethral vaporization of prostate    HERNIA REPAIR      X2    ORIF DISTAL RADIUS FRACTURE Left 2019    LEFT DISTAL RADIUS OPEN REDUCTION INTERNAL FIXATION   performed by Valentina Rodriguez MD at [address] [facility] 2018    EGD BIOPSY performed by Gagan Dias MD at [facility] 5/3/2019    EGD WITH ANESTHESIA performed by Gagan Dias MD at [address]         Immunization History:   Immunization History   Administered Date(s) Administered    COVID-19, Pfizer Purple top, DILUTE for use, 12+ yrs, 30mcg/0.3mL dose 2021, 2021, 2021, 10/21/2021, 2021    Influenza Virus Vaccine 10/10/2014    Pneumococcal Conjugate 13-valent (Wilhemenia Sensing) 2017    Tdap (Boostrix, Adacel) 2020       Active Problems:  Patient Active Problem List   Diagnosis Code    DM2 (diabetes mellitus, type 2) ([facility] [address] E11.9    Dizziness R42    Hematuria R31.9 Hyponatremia E87.1    S/P coronary artery stent placement Z95.5    S/P coronary artery stent placement x 4 Z95.5    Orthostatic hypotension I95.1    Acute posthemorrhagic anemia D62    Contusion, knee S80.00XA    DM hyperosmolarity type II, uncontrolled ([facility]) E11.00, E11.65    Coronary artery disease involving native coronary artery of native heart without angina pectoris I25.10    Essential hypertension I10    Chest pain R07.9    Ischemic chest pain ([facility]) I20.9    Syncope and collapse R55    CKD (chronic kidney disease), stage III ([facility]) N18.30    Anemia D64.9    CHF (congestive heart failure) ([facility]) I50.9    DM (diabetes mellitus) ([facility] [address] E11.9    Closed trimalleolar fracture of right ankle S82.851A    Closed fracture of fifth metacarpal bone of left hand S62.307A    Closed nondisplaced fracture of fifth metatarsal bone of left foot S92.355A    Hypokalemia E87.6    Hyperkalemia E87.5    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 K55.21    OCTAVIO (acute kidney injury) ([facility]) N17.9    Heartburn R12    Esophageal dysphagia R13.19    Esophagitis, [place] grade B K20.80    Closed fracture of metatarsal neck S92.309A    Closed fracture of metatarsal neck, left, initial encounter S92.302A    Ataxia R27.0    Sepsis ([facility]) A41.9    UTI (urinary tract infection) N39.0       Isolation/Infection:   Isolation            No Isolation          Patient Infection Status       Infection Onset Added Last Indicated Last Indicated By Review Planned Expiration Resolved Resolved By    None active    Resolved    C-diff Rule Out 05/27/22 05/27/22 05/27/22 GI Bacterial Pathogens By PCR (Ordered)   05/31/22 Rosa M Shultz RN    COVID-19 (Rule Out) 06/13/21 06/13/21 06/13/21 COVID-19 (Ordered)   06/14/21 Rule-Out Test Resulted    COVID-19 (Rule Out) 06/13/21 06/13/21 06/13/21 COVID-19, Rapid (Ordered)   06/13/21 Rule-Out Test Resulted            Nurse Assessment:  Last Vital Signs: BP (!) 150/75   Pulse 88   Temp 98.3 °F (36.8 °C) EDT    CASE MANAGEMENT/SOCIAL WORK SECTION    Inpatient Status Date: 05/27/2022    Readmission Risk Assessment Score:  Readmission Risk              Risk of Unplanned Readmission:  26           Discharging to Facility/ [address] [place] [address] at [facility], [address] [place] [address]  Phone: [phone]  Fax: [phone]       / signature: Electronically signed by Penelope Wolfe RN on 5/31/22 at 11:33 AM EDT    PHYSICIAN SECTION    Prognosis: Fair    Condition at Discharge: Stable    Rehab Potential (if transferring to Rehab): Good    Recommended Labs or Other Treatments After Discharge:   HgbA1c in 3 months   Renal profile in 1 week     Physician Certification: I certify the above information and transfer of Homer Solis  is necessary for the continuing treatment of the diagnosis listed and that he requires [facility] for less 30 days.      Update Admission H&P: No change in H&P    PHYSICIAN SIGNATURE:  Electronically signed by Valentina Salomon MD on 5/31/22 at 11:34 AM EDT

## 2022-05-31 NOTE — PROGRESS NOTES
[facility]   Phone: [phone]    Physical Therapy    [] Initial Evaluation            [x] Daily Treatment Note         [] Discharge Summary      Patient: Eric West   : 1950   MRN: [id number]   Date of Service:  2022  Admitting Diagnosis: UTI (urinary tract infection)  Current Admission Summary: Eric West is a 70 y.o. male  with PMHx of CAD s/p PCI, hypertension, hyperlipidemia, diabetes mellitus, CKD and recurrent falls presented from nursing home after he was found down on the floor and was noted to have BG of 32. Patient was given 25 g of dextrose 50 enroute to the hospital.  On admission, patient was alert and oriented x4 and repeat blood glucose was 138. Patient reported that he feels fatigued all the time and did not eat dinner last night because food did not taste good but still got his insulin dose. Patient reported that he was started on antibiotic last week for dysuria in the nursing home and have been experiencing diarrhea for the past 3 days. Patient stated that he continues to have dysuria but denied any fever, chills, chest pain, shortness of breath, palpitation nausea, vomiting, abdominal pain, hematuria, hematemesis, hematemesis, melena or sick contacts. Initial diagnostic lab work showed creatinine elevated to 1.7, UA suggestive of UTI. CT head and cervical spine negative for any acute pathology. CXR is negative for any acute abdominal pathology but showed new left humeral neck fracture of uncertain acuity. Follow-up x-ray left shoulder suggestive of nondisplaced humeral head fracture. Per patient's report, he has been wheelchair-bound since his left hip surgery last year   Per Ortho:Right humerus x-rays: Reviewed. No fracture or dislocation. Nonstandard  positioning for evaluation of your shoulder. Discussed with the patient that his symptoms  may be secondary to his right shoulder arthritis.   He did no chronic history of right shoulder  pain. If this continues to bother him can be seen as an outpatient, with standard shoulder  x-rays to better evaluate for arthritis. Past Medical History:  has a past medical history of Arthritis, Ataxia, BPH (benign prostatic hyperplasia), CAD (coronary artery disease), Coronary atherosclerosis of native coronary artery, Diabetes mellitus ([address] Environmental allergies, Hepatitis, Hydronephrosis, Hyperlipidemia, Hypertension, Kidney disease, Neuropathy, Parkinson's disease ([address] S/P coronary artery stent placement x 4, and Schizoaffective disorder, bipolar type ([facility] [address] Past Surgical History:  has a past surgical history that includes Coronary angioplasty; Coronary angioplasty with stent; hernia repair; Vasectomy; Cystocopy (4/4/14); Ankle surgery (Right, 06/09/2018); Upper gastrointestinal endoscopy (N/A, 12/13/2018); Colonoscopy (N/A, 12/13/2018); Upper gastrointestinal endoscopy (N/A, 5/3/2019); and ORIF DISTAL RADIUS FRACTURE (Left, 6/20/2019). Discharge Recommendations: Primitivo Godfrey scored a 17/24 on the AM-PAC short mobility form. Current research shows that an AM-PAC score of 17 or less is typically not associated with a discharge to the patient's home setting. Based on the patient's AM-PAC score and their current functional mobility deficits, it is recommended that the patient have 3-5 sessions per week of Physical Therapy at d/c to increase the patient's independence. Please see assessment section for further patient specific details. If patient discharges prior to next session this note will serve as a discharge summary. Please see below for the latest assessment towards goals.      DME Required For Discharge: DME to be determined at next level of care  Precautions/Restrictions: high fall risk  Weight Bearing Restrictions: weight bearing as tolerated  [] Right Upper Extremity  [] Left Upper Extremity [] Right Lower Extremity  [] Left Lower Extremity     Required Braces/Orthotics: no braces required    [] Right  [] Left  Positional Restrictions:no positional restrictions    Pre-Admission Information   Lives With: . Comment: Care of the [facility] staff                          Type of Home: long term care facility  Home Layout: able to live on main level  Home Access: level entry  Bathroom Layout: walker accessible, wheelchair accessible, walk in shower  Toilet Height: standard height  Bathroom Equipment: grab bars in shower, grab bars around toilet, built in [address]: manual wheelchair  Transfer Assistance: modified independent with use of w/c  Ambulation Assistance:requires assistance  ADL Assistance: requires assistance with bathing, . Comment: Pt notes he is independent with dressing and toileting  IADL Assistance: SNF provides meals and does pt's woodson  Active :        []? Yes                 [x]? No  Hand Dominance: []? Left                 [x]? Right  Current Employment: retired. Occupation: pt notes he did a little bit of everything  Hobbies: denies  Recent Falls: \"probably 1 per month for the past 6 months\"        Subjective  General: Pt agreeable to PT tx. Reports he wants to get back to bed  Pain: 0/10  Pain Interventions: not applicable       Functional Mobility  Bed Mobility  Supine to Sit: supervision  Sit to Supine: supervision  Comments: pt lying down in bed before PT could get gait belt off, pt sat back up into long sitting to allow PT to remove it. Transfers  Sit to stand transfer: contact guard assistance, minimal assistance  Stand to sit transfer: contact guard assistance, minimal assistance  Comments: Pt requiring min assist to stand from recliner due to posterior lean this AM, min assist to stand and to sit on toilet for control due to low seat and no armrests.    Ambulation  Surface:level surface  Assistive Device: rolling walker  Assistance: minimal assistance  Distance:25'x2  with seated rest break in between trials   Gait Mechanics: Pt demonstrates L LE ER during ambulation. Pt with forward flexed trunk, struggles to navigate RW through tight spaces, has posterior lean requiring assist to correct frequently. Comments:    Stair Mobility  Stair mobility not completed on this date. Comments:  Wheelchair Mobility:  No w/c mobility completed on this date. Comments:  Balance  Static Sitting Balance: good: independent with functional balance in unsupported position  Dynamic Sitting Balance: good: independent with functional balance in unsupported position  Static Standing Balance: fair (-): maintains balance at RW with use of UE support  Dynamic Standing Balance: fair (-): maintains balance at RW with use of UE support  Comments: Pt stood at sink without UE with min assist to maintain balance due to posterior lean while washing hands    Other Therapeutic Interventions  Pt depends were wet upon entry. Had pt go to bathroom to change depends however pt pulling pants down and kicking them off while standing, ignoring PT cues to sit down to take them off. Pt did sit down to don new depends which PT threaded and then provided min assist for balance for correctnion of posterior lean while pt pulled them up.    Functional Outcomes  AM-PAC Inpatient Mobility Raw Score : 17              Cognition  WFL  Attention Span: difficulty attending to directions  Safety Judgement: decreased awareness of need for safety  Orientation:    alert and oriented x 4  Command Following:   [facility]    Education  Barriers To Learning: cognition and hearing  Patient Education: patient educated on goals, PT role and benefits, plan of care, general safety, functional mobility training, proper use of assistive device/equipment, discharge recommendations  Learning Assessment:  patient will require reinforcement due to cognitive deficits    Assessment  Activity Tolerance: fair  Impairments Requiring Therapeutic Intervention: decreased functional mobility, decreased strength, decreased safety awareness, decreased endurance, decreased balance, decreased posture  Prognosis: good  Clinical Assessment: Pt is a 71 y/o male who presents below functional baseline. Pt needs frequent cues for safety and hands on assist at all times when up due to instability with balance. Will continue to follow while in house to progress pt's mobility as he tolerates.    Safety Interventions: patient left in bed, bed alarm in place and call light within reach    Plan  Frequency: 3-5 x/per week  Current Treatment Recommendations: strengthening, ROM, balance training, functional mobility training, transfer training, gait training, endurance training and patient/caregiver education    Goals  Patient Goals: to get back to SNF  No goals met 5/31/22  Short Term Goals:  Time Frame: by discharge  Patient will complete bed mobility at Independent   Patient will complete transfers at [address]   Patient will ambulate 100 ft with use of rolling walker at supervision    Therapy Session Time      Individual Group Co-treatment   Time In 1040       Time Out 1054       Minutes 14         Timed Code Treatment Minutes:  14 Minutes  Total Treatment Minutes:         Electronically Signed By: Gardenia Good, [facility]

## 2022-05-31 NOTE — CARE COORDINATION
Transportation set with [facility]  ([phone]) with a 3:15pm  to return to:    [facility] at [facility], [address]   Phone: [phone] (report)  Fax: [phone]    AVS faxed to facility

## 2022-05-31 NOTE — PLAN OF CARE
Problem: Pain  Goal: Verbalizes/displays adequate comfort level or baseline comfort level  Outcome: Completed     Problem: Skin/Tissue Integrity  Goal: Absence of new skin breakdown  Description: 1. Monitor for areas of redness and/or skin breakdown  2. Assess vascular access sites hourly  3. Every 4-6 hours minimum:  Change oxygen saturation probe site  4. Every 4-6 hours:  If on nasal continuous positive airway pressure, respiratory therapy assess nares and determine need for appliance change or resting period.   Outcome: Completed     Problem: Safety - Adult  Goal: Free from fall injury  Outcome: Completed     Problem: Chronic Conditions and Co-morbidities  Goal: Patient's chronic conditions and co-morbidity symptoms are monitored and maintained or improved  Outcome: Completed     Problem: Neurosensory - Adult  Goal: Achieves stable or improved neurological status  Outcome: Completed  Goal: Achieves maximal functionality and self care  Outcome: Completed     Problem: Respiratory - Adult  Goal: Achieves optimal ventilation and oxygenation  Outcome: Completed     Problem: Cardiovascular - Adult  Goal: Maintains optimal cardiac output and hemodynamic stability  Outcome: Completed  Goal: Absence of cardiac dysrhythmias or at baseline  Outcome: Completed     Problem: Skin/Tissue Integrity - Adult  Goal: Skin integrity remains intact  Outcome: Completed  Goal: Incisions, wounds, or drain sites healing without S/S of infection  Outcome: Completed  Goal: Oral mucous membranes remain intact  Outcome: Completed     Problem: Musculoskeletal - Adult  Goal: Return mobility to safest level of function  Outcome: Completed  Goal: Maintain proper alignment of affected body part  Outcome: Completed  Goal: Return ADL status to a safe level of function  Outcome: Completed     Problem: Gastrointestinal - Adult  Goal: Minimal or absence of nausea and vomiting  Outcome: Completed  Goal: Maintains or returns to baseline bowel function  Outcome: Completed  Goal: Maintains adequate nutritional intake  Outcome: Completed     Problem: Genitourinary - Adult  Goal: Absence of urinary retention  Outcome: Completed     Problem: Infection - Adult  Goal: Absence of infection at discharge  Outcome: Completed  Goal: Absence of infection during hospitalization  Outcome: Completed  Goal: Absence of fever/infection during anticipated neutropenic period  Outcome: Completed     Problem: Metabolic/Fluid and Electrolytes - Adult  Goal: Electrolytes maintained within normal limits  Outcome: Completed  Goal: Hemodynamic stability and optimal renal function maintained  Outcome: Completed  Goal: Glucose maintained within prescribed range  Outcome: Completed     Problem: Hematologic - Adult  Goal: Maintains hematologic stability  Outcome: Completed     Problem: ABCDS Injury Assessment  Goal: Absence of physical injury  Outcome: Completed

## 2022-05-31 NOTE — CARE COORDINATION
Ana nance/Fidel Ruby ([phone]) to call back re: if patient will require pre-cert to return to the facility skilled.  Awaiting call back

## 2022-05-31 NOTE — PROGRESS NOTES
Head to toe assessment complete. Vital signs obtained. Pt resting in bed at this time. Evening medication administered. Pt tolerated well. Pt denies pain. Pt denies further needs at this time. Call light in hand. Pt verbalizes correct use. Bed alarm set. Will continue to monitor.  Electronically signed by Maria Carias RN on 5/30/2022 at 8:52 PM

## 2022-05-31 NOTE — PROGRESS NOTES
Called and gave report to [facility] at [facility] for pt returning to them. Waiting for transport.

## 2022-05-31 NOTE — PROGRESS NOTES
[facility] Department   Phone: [phone]    Occupational Therapy    [] Initial Evaluation            [x] Daily Treatment Note         [] Discharge Summary      Patient: Jesus Pimentel   : 1950   MRN: [id number]   Date of Service:  2022    Admitting Diagnosis:  UTI (urinary tract infection)  Current Admission Summary: Jesus Pimentel is a 70 y.o. male who presents to the emergency department after he was found to have a glucose of 32 at nursing home. He was given 25 g of dextrose in route and now has a blood sugar of 138. He is alert and oriented x4 by the time he gets here. Patient states he feels generally fatigued. He states he did not eat dinner because he does not like the way the food taste yesterday. He lives at [facility]. He believes that he still got his insulin last night but he is unsure what his medicines are if he actually received it. Medicine list was not sent by nursing home. Unsure what medicines he received and when the last time he received these. Unsure what his baseline is. He states he has been having some dysuria for the past week. Denies hematuria, diarrhea, bloody stool, chest pain, shortness breath abdominal pain, nausea or any other symptoms. Who presented to [address] [place] [address] after being found down at his nursing home. He was found to be hypoglycemic. Chest x-ray in the ER, was read by the radiologist as possible left proximal humerus fracture. Left shoulder x-rays demonstrated abnormal morphology of the humeral head, with possible nondisplaced fracture being suspected. Patient is a poor historian. He denies any left shoulder pain. He complains of right shoulder and humerus pain. He states his right shoulder has been bothering him for over a year. He stated that he had fractured his right shoulder initially. He then stated that he had fractured his left shoulder previously.   He then stated that he had fractured both of his shoulders. Review of his chart on Care Everywhere reveals that he was treated for left proximal humerus fracture in October 2020 at [address] [place] [address].  The abnormality seen on x-ray is a healed left proximal humerus fracture. No orthopedic treatment necessary. He can discontinue the sling for his left shoulder. Discussed with the patient that his symptoms may be secondary to his right shoulder arthritis. He did no chronic history of right shoulder pain. If this continues to bother him can be seen as an outpatient, with standard shoulder x-rays to better evaluate for arthritis. Ice/heat as needed. NSAIDs as needed if no medical contraindication. Past Medical History:  has a past medical history of Arthritis, Ataxia, BPH (benign prostatic hyperplasia), CAD (coronary artery disease), Coronary atherosclerosis of native coronary artery, Diabetes mellitus ([address] Environmental allergies, Hepatitis, Hydronephrosis, Hyperlipidemia, Hypertension, Kidney disease, Neuropathy, Parkinson's disease ([address] S/P coronary artery stent placement x 4, and Schizoaffective disorder, bipolar type ([address] Past Surgical History:  has a past surgical history that includes Coronary angioplasty; Coronary angioplasty with stent; hernia repair; Vasectomy; Cystocopy (4/4/14); Ankle surgery (Right, 06/09/2018); Upper gastrointestinal endoscopy (N/A, 12/13/2018); Colonoscopy (N/A, 12/13/2018); Upper gastrointestinal endoscopy (N/A, 5/3/2019); and ORIF DISTAL RADIUS FRACTURE (Left, 6/20/2019). Discharge Recommendations: Esther Sandoval scored a 19/24 on the AM-PAC ADL Inpatient form. Current research shows that an AM-PAC score of 17 or less is typically not associated with a discharge to the patient's home setting. Based on the patient's AM-PAC score and their current ADL deficits, it is recommended that the patient have 3-5 sessions per week of Occupational Therapy at d/c to increase the patient's independence.   Please see assessment section for further patient specific details. Patient is a LTC resident at [facility]. If patient discharges prior to next session this note will serve as a discharge summary. Please see below for the latest assessment towards goals. DME Required For Discharge: DME to be determined at next level of care    Precautions/Restrictions: high fall risk  Weight Bearing Restrictions: weight bearing as tolerated and no restrictions  [] Right Upper Extremity  [] Left Upper Extremity [] Right Lower Extremity  [] Left Lower Extremity     Required Braces/Orthotics: no braces required   [] Right  [] Left  Positional Restrictions:no positional restrictions    Pre-Admission Information   Lives With: . Comment: [facility] the [facility] staff    Type of Home: long term care facility  Home Layout: able to live on main level  Home Access: level entry  Bathroom Layout: walker accessible, wheelchair accessible, walk in shower  Toilet Height: standard height  Bathroom Equipment: grab bars in shower, grab bars around toilet, built in [address]: manual wheelchair  Transfer Assistance: modified independent with use of w/c  Ambulation Assistance:requires assistance  ADL Assistance: requires assistance with bathing, . Comment: Pt notes he is independent with dressing and toileting  IADL Assistance: SNF provides meals and does pt's [place]  Active :        [] Yes  [x] No  Hand Dominance: [] Left  [x] Right  Current Employment: retired. Occupation: pt notes he did a little bit of everything  Hobbies: denies  Recent Falls: \"probably 1 per month for the past 6 months\"    Examination   Vision:   Vision Corrective Device: wears glasses for reading  Hearing:   hard of hearing, left hearing aid, right hearing aid  Perception:    WFL  Observation:   General Observation:  supine in bed upon arrival   Posture: Forward head and verbal cues to keep head up.    Sensation:   WFL  Proprioception:    WFL  Tone: Normotonic  Coordination Testing:   WFL    ROM:   (B) UE AROM WFL  Patient raised right arm and stated \"this is where I hurt myself, it is sore when I move it a lot\"  Strength:   Formal MMT was held, however, during therapy evaluation pt demonstrated strength WFL while performing transfers and adl skills. Decision Making: medium complexity  Clinical Presentation: stable      Subjective  General: Patient supine in bed, pleasant and cooperative. Patient stated hoping to return back to [employer] today. Denied pain. Pain: 0/10  Pain Interventions: not applicable        Activities of Daily Living  Basic Activities of Daily Living  Feeding: Independent  Grooming: Independent  Upper Extremity Bathing: setup assistance. Equipment: none  Lower Extremity Bathing: minimal assistance. Equipment: none  Bathing Comments: sponge bathing sitting on commode in bathroom  Upper Extremity Dressing: minimal assistance. Equipment: none  Lower Extremity Dressing: contact guard assistance. Equipment: none  Dressing Comments: CGA for pullup briefs, indep to don/doff socks sitting in chair  Toileting: contact guard assistance. Equipment: none  Toileting Comments: Ambulated into bathroom, performed sponge bathing and dressing from comode level. General Comments: pt demonstrated the ability to wash face and hands w/ warm wipes during therapy session. Instrumental Activities of Daily Living  No IADL completed on this date. Functional Mobility  Bed Mobility  Supine to Sit: Independent  Sit to Supine: Independent  Scooting-- independent      Scooting: IndependentTransfers  Sit to stand transfer:contact guard assistance, . Comment progressing to SBA w/ use of RW  Stand to sit transfer: contact guard assistance  Comments:  Functional Mobility:  Sitting Balance: supervision. Standing Balance: CGA with RW    Functional Mobility-- Ambulated with CGA with RW.      Patient stated \"I am not suppose to walk because I have a bad hip\" Later explained that patient fell @ 4 years ago and broke hip in \"several places\". Patient stated that the nursing home staff won't let him walk because of his hip and makes him stay in a wheelchair \"all the time\"     Other Therapeutic Interventions    Functional Outcomes  -PAC Inpatient Daily Activity Raw Score: 19    Cognition  Overall Cognitive Status: Impaired  Following Commands: follows one step commands with repetition, follows one step commands with increased time  Attention Span: attends with cues to redirect, difficulty attending to directions  Memory: decreased recall of precautions, decreased short term memory  Safety Judgement: decreased awareness of need for assistance, decreased awareness of need for safety  Problem Solving: assistance required to generate solutions, assistance required to implement solutions, decreased awareness of errors, assistance required to identify errors made, assistance required to correct errors made  Insights: decreased awareness of deficits  Initiation: requires cues for some  Sequencing: requires cues for some      Orientation:    alert and oriented x 4  Command Following:   impaired     Education  Barriers To Learning: cognition and hearing  Patient Education: patient educated on goals, OT role and benefits, plan of care, precautions, ADL adaptive strategies, discharge recommendations, safety  Learning Assessment:  patient verbalizes understanding, would benefit from continued reinforcement    Assessment  Activity Tolerance: Good tolerance to therapy this date. Impairments Requiring Therapeutic Intervention: decreased functional mobility, decreased ADL status, decreased safety awareness, decreased cognition, decreased endurance, decreased balance  Prognosis: good  Clinical Assessment: pt presents with the above deficits which impact his functional indep, functional transfers, and adl skills needed to return to his prior level of function.  Pt will benefit from skilled OT to improve these skills and decrease caregiver burden as to return to PLOF.    Safety Interventions: patient left in chair, chair alarm in place, call light within reach, gait belt and nurse notified    Plan  Frequency: 3-5 x/per week  Current Treatment Recommendations: strengthening, balance training, functional mobility training, transfer training, endurance training, modalities, patient/caregiver education, ADL/self-care training and IADL training    Goals  Patient Goals: return to PLOF   Short Term Goals:  Time Frame: d/c   Patient will complete upper body ADL at [facility] -- SBA/CGA with RW  Patient will complete lower body ADL at supervision -- [address] [place] [address] for pullup attends and socks  Patient will complete toileting at supervision -- [address]   Patient will complete functional transfers at supervision, stand by assistance -- SBA/CGA with RW  Patient will complete functional mobility at supervision, stand by assistance -- SBA/CGA with RW    Therapy Session Time     Individual Group Co-treatment   Time In 0830     Time Out 0855     Minutes 25          Timed Code Treatment Minutes:   25 min  Total Treatment Minutes:  25 min    Electronically Signed By: NURA Chicas/MIKE [phone]

## 2022-06-02 LAB — MISCELLANEOUS LAB TEST ORDER: NORMAL

## 2022-06-07 NOTE — DISCHARGE SUMMARY
Hospital Medicine Discharge Summary    Patient ID: Wolfgang Bran      Patient's PCP: No primary care provider on file. Admit Date: 5/27/2022     Discharge Date: 5/31/2022     Admitting Physician: Cristin Patel MD     Discharge Physician: Jeremias Easley MD        Active Hospital Problems    Diagnosis     UTI (urinary tract infection) [N39.0]      Priority: Medium         Hospital Course: This 70 y. o. male  with PMHx of CAD s/p PCI, hypertension, hyperlipidemia, diabetes mellitus, CKD and recurrent falls presented from nursing home after he was found down on the floor and was noted to have BG of 32.  Patient was given 25 g of dextrose 50 enroute to the hospital.  Patient reported that he was started on antibiotic last week for dysuria in the nursing home and have been experiencing diarrhea for the past 3 days. Initial diagnostic lab work showed creatinine elevated to 1.7, UA suggestive of UTI.  CT head and cervical spine negative for any acute pathology.  CXR negative for any acute abdominal pathology but showed new left humeral neck fracture of uncertain acuity. Follow-up x-ray left shoulder suggestive of nondisplaced humeral head fracture.       UTI: Urine culture grew> 100,000 Serratia marcescens; completed IV antibiotic course     Hypoglycemia: Likely secondary to decreased p.o. intake: Resolved     Fall: Likely secondary to hypoglycemia/generalized weakness     OCTAVIO  likely prerenal in setting of low p.o. intake/hypovolemia: Resolved  Serum creatinine elevated 1.7 on admission; (baseline around 0.8): Trended down  Patient was followed by nephrology during hospital stay      Diarrhea: Resolved  GI PCR and C. difficile negative     Hypomagnesemia: Likely secondary to diarrhea. Resolved     Right shoulder/arm pain likely secondary to right shoulder arthritis.   Orthopedic surgery was consulted and per their recs, patient underwent x-ray right humerus showed healed left proximal humerus fracture; previously treated at [facility] on 10/2020. No further surgical intervention recommended by orthopedic surgery. Patient was instructed to use ice/heat and Tylenol as needed     Hx of diabetes mellitus: Presented with hyperglycemia. Hemoglobin A1c 6 on admission. Insulin was discontinued. Patient was discharged on home dose of metformin was instructed to follow-up with PCP and obtain repeat A1c in 3 months.       Physical Exam Performed:     BP (!) 141/77   Pulse 85   Temp 98.1 °F (36.7 °C) (Oral)   Resp 16   Ht 5' 4\" (1.626 m)   Wt 146 lb (66.2 kg)   SpO2 97%   BMI 25.06 kg/m²     General appearance:  No apparent distress, appears stated age and cooperative. Eyes: Sclera clear. Pupils equal.  ENT: Moist oral mucosa. Trachea midline, no adenopathy. Cardiovascular: Regular rhythm, normal S1, S2. No murmur. No edema in lower extremities  Respiratory:Not usingaccessory muscles. Clear to auscultation bilaterally, no wheeze or crackles. GI: Abdomen soft, no tenderness, not distended, normal bowel sounds  Musculoskeletal: Normal ROM, No cyanosis in digits. Neurology: CN 2-12 grossly intact. No speech or motor deficits  Psych: Normal affect. Alert and oriented in time, place and person  Skin: Warm, dry, normal turgor    Consults:     IP CONSULT TO HOSPITALIST  IP CONSULT TO ORTHOPEDIC SURGERY  IP CONSULT TO NEPHROLOGY    Disposition: SNF    Condition at Discharge: Stable     Discharge Instructions/Follow-up:   Follow up with your PCP within 7 days of discharge  HbgA1C in 3 months   Follow up with orthopedic surgery as recommended   Follow up with Nephrology  as instructed   Take all your medications as prescribed.       Discharge Medications:     Discharge Medication List as of 5/31/2022  2:30 PM           Details   hydrALAZINE (APRESOLINE) 25 MG tablet Take 1 tablet by mouth every 8 hours Hold for BP<120, Disp-90 tablet, R-1NO PRINT              Details   simethicone (MYLICON) 397 MG chewable tablet Take 125 mg by mouth every morningHistorical Med      guaiFENesin (ROBITUSSIN) 100 MG/5ML SOLN oral solution Take 400 mg by mouth every 4 hours as needed for CoughHistorical Med      loratadine (CLARITIN) 10 MG tablet Take 10 mg by mouth dailyHistorical Med      mirtazapine (REMERON) 7.5 MG tablet Take 7.5 mg by mouth nightlyHistorical Med      acetaminophen (TYLENOL) 500 MG tablet Take 1,000 mg by mouth every 6 hours as needed Historical Med      carvedilol (COREG) 6.25 MG tablet Take 1 tablet by mouth 2 times daily (with meals), Disp-30 tablet, R-0Normal      QUEtiapine (SEROQUEL) 25 MG tablet Take 12.5 mg by mouth 2 times dailyHistorical Med      gabapentin (NEURONTIN) 300 MG capsule Take 1 capsule by mouth 3 times daily for 3 days. , Disp-9 capsule, R-0Print      pantoprazole (PROTONIX) 40 MG tablet Take one tablet daily, Disp-90 tablet, R-0Normal      ASPIRIN LOW DOSE 81 MG EC tablet TAKE 1 TABLET BY MOUTH ONCE DAILY, Disp-30 tablet, R-10Normal      metFORMIN (GLUCOPHAGE) 1000 MG tablet Take 1 tablet by mouth 2 times daily (with meals) Historical Med      PARoxetine (PAXIL) 40 MG tablet Take 1 tablet by mouth dailyHistorical Med      ferrous sulfate 325 (65 Fe) MG tablet Take 1 tablet by mouth 2 times daily, Disp-60 tablet, R-11Normal      atorvastatin (LIPITOR) 80 MG tablet TAKE 1 TABLET BY MOUTH AT BEDTIME, Disp-90 tablet, R-0Normal      traZODone (DESYREL) 100 MG tablet Take 150 mg by mouth nightly Historical Med      fluticasone (FLONASE) 50 MCG/ACT nasal spray 1 spray by Nasal route daily Historical Med             The patient was seen and examined on day of discharge and this discharge summary is in conjunction with any daily progress note from day of discharge. Time Spent on discharge is 45 minutes  in the examination, evaluation, counseling and review of medications and discharge plan.       Note that greater  than 30 minutes was spent in preparing discharge papers, discussing discharge with patient, medication review, etc.     Signed:    Get Aragon MD   6/7/2022      Thank you No primary care provider on file. for the opportunity to be involved in this patient's care. If you have any questions or concerns please feel free to contact me at [phone].

## 2022-06-26 PROBLEM — N39.0 UTI (URINARY TRACT INFECTION): Status: RESOLVED | Noted: 2022-05-27 | Resolved: 2022-06-26

## 2022-12-27 ENCOUNTER — HOSPITAL ENCOUNTER (EMERGENCY)
Age: 72
Discharge: NURSING, REHAB OR LONG-TERM CARE | End: 2022-12-28
Payer: COMMERCIAL

## 2022-12-27 ENCOUNTER — APPOINTMENT (OUTPATIENT)
Dept: CT IMAGING | Age: 72
End: 2022-12-27
Payer: COMMERCIAL

## 2022-12-27 ENCOUNTER — APPOINTMENT (OUTPATIENT)
Dept: GENERAL RADIOLOGY | Age: 72
End: 2022-12-27
Payer: COMMERCIAL

## 2022-12-27 DIAGNOSIS — S30.0XXA CONTUSION OF LOWER BACK, INITIAL ENCOUNTER: ICD-10-CM

## 2022-12-27 DIAGNOSIS — M25.552 LEFT HIP PAIN: ICD-10-CM
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
## 2022-12-27 DIAGNOSIS — R05.9 COUGH, UNSPECIFIED TYPE: ICD-10-CM

## 2022-12-27 LAB
A/G RATIO: 1.4 (ref 1.1–2.2)
ALBUMIN SERPL-MCNC: 3.8 G/DL (ref 3.4–5)
ALP BLD-CCNC: 80 U/L (ref 40–129)
ALT SERPL-CCNC: 13 U/L (ref 10–40)
ANION GAP SERPL CALCULATED.3IONS-SCNC: 11 MMOL/L (ref 3–16)
AST SERPL-CCNC: 18 U/L (ref 15–37)
BASOPHILS ABSOLUTE: 0 K/UL (ref 0–0.2)
BASOPHILS RELATIVE PERCENT: 0.8 %
BILIRUB SERPL-MCNC: 0.3 MG/DL (ref 0–1)
BUN BLDV-MCNC: 24 MG/DL (ref 7–20)
CALCIUM SERPL-MCNC: 8.9 MG/DL (ref 8.3–10.6)
CHLORIDE BLD-SCNC: 103 MMOL/L (ref 99–110)
CO2: 24 MMOL/L (ref 21–32)
CREAT SERPL-MCNC: 1.2 MG/DL (ref 0.8–1.3)
EOSINOPHILS ABSOLUTE: 0.1 K/UL (ref 0–0.6)
EOSINOPHILS RELATIVE PERCENT: 1.1 %
GFR SERPL CREATININE-BSD FRML MDRD: >60 ML/MIN/{1.73_M2}
GLUCOSE BLD-MCNC: 158 MG/DL (ref 70–99)
HCT VFR BLD CALC: 28.5 % (ref 40.5–52.5)
HEMOGLOBIN: 9.2 G/DL (ref 13.5–17.5)
LYMPHOCYTES ABSOLUTE: 0.5 K/UL (ref 1–5.1)
LYMPHOCYTES RELATIVE PERCENT: 11 %
MCH RBC QN AUTO: 29.1 PG (ref 26–34)
MCHC RBC AUTO-ENTMCNC: 32.2 G/DL (ref 31–36)
MCV RBC AUTO: 90.3 FL (ref 80–100)
MONOCYTES ABSOLUTE: 0.6 K/UL (ref 0–1.3)
MONOCYTES RELATIVE PERCENT: 12.3 %
NEUTROPHILS ABSOLUTE: 3.4 K/UL (ref 1.7–7.7)
NEUTROPHILS RELATIVE PERCENT: 74.8 %
PDW BLD-RTO: 15.2 % (ref 12.4–15.4)
PLATELET # BLD: 154 K/UL (ref 135–450)
PMV BLD AUTO: 7.7 FL (ref 5–10.5)
POTASSIUM SERPL-SCNC: 5 MMOL/L (ref 3.5–5.1)
PRO-BNP: 1982 PG/ML (ref 0–124)
PROCALCITONIN: 0.09 NG/ML (ref 0–0.15)
RAPID INFLUENZA  B AGN: NEGATIVE
RAPID INFLUENZA A AGN: NEGATIVE
RBC # BLD: 3.16 M/UL (ref 4.2–5.9)
SARS-COV-2, NAAT: NOT DETECTED
SODIUM BLD-SCNC: 138 MMOL/L (ref 136–145)
TOTAL PROTEIN: 6.6 G/DL (ref 6.4–8.2)
TROPONIN: <0.01 NG/ML
WBC # BLD: 4.6 K/UL (ref 4–11)

## 2022-12-27 PROCEDURE — 87804 INFLUENZA ASSAY W/OPTIC: CPT

## 2022-12-27 PROCEDURE — 83880 ASSAY OF NATRIURETIC PEPTIDE: CPT

## 2022-12-27 PROCEDURE — 72131 CT LUMBAR SPINE W/O DYE: CPT

## 2022-12-27 PROCEDURE — 73502 X-RAY EXAM HIP UNI 2-3 VIEWS: CPT

## 2022-12-27 PROCEDURE — 80053 COMPREHEN METABOLIC PANEL: CPT

## 2022-12-27 PROCEDURE — 71045 X-RAY EXAM CHEST 1 VIEW: CPT

## 2022-12-27 PROCEDURE — 96375 TX/PRO/DX INJ NEW DRUG ADDON: CPT

## 2022-12-27 PROCEDURE — 70450 CT HEAD/BRAIN W/O DYE: CPT

## 2022-12-27 PROCEDURE — 99285 EMERGENCY DEPT VISIT HI MDM: CPT

## 2022-12-27 PROCEDURE — 93005 ELECTROCARDIOGRAM TRACING: CPT | Performed by: PHYSICIAN ASSISTANT

## 2022-12-27 PROCEDURE — 96374 THER/PROPH/DIAG INJ IV PUSH: CPT

## 2022-12-27 PROCEDURE — 6360000002 HC RX W HCPCS: Performed by: PHYSICIAN ASSISTANT

## 2022-12-27 PROCEDURE — 87635 SARS-COV-2 COVID-19 AMP PRB: CPT

## 2022-12-27 PROCEDURE — 84145 PROCALCITONIN (PCT): CPT

## 2022-12-27 PROCEDURE — 85025 COMPLETE CBC W/AUTO DIFF WBC: CPT

## 2022-12-27 PROCEDURE — 72125 CT NECK SPINE W/O DYE: CPT

## 2022-12-27 PROCEDURE — 84484 ASSAY OF TROPONIN QUANT: CPT

## 2022-12-27 RX ORDER — MORPHINE SULFATE 4 MG/ML
4 INJECTION, SOLUTION INTRAMUSCULAR; INTRAVENOUS EVERY 30 MIN PRN
Status: DISCONTINUED | OUTPATIENT
Start: 2022-12-27 | End: 2022-12-28 | Stop reason: HOSPADM

## 2022-12-27 RX ORDER — ONDANSETRON 2 MG/ML
4 INJECTION INTRAMUSCULAR; INTRAVENOUS ONCE
Status: COMPLETED | OUTPATIENT
Start: 2022-12-27 | End: 2022-12-27

## 2022-12-27 RX ADMIN — ONDANSETRON 4 MG: 2 INJECTION INTRAMUSCULAR; INTRAVENOUS at 22:39

## 2022-12-27 RX ADMIN — MORPHINE SULFATE 4 MG: 4 INJECTION, SOLUTION INTRAMUSCULAR; INTRAVENOUS at 22:40

## 2022-12-27 ASSESSMENT — PAIN - FUNCTIONAL ASSESSMENT: PAIN_FUNCTIONAL_ASSESSMENT: NONE - DENIES PAIN

## 2022-12-27 ASSESSMENT — LIFESTYLE VARIABLES
HOW OFTEN DO YOU HAVE A DRINK CONTAINING ALCOHOL: NEVER
HOW MANY STANDARD DRINKS CONTAINING ALCOHOL DO YOU HAVE ON A TYPICAL DAY: PATIENT DOES NOT DRINK

## 2022-12-27 ASSESSMENT — PAIN SCALES - GENERAL: PAINLEVEL_OUTOF10: 5

## 2022-12-28 VITALS
SYSTOLIC BLOOD PRESSURE: 128 MMHG | HEART RATE: 74 BPM | OXYGEN SATURATION: 93 % | BODY MASS INDEX: 24.92 KG/M2 | HEIGHT: 64 IN | WEIGHT: 146 LBS | DIASTOLIC BLOOD PRESSURE: 50 MMHG | RESPIRATION RATE: 22 BRPM | TEMPERATURE: 97.9 F

## 2022-12-28 LAB
EKG ATRIAL RATE: 85 BPM
EKG DIAGNOSIS: NORMAL
EKG P AXIS: 61 DEGREES
EKG P-R INTERVAL: 160 MS
EKG Q-T INTERVAL: 378 MS
EKG QRS DURATION: 126 MS
EKG QTC CALCULATION (BAZETT): 449 MS
EKG R AXIS: -15 DEGREES
EKG T AXIS: 35 DEGREES
EKG VENTRICULAR RATE: 85 BPM

## 2022-12-28 PROCEDURE — 93010 ELECTROCARDIOGRAM REPORT: CPT | Performed by: INTERNAL MEDICINE

## 2022-12-28 ASSESSMENT — ENCOUNTER SYMPTOMS
DIARRHEA: 0
VOMITING: 0
COUGH: 1
RHINORRHEA: 0
BACK PAIN: 1
NAUSEA: 0
SHORTNESS OF BREATH: 0
ABDOMINAL PAIN: 0

## 2022-12-28 NOTE — ED PROVIDER NOTES
[facility]        Pt Name: Rosita Nieves  MRN: [id number]  [place] 1950  Date of evaluation: 12/27/2022  Provider: Carlos Anderson PA-C  PCP: No primary care provider on file. Note Started: 12:58 AM EST 12/28/22          JAYME. I have evaluated this patient. My supervising physician was available for consultation. CHIEF COMPLAINT       Chief Complaint   Patient presents with    Fall     Pt arrived via [facility] from [facility] d/t fall in a bathroom. Pt fell on L hip. Pt denies LOC. HISTORY OF PRESENT ILLNESS   (Location, Timing/Onset, Context/Setting, Quality, Duration, Modifying Factors, Severity, Associated Signs and Symptoms)  Note limiting factors. Chief Complaint: Tiffanie Boudreaux is a 70 y.o. male who presents to the emergency department today for evaluation for a fall which occurred short before having to the ED. Patient is from [address]. Patient is alert and oriented x3. 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 The patient denies feeling dizzy, lightheaded, having chest pain or shortness of breath before his fall, his fall was mechanical in nature. The patient states he did hit his head however there is no loss of consciousness, no vomiting, the patient is otherwise not on any blood thinners. Patient is complaining of pain to his left lower back and his left hip. He states his pain is a 5/10, pain is worse with touch and certain movements. Patient states that he did start with a cough last night, which did increase today. He states he has not had any sputum production or hemoptysis. He denies any fevers. No abdominal pain, nausea, vomiting or diarrhea.   No urinary symptoms no other PALSY    Heart Disease Father         CHF          SOCIAL HISTORY       Social History     Tobacco Use    Smoking status: Former     Packs/day: 1.00     Years: 25.00     Pack years: 25.00     Types: Cigarettes     Quit date: 5/10/2013     Years since quittin.6    Smokeless tobacco: Never   Vaping Use    Vaping Use: Never used   Substance Use Topics    Alcohol use: No    Drug use: No       SCREENINGS        Myron Coma Scale  Eye Opening: Spontaneous  Best Verbal Response: Oriented  Best Motor Response: Obeys commands  Standish Coma Scale Score: 15                CIWA Assessment  BP: (!) 132/54  Heart Rate: 78             PHYSICAL EXAM    (up to 7 for level 4, 8 or more for level 5)     ED Triage Vitals [22]   BP Temp Temp Source Heart Rate Resp SpO2 Height Weight   (!) 146/67 97.9 °F (36.6 °C) Oral 86 16 94 % 5' 4\" (1.626 m) 146 lb (66.2 kg)       Physical Exam  Vitals and nursing note reviewed. Constitutional:       Appearance: He is well-developed. He is not diaphoretic. HENT:      Head: Normocephalic and atraumatic. Right Ear: External ear normal.      Left Ear: External ear normal.      Nose: Nose normal.      Mouth/Throat:      Mouth: Mucous membranes are moist.      Pharynx: No posterior oropharyngeal erythema. Eyes:      General:         Right eye: No discharge. Left eye: No discharge. Extraocular Movements: Extraocular movements intact. Conjunctiva/sclera: Conjunctivae normal.      Pupils: Pupils are equal, round, and reactive to light. Neck:      Trachea: No tracheal deviation. Cardiovascular:      Rate and Rhythm: Normal rate and regular rhythm. Heart sounds: No murmur heard. Pulmonary:      Effort: Pulmonary effort is normal. No respiratory distress. Breath sounds: Normal breath sounds. No wheezing or rales. Abdominal:      General: Bowel sounds are normal. There is no distension. Palpations: Abdomen is soft. Tenderness:  There is no abdominal tenderness. There is no guarding. Musculoskeletal:         General: Normal range of motion. Cervical back: Normal range of motion and neck supple. Comments: There is tenderness palpation to the paraspinous muscles of the left lower lumbar spine, no midline tenderness, no bony step-offs or crepitus. Diffuse tenderness noted over the lateral aspect of the left hip, full range of motion   Skin:     General: Skin is warm and dry. Neurological:      General: No focal deficit present. Mental Status: He is alert and oriented to person, place, and time. Comments: Motor strength of bilateral lower extremities is 5/5 throughout. Normal sensation light touch. Patellar reflexes are 2+ bilaterally. Posterior tibialis pulse and dorsalis pedis pulses are 2+ bilaterally. Negative straight leg raise. Patient is able to ambulate without difficulty. Normal dorsiflexion and plantar flexion. Full range of motion of hip, knee and ankle joints. Psychiatric:         Behavior: Behavior normal.       DIAGNOSTIC RESULTS   LABS:    Labs Reviewed   CBC WITH AUTO DIFFERENTIAL - Abnormal; Notable for the following components:       Result Value    RBC 3.16 (*)     Hemoglobin 9.2 (*)     Hematocrit 28.5 (*)     Lymphocytes Absolute 0.5 (*)     All other components within normal limits   COMPREHENSIVE METABOLIC PANEL - Abnormal; Notable for the following components:    Glucose 158 (*)     BUN 24 (*)     All other components within normal limits   BRAIN NATRIURETIC PEPTIDE - Abnormal; Notable for the following components:    Pro-BNP 1,982 (*)     All other components within normal limits   COVID-19, RAPID   RAPID INFLUENZA A/B ANTIGENS   TROPONIN   PROCALCITONIN   URINALYSIS WITH REFLEX TO CULTURE       When ordered only abnormal lab results are displayed. All other labs were within normal range or not returned as of this dictation. EKG:  When ordered, EKG's are interpreted by the Emergency Department Physician in the absence of a cardiologist.  Please see their note for interpretation of EKG. RADIOLOGY:   Non-plain film images such as CT, Ultrasound and MRI are read by the radiologist. Plain radiographic images are visualized and preliminarily interpreted by the ED Provider with the below findings:        Interpretation per the Radiologist below, if available at the time of this note:    [place]   Final Result   1. No acute osseous abnormality is identified. 2. Multilevel degenerative changes as above. RECOMMENDATIONS:   Unavailable         CT HEAD WO CONTRAST   Final Result   1. No acute intracranial hemorrhage. 2.  Generalized age-related cortical atrophy, with intracranial   atherosclerosis and CT findings suggestive of chronic microvascular ischemic   change. 3. Degenerative changes in the cervical spine, without acute osseous fracture. CT CERVICAL SPINE WO CONTRAST   Final Result   1. No acute intracranial hemorrhage. 2.  Generalized age-related cortical atrophy, with intracranial   atherosclerosis and CT findings suggestive of chronic microvascular ischemic   change. 3. Degenerative changes in the cervical spine, without acute osseous fracture. XR CHEST PORTABLE   Final Result   No acute chest abnormality. Stable left transtrochanteric hip reconstruction but no acute fractures or   dislocations. XR HIP 2-3 VW W PELVIS LEFT   Final Result   No acute chest abnormality. Stable left transtrochanteric hip reconstruction but no acute fractures or   dislocations.            CT HEAD WO CONTRAST    Result Date: 12/27/2022  EXAMINATION: CT OF THE HEAD WITHOUT CONTRAST; CT OF THE CERVICAL SPINE WITHOUT CONTRAST 12/27/2022 10:13 pm TECHNIQUE: CT of the head was performed without the administration of intravenous contrast. Automated exposure control, iterative reconstruction, and/or weight based adjustment of the mA/kV was utilized to reduce the radiation dose to as low as reasonably achievable.; CT of the cervical spine was performed without the administration of intravenous contrast. Multiplanar reformatted images are provided for review. Automated exposure control, iterative reconstruction, and/or weight based adjustment of the mA/kV was utilized to reduce the radiation dose to as low as reasonably achievable. COMPARISON: 05/27/2022. HISTORY: ORDERING SYSTEM PROVIDED HISTORY: fall TECHNOLOGIST PROVIDED HISTORY: Reason for exam:->fall Has a \"code stroke\" or \"stroke alert\" been called? ->No Decision Support Exception - unselect if not a suspected or confirmed emergency medical condition->Emergency Medical Condition (MA) Reason for Exam: Fall. Fall (Pt arrived via [address] from [facility] d/t fall in a bathroom. Pt fell on L hip. Pt denies LOC. ). ; ORDERING SYSTEM PROVIDED HISTORY: fall TECHNOLOGIST PROVIDED HISTORY: Reason for exam:->fall Decision Support Exception - unselect if not a suspected or confirmed emergency medical condition->Emergency Medical Condition (MA) Reason for Exam: Fall. Fall (Pt arrived via [address] from [facility] d/t fall in a bathroom. Pt fell on L hip. Pt denies LOC. ). FINDINGS: CT HEAD: BRAIN/VENTRICLES: There is no acute intracranial hemorrhage, mass effect or midline shift. No abnormal extra-axial fluid collection. The gray-white differentiation is maintained without evidence of an acute infarct. There is no evidence of hydrocephalus. Patchy white matter low attenuation is identified within the periventricular and subcortical white matter. Age related mild generalized cortical atrophy. Intracranial atherosclerosis. No wedge-shaped area of acute ischemia. No basilar cistern or sulcal effacement. ORBITS: Orbits appear unremarkable. No acute abnormality. PARANASAL SINUSES: Chronic paranasal sinus disease is identified. Leftward nasal septal deviation with a spur. Mastoid air cells are well aerated. SOFT TISSUE/CALVARIUM: The bony calvarium appears intact without fracture. No large soft tissue hematoma is seen. CT CERVICAL SPINE: BONES/ALIGNMENT: Odontoid process, C1 ring and occipital condyles appear intact. No cervical spine fracture. DEGENERATIVE CHANGES: Mild multilevel degenerative disc disease in the cervical spine involving the facet joints and disc spaces. Degenerative change also noted at the C1-C2 articulation. SOFT TISSUES: No apical pneumothorax. No penetrating soft tissue injury seen in the neck. Atherosclerotic calcifications are identified. 1. No acute intracranial hemorrhage. 2.  Generalized age-related cortical atrophy, with intracranial atherosclerosis and CT findings suggestive of chronic microvascular ischemic change. 3. Degenerative changes in the cervical spine, without acute osseous fracture. CT CERVICAL SPINE WO CONTRAST    Result Date: 12/27/2022  EXAMINATION: CT OF THE HEAD WITHOUT CONTRAST; CT OF THE CERVICAL SPINE WITHOUT CONTRAST 12/27/2022 10:13 pm TECHNIQUE: CT of the head was performed without the administration of intravenous contrast. Automated exposure control, iterative reconstruction, and/or weight based adjustment of the mA/kV was utilized to reduce the radiation dose to as low as reasonably achievable.; CT of the cervical spine was performed without the administration of intravenous contrast. Multiplanar reformatted images are provided for review. Automated exposure control, iterative reconstruction, and/or weight based adjustment of the mA/kV was utilized to reduce the radiation dose to as low as reasonably achievable. COMPARISON: 05/27/2022. HISTORY: ORDERING SYSTEM PROVIDED HISTORY: fall TECHNOLOGIST PROVIDED HISTORY: Reason for exam:->fall Has a \"code stroke\" or \"stroke alert\" been called? ->No Decision Support Exception - unselect if not a suspected or confirmed emergency medical condition->Emergency Medical Condition (MA) Reason for Exam: Fall. Fall (Pt arrived via [address] from [facility] d/t fall in a bathroom. Pt fell on L hip. Pt denies LOC. ). ; ORDERING SYSTEM PROVIDED HISTORY: fall TECHNOLOGIST PROVIDED HISTORY: Reason for exam:->fall Decision Support Exception - unselect if not a suspected or confirmed emergency medical condition->Emergency Medical Condition (MA) Reason for Exam: Fall. Fall (Pt arrived via [address] from [facility] d/t fall in a bathroom. Pt fell on L hip. Pt denies LOC. ). FINDINGS: CT HEAD: BRAIN/VENTRICLES: There is no acute intracranial hemorrhage, mass effect or midline shift. No abnormal extra-axial fluid collection. The gray-white differentiation is maintained without evidence of an acute infarct. There is no evidence of hydrocephalus. Patchy white matter low attenuation is identified within the periventricular and subcortical white matter. Age related mild generalized cortical atrophy. Intracranial atherosclerosis. No wedge-shaped area of acute ischemia. No basilar cistern or sulcal effacement. ORBITS: Orbits appear unremarkable. No acute abnormality. PARANASAL SINUSES: Chronic paranasal sinus disease is identified. Leftward nasal septal deviation with a spur. Mastoid air cells are well aerated. SOFT TISSUE/CALVARIUM: The bony calvarium appears intact without fracture. No large soft tissue hematoma is seen. CT CERVICAL SPINE: BONES/ALIGNMENT: Odontoid process, C1 ring and occipital condyles appear intact. No cervical spine fracture. DEGENERATIVE CHANGES: Mild multilevel degenerative disc disease in the cervical spine involving the facet joints and disc spaces. Degenerative change also noted at the C1-C2 articulation. SOFT TISSUES: No apical pneumothorax. No penetrating soft tissue injury seen in the neck. Atherosclerotic calcifications are identified. 1. No acute intracranial hemorrhage.  2.  Generalized age-related cortical atrophy, with intracranial atherosclerosis and CT findings suggestive of chronic microvascular ischemic change. 3. Degenerative changes in the cervical spine, without acute osseous fracture. CT LUMBAR SPINE WO CONTRAST    Result Date: 12/27/2022  EXAMINATION: CT OF THE LUMBAR SPINE WITHOUT CONTRAST  12/27/2022 TECHNIQUE: CT of the lumbar spine was performed without the administration of intravenous contrast. Multiplanar reformatted images are provided for review. Adjustment of mA and/or kV according to patient size was utilized. Automated exposure control, iterative reconstruction, and/or weight based adjustment of the mA/kV was utilized to reduce the radiation dose to as low as reasonably achievable. COMPARISON: 03/14/2014 HISTORY: ORDERING SYSTEM PROVIDED HISTORY: fall TECHNOLOGIST PROVIDED HISTORY: Reason for exam:->fall Decision Support Exception - unselect if not a suspected or confirmed emergency medical condition->Emergency Medical Condition (MA) Reason for Exam: Fall. Fall (Pt arrived via [address] from [facility] d/t fall in a bathroom. Pt fell on L hip. Pt denies LOC. ). FINDINGS: BONES/ALIGNMENT: No acute lumbar spine fracture. No vertebral body height loss. Minimal retrolisthesis of L2 on L3. Spinous processes and lumbar transverse processes are intact. DEGENERATIVE CHANGES: Mild degenerative symmetric change seen within the sacroiliac joints. Multilevel degenerative disc disease identified within the lumbar spine, with small disc osteophyte complex noted at the level of L1-L2 causing mild canal stenosis. Similar finding noted at the level of L2-L3. No severe canal stenosis is identified. Osseous foraminal stenosis noted greatest at the level of L2-L3 which appears mild to moderate. SOFT TISSUES/RETROPERITONEUM: No acute soft tissue abnormality. Aorto iliac atherosclerosis. 1. No acute osseous abnormality is identified.  2. Multilevel degenerative changes as above. RECOMMENDATIONS: Unavailable     XR CHEST PORTABLE    Result Date: 12/27/2022  EXAMINATION: ONE XRAY VIEW OF THE CHEST; ONE XRAY VIEW OF THE PELVIS AND TWO XRAY VIEWS LEFT HIP 12/27/2022 9:54 pm COMPARISON: Chest x-ray from May 27, 2022 HISTORY: ORDERING SYSTEM PROVIDED HISTORY: fall TECHNOLOGIST PROVIDED HISTORY: Reason for exam:->fall Reason for Exam: fall FINDINGS: Chest x-ray: No active lung parenchyma or pleural disease. Heart, mediastinum and pleural surfaces appear unremarkable. Visualized musculoskeletal structures demonstrate no acute abnormality. Pelvis and left hip x-ray: Single view of the pelvis demonstrates no acute abnormality. Views of the left hip demonstrate no acute fractures or dislocations. A transtrochanteric reconstruction is noted which appears stable. No evidence of fractures of the left femur. Acetabulum and pubic rami appear intact. No acute chest abnormality. Stable left transtrochanteric hip reconstruction but no acute fractures or dislocations. XR HIP 2-3 VW W PELVIS LEFT    Result Date: 12/27/2022  EXAMINATION: ONE XRAY VIEW OF THE CHEST; ONE XRAY VIEW OF THE PELVIS AND TWO XRAY VIEWS LEFT HIP 12/27/2022 9:54 pm COMPARISON: Chest x-ray from May 27, 2022 HISTORY: ORDERING SYSTEM PROVIDED HISTORY: fall TECHNOLOGIST PROVIDED HISTORY: Reason for exam:->fall Reason for Exam: fall FINDINGS: Chest x-ray: No active lung parenchyma or pleural disease. Heart, mediastinum and pleural surfaces appear unremarkable. Visualized musculoskeletal structures demonstrate no acute abnormality. Pelvis and left hip x-ray: Single view of the pelvis demonstrates no acute abnormality. Views of the left hip demonstrate no acute fractures or dislocations. A transtrochanteric reconstruction is noted which appears stable. No evidence of fractures of the left femur. Acetabulum and pubic rami appear intact. No acute chest abnormality.  Stable left transtrochanteric hip reconstruction but no acute fractures or dislocations. No results found. PROCEDURES   Unless otherwise noted below, none     Procedures    CRITICAL CARE TIME       CONSULTS:  None      EMERGENCY DEPARTMENT COURSE and DIFFERENTIAL DIAGNOSIS/MDM:   Vitals:    Vitals:    12/27/22 2125 12/27/22 2156 12/27/22 2301 12/28/22 0030   BP: (!) 146/67 138/64 (!) 147/54 (!) 132/54   Pulse: 86 84 81 78   Resp: 16 19 17 18   Temp: 97.9 °F (36.6 °C)      TempSrc: Oral      SpO2: 94% 93% 92% 92%   Weight: 146 lb (66.2 kg)      Height: 5' 4\" (1.626 m)          Patient was given the following medications:  Medications   morphine injection 4 mg (4 mg IntraVENous Given 12/27/22 2240)   ondansetron (ZOFRAN) injection 4 mg (4 mg IntraVENous Given 12/27/22 2239)         Is this patient to be included in the SEP-1 Core Measure due to severe sepsis or septic shock? No   Exclusion criteria - the patient is NOT to be included for SEP-1 Core Measure due to: Infection is not suspected    Briefly, this is a 77-year-old male who presents to the emergency department today for evaluation for a fall which occurred shortly before arriving to the ED. The patient does have a history of ataxia, he is supposed to be walking with a walker. The patient states that he did not have his walker with him in the bathroom, which caused him to fall, landing on his left hip. He did not hit his head no loss of consciousness or vomiting. On examination, he does have tenderness noted to the paraspinous muscles of the left lower lumbar spine, there is no midline tenderness, he has minimal tenderness under the lateral aspect of the left hip. No focal deficits    CT of head and cervical spine are obtained and are negative for any acute process. CT lumbar spine is negative for any acute fracture. Hip x-ray is negative for acute fracture. Due to the patient's cough, and fall, EKG, lab work were obtained.   EKG is documented by my attending, please see his note for further details. CBC shows no evidence of leukocytosis, there is a stable anemia. CMP unremarkable. Troponin is negative, BNP is slightly elevated however chest x-ray shows no acute process. Rapid COVID and influenza are negative, procalcitonin is normal.    Patient is overall feeling better after medications given in the ED, he is actually been able to ambulate with a walker without any difficulty. Lab as patient tells me that his fall was mechanical in nature, he has been able to ambulate with a negative work-up. Feel that he can be managed as an outpatient. He will be transferred back to nursing home facility in stable condition.    Results for orders placed or performed during the hospital encounter of 12/27/22   COVID-19, Rapid    Specimen: Nasopharyngeal Swab   Result Value Ref Range    SARS-CoV-2, NAAT Not Detected Not Detected   Rapid influenza A/B antigens    Specimen: Nasopharyngeal   Result Value Ref Range    Rapid Influenza A Ag Negative Negative    Rapid Influenza B Ag Negative Negative   CBC with Auto Differential   Result Value Ref Range    WBC 4.6 4.0 - 11.0 K/uL    RBC 3.16 (L) 4.20 - 5.90 M/uL    Hemoglobin 9.2 (L) 13.5 - 17.5 g/dL    Hematocrit 28.5 (L) 40.5 - 52.5 %    MCV 90.3 80.0 - 100.0 fL    MCH 29.1 26.0 - 34.0 pg    MCHC 32.2 31.0 - 36.0 g/dL    RDW 15.2 12.4 - 15.4 %    Platelets 044 827 - 189 K/uL    MPV 7.7 5.0 - 10.5 fL    Neutrophils % 74.8 %    Lymphocytes % 11.0 %    Monocytes % 12.3 %    Eosinophils % 1.1 %    Basophils % 0.8 %    Neutrophils Absolute 3.4 1.7 - 7.7 K/uL    Lymphocytes Absolute 0.5 (L) 1.0 - 5.1 K/uL    Monocytes Absolute 0.6 0.0 - 1.3 K/uL    Eosinophils Absolute 0.1 0.0 - 0.6 K/uL    Basophils Absolute 0.0 0.0 - 0.2 K/uL   Comprehensive Metabolic Panel   Result Value Ref Range    Sodium 138 136 - 145 mmol/L    Potassium 5.0 3.5 - 5.1 mmol/L    Chloride 103 99 - 110 mmol/L    CO2 24 21 - 32 mmol/L    Anion Gap 11 3 - 16    Glucose 158 (H) 70 - 99 mg/dL    BUN 24 (H) 7 - 20 mg/dL    Creatinine 1.2 0.8 - 1.3 mg/dL    Est, Glom Filt Rate >60 >60    Calcium 8.9 8.3 - 10.6 mg/dL    Total Protein 6.6 6.4 - 8.2 g/dL    Albumin 3.8 3.4 - 5.0 g/dL    Albumin/Globulin Ratio 1.4 1.1 - 2.2    Total Bilirubin 0.3 0.0 - 1.0 mg/dL    Alkaline Phosphatase 80 40 - 129 U/L    ALT 13 10 - 40 U/L    AST 18 15 - 37 U/L   Troponin   Result Value Ref Range    Troponin <0.01 <0.01 ng/mL   Brain Natriuretic Peptide   Result Value Ref Range    Pro-BNP 1,982 (H) 0 - 124 pg/mL   Procalcitonin   Result Value Ref Range    Procalcitonin 0.09 0.00 - 0.15 ng/mL   EKG 12 Lead   Result Value Ref Range    Ventricular Rate 85 BPM    Atrial Rate 85 BPM    P-R Interval 160 ms    QRS Duration 126 ms    Q-T Interval 378 ms    QTc Calculation (Bazett) 449 ms    P Axis 61 degrees    R Axis -15 degrees    T Axis 35 degrees    Diagnosis Normal sinus rhythmRight bundle branch block        I estimate there is LOW risk for COMPARTMENT SYNDROME, DEEP VENOUS THROMBOSIS, SEPTIC ARTHRITIS, TENDON OR NEUROVASCULAR INJURY, thus I consider the discharge disposition reasonable. Jordana Mario and I have discussed the diagnosis and risks, and we agree with discharging home to follow-up with their primary doctor or the referral orthopedist. We also discussed returning to the Emergency Department immediately if new or worsening symptoms occur. We have discussed the symptoms which are most concerning (e.g., changing or worsening pain, numbness, weakness) that necessitate immediate return. Final Impression    1. Fall, initial encounter    2. Left hip pain    3. Contusion of lower back, initial encounter    4. Cough, unspecified type        Blood pressure (!) 132/54, pulse 78, temperature 97.9 °F (36.6 °C), temperature source Oral, resp. rate 18, height 5' 4\" (1.626 m), weight 146 lb (66.2 kg), SpO2 92 %. FINAL IMPRESSION      1. Fall, initial encounter    2. Left hip pain    3.  Contusion of lower back, initial encounter 4. Cough, unspecified type          DISPOSITION/PLAN   DISPOSITION Decision To Discharge 12/27/2022 11:48:21 PM      PATIENT REFERRED TO:  [facility]) Pre-Services  [phone]  Schedule an appointment as soon as possible for a visit in 2 days      [facility] Emergency Department  [address] [place] [address]  [phone]    As needed, If symptoms worsen      DISCHARGE MEDICATIONS:  New Prescriptions    No medications on file       DISCONTINUED MEDICATIONS:  Discontinued Medications    No medications on file              (Please note that portions of this note were completed with a voice recognition program.  Efforts were made to edit the dictations but occasionally words are mis-transcribed.)    Grant Griffin PA-C (electronically signed)            Grant Griffin PA-C  12/28/22 0103

## 2022-12-28 NOTE — ED PROVIDER NOTES
EKG Interpretation    Interpreted by emergency department physician    Rhythm: normal sinus   Rate: normal  Axis: normal  Ectopy: none  Conduction: right bundle branch block (incomplete)  ST Segments: no acute change  T Waves: no acute change  Q Waves: none    Clinical Impression: Normal sinus rhythm    MD Shira Cummings MD  12/27/22 8985

## 2022-12-28 NOTE — ED NOTES
Pt observed sleeping in bed, VSS, report given to Raisa Villegas who will assume care, Pt waiting on transport back to facility.        Woody Ornelas RN  12/28/22 0396

## 2022-12-28 NOTE — ED NOTES
Pt ambulate in a alexander w a walker (baseline). Tolerated well.      Sruthi Degroot, RN  12/27/22 2160

## 2023-01-05 ENCOUNTER — APPOINTMENT (OUTPATIENT)
Dept: GENERAL RADIOLOGY | Age: 73
DRG: 690 | End: 2023-01-05
Payer: COMMERCIAL

## 2023-01-05 ENCOUNTER — HOSPITAL ENCOUNTER (INPATIENT)
Age: 73
LOS: 3 days | Discharge: HOME | DRG: 690 | End: 2023-01-08
Attending: EMERGENCY MEDICINE | Admitting: INTERNAL MEDICINE
Payer: COMMERCIAL

## 2023-01-05 DIAGNOSIS — R53.83 OTHER FATIGUE: ICD-10-CM

## 2023-01-05 DIAGNOSIS — N39.0 URINARY TRACT INFECTION WITHOUT HEMATURIA, SITE UNSPECIFIED: Primary | ICD-10-CM

## 2023-01-05 DIAGNOSIS — E87.5 HYPERKALEMIA: ICD-10-CM

## 2023-01-05 PROBLEM — D72.829 LEUKOCYTOSIS: Status: ACTIVE | Noted: 2023-01-05

## 2023-01-05 LAB
A/G RATIO: 1.5 (ref 1.1–2.2)
ALBUMIN SERPL-MCNC: 4.7 G/DL (ref 3.4–5)
ALP BLD-CCNC: 106 U/L (ref 40–129)
ALT SERPL-CCNC: 7 U/L (ref 10–40)
ANION GAP SERPL CALCULATED.3IONS-SCNC: 11 MMOL/L (ref 3–16)
AST SERPL-CCNC: 15 U/L (ref 15–37)
BACTERIA: ABNORMAL /HPF
BASOPHILS ABSOLUTE: 0 K/UL (ref 0–0.2)
BASOPHILS RELATIVE PERCENT: 0.2 %
BILIRUB SERPL-MCNC: 0.6 MG/DL (ref 0–1)
BILIRUBIN URINE: NEGATIVE
BLOOD, URINE: NEGATIVE
BUN BLDV-MCNC: 27 MG/DL (ref 7–20)
CALCIUM SERPL-MCNC: 10 MG/DL (ref 8.3–10.6)
CHLORIDE BLD-SCNC: 104 MMOL/L (ref 99–110)
CLARITY: ABNORMAL
CO2: 21 MMOL/L (ref 21–32)
COLOR: YELLOW
CREAT SERPL-MCNC: 1.4 MG/DL (ref 0.8–1.3)
EOSINOPHILS ABSOLUTE: 0.1 K/UL (ref 0–0.6)
EOSINOPHILS RELATIVE PERCENT: 1 %
EPITHELIAL CELLS, UA: 0 /HPF (ref 0–5)
GFR SERPL CREATININE-BSD FRML MDRD: 53 ML/MIN/{1.73_M2}
GLUCOSE BLD-MCNC: 171 MG/DL (ref 70–99)
GLUCOSE BLD-MCNC: 197 MG/DL (ref 70–99)
GLUCOSE URINE: NEGATIVE MG/DL
HCT VFR BLD CALC: 31.8 % (ref 40.5–52.5)
HEMOGLOBIN: 10.5 G/DL (ref 13.5–17.5)
HYALINE CASTS: 3 /LPF (ref 0–8)
KETONES, URINE: NEGATIVE MG/DL
LACTIC ACID: 1.1 MMOL/L (ref 0.4–2)
LEUKOCYTE ESTERASE, URINE: ABNORMAL
LYMPHOCYTES ABSOLUTE: 0.8 K/UL (ref 1–5.1)
LYMPHOCYTES RELATIVE PERCENT: 7.1 %
MCH RBC QN AUTO: 29.4 PG (ref 26–34)
MCHC RBC AUTO-ENTMCNC: 32.9 G/DL (ref 31–36)
MCV RBC AUTO: 89.4 FL (ref 80–100)
MICROSCOPIC EXAMINATION: YES
MONOCYTES ABSOLUTE: 0.9 K/UL (ref 0–1.3)
MONOCYTES RELATIVE PERCENT: 7.6 %
NEUTROPHILS ABSOLUTE: 9.5 K/UL (ref 1.7–7.7)
NEUTROPHILS RELATIVE PERCENT: 84.1 %
NITRITE, URINE: NEGATIVE
PDW BLD-RTO: 15.1 % (ref 12.4–15.4)
PERFORMED ON: ABNORMAL
PH UA: 5 (ref 5–8)
PLATELET # BLD: 300 K/UL (ref 135–450)
PMV BLD AUTO: 7.6 FL (ref 5–10.5)
POTASSIUM REFLEX MAGNESIUM: 5.7 MMOL/L (ref 3.5–5.1)
PROTEIN UA: 30 MG/DL
RAPID INFLUENZA  B AGN: NEGATIVE
RAPID INFLUENZA A AGN: NEGATIVE
RBC # BLD: 3.56 M/UL (ref 4.2–5.9)
RBC UA: 0 /HPF (ref 0–4)
SARS-COV-2, NAAT: NOT DETECTED
SODIUM BLD-SCNC: 136 MMOL/L (ref 136–145)
SPECIFIC GRAVITY UA: 1.01 (ref 1–1.03)
TOTAL CK: 43 U/L (ref 39–308)
TOTAL PROTEIN: 7.8 G/DL (ref 6.4–8.2)
TROPONIN: <0.01 NG/ML
URINE REFLEX TO CULTURE: YES
URINE TYPE: ABNORMAL
UROBILINOGEN, URINE: 0.2 E.U./DL
WBC # BLD: 11.3 K/UL (ref 4–11)
WBC UA: 208 /HPF (ref 0–5)

## 2023-01-05 PROCEDURE — 87186 SC STD MICRODIL/AGAR DIL: CPT

## 2023-01-05 PROCEDURE — 93005 ELECTROCARDIOGRAM TRACING: CPT | Performed by: EMERGENCY MEDICINE

## 2023-01-05 PROCEDURE — 36415 COLL VENOUS BLD VENIPUNCTURE: CPT

## 2023-01-05 PROCEDURE — 80053 COMPREHEN METABOLIC PANEL: CPT

## 2023-01-05 PROCEDURE — 81001 URINALYSIS AUTO W/SCOPE: CPT

## 2023-01-05 PROCEDURE — 6370000000 HC RX 637 (ALT 250 FOR IP): Performed by: EMERGENCY MEDICINE

## 2023-01-05 PROCEDURE — 87086 URINE CULTURE/COLONY COUNT: CPT

## 2023-01-05 PROCEDURE — 85025 COMPLETE CBC W/AUTO DIFF WBC: CPT

## 2023-01-05 PROCEDURE — 87804 INFLUENZA ASSAY W/OPTIC: CPT

## 2023-01-05 PROCEDURE — 99285 EMERGENCY DEPT VISIT HI MDM: CPT

## 2023-01-05 PROCEDURE — 83605 ASSAY OF LACTIC ACID: CPT

## 2023-01-05 PROCEDURE — 2580000003 HC RX 258: Performed by: INTERNAL MEDICINE

## 2023-01-05 PROCEDURE — 6370000000 HC RX 637 (ALT 250 FOR IP): Performed by: INTERNAL MEDICINE

## 2023-01-05 PROCEDURE — 82550 ASSAY OF CK (CPK): CPT

## 2023-01-05 PROCEDURE — 84484 ASSAY OF TROPONIN QUANT: CPT

## 2023-01-05 PROCEDURE — 2580000003 HC RX 258: Performed by: PHYSICIAN ASSISTANT

## 2023-01-05 PROCEDURE — 96365 THER/PROPH/DIAG IV INF INIT: CPT

## 2023-01-05 PROCEDURE — 87635 SARS-COV-2 COVID-19 AMP PRB: CPT

## 2023-01-05 PROCEDURE — 6360000002 HC RX W HCPCS: Performed by: PHYSICIAN ASSISTANT

## 2023-01-05 PROCEDURE — 71045 X-RAY EXAM CHEST 1 VIEW: CPT

## 2023-01-05 PROCEDURE — 1200000000 HC SEMI PRIVATE

## 2023-01-05 PROCEDURE — 96361 HYDRATE IV INFUSION ADD-ON: CPT

## 2023-01-05 PROCEDURE — 87077 CULTURE AEROBIC IDENTIFY: CPT

## 2023-01-05 RX ORDER — INSULIN LISPRO 100 [IU]/ML
0-4 INJECTION, SOLUTION INTRAVENOUS; SUBCUTANEOUS NIGHTLY
Status: DISCONTINUED | OUTPATIENT
Start: 2023-01-05 | End: 2023-01-09 | Stop reason: HOSPADM

## 2023-01-05 RX ORDER — SODIUM CHLORIDE 9 MG/ML
INJECTION, SOLUTION INTRAVENOUS CONTINUOUS
Status: DISCONTINUED | OUTPATIENT
Start: 2023-01-05 | End: 2023-01-09 | Stop reason: HOSPADM

## 2023-01-05 RX ORDER — ACETAMINOPHEN 325 MG/1
650 TABLET ORAL EVERY 6 HOURS PRN
Status: DISCONTINUED | OUTPATIENT
Start: 2023-01-05 | End: 2023-01-09 | Stop reason: HOSPADM

## 2023-01-05 RX ORDER — CETIRIZINE HYDROCHLORIDE 10 MG/1
10 TABLET ORAL DAILY
Status: DISCONTINUED | OUTPATIENT
Start: 2023-01-06 | End: 2023-01-09 | Stop reason: HOSPADM

## 2023-01-05 RX ORDER — SODIUM CHLORIDE 0.9 % (FLUSH) 0.9 %
5-40 SYRINGE (ML) INJECTION PRN
Status: DISCONTINUED | OUTPATIENT
Start: 2023-01-05 | End: 2023-01-09 | Stop reason: HOSPADM

## 2023-01-05 RX ORDER — SODIUM CHLORIDE 0.9 % (FLUSH) 0.9 %
5-40 SYRINGE (ML) INJECTION EVERY 12 HOURS SCHEDULED
Status: DISCONTINUED | OUTPATIENT
Start: 2023-01-05 | End: 2023-01-09 | Stop reason: HOSPADM

## 2023-01-05 RX ORDER — ACETAMINOPHEN 650 MG/1
650 SUPPOSITORY RECTAL EVERY 6 HOURS PRN
Status: DISCONTINUED | OUTPATIENT
Start: 2023-01-05 | End: 2023-01-09 | Stop reason: HOSPADM

## 2023-01-05 RX ORDER — FLUTICASONE PROPIONATE 50 MCG
1 SPRAY, SUSPENSION (ML) NASAL DAILY
Status: DISCONTINUED | OUTPATIENT
Start: 2023-01-06 | End: 2023-01-09 | Stop reason: HOSPADM

## 2023-01-05 RX ORDER — QUETIAPINE FUMARATE 25 MG/1
12.5 TABLET, FILM COATED ORAL 2 TIMES DAILY
Status: DISCONTINUED | OUTPATIENT
Start: 2023-01-05 | End: 2023-01-09 | Stop reason: HOSPADM

## 2023-01-05 RX ORDER — ENOXAPARIN SODIUM 100 MG/ML
40 INJECTION SUBCUTANEOUS DAILY
Status: DISCONTINUED | OUTPATIENT
Start: 2023-01-05 | End: 2023-01-09 | Stop reason: HOSPADM

## 2023-01-05 RX ORDER — 0.9 % SODIUM CHLORIDE 0.9 %
1000 INTRAVENOUS SOLUTION INTRAVENOUS ONCE
Status: COMPLETED | OUTPATIENT
Start: 2023-01-05 | End: 2023-01-05

## 2023-01-05 RX ORDER — DEXTROSE MONOHYDRATE 100 MG/ML
INJECTION, SOLUTION INTRAVENOUS CONTINUOUS PRN
Status: DISCONTINUED | OUTPATIENT
Start: 2023-01-05 | End: 2023-01-09 | Stop reason: HOSPADM

## 2023-01-05 RX ORDER — INSULIN LISPRO 100 [IU]/ML
0-8 INJECTION, SOLUTION INTRAVENOUS; SUBCUTANEOUS
Status: DISCONTINUED | OUTPATIENT
Start: 2023-01-06 | End: 2023-01-09 | Stop reason: HOSPADM

## 2023-01-05 RX ORDER — SODIUM CHLORIDE 9 MG/ML
INJECTION, SOLUTION INTRAVENOUS PRN
Status: DISCONTINUED | OUTPATIENT
Start: 2023-01-05 | End: 2023-01-09 | Stop reason: HOSPADM

## 2023-01-05 RX ORDER — ATORVASTATIN CALCIUM 80 MG/1
80 TABLET, FILM COATED ORAL NIGHTLY
Status: DISCONTINUED | OUTPATIENT
Start: 2023-01-05 | End: 2023-01-09 | Stop reason: HOSPADM

## 2023-01-05 RX ORDER — MIRTAZAPINE 15 MG/1
7.5 TABLET, FILM COATED ORAL NIGHTLY
Status: DISCONTINUED | OUTPATIENT
Start: 2023-01-05 | End: 2023-01-09 | Stop reason: HOSPADM

## 2023-01-05 RX ORDER — FERROUS SULFATE 325(65) MG
325 TABLET ORAL
Status: DISCONTINUED | OUTPATIENT
Start: 2023-01-06 | End: 2023-01-09 | Stop reason: HOSPADM

## 2023-01-05 RX ORDER — PAROXETINE HYDROCHLORIDE 20 MG/1
40 TABLET, FILM COATED ORAL DAILY
Status: DISCONTINUED | OUTPATIENT
Start: 2023-01-06 | End: 2023-01-09 | Stop reason: HOSPADM

## 2023-01-05 RX ORDER — ONDANSETRON 2 MG/ML
4 INJECTION INTRAMUSCULAR; INTRAVENOUS EVERY 6 HOURS PRN
Status: DISCONTINUED | OUTPATIENT
Start: 2023-01-05 | End: 2023-01-09 | Stop reason: HOSPADM

## 2023-01-05 RX ORDER — CARVEDILOL 6.25 MG/1
6.25 TABLET ORAL 2 TIMES DAILY WITH MEALS
Status: DISCONTINUED | OUTPATIENT
Start: 2023-01-05 | End: 2023-01-09 | Stop reason: HOSPADM

## 2023-01-05 RX ORDER — TRAZODONE HYDROCHLORIDE 150 MG/1
150 TABLET ORAL NIGHTLY
Status: DISCONTINUED | OUTPATIENT
Start: 2023-01-05 | End: 2023-01-09 | Stop reason: HOSPADM

## 2023-01-05 RX ORDER — HYDRALAZINE HYDROCHLORIDE 25 MG/1
25 TABLET, FILM COATED ORAL EVERY 8 HOURS SCHEDULED
Status: DISCONTINUED | OUTPATIENT
Start: 2023-01-05 | End: 2023-01-09 | Stop reason: HOSPADM

## 2023-01-05 RX ORDER — ASPIRIN 81 MG/1
81 TABLET ORAL DAILY
Status: DISCONTINUED | OUTPATIENT
Start: 2023-01-06 | End: 2023-01-09 | Stop reason: HOSPADM

## 2023-01-05 RX ORDER — POLYETHYLENE GLYCOL 3350 17 G/17G
17 POWDER, FOR SOLUTION ORAL DAILY PRN
Status: DISCONTINUED | OUTPATIENT
Start: 2023-01-05 | End: 2023-01-09 | Stop reason: HOSPADM

## 2023-01-05 RX ORDER — ONDANSETRON 4 MG/1
4 TABLET, ORALLY DISINTEGRATING ORAL EVERY 8 HOURS PRN
Status: DISCONTINUED | OUTPATIENT
Start: 2023-01-05 | End: 2023-01-09 | Stop reason: HOSPADM

## 2023-01-05 RX ADMIN — CARVEDILOL 6.25 MG: 6.25 TABLET, FILM COATED ORAL at 23:59

## 2023-01-05 RX ADMIN — TRAZODONE HYDROCHLORIDE 150 MG: 150 TABLET ORAL at 23:59

## 2023-01-05 RX ADMIN — SODIUM CHLORIDE 1000 ML: 9 INJECTION, SOLUTION INTRAVENOUS at 15:17

## 2023-01-05 RX ADMIN — HYDRALAZINE HYDROCHLORIDE 25 MG: 25 TABLET, FILM COATED ORAL at 23:59

## 2023-01-05 RX ADMIN — SODIUM ZIRCONIUM CYCLOSILICATE 5 G: 5 POWDER, FOR SUSPENSION ORAL at 17:41

## 2023-01-05 RX ADMIN — CEFTRIAXONE SODIUM 1000 MG: 1 INJECTION, POWDER, FOR SOLUTION INTRAMUSCULAR; INTRAVENOUS at 17:38

## 2023-01-05 RX ADMIN — QUETIAPINE FUMARATE 12.5 MG: 25 TABLET ORAL at 23:59

## 2023-01-05 RX ADMIN — MIRTAZAPINE 7.5 MG: 15 TABLET, FILM COATED ORAL at 23:59

## 2023-01-05 RX ADMIN — SODIUM CHLORIDE: 9 INJECTION, SOLUTION INTRAVENOUS at 23:54

## 2023-01-05 ASSESSMENT — PAIN - FUNCTIONAL ASSESSMENT: PAIN_FUNCTIONAL_ASSESSMENT: 0-10

## 2023-01-05 ASSESSMENT — PAIN SCALES - GENERAL
PAINLEVEL_OUTOF10: 0
PAINLEVEL_OUTOF10: 0

## 2023-01-05 NOTE — H&P
HOSPITALISTS HISTORY AND PHYSICAL    1/5/2023 6:16 PM    Patient Information:  Jean Fitch is a 67 y.o. male [id number]  PCP:  DANYELLE Guerrero (Tel: [phone] )    Chief complaint:    Chief Complaint   Patient presents with    Fatigue     Pt brought in by [facility] from [facility] due to weakness and lethargy with dizziness, during triage Pt denies any complaints at this time, VSS and afebrile. Does have psych hx. History of Present Illness:  Debbie Garvey is a 67 y.o. male with history of DM 2, CAD, CKD 3, HTN who was sent from [address] [place] [address] for weakness, fatigue and dizziness. Patient has not been able to get out of bed. Was seen in ED on 12/27/22 for falls and discharged back to L[facility]. Patient found to have UTI and hyperkalemia in ED. No CP, SOB, HA, abdominal pain or fevers. Otherwise complete ROS is negative unless listed above. REVIEW OF SYSTEMS:   Pertinent positives as noted in HPI. All other systems were reviewed and are negative. Past Medical History:   has a past medical history of Arthritis, Ataxia, BPH (benign prostatic hyperplasia), CAD (coronary artery disease), Coronary atherosclerosis of native coronary artery, Diabetes mellitus ([address] Environmental allergies, Hepatitis, Hydronephrosis, Hyperlipidemia, Hypertension, Kidney disease, Neuropathy, Parkinson's disease ([address] S/P coronary artery stent placement x 4, and Schizoaffective disorder, bipolar type ([address] Past Surgical History:   has a past surgical history that includes Coronary angioplasty; Coronary angioplasty with stent; hernia repair; Vasectomy; Cystocopy (4/4/14); Ankle surgery (Right, 06/09/2018); Upper gastrointestinal endoscopy (N/A, 12/13/2018); Colonoscopy (N/A, 12/13/2018); Upper gastrointestinal endoscopy (N/A, 5/3/2019); and ORIF DISTAL RADIUS FRACTURE (Left, 6/20/2019). Medications:  No current facility-administered medications on file prior to encounter. Current Outpatient Medications on File Prior to Encounter   Medication Sig Dispense Refill    hydrALAZINE (APRESOLINE) 25 MG tablet Take 1 tablet by mouth every 8 hours Hold for BP<120 90 tablet 1    simethicone (MYLICON) 102 MG chewable tablet Take 125 mg by mouth every morning      guaiFENesin (ROBITUSSIN) 100 MG/5ML SOLN oral solution Take 400 mg by mouth every 4 hours as needed for Cough      loratadine (CLARITIN) 10 MG tablet Take 10 mg by mouth daily      mirtazapine (REMERON) 7.5 MG tablet Take 7.5 mg by mouth nightly      acetaminophen (TYLENOL) 500 MG tablet Take 1,000 mg by mouth every 6 hours as needed       carvedilol (COREG) 6.25 MG tablet Take 1 tablet by mouth 2 times daily (with meals) 30 tablet 0    QUEtiapine (SEROQUEL) 25 MG tablet Take 12.5 mg by mouth 2 times daily      gabapentin (NEURONTIN) 300 MG capsule Take 1 capsule by mouth 3 times daily for 3 days. 9 capsule 0    pantoprazole (PROTONIX) 40 MG tablet Take one tablet daily 90 tablet 0    ASPIRIN LOW DOSE 81 MG EC tablet TAKE 1 TABLET BY MOUTH ONCE DAILY 30 tablet 10    metFORMIN (GLUCOPHAGE) 1000 MG tablet Take 1 tablet by mouth 2 times daily (with meals)       PARoxetine (PAXIL) 40 MG tablet Take 1 tablet by mouth daily      ferrous sulfate 325 (65 Fe) MG tablet Take 1 tablet by mouth 2 times daily (Patient taking differently: Take 325 mg by mouth 3 times daily (with meals) ) 60 tablet 11    atorvastatin (LIPITOR) 80 MG tablet TAKE 1 TABLET BY MOUTH AT BEDTIME 90 tablet 0    traZODone (DESYREL) 100 MG tablet Take 150 mg by mouth nightly       fluticasone (FLONASE) 50 MCG/ACT nasal spray 1 spray by Nasal route daily          Allergies:  No Known Allergies     Social History:  Patient Lives at [address] [place] [address]   reports that he quit smoking about 9 years ago. His smoking use included cigarettes. He has a 25.00 pack-year smoking history.  He has never used smokeless tobacco. He reports that he does not drink alcohol and does not use drugs. Family History:  family history includes Heart Disease in his father; Other in his mother. ,    Physical Exam:  /65   Pulse 82   Temp 97.3 °F (36.3 °C) (Oral)   Resp 26   Ht 5' 4\" (1.626 m)   Wt 136 lb (61.7 kg)   SpO2 93%   BMI 23.34 kg/m²     General appearance:  Appears comfortable. Chronically ill appearing. NAD  Eyes: Sclera clear, pupils equal  ENT: Moist mucus membranes, no thrush. Trachea midline. Cardiovascular: Regular rhythm, normal S1, S2. No murmur, gallop, rub. No edema in lower extremities  Respiratory: Clear to auscultation bilaterally, no wheeze, good inspiratory effort  Gastrointestinal: Abdomen soft, non-tender, not distended, normal bowel sounds  Musculoskeletal: No cyanosis in digits, neck supple  Neurology: Cranial nerves grossly intact. Alert and oriented in time, place and person. No speech or motor deficits  Psychiatry: Appropriate affect.  Not agitated  Skin: Warm, dry, normal turgor, no rash  Brisk capillary refill, peripheral pulses palpable   Labs:  CBC:   Lab Results   Component Value Date/Time    WBC 11.3 01/05/2023 03:00 PM    RBC 3.56 01/05/2023 03:00 PM    HGB 10.5 01/05/2023 03:00 PM    HCT 31.8 01/05/2023 03:00 PM    MCV 89.4 01/05/2023 03:00 PM    MCH 29.4 01/05/2023 03:00 PM    MCHC 32.9 01/05/2023 03:00 PM    RDW 15.1 01/05/2023 03:00 PM     01/05/2023 03:00 PM    MPV 7.6 01/05/2023 03:00 PM     BMP:    Lab Results   Component Value Date/Time     01/05/2023 03:00 PM    K 5.7 01/05/2023 03:00 PM     01/05/2023 03:00 PM    CO2 21 01/05/2023 03:00 PM    BUN 27 01/05/2023 03:00 PM    CREATININE 1.4 01/05/2023 03:00 PM    CALCIUM 10.0 01/05/2023 03:00 PM    GFRAA >60 05/31/2022 05:38 AM    GFRAA >60 05/11/2013 03:45 AM    LABGLOM 53 01/05/2023 03:00 PM    GLUCOSE 197 01/05/2023 03:00 PM     XR CHEST PORTABLE   Final Result   Small lung volumes with mild bibasilar airspace opacities that could reflect   associated atelectasis, but pneumonia cannot be excluded. Problem List  Principal Problem:    UTI (urinary tract infection)  Active Problems:    Leukocytosis    DM2 (diabetes mellitus, type 2) (HCC)    Coronary artery disease involving native coronary artery of native heart without angina pectoris    Essential hypertension    CKD (chronic kidney disease), stage III (HCC)    Anemia    Hyperkalemia  Resolved Problems:    * No resolved hospital problems. *        Assessment/Plan:   UTI  Start IV Rocephin  IVF  F/U UCx  Monitor for PVR. Place Corey if > 300 mL    2. Hyperkalemia   -    Given Gearlean Prayer in ED   -    Repeat BMP in AM   -    Keep on telemetry to monitor for arrhythmia    3. DM 2   -    Continue MTF   - SSI    4. HTN   - Continue Coreg and Hydralazine    5. CAD   - Continue ASA and Lipitor     6. CKD 3   - IVF   - Monitor Cr daily      DVT prophylaxis Lovenox  Code status Full code  Diet Dysphagia  IV access Peripheral   Corey Catheter No    Admit as inpatient. I anticipate hospitalization spanning more than two midnights for investigation and treatment of the above medically necessary diagnoses. Discussed with patient.     Álvaro Young MD    1/5/2023 6:16 PM

## 2023-01-05 NOTE — ED PROVIDER NOTES
[address]        Pt Name: Freida You  MRN: [id number]  [place] 1950  Date of evaluation: 1/5/2023  Provider: ZEESHAN Gong  PCP: DANYELLE LINARES  Note Started: 3:12 PM EST 1/5/23       I have seen and evaluated this patient with my supervising physician Michelle Rodriguez MD.      [address] [place] [address]       Chief Complaint   Patient presents with    Fatigue     Pt brought in by [facility] from [facility] due to weakness and lethargy with dizziness, during triage Pt denies any complaints at this time, VSS and afebrile. Does have psych hx. HISTORY OF PRESENT ILLNESS: 1 or more Elements     History from : Patient and EMS    Limitations to history : psychiatric hx making history taking difficult    Freida You is a 67 y.o. male who presents to the emergency department from [facility] due to concern for weakness, lethargy and dizziness. When asking the patient he states that he does not have any complaints at this time. He does state that he was trying to get out of bed today and tried to get into his chair but was unable to get into his chair due to him keep on falling. Patient has any leg pain lower back pain fevers or chills. Old records reviewed from December 27, 2022 when he was here in the emergency department for falls work-up at that time included a CT scan of the cervical spine, head and lumbar spine which were all unremarkable he also had x-rays of the chest and hip at that time which did not reveal any acute abnormalities. Patient was ultimately discharged with a benign work-up back to his care facility. Patient is hard of hearing at times but is able to follow my commands and move extremities. At the end of my initial examination he was talking to the nurse and did state that he had some neck pain. Nursing Notes were all reviewed and agreed with or any disagreements were addressed in the HPI.     REVIEW OF SYSTEMS :     Review of Systems    Positives and Pertinent negatives as per HPI.     SURGICAL HISTORY     Past Surgical History:   Procedure Laterality Date    ANKLE SURGERY Right 06/09/2018    ORIF of R ankle     COLONOSCOPY N/A 12/13/2018    COLONOSCOPY CONTROL HEMORRHAGE performed by Kamran Magallanes MD at [facility] ENDOSCOPY    CORONARY ANGIOPLASTY      CORONARY ANGIOPLASTY WITH STENT PLACEMENT      x 4    CYSTOSCOPY  4/4/14    transurethral vaporization of prostate    HERNIA REPAIR      X2    ORIF DISTAL RADIUS FRACTURE Left 6/20/2019    LEFT DISTAL RADIUS OPEN REDUCTION INTERNAL FIXATION   performed by Teddy Go MD at [facility] OR    UPPER GASTROINTESTINAL ENDOSCOPY N/A 12/13/2018    EGD BIOPSY performed by Kamran Magallanes MD at [facility] ENDOSCOPY    UPPER GASTROINTESTINAL ENDOSCOPY N/A 5/3/2019    EGD WITH ANESTHESIA performed by Kamran Magallanes MD at [facility] ENDOSCOPY    VASECTOMY         CURRENTMEDICATIONS       Current Discharge Medication List        CONTINUE these medications which have NOT CHANGED    Details   hydrALAZINE (APRESOLINE) 25 MG tablet Take 1 tablet by mouth every 8 hours Hold for BP<120  Qty: 90 tablet, Refills: 1      simethicone (MYLICON) 125 MG chewable tablet Take 125 mg by mouth every morning      guaiFENesin (ROBITUSSIN) 100 MG/5ML SOLN oral solution Take 400 mg by mouth every 4 hours as needed for Cough      loratadine (CLARITIN) 10 MG tablet Take 10 mg by mouth daily      mirtazapine (REMERON) 7.5 MG tablet Take 7.5 mg by mouth nightly      acetaminophen (TYLENOL) 500 MG tablet Take 1,000 mg by mouth every 6 hours as needed       carvedilol (COREG) 6.25 MG tablet Take 1 tablet by mouth 2 times daily (with meals)  Qty: 30 tablet, Refills: 0      QUEtiapine (SEROQUEL) 25 MG tablet Take 12.5 mg by mouth 2 times daily      gabapentin (NEURONTIN) 300 MG capsule Take 1 capsule by mouth 3 times daily for 3 days.  Qty: 9 capsule, Refills: 0     pantoprazole (PROTONIX) 40 MG tablet Take one tablet daily  Qty: 90 tablet, Refills: 0      ASPIRIN LOW DOSE 81 MG EC tablet TAKE 1 TABLET BY MOUTH ONCE DAILY  Qty: 30 tablet, Refills: 10      metFORMIN (GLUCOPHAGE) 1000 MG tablet Take 1 tablet by mouth 2 times daily (with meals)       PARoxetine (PAXIL) 40 MG tablet Take 1 tablet by mouth daily      ferrous sulfate 325 (65 Fe) MG tablet Take 1 tablet by mouth 2 times daily  Qty: 60 tablet, Refills: 11      atorvastatin (LIPITOR) 80 MG tablet TAKE 1 TABLET BY MOUTH AT BEDTIME  Qty: 90 tablet, Refills: 0      traZODone (DESYREL) 100 MG tablet Take 150 mg by mouth nightly       fluticasone (FLONASE) 50 MCG/ACT nasal spray 1 spray by Nasal route daily              ALLERGIES     Patient has no known allergies. FAMILYHISTORY       Family History   Problem Relation Age of Onset    Other Mother         CEREBRAL PALSY    Heart Disease Father         CHF        SOCIAL HISTORY       Social History     Tobacco Use    Smoking status: Former     Packs/day: 1.00     Years: 25.00     Pack years: 25.00     Types: Cigarettes     Quit date: 5/10/2013     Years since quittin.6    Smokeless tobacco: Never   Vaping Use    Vaping Use: Never used   Substance Use Topics    Alcohol use: No    Drug use: No       SCREENINGS        [place] Coma Scale  Eye Opening: Spontaneous  Best Verbal Response: Oriented  Best Motor Response: Obeys commands  [place] Coma Scale Score: 15                CIWA Assessment  BP: (!) 144/62  Heart Rate: 84           PHYSICAL EXAM  1 or more Elements     ED Triage Vitals [23 1445]   BP Temp Temp Source Heart Rate Resp SpO2 Height Weight   (!) 129/58 97.3 °F (36.3 °C) Oral 88 18 97 % 5' 4\" (1.626 m) 136 lb (61.7 kg)       Physical Exam  Vitals and nursing note reviewed. Constitutional:       Appearance: He is normal weight. Comments: Patient skin appears pale   HENT:      Head: Normocephalic.       Mouth/Throat:      Mouth: Mucous membranes are dry.      Pharynx: Oropharynx is clear. No oropharyngeal exudate or posterior oropharyngeal erythema. Eyes:      Pupils: Pupils are equal, round, and reactive to light. Comments: Conjunctivea pale   Cardiovascular:      Rate and Rhythm: Normal rate and regular rhythm. Pulses: Normal pulses. Heart sounds: Normal heart sounds. Pulmonary:      Effort: Pulmonary effort is normal.      Breath sounds: Normal breath sounds. Abdominal:      General: Bowel sounds are normal. There is no distension. Palpations: There is no mass. Tenderness: There is no abdominal tenderness. Musculoskeletal:      Cervical back: Normal range of motion and neck supple. No rigidity or tenderness. Right lower leg: No edema. Left lower leg: No edema. Comments: Bilateral posterior tibialis pulses equal intact 2+. Bilateral radial pulses equal intact 2+. Skin:     Capillary Refill: Capillary refill takes less than 2 seconds. Findings: No bruising, erythema, lesion or rash. Neurological:      General: No focal deficit present. Mental Status: He is alert. Cranial Nerves: No cranial nerve deficit. Sensory: No sensory deficit. Motor: No weakness, tremor, atrophy, abnormal muscle tone, seizure activity or pronator drift. Comments: Patient is able to move extremities without any difficulty or pain.    Psychiatric:         Mood and Affect: Mood normal.         Behavior: Behavior normal.           DIAGNOSTIC RESULTS   LABS:    Labs Reviewed   CBC WITH AUTO DIFFERENTIAL - Abnormal; Notable for the following components:       Result Value    WBC 11.3 (*)     RBC 3.56 (*)     Hemoglobin 10.5 (*)     Hematocrit 31.8 (*)     Neutrophils Absolute 9.5 (*)     Lymphocytes Absolute 0.8 (*)     All other components within normal limits   COMPREHENSIVE METABOLIC PANEL W/ REFLEX TO MG FOR LOW K - Abnormal; Notable for the following components:    Potassium reflex Magnesium 5.7 (*) Glucose 197 (*)     BUN 27 (*)     Creatinine 1.4 (*)     Est, Glom Filt Rate 53 (*)     ALT 7 (*)     All other components within normal limits   URINALYSIS WITH REFLEX TO CULTURE - Abnormal; Notable for the following components:    Clarity, UA CLOUDY (*)     Protein, UA 30 (*)     Leukocyte Esterase, Urine LARGE (*)     All other components within normal limits   MICROSCOPIC URINALYSIS - Abnormal; Notable for the following components:    Bacteria, UA 4+ (*)     WBC,  (*)     All other components within normal limits   POCT GLUCOSE - Abnormal; Notable for the following components:    POC Glucose 171 (*)     All other components within normal limits   COVID-19, RAPID   RAPID INFLUENZA A/B ANTIGENS   CULTURE, URINE   LACTIC ACID   CK   TROPONIN   BASIC METABOLIC PANEL W/ REFLEX TO MG FOR LOW K   CBC WITH AUTO DIFFERENTIAL   HEMOGLOBIN A1C   POCT GLUCOSE   POCT GLUCOSE   POCT GLUCOSE       When ordered only abnormal lab results are displayed. All other labs were within normal range or not returned as of this dictation. EKG: When ordered, EKG's are interpreted by the Emergency Department Physician in the absence of a cardiologist.  Please see their note for interpretation of EKG. RADIOLOGY:   Non-plain film images such as CT, Ultrasound and MRI are read by the radiologist. Plain radiographic images are visualized and preliminarily interpreted by the ED Provider with the below findings:        Interpretation per the Radiologist below, if available at the time of this note:    XR CHEST PORTABLE   Final Result   Small lung volumes with mild bibasilar airspace opacities that could reflect   associated atelectasis, but pneumonia cannot be excluded. No results found. No results found.     PROCEDURES   Unless otherwise noted below, none     Procedures    CRITICAL CARE TIME (.cctime)   I personally saw the patient and independently provided 30 minutes of non-concurrent critical care time out of the total critical care time provided. This excludes time spent doing separately billable procedures. This includes time at the bedside, data interpretation, medication management, obtaining critical history from collateral sources if the patient is unable to provide it directly, and physician consultation. Specifics of interventions taken and potentially life-threatening diagnostic considerations are listed above in the medical decision making. PAST MEDICAL HISTORY      has a past medical history of Arthritis, Ataxia (3/14/2014), BPH (benign prostatic hyperplasia), CAD (coronary artery disease), Coronary atherosclerosis of native coronary artery (5/11/2013), Diabetes mellitus ([facility] [facility] [address] Environmental allergies, Hepatitis, Hydronephrosis (3/14/2014), Hyperlipidemia, Hypertension, Kidney disease, Neuropathy, Parkinson's disease ([facility] [address] S/P coronary artery stent placement x 4, and Schizoaffective disorder, bipolar type ([facility] [address]      Chronic Conditions affecting Care: schizoaffective disorder    EMERGENCY DEPARTMENT COURSE and DIFFERENTIAL DIAGNOSIS/MDM:   Vitals:    Vitals:    01/05/23 1930 01/05/23 2000 01/05/23 2358 01/06/23 0033   BP: 135/62 119/60 (!) 146/74 (!) 144/62   Pulse: 77 89 85 84   Resp: 23 20 18    Temp:  97.8 °F (36.6 °C) 97.5 °F (36.4 °C) 97.9 °F (36.6 °C)   TempSrc:  Oral Oral Oral   SpO2: 94% 96% 95% 93%   Weight:       Height:           Patient was given the following medications:  Medications   aspirin EC tablet 81 mg (has no administration in time range)   atorvastatin (LIPITOR) tablet 80 mg (80 mg Oral Given 1/6/23 0001)   carvedilol (COREG) tablet 6.25 mg (6.25 mg Oral Given 1/5/23 2359)   ferrous sulfate (IRON 325) tablet 325 mg (has no administration in time range)   fluticasone (FLONASE) 50 MCG/ACT nasal spray 1 spray (has no administration in time range)   guaiFENesin (ROBITUSSIN) 100 MG/5ML oral solution 400 mg (has no administration in time range)   hydrALAZINE (APRESOLINE) tablet 25 mg (25 mg Oral Given 1/5/23 2359)   cetirizine (ZYRTEC) tablet 10 mg (has no administration in time range)   metFORMIN (GLUCOPHAGE) tablet 1,000 mg (has no administration in time range)   mirtazapine (REMERON) tablet 7.5 mg (7.5 mg Oral Given 1/5/23 2359)   PARoxetine (PAXIL) tablet 40 mg (has no administration in time range)   QUEtiapine (SEROQUEL) tablet 12.5 mg (12.5 mg Oral Given 1/5/23 2359)   traZODone (DESYREL) tablet 150 mg (150 mg Oral Given 1/5/23 2359)   sodium chloride flush 0.9 % injection 5-40 mL (10 mLs IntraVENous Given 1/6/23 0000)   sodium chloride flush 0.9 % injection 5-40 mL (has no administration in time range)   0.9 % sodium chloride infusion (has no administration in time range)   enoxaparin (LOVENOX) injection 40 mg (40 mg SubCUTAneous Given 1/6/23 0000)   ondansetron (ZOFRAN-ODT) disintegrating tablet 4 mg (has no administration in time range)     Or   ondansetron (ZOFRAN) injection 4 mg (has no administration in time range)   polyethylene glycol (GLYCOLAX) packet 17 g (has no administration in time range)   acetaminophen (TYLENOL) tablet 650 mg (has no administration in time range)     Or   acetaminophen (TYLENOL) suppository 650 mg (has no administration in time range)   0.9 % sodium chloride infusion ( IntraVENous New Bag 1/5/23 2354)   cefTRIAXone (ROCEPHIN) 1,000 mg in dextrose 5 % 50 mL IVPB mini-bag (has no administration in time range)   dextrose bolus 10% 125 mL (has no administration in time range)     Or   dextrose bolus 10% 250 mL (has no administration in time range)   glucagon (rDNA) injection 1 mg (has no administration in time range)   dextrose 10 % infusion (has no administration in time range)   insulin lispro (HUMALOG) injection vial 0-8 Units (has no administration in time range)   insulin lispro (HUMALOG) injection vial 0-4 Units (0 Units SubCUTAneous Not Given 1/5/23 2359)   0.9 % sodium chloride bolus (0 mLs IntraVENous Stopped 1/5/23 1630)   cefTRIAXone (ROCEPHIN) 1,000 mg in dextrose 5 % 50 mL IVPB mini-bag (0 mg IntraVENous Stopped 1/5/23 1808)   sodium zirconium cyclosilicate (LOKELMA) oral suspension 5 g (5 g Oral Given 1/5/23 1741)             Is this patient to be included in the SEP-1 Core Measure due to severe sepsis or septic shock?   No   Exclusion criteria - the patient is NOT to be included for SEP-1 Core Measure due to:  2+ SIRS criteria are not met    CONSULTS: (Who and What was discussed)  None  Discussion with Other Profesionals : None    Social Determinants : None    Records Reviewed : None    CC/HPI Summary, DDx, ED Course, and Reassessment: Presents to the ED via EMS from the [facility] due to fatigue and weakness that was noted today.    On initial examination patient was not having any acute complaints and states that he was weak and he cannot get into his chair that he want to get into today so they called the ambulance and brought him here. Denies any chest pain, shortness of breath difficulty breathing. Denies any headaches, head pain or neck pain.    CBC with elevated white count 11.3 hemoglobin 10.5  CMP potassium 5.7 without any EKG changes, creatinine 1.4  Urinalysis showing urinary tract infection unremarkable exam with 4+ bacteria and 208 white blood cells.    Patient given IV fluids, Rocephin, Lokelma here in the emergency department    Patient will be admitted to the hospital for further evaluation and treatment for UTI, hyperkalemia and fatigue       I am the Primary Clinician of Record.    FINAL IMPRESSION      1. Urinary tract infection without hematuria, site unspecified    2. Hyperkalemia    3. Other fatigue          DISPOSITION/PLAN     DISPOSITION Admitted 01/05/2023 06:15:45 PM      PATIENT REFERRED TO:  No follow-up provider specified.    DISCHARGE MEDICATIONS:  Current Discharge Medication List          DISCONTINUED MEDICATIONS:  Current Discharge Medication List                 (Please note that portions of this note were completed  with a voice recognition program.  Efforts were made to edit the dictations but occasionally words are mis-transcribed.)    ZEESHAN Merrill (electronically signed)           ZEESHAN Merrill  01/06/23 8146

## 2023-01-06 LAB
ANION GAP SERPL CALCULATED.3IONS-SCNC: 12 MMOL/L (ref 3–16)
BASOPHILS ABSOLUTE: 0 K/UL (ref 0–0.2)
BASOPHILS RELATIVE PERCENT: 0.2 %
BUN BLDV-MCNC: 24 MG/DL (ref 7–20)
CALCIUM SERPL-MCNC: 9.1 MG/DL (ref 8.3–10.6)
CHLORIDE BLD-SCNC: 104 MMOL/L (ref 99–110)
CO2: 21 MMOL/L (ref 21–32)
CREAT SERPL-MCNC: 1.1 MG/DL (ref 0.8–1.3)
EKG ATRIAL RATE: 90 BPM
EKG DIAGNOSIS: NORMAL
EKG P AXIS: 89 DEGREES
EKG P-R INTERVAL: 164 MS
EKG Q-T INTERVAL: 356 MS
EKG QRS DURATION: 128 MS
EKG QTC CALCULATION (BAZETT): 435 MS
EKG R AXIS: -28 DEGREES
EKG T AXIS: 116 DEGREES
EKG VENTRICULAR RATE: 90 BPM
EOSINOPHILS ABSOLUTE: 0.1 K/UL (ref 0–0.6)
EOSINOPHILS RELATIVE PERCENT: 1 %
ESTIMATED AVERAGE GLUCOSE: 142.7 MG/DL
GFR SERPL CREATININE-BSD FRML MDRD: >60 ML/MIN/{1.73_M2}
GLUCOSE BLD-MCNC: 117 MG/DL (ref 70–99)
GLUCOSE BLD-MCNC: 143 MG/DL (ref 70–99)
GLUCOSE BLD-MCNC: 158 MG/DL (ref 70–99)
GLUCOSE BLD-MCNC: 161 MG/DL (ref 70–99)
GLUCOSE BLD-MCNC: 284 MG/DL (ref 70–99)
HBA1C MFR BLD: 6.6 %
HCT VFR BLD CALC: 26.2 % (ref 40.5–52.5)
HEMOGLOBIN: 8.7 G/DL (ref 13.5–17.5)
LYMPHOCYTES ABSOLUTE: 0.9 K/UL (ref 1–5.1)
LYMPHOCYTES RELATIVE PERCENT: 9.7 %
MCH RBC QN AUTO: 29.2 PG (ref 26–34)
MCHC RBC AUTO-ENTMCNC: 33.2 G/DL (ref 31–36)
MCV RBC AUTO: 88 FL (ref 80–100)
MONOCYTES ABSOLUTE: 0.7 K/UL (ref 0–1.3)
MONOCYTES RELATIVE PERCENT: 7.4 %
NEUTROPHILS ABSOLUTE: 8 K/UL (ref 1.7–7.7)
NEUTROPHILS RELATIVE PERCENT: 81.7 %
PDW BLD-RTO: 14.8 % (ref 12.4–15.4)
PERFORMED ON: ABNORMAL
PLATELET # BLD: 260 K/UL (ref 135–450)
PMV BLD AUTO: 8 FL (ref 5–10.5)
POTASSIUM REFLEX MAGNESIUM: 4.3 MMOL/L (ref 3.5–5.1)
RBC # BLD: 2.98 M/UL (ref 4.2–5.9)
SODIUM BLD-SCNC: 137 MMOL/L (ref 136–145)
WBC # BLD: 9.8 K/UL (ref 4–11)

## 2023-01-06 PROCEDURE — 92526 ORAL FUNCTION THERAPY: CPT

## 2023-01-06 PROCEDURE — 97166 OT EVAL MOD COMPLEX 45 MIN: CPT

## 2023-01-06 PROCEDURE — 97530 THERAPEUTIC ACTIVITIES: CPT

## 2023-01-06 PROCEDURE — 92610 EVALUATE SWALLOWING FUNCTION: CPT

## 2023-01-06 PROCEDURE — 1200000000 HC SEMI PRIVATE

## 2023-01-06 PROCEDURE — 97162 PT EVAL MOD COMPLEX 30 MIN: CPT

## 2023-01-06 PROCEDURE — 80048 BASIC METABOLIC PNL TOTAL CA: CPT

## 2023-01-06 PROCEDURE — 93010 ELECTROCARDIOGRAM REPORT: CPT | Performed by: INTERNAL MEDICINE

## 2023-01-06 PROCEDURE — 36415 COLL VENOUS BLD VENIPUNCTURE: CPT

## 2023-01-06 PROCEDURE — 2580000003 HC RX 258: Performed by: INTERNAL MEDICINE

## 2023-01-06 PROCEDURE — 83036 HEMOGLOBIN GLYCOSYLATED A1C: CPT

## 2023-01-06 PROCEDURE — 97535 SELF CARE MNGMENT TRAINING: CPT

## 2023-01-06 PROCEDURE — 6370000000 HC RX 637 (ALT 250 FOR IP): Performed by: INTERNAL MEDICINE

## 2023-01-06 PROCEDURE — 85025 COMPLETE CBC W/AUTO DIFF WBC: CPT

## 2023-01-06 PROCEDURE — 6360000002 HC RX W HCPCS: Performed by: INTERNAL MEDICINE

## 2023-01-06 RX ADMIN — CEFTRIAXONE SODIUM 1000 MG: 1 INJECTION, POWDER, FOR SOLUTION INTRAMUSCULAR; INTRAVENOUS at 18:08

## 2023-01-06 RX ADMIN — INSULIN LISPRO 4 UNITS: 100 INJECTION, SOLUTION INTRAVENOUS; SUBCUTANEOUS at 12:25

## 2023-01-06 RX ADMIN — ENOXAPARIN SODIUM 40 MG: 100 INJECTION SUBCUTANEOUS at 00:00

## 2023-01-06 RX ADMIN — SODIUM CHLORIDE, PRESERVATIVE FREE 10 ML: 5 INJECTION INTRAVENOUS at 21:33

## 2023-01-06 RX ADMIN — TRAZODONE HYDROCHLORIDE 150 MG: 150 TABLET ORAL at 21:33

## 2023-01-06 RX ADMIN — HYDRALAZINE HYDROCHLORIDE 25 MG: 25 TABLET, FILM COATED ORAL at 05:02

## 2023-01-06 RX ADMIN — METFORMIN HYDROCHLORIDE 1000 MG: 500 TABLET ORAL at 17:58

## 2023-01-06 RX ADMIN — CARVEDILOL 6.25 MG: 6.25 TABLET, FILM COATED ORAL at 09:27

## 2023-01-06 RX ADMIN — FERROUS SULFATE TAB 325 MG (65 MG ELEMENTAL FE) 325 MG: 325 (65 FE) TAB at 09:26

## 2023-01-06 RX ADMIN — SODIUM CHLORIDE, PRESERVATIVE FREE 10 ML: 5 INJECTION INTRAVENOUS at 09:27

## 2023-01-06 RX ADMIN — ASPIRIN 81 MG: 81 TABLET, COATED ORAL at 09:27

## 2023-01-06 RX ADMIN — CARVEDILOL 6.25 MG: 6.25 TABLET, FILM COATED ORAL at 17:58

## 2023-01-06 RX ADMIN — FERROUS SULFATE TAB 325 MG (65 MG ELEMENTAL FE) 325 MG: 325 (65 FE) TAB at 12:26

## 2023-01-06 RX ADMIN — ENOXAPARIN SODIUM 40 MG: 100 INJECTION SUBCUTANEOUS at 09:26

## 2023-01-06 RX ADMIN — HYDRALAZINE HYDROCHLORIDE 25 MG: 25 TABLET, FILM COATED ORAL at 21:33

## 2023-01-06 RX ADMIN — ATORVASTATIN CALCIUM 80 MG: 80 TABLET, FILM COATED ORAL at 21:32

## 2023-01-06 RX ADMIN — SODIUM CHLORIDE, PRESERVATIVE FREE 10 ML: 5 INJECTION INTRAVENOUS at 00:00

## 2023-01-06 RX ADMIN — CETIRIZINE HYDROCHLORIDE 10 MG: 10 TABLET, FILM COATED ORAL at 09:27

## 2023-01-06 RX ADMIN — ATORVASTATIN CALCIUM 80 MG: 80 TABLET, FILM COATED ORAL at 00:01

## 2023-01-06 RX ADMIN — PAROXETINE HYDROCHLORIDE 40 MG: 20 TABLET, FILM COATED ORAL at 09:27

## 2023-01-06 RX ADMIN — METFORMIN HYDROCHLORIDE 1000 MG: 500 TABLET ORAL at 09:26

## 2023-01-06 RX ADMIN — QUETIAPINE FUMARATE 12.5 MG: 25 TABLET ORAL at 21:33

## 2023-01-06 RX ADMIN — FLUTICASONE PROPIONATE 1 SPRAY: 50 SPRAY, METERED NASAL at 09:27

## 2023-01-06 RX ADMIN — FERROUS SULFATE TAB 325 MG (65 MG ELEMENTAL FE) 325 MG: 325 (65 FE) TAB at 17:58

## 2023-01-06 RX ADMIN — MIRTAZAPINE 7.5 MG: 15 TABLET, FILM COATED ORAL at 21:33

## 2023-01-06 RX ADMIN — QUETIAPINE FUMARATE 12.5 MG: 25 TABLET ORAL at 09:26

## 2023-01-06 NOTE — PROGRESS NOTES
Morning assessment completed. Pt comfortably resting in bed. , scheduled coreg given - Will continue to monitor. Morning meds given per MAR. The care plan and education has been reviewed and mutually agreed upon with the patient.

## 2023-01-06 NOTE — PROGRESS NOTES
Facility/Department: [address] ORTHO/NEURO NURSING  Initial Assessment  DYSPHAGIA BEDSIDE SWALLOW EVALUATION     Patient: Michael Rosales   : 1950   MRN: [id number]      Evaluation Date: 2023   Admitting Diagnosis: Hyperkalemia [E87.5]  UTI (urinary tract infection) [N39.0]  Urinary tract infection without hematuria, site unspecified [N39.0]  Other fatigue [R53.83]  Pain: Did not state                                                       H&P: Michael Rosales is a 67 y.o. male who presents to the emergency department from [facility] due to concern for weakness, lethargy and dizziness. When asking the patient he states that he does not have any complaints at this time. He does state that he was trying to get out of bed today and tried to get into his chair but was unable to get into his chair due to him keep on falling. Patient has any leg pain lower back pain fevers or chills. Old records reviewed from 2022 when he was here in the emergency department for falls work-up at that time included a CT scan of the cervical spine, head and lumbar spine which were all unremarkable he also had x-rays of the chest and hip at that time which did not reveal any acute abnormalities. Patient was ultimately discharged with a benign work-up back to his care facility. Patient is hard of hearing at times but is able to follow my commands and move extremities. At the end of my initial examination he was talking to the nurse and did state that he had some neck pain. Imaging:  Chest X-ray:   Small lung volumes with mild bibasilar airspace opacities that could reflect   associated atelectasis, but pneumonia cannot be excluded. Head CT: 2022-   1. No acute intracranial hemorrhage. 2.  Generalized age-related cortical atrophy, with intracranial   atherosclerosis and CT findings suggestive of chronic microvascular ischemic   change.    3. Degenerative changes in the cervical spine, without acute osseous fracture. Prior Modified Barium Swallow Study:3/14/2014-   Pt with mild pharyngeal dysphagia. During the swallow pt with slightly reduced laryngeal elevation and closure resulting in transient, shallow penetration (Rosenbeck Penetration Aspiration Scale - 2) of thin liquids. Following the swallow with all solids pt with min-mod vallecular and pyriform residue due to reduced BOT retraction and pharyngeal contraction. Pt decreased residue with cued dry swallows and liquid wash. Solid consistency: Regular  Liquid consistency: Thin  Liquid administration via: Cup or Straw  Medication administration: PO  Compensatory Swallowing Strategies: Alternate solids and liquids;  Small bites/sips;  Upright as possible for all oral intake;  Remain upright for 30-45 minutes after meals;  Swallow 2 times per bite/sip;  Eat/Feed slowly    History/Prior Level of Function:   Living Status: Lives at [facility]  Prior Dysphagia History: March 2014- completed an MBSS and was recommended for the regular/thin diet. Reason for referral: SLP evaluation orders received due to prior hx of dysphagia     Dysphagia Impressions/Diagnosis: Oropharyngeal Dysphagia   Pt asleep upon SLP's arrival but was easily awakened and agreeable to work with [facility] Zhane Glasgow. Pt is on room air. The pt was oriented to self, month, year, and \"hospital\". The pt required repetitions of instructions with visual cues to complete 1-step verbal commands. He demonstrates reduced lingual/labial ROM and coordination, determined via oral mechanism exam. The pt demonstrates delayed initiation of the swallow with reduced laryngeal elevation/excursion, determined via palpation. Pt presented with the following PO trials: ice chips, thin liquids, puree, soft solid and a regular texture. The pt demonstrates slow and extensive mastication of the soft and regular textured solids with delayed initiation and effortful swallow required to clear.  The pt demonstrated an extensive cough with the first bite of the regular textured item. Based on today's assessment, recommend continuation of the Dysphagia III Soft and Bite-Sized with thin liquids, medications whole with water or in puree. The pt is at increased aspiration risk due to cognitive state, deconditioning, and weak cough. If the pt demonstrates increased overt signs/symptoms of penetration/aspiration with oral intake, recommend downgrade to NPO with ongoing assessment by ST. Recommended Diet and Intervention 1/6/2023:  Diet Solids Recommendation:  Dysphagia III Soft and bite sized  Liquid Consistency Recommendation: Thin liquids  Recommended form of Meds: Meds whole with water or Meds in puree           Compensatory Swallowing Strategies: Alternate solids/liquids , Upright as possible with all PO intake , Small bites/sips , Swallow 2 times per bite , Eat/feed slowly, Remain upright 30-45 min     SHORT TERM DYSPHAGIA GOALS/PLAN OF CARE: Speech therapy for dysphagia tx 3-5 times per week during acute care stay.     Pt will functionally tolerate recommended diet with no overt clinical s/s of aspiration   Pt will advance to least restrictive diet as indicated     Dysphagia Therapeutic Intervention:  Diet Tolerance Monitoring , Patient/Family Education , Therapeutic Trials with SLP     Discharge Recommendations: Discharge recommendations to be determined pending ongoing follow-up during acute care stay    Patient Positioning: Upright in bed     Current Diet Level (prior to evaluation): Dysphagia III Soft and bite sized  Thin liquids      Respiratory Status:   [x]Room Air   []O2 via nasal cannula   []Other:    Dentition:  []Adequate  []Dentures   [x]Missing Many Teeth (on bottom)  []Edentulous  [x]Other: Pt reports that he has lost his dentures    Baseline Vocal Quality:  [x]Normal  []Dysphonic   []Aphonic   []Hoarse  []Wet  []Weak  []Other:    Volitional Cough:  Not Elicited     Volitional Swallow:   []Absent [x]Delayed     []Adequate     [x]Required use of drink     Oral Mechanism Exam:  []WFL [x]Mild   [x] Moderate  []Severe  []To be assessed  Impaired:   []Left side      []Right side    [x]Labial ROM/Coordination    []Labial Symmetry   [x]Lingual ROM/Coordination   []Lingual Symmetry  []Gag  []Other:     Oral Phase: []WFL [x]Mild   [x] Moderate  []Severe  []To be assessed   [x]Impaired/Prolonged Mastication:   []Oral Holding:   []Spillage Left:   []Spillage Right:  []Pocketing Left:   []Pocketing Right:   [x]Decreased Anterior to Posterior Transit:   []Suspected Premature Bolus Loss:   []Lingual/Palatal Residue:   []Other:     Pharyngeal Phase: []WFL [x]Mild   [] Moderate  []Severe  []To be assessed   [x]Delayed Swallow:   []Suspected Pharyngeal Pooling:   [x]Decreased Laryngeal Elevation:   []Absent Swallow:  []Wet Vocal Quality:   []Throat Clearing-Immediate:   []Throat Clearing-Delayed:   [x]Cough-Immediate:   []Cough-Delayed:  []Change in Vital Signs:  []Suspected Delayed Pharyngeal Clearing:  []Other:     Eating Assistance:  []Independent  [x]Setup or clean-up assistance   [] Supervision or touching assistance   [] Partial or moderate assistance   [] Substantial or maximal assistance  [] Dependent     EDUCATION:   Provided education regarding role of SLP, results of assessment, recommendations and general speech pathology plan of care. [] Pt verbalized understanding and agreement   [x] Pt requires ongoing learning   [x] No evidence of comprehension     If patient discharges prior to next visit, this note will serve as discharge. Treatment time:  Timed Code Treatment Minutes: 0  Total Treatment time: 23 minutes    Electronically signed by:    Spencer Tejeda M.S. [id number] [facility] [id number] [facility]

## 2023-01-06 NOTE — CARE COORDINATION
Discharge Planning Note:    Talked with Kenneth at [facility] JOSEFINA:    - [facility] and able to return upon discharge. Will continue to follow.     ALYSSIA TobinN RN    [facility]  Phone: [phone]

## 2023-01-06 NOTE — PROGRESS NOTES
[facility] [address]   Phone: [phone]    Physical Therapy    [x] Initial Evaluation            [] Daily Treatment Note         [] Discharge Summary      Patient: Jayden Ramirez   : 1950   MRN: [id number]   Date of Service:  2023  Admitting Diagnosis: UTI (urinary tract infection)  Current Admission Summary: Jayden Ramirez is a 67 y.o. male who presents to the emergency department from [facility] due to concern for weakness, lethargy and dizziness. When asking the patient he states that he does not have any complaints at this time. He does state that he was trying to get out of bed today and tried to get into his chair but was unable to get into his chair due to him keep on falling. Patient has any leg pain lower back pain fevers or chills. Old records reviewed from 2022 when he was here in the emergency department for falls work-up at that time included a CT scan of the cervical spine, head and lumbar spine which were all unremarkable he also had x-rays of the chest and hip at that time which did not reveal any acute abnormalities. Patient was ultimately discharged with a benign work-up back to his care facility. Patient is hard of hearing at times but is able to follow my commands and move extremities. At the end of my initial examination he was talking to the nurse and did state that he had some neck pain. Past Medical History:  has a past medical history of Arthritis, Ataxia, BPH (benign prostatic hyperplasia), CAD (coronary artery disease), Coronary atherosclerosis of native coronary artery, Diabetes mellitus ([address] Environmental allergies, Hepatitis, Hydronephrosis, Hyperlipidemia, Hypertension, Kidney disease, Neuropathy, Parkinson's disease ([address] S/P coronary artery stent placement x 4, and Schizoaffective disorder, bipolar type ([facility] [address]   Past Surgical History:  has a past surgical history that includes Coronary angioplasty; Coronary angioplasty with stent; hernia repair; Vasectomy; Cystocopy (4/4/14); Ankle surgery (Right, 06/09/2018); Upper gastrointestinal endoscopy (N/A, 12/13/2018); Colonoscopy (N/A, 12/13/2018); Upper gastrointestinal endoscopy (N/A, 5/3/2019); and ORIF DISTAL RADIUS FRACTURE (Left, 6/20/2019). Discharge Recommendations: Lou Downs scored a 10/24 on the AM-PAC short mobility form. Current research shows that an AM-PAC score of 17 or less is typically not associated with a discharge to the patient's home setting. Based on the patient's AM-PAC score and their current functional mobility deficits, it is recommended that the patient have 3-5 sessions per week of Physical Therapy at d/c to increase the patient's independence. Please see assessment section for further patient specific details. If patient discharges prior to next session this note will serve as a discharge summary. Please see below for the latest assessment towards goals.     DME Required For Discharge: DME to be determined at next level of care  Precautions/Restrictions: high fall risk, up as tolerated  Weight Bearing Restrictions: no restrictions     Required Braces/Orthotics: no braces required  Positional Restrictions:no positional restrictions    Pre-Admission Information   Lives With:  [facility]     Type of Home: long term care facility  Home Layout: one level  Home Access: level entry  Bathroom Layout: walker accessible, wheelchair accessible  Bathroom Equipment: grab bars in shower, grab bars around toilet  Toilet Height: elevated height  Home Equipment: manual wheelchair  Transfer Assistance: Independent without use of device- states he transfers to/from his w/c and his w/c is his primary means of mobility  Ambulation Assistance: Pt states that he ambulates to/from his restroom at [employer] without an AD  however the staff prefers that he use his w/c  ADL Assistance: requires assistance with bathing, requires assistance with dressing  IADL Assistance: requires assistance with all homemaking tasks  Active :        [] Yes  [x] No  Hand Dominance: [] Left  [x] Right  Recent Falls: Pt reports one fall two months ago, states he slid out of his chair    Examination   Vision:   Vision Gross Assessment: Impaired- states that he needs glasses but doesn't know where they are  Hearing:   hard of hearing- pt states that he has hearing aids but they do not work  Observation:   General Observation:  Pt on RA on arrival.   Posture:   Mild forward head, rounded shoulders, and increased thoracic kyphosis in sitting and standing. Sensation:   WFL  ROM:   (B) LE AROM WFL  Strength:   (B) LE strength grossly -4/5  Therapist Clinical Decision Making (Complexity): medium complexity  Clinical Presentation: evolving      Subjective  General: Pt supine in bed on arrival, agreeable to participate in PT/OT initial evaluation. Pain: 3/10. Location: head  Pain Interventions: patient denies pain interventions     Functional Mobility  Bed Mobility  Supine to Sit: moderate assistance  Scooting: moderate assistance  Comments: Supine to sit: HOB elevated, increased time required to complete task, use of bed rail, verbal cues required for initiation, sequencing, and hand placement  Transfers  Sit to stand transfer: moderate assistance  Stand to sit transfer: moderate assistance  Stand pivot transfer: moderate assistance  Comments:  Ambulation  Ambulation not tested on this date secondary to generalized weakness. Comments:    Stair Mobility  Stair mobility not completed on this date. Comments:  Wheelchair Mobility:  No w/c mobility completed on this date.   Comments:  Balance  Static Sitting Balance: fair (+): maintains balance at SBA/supervision without use of UE support  Dynamic Sitting Balance: fair (+): maintains balance at SBA/supervision without use of UE support  Static Standing Balance: poor (+): requires min (A) to maintain balance  Dynamic Standing Balance: poor: requires mod (A) to maintain balance  Comments: Pt stood with B UE support on a RW with posterior lean requiring min A for standing balance    Other Therapeutic Interventions  Pt participated in ADLs with OT. See OT note for assist levels. Functional Outcomes  AM-PAC Inpatient Mobility Raw Score : 10              Cognition  Overall Cognitive Status: Impaired  Arousal/Alterness: delayed responses to stimuli  Following Commands: follows one step commands with repetition, follows one step commands with increased time  Safety Judgement: decreased awareness of need for assistance, decreased awareness of need for safety  Insights: decreased awareness of deficits  Initiation: requires cues for some  Sequencing: requires cues for some  Orientation:    alert and oriented x 4- partially to place (stated hospital) and situation (states his nurse at [facility] sent him here)  Command Following:   impaired- pt follows one step commands with increased time and repetition of instruction    Education  Barriers To Learning: cognition and hearing  Patient Education: patient educated on goals, PT role and benefits, plan of care, general safety, functional mobility training, transfer training  Learning Assessment:  patient will require reinforcement due to cognitive deficits    Assessment  Activity Tolerance: Pt tolerated the PT/OT initial evaluation well with no significant limitations. Impairments Requiring Therapeutic Intervention: decreased functional mobility, decreased strength, decreased safety awareness, decreased cognition, decreased endurance, decreased balance  Prognosis: fair  Clinical Assessment: Pt is a 68 y/o male who presents to the hospital for lethargy, dizziness, and weakness. Prior to admission to the hospital, the pt was ambulating short distances within his room without an AD and was transferring to/from his w/c independently. Pt states that his w/c is his primary means of mobility.  Today, the pt required mod A for performance of bed mobility tasks and transfers and was unable to ambulate. Pt is presenting below his baseline level of function and will benefit from skilled PT to facilitate return to PLOF and to promote independence.   Safety Interventions: patient left in chair, chair alarm in place, call light within reach, and gait belt    Plan  Frequency: 3-5 x/per week  Current Treatment Recommendations: strengthening, balance training, functional mobility training, transfer training, gait training, endurance training, patient/caregiver education, home exercise program, and safety education    Goals  Patient Goals: Pt did not state   Short Term Goals:  Time Frame: By discharge  Patient will complete bed mobility at modified independent   Patient will complete transfers at modified independent   Patient will ambulate 20 ft with use of LRAD at contact guard assistance    Therapy Session Time      Individual Group Co-treatment   Time In     1118   Time Out     1158   Minutes     40     Timed Code Treatment Minutes: 25 Minutes  Total Treatment Minutes: 40 Minutes        Electronically Signed By: Ramon Orr, PT  Jovanna Rueda PT, DPT [id number]

## 2023-01-06 NOTE — PROGRESS NOTES
[address] [facility],[address] [facility] Msb Department   Phone: [phone]    Occupational Therapy    [x] Initial Evaluation            [] Daily Treatment Note         [] Discharge Summary      Patient: iSria Villalobos   : 1950   MRN: [id number]   Date of Service:  2023    Admitting Diagnosis:  UTI (urinary tract infection)  Current Admission Summary: Siria Villalobos is a 67 y.o. male who presents to the emergency department from [facility] due to concern for weakness, lethargy and dizziness. When asking the patient he states that he does not have any complaints at this time. He does state that he was trying to get out of bed today and tried to get into his chair but was unable to get into his chair due to him keep on falling. Patient has any leg pain lower back pain fevers or chills. Old records reviewed from 2022 when he was here in the emergency department for falls work-up at that time included a CT scan of the cervical spine, head and lumbar spine which were all unremarkable he also had x-rays of the chest and hip at that time which did not reveal any acute abnormalities. Patient was ultimately discharged with a benign work-up back to his care facility. Patient is hard of hearing at times but is able to follow my commands and move extremities. At the end of my initial examination he was talking to the nurse and did state that he had some neck pain. Past Medical History:  has a past medical history of Arthritis, Ataxia, BPH (benign prostatic hyperplasia), CAD (coronary artery disease), Coronary atherosclerosis of native coronary artery, Diabetes mellitus ([address] Environmental allergies, Hepatitis, Hydronephrosis, Hyperlipidemia, Hypertension, Kidney disease, Neuropathy, Parkinson's disease ([address] S/P coronary artery stent placement x 4, and Schizoaffective disorder, bipolar type ([address]   Past Surgical History:  has a past surgical history that includes Coronary angioplasty; Coronary angioplasty with stent; hernia repair; Vasectomy; Cystocopy (4/4/14); Ankle surgery (Right, 06/09/2018); Upper gastrointestinal endoscopy (N/A, 12/13/2018); Colonoscopy (N/A, 12/13/2018); Upper gastrointestinal endoscopy (N/A, 5/3/2019); and ORIF DISTAL RADIUS FRACTURE (Left, 6/20/2019). Discharge Recommendations: Malaika Barahona scored a 15/24 on the AM-PAC ADL Inpatient form. Current research shows that an AM-PAC score of 17 or less is typically not associated with a discharge to the patient's home setting. Based on the patient's AM-PAC score and their current ADL deficits, it is recommended that the patient have 3-5 sessions per week of Occupational Therapy at d/c to increase the patient's independence. Please see assessment section for further patient specific details. If patient discharges prior to next session this note will serve as a discharge summary. Please see below for the latest assessment towards goals.       DME Required For Discharge: TBD by next facility     Precautions/Restrictions: high fall risk, up as tolerated  Weight Bearing Restrictions: no restrictions  [] Right Upper Extremity  [] Left Upper Extremity [] Right Lower Extremity  [] Left Lower Extremity     Required Braces/Orthotics: no braces required   [] Right  [] Left  Positional Restrictions:no positional restrictions    Pre-Admission Information   Lives With:  [facility]                   Type of Home: long term care facility  Home Layout: one level  Home Access: level entry  Bathroom Layout: walker accessible, wheelchair accessible  Bathroom Equipment: grab bars in shower, grab bars around toilet  Toilet Height: elevated height  Home Equipment: manual wheelchair  Transfer Assistance: Independent without use of device- states he transfers to/from his w/c and his w/c is his primary means of mobility  Ambulation Assistance: Pt states that he ambulates to/from his restroom at [facility] without an AD  however the staff prefers that he use his w/c  ADL Assistance: requires assistance with bathing, requires assistance with dressing  IADL Assistance: requires assistance with all homemaking tasks  Active :        [] Yes                 [x] No  Hand Dominance: [] Left                 [x] Right  Recent Falls: Pt reports one fall two months ago, states he slid out of his chair    Examination   Vision:   Vision Gross Assessment: Impaired - states he needs glasses but does not have them  Hearing:   hard of hearing  Perception:   WFL  Observation:   General Observation:  on room air, using urinal on arrival in supine  Posture:   Mild forward head, rounded shoulders  Sensation:   WFL  Proprioception:    WFL  Tone:   Normotonic  Coordination Testing:   Finger to Nose: WFL  Finger/Thumb Opposition: WFL    ROM:   (B) UE AROM WFL  Strength:   (B) UE strength grossly WFL    Therapist Clinical Decision Making (Complexity): medium complexity  Clinical Presentation: stable      Subjective  General: pt in bed on arrival - agreeable to evaluation - noted to be saturated in urine after using urinal  Pain: 3/10. Location: headache  Pain Interventions: patient denies pain interventions        Activities of Daily Living  Basic Activities of Daily Living  Lower Extremity Bathing: minimal assistance   Upper Extremity Dressing: minimal assistance  Lower Extremity Dressing: moderate assistance  Dressing Comments: pt completed sponge bathing, changing brief, changing gown from reclining chair   Toileting: Comment: pt using urinal on arrival - noted to be saturated in urine following its use and assisted to change (see LB dressing). Instrumental Activities of Daily Living  No IADL completed on this date.     Functional Mobility  Bed Mobility  Supine to Sit: moderate assistance  Scooting: contact guard assistance  Comments:  Transfers  Sit to stand transfer:moderate assistance  Stand to sit transfer: moderate assistance  Stand pivot transfer: moderate assistance  Comments: using arms of chair for support   Functional Mobility:  Sitting Balance: minimal assistance. Standing Balance: minimal assistance, with hand support on RW during LB dressing .     Functional Mobility Comment: pt declines ambulation today stating he is too weak to attempt (only ambulates with assist at baseline, facility prefers he uses w/c if alone)    Other Therapeutic Interventions    Functional Outcomes       Cognition  Overall Cognitive Status: Impaired  Arousal/Alterness: inconsistent responses to stimuli  Following Commands: follows one step commands with increased time  Attention Span: attends with cues to redirect  Memory: decreased recall of precautions, decreased recall of recent events, decreased short term memory  Safety Judgement: decreased awareness of need for assistance, decreased awareness of need for safety  Problem Solving: assistance required to generate solutions, assistance required to implement solutions  Insights: decreased awareness of deficits  Initiation: requires cues for some  Sequencing: requires cues for some  Orientation:    oriented to person and oriented to time - partially oriented to situation and place   Command Following:   accurately follows one step commands     Education  Barriers To Learning: cognition and hearing  Patient Education: patient educated on OT role and benefits, plan of care, precautions, ADL adaptive strategies, disease specific education  Learning Assessment:  patient verbalizes understanding, would benefit from continued reinforcement    Assessment  Activity Tolerance: limited by fatigue, declines amb trial due to fatigue, but pt tolerates session well /87 after transfer   Impairments Requiring Therapeutic Intervention: decreased functional mobility, decreased ADL status, decreased strength, decreased safety awareness, decreased cognition, decreased endurance  Prognosis: good  Clinical Assessment: Patient presents below baseline function secondary to UTI Patient will benefit from OT services to address the above deficits. Patient is typically able to perform transfers and use of w/c without assist (although is a questionable historian) Patient is primarily limited by decreased endurance. Patient will benefit from OT services to maximize safety and independence in ADLs, transfers and functional mobility prior to returning home. Patient currently requiring assistance mod A ADLs and  mod A for functional mobility for SPT.     Safety Interventions: patient left in chair, chair alarm in place, call light within reach, and nurse notified    Plan  Frequency: 3-5 x/per week  Current Treatment Recommendations: balance training, transfer training, ADL/self-care training, and cognitive reorientation    Goals  Patient Goals: to return to facility/get out of hospital    Short Term Goals:  Time Frame: discharge   Patient will complete upper body ADL at supervision   Patient will complete lower body ADL at contact guard assistance   Patient will complete toileting at contact guard assistance   Patient will complete functional transfers at contact guard assistance     Therapy Session Time     Individual Group Co-treatment   Time In     1118   Time Out     1158   Minutes     40        Timed Code Treatment Minutes:   25  Total Treatment Minutes:  40       Electronically Signed By: Ottoniel Jacobo, [address]

## 2023-01-06 NOTE — ED NOTES
Report given to 4T RN, all questions answered at time of handoff report, Francine Novoa RN to assume care while Pt is in the ER and Pt is to be transported to 4T.      Beckie Lawton RN  01/05/23 1931

## 2023-01-07 LAB
ANION GAP SERPL CALCULATED.3IONS-SCNC: 9 MMOL/L (ref 3–16)
BASOPHILS ABSOLUTE: 0 K/UL (ref 0–0.2)
BASOPHILS RELATIVE PERCENT: 0.4 %
BUN BLDV-MCNC: 14 MG/DL (ref 7–20)
CALCIUM SERPL-MCNC: 8.7 MG/DL (ref 8.3–10.6)
CHLORIDE BLD-SCNC: 113 MMOL/L (ref 99–110)
CO2: 19 MMOL/L (ref 21–32)
CREAT SERPL-MCNC: 0.9 MG/DL (ref 0.8–1.3)
EOSINOPHILS ABSOLUTE: 0.2 K/UL (ref 0–0.6)
EOSINOPHILS RELATIVE PERCENT: 1.9 %
GFR SERPL CREATININE-BSD FRML MDRD: >60 ML/MIN/{1.73_M2}
GLUCOSE BLD-MCNC: 121 MG/DL (ref 70–99)
GLUCOSE BLD-MCNC: 128 MG/DL (ref 70–99)
GLUCOSE BLD-MCNC: 130 MG/DL (ref 70–99)
GLUCOSE BLD-MCNC: 136 MG/DL (ref 70–99)
GLUCOSE BLD-MCNC: 155 MG/DL (ref 70–99)
HCT VFR BLD CALC: 25.3 % (ref 40.5–52.5)
HEMOGLOBIN: 8.4 G/DL (ref 13.5–17.5)
LYMPHOCYTES ABSOLUTE: 0.9 K/UL (ref 1–5.1)
LYMPHOCYTES RELATIVE PERCENT: 10.8 %
MCH RBC QN AUTO: 29.2 PG (ref 26–34)
MCHC RBC AUTO-ENTMCNC: 33 G/DL (ref 31–36)
MCV RBC AUTO: 88.4 FL (ref 80–100)
MONOCYTES ABSOLUTE: 0.7 K/UL (ref 0–1.3)
MONOCYTES RELATIVE PERCENT: 8.3 %
NEUTROPHILS ABSOLUTE: 6.2 K/UL (ref 1.7–7.7)
NEUTROPHILS RELATIVE PERCENT: 78.6 %
ORGANISM: ABNORMAL
PDW BLD-RTO: 14.8 % (ref 12.4–15.4)
PERFORMED ON: ABNORMAL
PLATELET # BLD: 259 K/UL (ref 135–450)
PMV BLD AUTO: 7.7 FL (ref 5–10.5)
POTASSIUM REFLEX MAGNESIUM: 4.2 MMOL/L (ref 3.5–5.1)
RBC # BLD: 2.86 M/UL (ref 4.2–5.9)
SODIUM BLD-SCNC: 141 MMOL/L (ref 136–145)
URINE CULTURE, ROUTINE: ABNORMAL
WBC # BLD: 7.8 K/UL (ref 4–11)

## 2023-01-07 PROCEDURE — 6370000000 HC RX 637 (ALT 250 FOR IP): Performed by: INTERNAL MEDICINE

## 2023-01-07 PROCEDURE — 6360000002 HC RX W HCPCS: Performed by: INTERNAL MEDICINE

## 2023-01-07 PROCEDURE — 2580000003 HC RX 258: Performed by: INTERNAL MEDICINE

## 2023-01-07 PROCEDURE — 36415 COLL VENOUS BLD VENIPUNCTURE: CPT

## 2023-01-07 PROCEDURE — 1200000000 HC SEMI PRIVATE

## 2023-01-07 PROCEDURE — 80048 BASIC METABOLIC PNL TOTAL CA: CPT

## 2023-01-07 PROCEDURE — 85025 COMPLETE CBC W/AUTO DIFF WBC: CPT

## 2023-01-07 RX ADMIN — METFORMIN HYDROCHLORIDE 1000 MG: 500 TABLET ORAL at 09:45

## 2023-01-07 RX ADMIN — TRAZODONE HYDROCHLORIDE 150 MG: 150 TABLET ORAL at 20:26

## 2023-01-07 RX ADMIN — HYDRALAZINE HYDROCHLORIDE 25 MG: 25 TABLET, FILM COATED ORAL at 05:54

## 2023-01-07 RX ADMIN — SODIUM CHLORIDE: 9 INJECTION, SOLUTION INTRAVENOUS at 17:28

## 2023-01-07 RX ADMIN — FERROUS SULFATE TAB 325 MG (65 MG ELEMENTAL FE) 325 MG: 325 (65 FE) TAB at 11:38

## 2023-01-07 RX ADMIN — QUETIAPINE FUMARATE 12.5 MG: 25 TABLET ORAL at 09:45

## 2023-01-07 RX ADMIN — METFORMIN HYDROCHLORIDE 1000 MG: 500 TABLET ORAL at 17:22

## 2023-01-07 RX ADMIN — HYDRALAZINE HYDROCHLORIDE 25 MG: 25 TABLET, FILM COATED ORAL at 20:27

## 2023-01-07 RX ADMIN — QUETIAPINE FUMARATE 12.5 MG: 25 TABLET ORAL at 20:27

## 2023-01-07 RX ADMIN — ATORVASTATIN CALCIUM 80 MG: 80 TABLET, FILM COATED ORAL at 20:27

## 2023-01-07 RX ADMIN — PAROXETINE HYDROCHLORIDE 40 MG: 20 TABLET, FILM COATED ORAL at 09:44

## 2023-01-07 RX ADMIN — ENOXAPARIN SODIUM 40 MG: 100 INJECTION SUBCUTANEOUS at 09:45

## 2023-01-07 RX ADMIN — FERROUS SULFATE TAB 325 MG (65 MG ELEMENTAL FE) 325 MG: 325 (65 FE) TAB at 09:45

## 2023-01-07 RX ADMIN — HYDRALAZINE HYDROCHLORIDE 25 MG: 25 TABLET, FILM COATED ORAL at 15:15

## 2023-01-07 RX ADMIN — CEFTRIAXONE SODIUM 1000 MG: 1 INJECTION, POWDER, FOR SOLUTION INTRAMUSCULAR; INTRAVENOUS at 16:56

## 2023-01-07 RX ADMIN — CARVEDILOL 6.25 MG: 6.25 TABLET, FILM COATED ORAL at 09:45

## 2023-01-07 RX ADMIN — CARVEDILOL 6.25 MG: 6.25 TABLET, FILM COATED ORAL at 17:22

## 2023-01-07 RX ADMIN — CETIRIZINE HYDROCHLORIDE 10 MG: 10 TABLET, FILM COATED ORAL at 09:45

## 2023-01-07 RX ADMIN — FERROUS SULFATE TAB 325 MG (65 MG ELEMENTAL FE) 325 MG: 325 (65 FE) TAB at 17:22

## 2023-01-07 RX ADMIN — ASPIRIN 81 MG: 81 TABLET, COATED ORAL at 09:44

## 2023-01-07 RX ADMIN — FLUTICASONE PROPIONATE 1 SPRAY: 50 SPRAY, METERED NASAL at 09:46

## 2023-01-07 RX ADMIN — MIRTAZAPINE 7.5 MG: 15 TABLET, FILM COATED ORAL at 20:26

## 2023-01-07 NOTE — PROGRESS NOTES
Hospitalist Progress Note      PCP: DANYELLE LINARES    Date of Admission: 1/5/2023    Chief Complaint: Fatigue    Hospital Course:   67 y.o. male with history of DM 2, CAD, CKD 3, HTN who was sent from [address] [place] [address] for weakness, fatigue and dizziness. Admitted as inpatient for UTI and hyperkalemia. Started on IV Rocephin. Subjective:  Patient feeling a little better. No CP, SOB, HA or abdominal pain      Medications:  Reviewed    Infusion Medications    sodium chloride      sodium chloride 75 mL/hr at 01/05/23 2354    dextrose       Scheduled Medications    aspirin  81 mg Oral Daily    atorvastatin  80 mg Oral Nightly    carvedilol  6.25 mg Oral BID WC    ferrous sulfate  325 mg Oral TID WC    fluticasone  1 spray Nasal Daily    hydrALAZINE  25 mg Oral 3 times per day    cetirizine  10 mg Oral Daily    metFORMIN  1,000 mg Oral BID WC    mirtazapine  7.5 mg Oral Nightly    PARoxetine  40 mg Oral Daily    QUEtiapine  12.5 mg Oral BID    traZODone  150 mg Oral Nightly    sodium chloride flush  5-40 mL IntraVENous 2 times per day    enoxaparin  40 mg SubCUTAneous Daily    cefTRIAXone (ROCEPHIN) IV  1,000 mg IntraVENous Q24H    insulin lispro  0-8 Units SubCUTAneous TID WC    insulin lispro  0-4 Units SubCUTAneous Nightly     PRN Meds: guaiFENesin, sodium chloride flush, sodium chloride, ondansetron **OR** ondansetron, polyethylene glycol, acetaminophen **OR** acetaminophen, dextrose bolus **OR** dextrose bolus, glucagon (rDNA), dextrose      Intake/Output Summary (Last 24 hours) at 1/6/2023 2135  Last data filed at 1/6/2023 1230  Gross per 24 hour   Intake 120 ml   Output 100 ml   Net 20 ml       Physical Exam Performed:    BP (!) 152/70   Pulse 80   Temp 98.4 °F (36.9 °C) (Oral)   Resp 16   Ht 5' 4\" (1.626 m)   Wt 140 lb 12.8 oz (63.9 kg)   SpO2 95%   BMI 24.17 kg/m²     General appearance:  Appears comfortable. Chronically ill appearing.   NAD  Eyes: Sclera clear, pupils equal  ENT: Moist mucus membranes, no thrush. Trachea midline. Cardiovascular: Regular rhythm, normal S1, S2. No murmur, gallop, rub. No edema in lower extremities  Respiratory: Clear to auscultation bilaterally, no wheeze, good inspiratory effort  Gastrointestinal: Abdomen soft, non-tender, not distended, normal bowel sounds  Musculoskeletal: No cyanosis in digits, neck supple  Neurology: Cranial nerves grossly intact. Alert and oriented in time, place and person. No speech or motor deficits  Psychiatry: Appropriate affect. Not agitated  Skin: Warm, dry, normal turgor, no rash  Brisk capillary refill, peripheral pulses palpable       Labs:   Recent Labs     01/05/23  1500 01/06/23  0507   WBC 11.3* 9.8   HGB 10.5* 8.7*   HCT 31.8* 26.2*    260     Recent Labs     01/05/23  1500 01/06/23  0507    137   K 5.7* 4.3    104   CO2 21 21   BUN 27* 24*   CREATININE 1.4* 1.1   CALCIUM 10.0 9.1     Recent Labs     01/05/23  1500   AST 15   ALT 7*   BILITOT 0.6   ALKPHOS 106     No results for input(s): INR in the last 72 hours. Recent Labs     01/05/23  1500   CKTOTAL 37   TROPONINI <0.01       Urinalysis:      Lab Results   Component Value Date/Time    NITRU Negative 01/05/2023 03:26 PM    WBCUA 208 01/05/2023 03:00 PM    BACTERIA 4+ 01/05/2023 03:00 PM    RBCUA 0 01/05/2023 03:00 PM    BLOODU Negative 01/05/2023 03:26 PM    SPECGRAV 1.015 01/05/2023 03:26 PM    GLUCOSEU Negative 01/05/2023 03:26 PM       Radiology:  XR CHEST PORTABLE   Final Result   Small lung volumes with mild bibasilar airspace opacities that could reflect   associated atelectasis, but pneumonia cannot be excluded.              None    Assessment/Plan:    Active Hospital Problems    Diagnosis     Leukocytosis [D72.829]      Priority: Medium    UTI (urinary tract infection) [N39.0]      Priority: Medium    Hyperkalemia [E87.5]     CKD (chronic kidney disease), stage III (HCC) [N18.30]     Anemia [D64.9]     Coronary artery disease involving native coronary artery of native heart without angina pectoris [I25.10]     Essential hypertension [I10]     DM2 (diabetes mellitus, type 2) (HCC) [E11.9]      UTI  Continue IV Rocephin  Continue IVF  F/U UCx  Monitor for PVR. Place Corey if > 300 mL     2. Hyperkalemia              -    Given Reji Karissa in ED              -    Repeat K improved              -    Keep on telemetry to monitor for arrhythmia     3. DM 2              -           Continue MTF              -           SSI     4. HTN              -           Continue Coreg and Hydralazine     5. CAD              -           Continue ASA and Lipitor            6.         CKD 3              -           Continue IVF              -           Monitor Cr daily       DVT Prophylaxis: Lovenox  Diet: ADULT DIET; Dysphagia - Soft and Bite Sized  Code Status: Full Code  PT/OT Eval Status: Following    [address]    Discussed with patient, nursing and CM. Await UCx sensitivities. Back to LTC in next 48 hrs.     Kathryn Dalal MD

## 2023-01-07 NOTE — PROGRESS NOTES
PM Assessment completed. BP (!) 151/65   Pulse 80   Temp 98.4 °F (36.9 °C) (Oral)   Resp 16   Ht 5' 4\" (1.626 m)   Wt 140 lb 12.8 oz (63.9 kg)   SpO2 94%   BMI 24.17 kg/m²     Alert and oriented x4. Denies pain at this time. Disappointed he did not return to the facility today because he missed his therapy appointment. Calls appropriately. Plan of care, education and safety measures reviewed and mutually agreed upon with the patient. Needed items including call light in reach and exit alarm in place.        Electronically signed by Almaz Escamilla RN on 1/6/2023 at 9:39 PM

## 2023-01-07 NOTE — PROGRESS NOTES
Shift assessment complete, /73, pt A&Ox4 in bed, anxious, denies feeling dizzy/fatigue, denies any pain at this time. AM med administered per [facility], POC and education reviewed with the pt. All needs met at this time, call light in reach, will continue to monitor.

## 2023-01-07 NOTE — PROGRESS NOTES
Hospitalist Progress Note      PCP: DANYELLE LINARES    Date of Admission: 1/5/2023    Chief Complaint: Fatigue    Hospital Course:   67 y.o. male with history of DM 2, CAD, CKD 3, HTN who was sent from [address] [place] [address] for weakness, fatigue and dizziness. Admitted as inpatient for UTI and hyperkalemia. Started on IV Rocephin. Subjective:  Patient feeling ok. No CP, SOB, HA or abdominal pain      Medications:  Reviewed    Infusion Medications    sodium chloride      sodium chloride 75 mL/hr at 01/07/23 1728    dextrose       Scheduled Medications    aspirin  81 mg Oral Daily    atorvastatin  80 mg Oral Nightly    carvedilol  6.25 mg Oral BID WC    ferrous sulfate  325 mg Oral TID WC    fluticasone  1 spray Nasal Daily    hydrALAZINE  25 mg Oral 3 times per day    cetirizine  10 mg Oral Daily    metFORMIN  1,000 mg Oral BID WC    mirtazapine  7.5 mg Oral Nightly    PARoxetine  40 mg Oral Daily    QUEtiapine  12.5 mg Oral BID    traZODone  150 mg Oral Nightly    sodium chloride flush  5-40 mL IntraVENous 2 times per day    enoxaparin  40 mg SubCUTAneous Daily    cefTRIAXone (ROCEPHIN) IV  1,000 mg IntraVENous Q24H    insulin lispro  0-8 Units SubCUTAneous TID WC    insulin lispro  0-4 Units SubCUTAneous Nightly     PRN Meds: guaiFENesin, sodium chloride flush, sodium chloride, ondansetron **OR** ondansetron, polyethylene glycol, acetaminophen **OR** acetaminophen, dextrose bolus **OR** dextrose bolus, glucagon (rDNA), dextrose      Intake/Output Summary (Last 24 hours) at 1/7/2023 1825  Last data filed at 1/7/2023 1401  Gross per 24 hour   Intake 220 ml   Output 300 ml   Net -80 ml         Physical Exam Performed:    /80   Pulse 70   Temp 97.6 °F (36.4 °C) (Oral)   Resp 16   Ht 5' 4\" (1.626 m)   Wt 136 lb 8 oz (61.9 kg)   SpO2 96%   BMI 23.43 kg/m²     General appearance:  Appears comfortable. Chronically ill appearing.   NAD  Eyes: Sclera clear, pupils equal  ENT: Moist mucus membranes, no thrush. Trachea midline. Cardiovascular: Regular rhythm, normal S1, S2. No murmur, gallop, rub. No edema in lower extremities  Respiratory: Clear to auscultation bilaterally, no wheeze, good inspiratory effort  Gastrointestinal: Abdomen soft, non-tender, not distended, normal bowel sounds  Musculoskeletal: No cyanosis in digits, neck supple  Neurology: Cranial nerves grossly intact. Alert and oriented in time, place and person. No speech or motor deficits  Psychiatry: Appropriate affect. Not agitated  Skin: Warm, dry, normal turgor, no rash  Brisk capillary refill, peripheral pulses palpable       Labs:   Recent Labs     01/05/23  1500 01/06/23  0507 01/07/23  0530   WBC 11.3* 9.8 7.8   HGB 10.5* 8.7* 8.4*   HCT 31.8* 26.2* 25.3*    260 259       Recent Labs     01/05/23  1500 01/06/23  0507 01/07/23  0530    137 141   K 5.7* 4.3 4.2    104 113*   CO2 21 21 19*   BUN 27* 24* 14   CREATININE 1.4* 1.1 0.9   CALCIUM 10.0 9.1 8.7       Recent Labs     01/05/23  1500   AST 15   ALT 7*   BILITOT 0.6   ALKPHOS 106       No results for input(s): INR in the last 72 hours. Recent Labs     01/05/23  1500   CKTOTAL 37   TROPONINI <0.01         Urinalysis:      Lab Results   Component Value Date/Time    NITRU Negative 01/05/2023 03:26 PM    WBCUA 208 01/05/2023 03:00 PM    BACTERIA 4+ 01/05/2023 03:00 PM    RBCUA 0 01/05/2023 03:00 PM    BLOODU Negative 01/05/2023 03:26 PM    SPECGRAV 1.015 01/05/2023 03:26 PM    GLUCOSEU Negative 01/05/2023 03:26 PM       Radiology:  XR CHEST PORTABLE   Final Result   Small lung volumes with mild bibasilar airspace opacities that could reflect   associated atelectasis, but pneumonia cannot be excluded.              None    Assessment/Plan:    Active Hospital Problems    Diagnosis     Leukocytosis [D72.829]      Priority: Medium    UTI (urinary tract infection) [N39.0]      Priority: Medium    Hyperkalemia [E87.5]     CKD (chronic kidney disease), stage III ([facility] [N18.30] Anemia [D64.9]     Coronary artery disease involving native coronary artery of native heart without angina pectoris [I25.10]     Essential hypertension [I10]     DM2 (diabetes mellitus, type 2) (HCC) [E11.9]      UTI  Continue IV Rocephin  DC IVF  F/U UCx  Monitor for PVR. Place Corey if > 300 mL     2. Hyperkalemia              -    Given Margarita Europe in ED              -    Repeat K improved              -    Keep on telemetry to monitor for arrhythmia     3. DM 2              -           Continue MTF              -           SSI     4. HTN              -           Continue Coreg and Hydralazine     5. CAD              -           Continue ASA and Lipitor            6.         CKD 3              -           Continue IVF              -           Monitor Cr daily       DVT Prophylaxis: Lovenox  Diet: ADULT DIET; Dysphagia - Soft and Bite Sized  Code Status: Full Code  PT/OT Eval Status: Following    [address]    Discussed with patient and nursing. Back to LTC tomorrow.     Russell Torres MD

## 2023-01-08 VITALS
OXYGEN SATURATION: 97 % | SYSTOLIC BLOOD PRESSURE: 171 MMHG | DIASTOLIC BLOOD PRESSURE: 76 MMHG | BODY MASS INDEX: 23.1 KG/M2 | HEIGHT: 64 IN | TEMPERATURE: 97.6 F | RESPIRATION RATE: 16 BRPM | HEART RATE: 82 BPM | WEIGHT: 135.31 LBS

## 2023-01-08 LAB
ANION GAP SERPL CALCULATED.3IONS-SCNC: 11 MMOL/L (ref 3–16)
BASOPHILS ABSOLUTE: 0 K/UL (ref 0–0.2)
BASOPHILS RELATIVE PERCENT: 0.4 %
BUN BLDV-MCNC: 9 MG/DL (ref 7–20)
CALCIUM SERPL-MCNC: 8.8 MG/DL (ref 8.3–10.6)
CHLORIDE BLD-SCNC: 114 MMOL/L (ref 99–110)
CO2: 20 MMOL/L (ref 21–32)
CREAT SERPL-MCNC: 0.9 MG/DL (ref 0.8–1.3)
EOSINOPHILS ABSOLUTE: 0.2 K/UL (ref 0–0.6)
EOSINOPHILS RELATIVE PERCENT: 4.1 %
GFR SERPL CREATININE-BSD FRML MDRD: >60 ML/MIN/{1.73_M2}
GLUCOSE BLD-MCNC: 125 MG/DL (ref 70–99)
GLUCOSE BLD-MCNC: 129 MG/DL (ref 70–99)
GLUCOSE BLD-MCNC: 137 MG/DL (ref 70–99)
GLUCOSE BLD-MCNC: 140 MG/DL (ref 70–99)
HCT VFR BLD CALC: 25.2 % (ref 40.5–52.5)
HEMOGLOBIN: 8.5 G/DL (ref 13.5–17.5)
LYMPHOCYTES ABSOLUTE: 0.8 K/UL (ref 1–5.1)
LYMPHOCYTES RELATIVE PERCENT: 14.9 %
MCH RBC QN AUTO: 29.9 PG (ref 26–34)
MCHC RBC AUTO-ENTMCNC: 33.9 G/DL (ref 31–36)
MCV RBC AUTO: 88.1 FL (ref 80–100)
MONOCYTES ABSOLUTE: 0.5 K/UL (ref 0–1.3)
MONOCYTES RELATIVE PERCENT: 9.9 %
NEUTROPHILS ABSOLUTE: 3.7 K/UL (ref 1.7–7.7)
NEUTROPHILS RELATIVE PERCENT: 70.7 %
PDW BLD-RTO: 14.4 % (ref 12.4–15.4)
PERFORMED ON: ABNORMAL
PLATELET # BLD: 284 K/UL (ref 135–450)
PMV BLD AUTO: 8 FL (ref 5–10.5)
POTASSIUM REFLEX MAGNESIUM: 4.1 MMOL/L (ref 3.5–5.1)
RBC # BLD: 2.86 M/UL (ref 4.2–5.9)
SODIUM BLD-SCNC: 145 MMOL/L (ref 136–145)
WBC # BLD: 5.2 K/UL (ref 4–11)

## 2023-01-08 PROCEDURE — 2580000003 HC RX 258: Performed by: INTERNAL MEDICINE

## 2023-01-08 PROCEDURE — 80048 BASIC METABOLIC PNL TOTAL CA: CPT

## 2023-01-08 PROCEDURE — 6360000002 HC RX W HCPCS: Performed by: INTERNAL MEDICINE

## 2023-01-08 PROCEDURE — 6370000000 HC RX 637 (ALT 250 FOR IP): Performed by: INTERNAL MEDICINE

## 2023-01-08 PROCEDURE — 85025 COMPLETE CBC W/AUTO DIFF WBC: CPT

## 2023-01-08 PROCEDURE — 36415 COLL VENOUS BLD VENIPUNCTURE: CPT

## 2023-01-08 RX ORDER — CEPHALEXIN 500 MG/1
500 CAPSULE ORAL 3 TIMES DAILY
Qty: 12 CAPSULE | Refills: 0
Start: 2023-01-08 | End: 2023-01-12

## 2023-01-08 RX ORDER — GREEN TEA/HOODIA GORDONII 315-12.5MG
1 CAPSULE ORAL 2 TIMES DAILY
Qty: 60 TABLET | Refills: 0
Start: 2023-01-08 | End: 2023-02-07

## 2023-01-08 RX ADMIN — ASPIRIN 81 MG: 81 TABLET, COATED ORAL at 09:12

## 2023-01-08 RX ADMIN — HYDRALAZINE HYDROCHLORIDE 25 MG: 25 TABLET, FILM COATED ORAL at 15:29

## 2023-01-08 RX ADMIN — QUETIAPINE FUMARATE 12.5 MG: 25 TABLET ORAL at 09:12

## 2023-01-08 RX ADMIN — FERROUS SULFATE TAB 325 MG (65 MG ELEMENTAL FE) 325 MG: 325 (65 FE) TAB at 12:46

## 2023-01-08 RX ADMIN — METFORMIN HYDROCHLORIDE 1000 MG: 500 TABLET ORAL at 09:12

## 2023-01-08 RX ADMIN — FERROUS SULFATE TAB 325 MG (65 MG ELEMENTAL FE) 325 MG: 325 (65 FE) TAB at 09:12

## 2023-01-08 RX ADMIN — CETIRIZINE HYDROCHLORIDE 10 MG: 10 TABLET, FILM COATED ORAL at 09:12

## 2023-01-08 RX ADMIN — SODIUM CHLORIDE: 9 INJECTION, SOLUTION INTRAVENOUS at 04:57

## 2023-01-08 RX ADMIN — HYDRALAZINE HYDROCHLORIDE 25 MG: 25 TABLET, FILM COATED ORAL at 04:55

## 2023-01-08 RX ADMIN — CEFTRIAXONE SODIUM 1000 MG: 1 INJECTION, POWDER, FOR SOLUTION INTRAMUSCULAR; INTRAVENOUS at 17:15

## 2023-01-08 RX ADMIN — ENOXAPARIN SODIUM 40 MG: 100 INJECTION SUBCUTANEOUS at 09:12

## 2023-01-08 RX ADMIN — CARVEDILOL 6.25 MG: 6.25 TABLET, FILM COATED ORAL at 09:12

## 2023-01-08 RX ADMIN — PAROXETINE HYDROCHLORIDE 40 MG: 20 TABLET, FILM COATED ORAL at 09:12

## 2023-01-08 RX ADMIN — FLUTICASONE PROPIONATE 1 SPRAY: 50 SPRAY, METERED NASAL at 09:14

## 2023-01-08 RX ADMIN — CARVEDILOL 6.25 MG: 6.25 TABLET, FILM COATED ORAL at 17:19

## 2023-01-08 RX ADMIN — FERROUS SULFATE TAB 325 MG (65 MG ELEMENTAL FE) 325 MG: 325 (65 FE) TAB at 17:19

## 2023-01-08 RX ADMIN — METFORMIN HYDROCHLORIDE 1000 MG: 500 TABLET ORAL at 17:20

## 2023-01-08 ASSESSMENT — PAIN DESCRIPTION - FREQUENCY: FREQUENCY: INTERMITTENT

## 2023-01-08 ASSESSMENT — PAIN SCALES - GENERAL: PAINLEVEL_OUTOF10: 5

## 2023-01-08 ASSESSMENT — PAIN DESCRIPTION - DESCRIPTORS: DESCRIPTORS: ACHING;DISCOMFORT

## 2023-01-08 ASSESSMENT — PAIN DESCRIPTION - ORIENTATION: ORIENTATION: RIGHT;LEFT;MID

## 2023-01-08 ASSESSMENT — PAIN DESCRIPTION - PAIN TYPE: TYPE: CHRONIC PAIN;ACUTE PAIN

## 2023-01-08 ASSESSMENT — PAIN DESCRIPTION - LOCATION: LOCATION: HEAD

## 2023-01-08 NOTE — DISCHARGE INSTR - COC
Continuity of Care Form    Patient Name: Ann Renae   :  1950  MRN:  [id number]    Admit date:  2023  Discharge date:  22    Code Status Order: Full Code   Advance Directives:     Admitting Physician:  Edmar Wing MD  PCP: DANYELLE LINARES    Discharging Nurse:  [facility] Unit/Room#: 6KM-4768/1477-36  Discharging Unit Phone Number: [phone]    Emergency Contact:   Extended Emergency Contact Information  Primary Emergency Contact: gil alicia  Home Phone: [phone]  Relation: Other    Past Surgical History:  Past Surgical History:   Procedure Laterality Date    ANKLE SURGERY Right 2018    ORIF of R ankle     COLONOSCOPY N/A 2018    COLONOSCOPY CONTROL HEMORRHAGE performed by Shila Gallegos MD at [facility]      x 4    CYSTOSCOPY  14    transurethral vaporization of prostate    HERNIA REPAIR      X2    ORIF DISTAL RADIUS FRACTURE Left 2019    LEFT DISTAL RADIUS OPEN REDUCTION INTERNAL FIXATION   performed by Hortencia Dang MD at [facility] 2018    EGD BIOPSY performed by Shila Gallegos MD at [facility] 5/3/2019    EGD WITH ANESTHESIA performed by Shila Gallegos MD at [address]         Immunization History:   Immunization History   Administered Date(s) Administered    COVID-19, PFIZER PURPLE top, DILUTE for use, (age 15 y+), 30mcg/0.3mL 2021, 2021, 2021, 10/21/2021, 2021    Influenza Virus Vaccine 10/10/2014    Pneumococcal Conjugate 13-valent (Hina Iron) 2017    Tdap (Boostrix, Adacel) 2020       Active Problems:  Patient Active Problem List   Diagnosis Code    DM2 (diabetes mellitus, type 2) ([facility] [address] E11.9    Dizziness R42    Hematuria R31.9    Hyponatremia E87.1    S/P coronary artery stent placement Z95.5    S/P coronary artery stent placement x 4 Z95.5    Orthostatic hypotension I95.1    Acute posthemorrhagic anemia D62    Contusion, knee S80.00XA    DM hyperosmolarity type II, uncontrolled ([facility]) E11.00, E11.65    Coronary artery disease involving native coronary artery of native heart without angina pectoris I25.10    Essential hypertension I10    Chest pain R07.9    Ischemic chest pain ([facility]) I20.9    Syncope and collapse R55    CKD (chronic kidney disease), stage III ([facility]) N18.30    Anemia D64.9    CHF (congestive heart failure) ([facility]) I50.9    DM (diabetes mellitus) ([facility] [address] E11.9    Closed trimalleolar fracture of right ankle S82.851A    Closed fracture of fifth metacarpal bone of left hand S62.307A    Closed nondisplaced fracture of fifth metatarsal bone of left foot S92.355A    Hypokalemia E87.6    Hyperkalemia E87.5    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 K55.21    OCTAVIO (acute kidney injury) ([facility]) N17.9    Heartburn R12    Esophageal dysphagia R13.19    Esophagitis, [place] grade B K20.80    Closed fracture of metatarsal neck S92.309A    Closed fracture of metatarsal neck, left, initial encounter S92.302A    Ataxia R27.0    Sepsis ([facility]) A41.9    UTI (urinary tract infection) N39.0    Schizoaffective disorder, bipolar type ([facility]) F25.0    Leukocytosis D72.829       Isolation/Infection:   Isolation            No Isolation          Patient Infection Status       Infection Onset Added Last Indicated Last Indicated By Review Planned Expiration Resolved Resolved By    None active    Resolved    COVID-19 (Rule Out) 01/05/23 01/05/23 01/05/23 COVID-19, Rapid (Ordered)   01/05/23 Rule-Out Test Resulted    COVID-19 (Rule Out) 12/27/22 12/27/22 12/27/22 COVID-19, Rapid (Ordered)   12/27/22 Rule-Out Test Resulted    C-diff Rule Out 05/27/22 05/27/22 05/27/22 GI Bacterial Pathogens By PCR (Ordered)   05/31/22 Robert Miller RN    COVID-19 (Rule Out) 06/13/21 06/13/21 06/13/21 COVID-19 (Ordered)   06/14/21 Rule-Out Test Resulted    COVID-19 (Rule Out) 06/13/21 06/13/21 06/13/21 COVID-19, Rapid (Ordered)   06/13/21 Rule-Out Test Resulted            Nurse Assessment:  Last Vital Signs: BP (!) 173/70   Pulse 89   Temp 97.6 °F (36.4 °C) (Oral)   Resp 16   Ht 5' 4\" (1.626 m)   Wt 135 lb 5 oz (61.4 kg)   SpO2 97%   BMI 23.23 kg/m²     Last documented pain score (0-10 scale): Pain Level: 5  Last Weight:   Wt Readings from Last 1 Encounters:   01/08/23 135 lb 5 oz (61.4 kg)     Mental Status:  oriented, alert, thought processes intact, and able to concentrate and follow conversation    IV Access:  - None    Nursing Mobility/ADLs:  Walking   Assisted  Transfer  Assisted  Bathing  Assisted  Dressing  Assisted  Toileting  Assisted  Feeding  Independent  Med Admin  Assisted  Med Delivery   whole    Wound Care Documentation and Therapy:        Elimination:  Continence:   Bowel: No  Bladder: No  Urinary Catheter: None   Colostomy/Ileostomy/Ileal Conduit: No       Date of Last BM: ***    Intake/Output Summary (Last 24 hours) at 1/8/2023 1317  Last data filed at 1/8/2023 0456  Gross per 24 hour   Intake 4443.11 ml   Output 1000 ml   Net 3443.11 ml     I/O last 3 completed shifts:  In: 4443.1 [P.O.:460; I.V.:3883.3; IV Piggyback:99.9]  Out: 1300 [Urine:1300]    Safety Concerns:     At Risk for Falls    Impairments/Disabilities:      Hearing    Nutrition Therapy:  Current Nutrition Therapy:   - Oral Diet:  General    Routes of Feeding: Oral  Liquids: Thin Liquids  Daily Fluid Restriction: no  Last Modified Barium Swallow with Video (Video Swallowing Test): not done    Treatments at the Time of Hospital Discharge:   Respiratory Treatments: ***  Oxygen Therapy:  is not on home oxygen therapy.  Ventilator:    - No ventilator support    Rehab Therapies: Physical Therapy and Occupational Therapy  Weight Bearing Status/Restrictions: No weight bearing restrictions  Other Medical Equipment (for information only, NOT  a DME order):  wheelchair  Other Treatments: ***    Patient's personal belongings (please select all that are sent with patient):  None    RN SIGNATURE:  Electronically signed by Perry Barfield RN on 1/8/23 at 3:26 PM EST    CASE MANAGEMENT/SOCIAL WORK SECTION    Inpatient Status Date: 01/05/2023    Readmission Risk Assessment Score:  Readmission Risk              Risk of Unplanned Readmission:  22           Discharging to Facility/ Agency   Name:   [facility]  Address:  [address] [place] [address]  Phone:  [phone]  Fax:  [phone]      / signature: Electronically signed by Merlinda Rily, MSW, YOW on 1/8/23 at 3:30 PM EST    PHYSICIAN SECTION    Prognosis: Fair    Condition at Discharge: Stable    Rehab Potential (if transferring to Rehab): Fair    Recommended Labs or Other Treatments After Discharge:     Physician Certification: I certify the above information and transfer of Sanket Gómez  is necessary for the continuing treatment of the diagnosis listed and that he requires [facility] for less 30 days.      Update Admission H&P: No change in H&P    PHYSICIAN SIGNATURE:  Electronically signed by Pedro Lozano MD on 1/8/23 at 1:17 PM EST

## 2023-01-08 NOTE — PROGRESS NOTES
Shift assessment complete, /70, pt A&Ox4 in bed, c/o pain to head 5/10. AM med administered per [facility], POC and education reviewed with the pt. All needs met at this time, call light in reach,will continue to monitor. Gail at [facility] and gave report to [facility]. 1930: [facility].

## 2023-01-08 NOTE — PLAN OF CARE
Problem: Discharge Planning  Goal: Discharge to home or other facility with appropriate resources  Outcome: Progressing  Flowsheets (Taken 1/7/2023 2026)  Discharge to home or other facility with appropriate resources: Identify barriers to discharge with patient and caregiver     Problem: Pain  Goal: Verbalizes/displays adequate comfort level or baseline comfort level  Outcome: Progressing  Flowsheets (Taken 1/7/2023 2025)  Verbalizes/displays adequate comfort level or baseline comfort level: Encourage patient to monitor pain and request assistance     Problem: Skin/Tissue Integrity  Goal: Absence of new skin breakdown  Description: 1. Monitor for areas of redness and/or skin breakdown  2. Assess vascular access sites hourly  3. Every 4-6 hours minimum:  Change oxygen saturation probe site  4. Every 4-6 hours:  If on nasal continuous positive airway pressure, respiratory therapy assess nares and determine need for appliance change or resting period.   Outcome: Progressing

## 2023-01-08 NOTE — CARE COORDINATION
Discharge planner notified of pt's discharge today back to [address] transport set at [facility]. Discharge packet completed. Facility, RN and patient informed of discharge time. All documents faxed. Discharge Plan:  [address]  [facility], [address] [place] [address]  Phone:  [phone]  Fax:  [phone]  transport at [facility].     Electronically signed by PALLAVI Mckeon, REGI on 1/8/2023 at 3:44 PM

## 2023-01-08 NOTE — PROGRESS NOTES
Physician Progress Note      James Adan  [facility] #:                  [id number]  :                       1950  ADMIT DATE:       2023 2:29 PM  [address] [place] DATE:  RESPONDING  PROVIDER #:        Ruth Powell MD          QUERY TEXT:    Pt admitted with UTI and weakness. Noted documentation of borderline OCTAVIO by   ED provider. If possible, please document in progress notes and discharge   summary:      The medical record reflects the following:  Risk Factors: 66 yo with UTI and CKD III    Clinical Indicators: Creatinine () 1.4, () 1.1, () 0.9.  ED, \"Laboratory work-up with borderline acute kidney injury, mild hyperkalemia   noted without EKG changes. \"    Treatment: IVF bolus, supportive care  Options provided:  -- OCTAVIO confirmed present on admission  -- OCTAVIO ruled out  -- Other - I will add my own diagnosis  -- Disagree - Not applicable / Not valid  -- Disagree - Clinically unable to determine / Unknown  -- Refer to Clinical Documentation Reviewer    PROVIDER RESPONSE TEXT:    The diagnosis of OCTAVIO was confirmed as present on admission. Query created by:  Dasha Gordon on 2023 10:13 AM      Electronically signed by:  Ruth Powell MD 2023 8:51 PM

## 2023-01-09 NOTE — PLAN OF CARE
Problem: Discharge Planning  Goal: Discharge to home or other facility with appropriate resources  1/8/2023 2057 by Tiffanie Coto RN  Outcome: Completed  1/8/2023 1412 by Tiffanie Coto RN  Outcome: Progressing     Problem: Pain  Goal: Verbalizes/displays adequate comfort level or baseline comfort level  1/8/2023 2057 by Tiffanie Coto RN  Outcome: Completed  1/8/2023 1412 by Tiffanie Coto RN  Outcome: Progressing     Problem: Skin/Tissue Integrity  Goal: Absence of new skin breakdown  Description: 1. Monitor for areas of redness and/or skin breakdown  2. Assess vascular access sites hourly  3. Every 4-6 hours minimum:  Change oxygen saturation probe site  4. Every 4-6 hours:  If on nasal continuous positive airway pressure, respiratory therapy assess nares and determine need for appliance change or resting period.   1/8/2023 2057 by Tiffanie Coto RN  Outcome: Completed  1/8/2023 1412 by Tiffanie Coto RN  Outcome: Progressing     Problem: Safety - Adult  Goal: Free from fall injury  1/8/2023 2057 by Tiffanie Coto RN  Outcome: Completed  1/8/2023 1412 by Tiffanie Coto RN  Outcome: Progressing     Problem: Chronic Conditions and Co-morbidities  Goal: Patient's chronic conditions and co-morbidity symptoms are monitored and maintained or improved  1/8/2023 2057 by Tiffanie Coto RN  Outcome: Completed  1/8/2023 1412 by Tiffanie Coto RN  Outcome: Progressing

## 2023-01-09 NOTE — DISCHARGE SUMMARY
Hospital Medicine Discharge Summary    Patient: Ann Renae     Gender: male  : 1950   Age: 67 y.o. MRN: [id number]    Admitting Physician: Edmar Wing MD  Discharge Physician: Edmar Wing MD     Code Status: Full Code     Admit Date: 2023   Discharge Date:   23    Disposition:  Home    Discharge Diagnoses: Active Hospital Problems    Diagnosis Date Noted    Leukocytosis [D72.829] 2023     Priority: Medium    UTI (urinary tract infection) [N39.0] 2022     Priority: Medium    Hyperkalemia [E87.5] 2018    CKD (chronic kidney disease), stage III ([facility] [N18.30] 09/10/2017    Anemia [D64.9] 09/10/2017    Coronary artery disease involving native coronary artery of native heart without angina pectoris [I25.10] 10/09/2014    Essential hypertension [I10] 10/09/2014    DM2 (diabetes mellitus, type 2) ([facility] [E11.9] 2013       Follow-up appointments:  one week    Outpatient to do list: F/U with PCP    Condition at Discharge:  Stable    Hospital Course:   67 y.o. male with history of DM 2, CAD, CKD 3, HTN who was sent from [address] [place] [address] for weakness, fatigue and dizziness. Admitted as inpatient for UTI and hyperkalemia. Started on IV Rocephin. Improved. Will be on PO Keflex and probiotic. F/U with PCP.     Discharge Medications:   Current Discharge Medication List        START taking these medications    Details   cephALEXin (KEFLEX) 500 MG capsule Take 1 capsule by mouth 3 times daily for 4 days  Qty: 12 capsule, Refills: 0      Probiotic Acidophilus (FLORANEX) TABS Take 1 tablet by mouth 2 times daily  Qty: 60 tablet, Refills: 0           Current Discharge Medication List        Current Discharge Medication List        CONTINUE these medications which have NOT CHANGED    Details   hydrALAZINE (APRESOLINE) 25 MG tablet Take 1 tablet by mouth every 8 hours Hold for BP<120  Qty: 90 tablet, Refills: 1      simethicone (MYLICON) 923 MG chewable tablet Take 125 mg by mouth every morning      guaiFENesin (ROBITUSSIN) 100 MG/5ML SOLN oral solution Take 400 mg by mouth every 4 hours as needed for Cough      loratadine (CLARITIN) 10 MG tablet Take 10 mg by mouth daily      mirtazapine (REMERON) 7.5 MG tablet Take 7.5 mg by mouth nightly      acetaminophen (TYLENOL) 500 MG tablet Take 1,000 mg by mouth every 6 hours as needed       carvedilol (COREG) 6.25 MG tablet Take 1 tablet by mouth 2 times daily (with meals)  Qty: 30 tablet, Refills: 0      QUEtiapine (SEROQUEL) 25 MG tablet Take 12.5 mg by mouth 2 times daily      pantoprazole (PROTONIX) 40 MG tablet Take one tablet daily  Qty: 90 tablet, Refills: 0      ASPIRIN LOW DOSE 81 MG EC tablet TAKE 1 TABLET BY MOUTH ONCE DAILY  Qty: 30 tablet, Refills: 10      metFORMIN (GLUCOPHAGE) 1000 MG tablet Take 1 tablet by mouth 2 times daily (with meals)       PARoxetine (PAXIL) 40 MG tablet Take 1 tablet by mouth daily      ferrous sulfate 325 (65 Fe) MG tablet Take 1 tablet by mouth 2 times daily  Qty: 60 tablet, Refills: 11      atorvastatin (LIPITOR) 80 MG tablet TAKE 1 TABLET BY MOUTH AT BEDTIME  Qty: 90 tablet, Refills: 0      traZODone (DESYREL) 100 MG tablet Take 150 mg by mouth nightly       fluticasone (FLONASE) 50 MCG/ACT nasal spray 1 spray by Nasal route daily            Current Discharge Medication List        STOP taking these medications       gabapentin (NEURONTIN) 300 MG capsule Comments:   Reason for Stopping:             Discharge Exam:    BP (!) 171/76   Pulse 82   Temp 97.6 °F (36.4 °C) (Oral)   Resp 16   Ht 5' 4\" (1.626 m)   Wt 135 lb 5 oz (61.4 kg)   SpO2 97%   BMI 23.23 kg/m²   General appearance:  Appears comfortable. Chronically ill appearing. NAD  Eyes: Sclera clear, pupils equal  ENT: Moist mucus membranes, no thrush. Trachea midline. Cardiovascular: Regular rhythm, normal S1, S2. No murmur, gallop, rub.  No edema in lower extremities  Respiratory: Clear to auscultation bilaterally, no wheeze, good inspiratory effort  Gastrointestinal: Abdomen soft, non-tender, not distended, normal bowel sounds  Musculoskeletal: No cyanosis in digits, neck supple  Neurology: Cranial nerves grossly intact. Alert and oriented in time, place and person. No speech or motor deficits  Psychiatry: Appropriate affect. Not agitated  Skin: Warm, dry, normal turgor, no rash  Brisk capillary refill, peripheral pulses palpable     Labs: For convenience and continuity at follow-up the following most recent labs are provided:    Lab Results   Component Value Date/Time    WBC 5.2 01/08/2023 06:32 AM    HGB 8.5 01/08/2023 06:32 AM    HCT 25.2 01/08/2023 06:32 AM    MCV 88.1 01/08/2023 06:32 AM     01/08/2023 06:32 AM     01/08/2023 06:32 AM    K 4.1 01/08/2023 06:32 AM     01/08/2023 06:32 AM    CO2 20 01/08/2023 06:32 AM    BUN 9 01/08/2023 06:32 AM    CREATININE 0.9 01/08/2023 06:32 AM    CALCIUM 8.8 01/08/2023 06:32 AM    PHOS 3.5 05/31/2022 05:38 AM    ALKPHOS 106 01/05/2023 03:00 PM    ALT 7 01/05/2023 03:00 PM    AST 15 01/05/2023 03:00 PM    BILITOT 0.6 01/05/2023 03:00 PM    LABALBU 4.7 01/05/2023 03:00 PM    LDLCALC 22 12/07/2015 05:32 AM    TRIG 146 12/07/2015 05:32 AM     Lab Results   Component Value Date    INR 1.13 05/26/2019    INR 1.02 06/08/2018    INR 0.96 10/08/2014       Radiology:  CT HEAD WO CONTRAST    Result Date: 12/27/2022  EXAMINATION: CT OF THE HEAD WITHOUT CONTRAST; CT OF THE CERVICAL SPINE WITHOUT CONTRAST 12/27/2022 10:13 pm TECHNIQUE: CT of the head was performed without the administration of intravenous contrast. Automated exposure control, iterative reconstruction, and/or weight based adjustment of the mA/kV was utilized to reduce the radiation dose to as low as reasonably achievable.; CT of the cervical spine was performed without the administration of intravenous contrast. Multiplanar reformatted images are provided for review.  Automated exposure control, iterative reconstruction, and/or weight based adjustment of the mA/kV was utilized to reduce the radiation dose to as low as reasonably achievable. COMPARISON: 05/27/2022. HISTORY: ORDERING SYSTEM PROVIDED HISTORY: fall TECHNOLOGIST PROVIDED HISTORY: Reason for exam:->fall Has a \"code stroke\" or \"stroke alert\" been called? ->No Decision Support Exception - unselect if not a suspected or confirmed emergency medical condition->Emergency Medical Condition (MA) Reason for Exam: Fall. Fall (Pt arrived via [address] from [facility] d/t fall in a bathroom. Pt fell on L hip. Pt denies LOC. ). ; ORDERING SYSTEM PROVIDED HISTORY: fall TECHNOLOGIST PROVIDED HISTORY: Reason for exam:->fall Decision Support Exception - unselect if not a suspected or confirmed emergency medical condition->Emergency Medical Condition (MA) Reason for Exam: Fall. Fall (Pt arrived via [address] from [facility] d/t fall in a bathroom. Pt fell on L hip. Pt denies LOC. ). FINDINGS: CT HEAD: BRAIN/VENTRICLES: There is no acute intracranial hemorrhage, mass effect or midline shift. No abnormal extra-axial fluid collection. The gray-white differentiation is maintained without evidence of an acute infarct. There is no evidence of hydrocephalus. Patchy white matter low attenuation is identified within the periventricular and subcortical white matter. Age related mild generalized cortical atrophy. Intracranial atherosclerosis. No wedge-shaped area of acute ischemia. No basilar cistern or sulcal effacement. ORBITS: Orbits appear unremarkable. No acute abnormality. PARANASAL SINUSES: Chronic paranasal sinus disease is identified. Leftward nasal septal deviation with a spur. Mastoid air cells are well aerated. SOFT TISSUE/CALVARIUM: The bony calvarium appears intact without fracture. No large soft tissue hematoma is seen. CT CERVICAL SPINE: BONES/ALIGNMENT: Odontoid process, C1 ring and occipital condyles appear intact.   No cervical spine fracture. DEGENERATIVE CHANGES: Mild multilevel degenerative disc disease in the cervical spine involving the facet joints and disc spaces. Degenerative change also noted at the C1-C2 articulation. SOFT TISSUES: No apical pneumothorax. No penetrating soft tissue injury seen in the neck. Atherosclerotic calcifications are identified. 1. No acute intracranial hemorrhage. 2.  Generalized age-related cortical atrophy, with intracranial atherosclerosis and CT findings suggestive of chronic microvascular ischemic change. 3. Degenerative changes in the cervical spine, without acute osseous fracture. CT CERVICAL SPINE WO CONTRAST    Result Date: 12/27/2022  EXAMINATION: CT OF THE HEAD WITHOUT CONTRAST; CT OF THE CERVICAL SPINE WITHOUT CONTRAST 12/27/2022 10:13 pm TECHNIQUE: CT of the head was performed without the administration of intravenous contrast. Automated exposure control, iterative reconstruction, and/or weight based adjustment of the mA/kV was utilized to reduce the radiation dose to as low as reasonably achievable.; CT of the cervical spine was performed without the administration of intravenous contrast. Multiplanar reformatted images are provided for review. Automated exposure control, iterative reconstruction, and/or weight based adjustment of the mA/kV was utilized to reduce the radiation dose to as low as reasonably achievable. COMPARISON: 05/27/2022. HISTORY: ORDERING SYSTEM PROVIDED HISTORY: fall TECHNOLOGIST PROVIDED HISTORY: Reason for exam:->fall Has a \"code stroke\" or \"stroke alert\" been called? ->No Decision Support Exception - unselect if not a suspected or confirmed emergency medical condition->Emergency Medical Condition (MA) Reason for Exam: Fall. Fall (Pt arrived via [address] from [facility] d/t fall in a bathroom. Pt fell on L hip.  Pt denies LOC. ). ; ORDERING SYSTEM PROVIDED HISTORY: fall TECHNOLOGIST PROVIDED HISTORY: Reason for exam:->fall Decision Support Exception - unselect if not a suspected or confirmed emergency medical condition->Emergency Medical Condition (MA) Reason for Exam: Fall. Fall (Pt arrived via [address] from [facility] d/t fall in a bathroom. Pt fell on L hip. Pt denies LOC. ). FINDINGS: CT HEAD: BRAIN/VENTRICLES: There is no acute intracranial hemorrhage, mass effect or midline shift. No abnormal extra-axial fluid collection. The gray-white differentiation is maintained without evidence of an acute infarct. There is no evidence of hydrocephalus. Patchy white matter low attenuation is identified within the periventricular and subcortical white matter. Age related mild generalized cortical atrophy. Intracranial atherosclerosis. No wedge-shaped area of acute ischemia. No basilar cistern or sulcal effacement. ORBITS: Orbits appear unremarkable. No acute abnormality. PARANASAL SINUSES: Chronic paranasal sinus disease is identified. Leftward nasal septal deviation with a spur. Mastoid air cells are well aerated. SOFT TISSUE/CALVARIUM: The bony calvarium appears intact without fracture. No large soft tissue hematoma is seen. CT CERVICAL SPINE: BONES/ALIGNMENT: Odontoid process, C1 ring and occipital condyles appear intact. No cervical spine fracture. DEGENERATIVE CHANGES: Mild multilevel degenerative disc disease in the cervical spine involving the facet joints and disc spaces. Degenerative change also noted at the C1-C2 articulation. SOFT TISSUES: No apical pneumothorax. No penetrating soft tissue injury seen in the neck. Atherosclerotic calcifications are identified. 1. No acute intracranial hemorrhage. 2.  Generalized age-related cortical atrophy, with intracranial atherosclerosis and CT findings suggestive of chronic microvascular ischemic change. 3. Degenerative changes in the cervical spine, without acute osseous fracture.      CT LUMBAR SPINE WO CONTRAST    Result Date: 12/27/2022  EXAMINATION: CT OF THE LUMBAR SPINE WITHOUT CONTRAST  12/27/2022 TECHNIQUE: CT of the lumbar spine was performed without the administration of intravenous contrast. Multiplanar reformatted images are provided for review. Adjustment of mA and/or kV according to patient size was utilized. Automated exposure control, iterative reconstruction, and/or weight based adjustment of the mA/kV was utilized to reduce the radiation dose to as low as reasonably achievable. COMPARISON: 03/14/2014 HISTORY: ORDERING SYSTEM PROVIDED HISTORY: fall TECHNOLOGIST PROVIDED HISTORY: Reason for exam:->fall Decision Support Exception - unselect if not a suspected or confirmed emergency medical condition->Emergency Medical Condition (MA) Reason for Exam: Fall. Fall (Pt arrived via [address] from [facility] d/t fall in a bathroom. Pt fell on L hip. Pt denies LOC. ). FINDINGS: BONES/ALIGNMENT: No acute lumbar spine fracture. No vertebral body height loss. Minimal retrolisthesis of L2 on L3. Spinous processes and lumbar transverse processes are intact. DEGENERATIVE CHANGES: Mild degenerative symmetric change seen within the sacroiliac joints. Multilevel degenerative disc disease identified within the lumbar spine, with small disc osteophyte complex noted at the level of L1-L2 causing mild canal stenosis. Similar finding noted at the level of L2-L3. No severe canal stenosis is identified. Osseous foraminal stenosis noted greatest at the level of L2-L3 which appears mild to moderate. SOFT TISSUES/RETROPERITONEUM: No acute soft tissue abnormality. Aorto iliac atherosclerosis. 1. No acute osseous abnormality is identified. 2. Multilevel degenerative changes as above. RECOMMENDATIONS: Unavailable     XR CHEST PORTABLE    Result Date: 1/5/2023  EXAMINATION: ONE XRAY VIEW OF THE CHEST 1/5/2023 2:46 pm COMPARISON: 12/27/2022.  HISTORY: ORDERING SYSTEM PROVIDED HISTORY: weakness, cough TECHNOLOGIST PROVIDED HISTORY: Reason for exam:->weakness, cough Reason for Exam: weakness, cough FINDINGS: Lung volumes are small. There are mild opacities at the bases. No effusion is noted. The cardiomediastinal silhouette and pulmonary vessels appear normal.     Small lung volumes with mild bibasilar airspace opacities that could reflect associated atelectasis, but pneumonia cannot be excluded. XR CHEST PORTABLE    Result Date: 12/27/2022  EXAMINATION: ONE XRAY VIEW OF THE CHEST; ONE XRAY VIEW OF THE PELVIS AND TWO XRAY VIEWS LEFT HIP 12/27/2022 9:54 pm COMPARISON: Chest x-ray from May 27, 2022 HISTORY: ORDERING SYSTEM PROVIDED HISTORY: fall TECHNOLOGIST PROVIDED HISTORY: Reason for exam:->fall Reason for Exam: fall FINDINGS: Chest x-ray: No active lung parenchyma or pleural disease. Heart, mediastinum and pleural surfaces appear unremarkable. Visualized musculoskeletal structures demonstrate no acute abnormality. Pelvis and left hip x-ray: Single view of the pelvis demonstrates no acute abnormality. Views of the left hip demonstrate no acute fractures or dislocations. A transtrochanteric reconstruction is noted which appears stable. No evidence of fractures of the left femur. Acetabulum and pubic rami appear intact. No acute chest abnormality. Stable left transtrochanteric hip reconstruction but no acute fractures or dislocations. XR HIP 2-3 VW W PELVIS LEFT    Result Date: 12/27/2022  EXAMINATION: ONE XRAY VIEW OF THE CHEST; ONE XRAY VIEW OF THE PELVIS AND TWO XRAY VIEWS LEFT HIP 12/27/2022 9:54 pm COMPARISON: Chest x-ray from May 27, 2022 HISTORY: ORDERING SYSTEM PROVIDED HISTORY: fall TECHNOLOGIST PROVIDED HISTORY: Reason for exam:->fall Reason for Exam: fall FINDINGS: Chest x-ray: No active lung parenchyma or pleural disease. Heart, mediastinum and pleural surfaces appear unremarkable. Visualized musculoskeletal structures demonstrate no acute abnormality.  Pelvis and left hip x-ray: Single view of the pelvis demonstrates no acute abnormality. Views of the left hip demonstrate no acute fractures or dislocations. A transtrochanteric reconstruction is noted which appears stable. No evidence of fractures of the left femur. Acetabulum and pubic rami appear intact. No acute chest abnormality. Stable left transtrochanteric hip reconstruction but no acute fractures or dislocations. The patient was seen and examined on day of discharge and this discharge summary is in conjunction with any daily progress note from day of discharge. Time Spent on discharge is 45 minutes  in the examination, evaluation, counseling and review of medications and discharge plan. Note that more than 30 minutes was spent in preparing discharge papers, discussing discharge with patient, medication review, etc.       Signed:    Kathryn Dalal MD   1/8/2023      Thank you DANYELLE LINARES for the opportunity to be involved in this patient's care.  If you have any questions or concerns please feel free to contact me at [address] [place] [address]

## 2023-02-04 PROBLEM — N39.0 UTI (URINARY TRACT INFECTION): Status: RESOLVED | Noted: 2022-05-27 | Resolved: 2023-02-04

## 2023-02-26 ENCOUNTER — HOSPITAL ENCOUNTER (INPATIENT)
Age: 73
LOS: 3 days | Discharge: OTHER INSTITUTION | DRG: 690 | End: 2023-03-01
Attending: STUDENT IN AN ORGANIZED HEALTH CARE EDUCATION/TRAINING PROGRAM | Admitting: STUDENT IN AN ORGANIZED HEALTH CARE EDUCATION/TRAINING PROGRAM
Payer: COMMERCIAL

## 2023-02-26 ENCOUNTER — APPOINTMENT (OUTPATIENT)
Dept: CT IMAGING | Age: 73
DRG: 690 | End: 2023-02-26
Payer: COMMERCIAL

## 2023-02-26 DIAGNOSIS — R79.89 ELEVATED LACTIC ACID LEVEL: ICD-10-CM

## 2023-02-26 DIAGNOSIS — N30.00 ACUTE CYSTITIS WITHOUT HEMATURIA: Primary | ICD-10-CM

## 2023-02-26 PROBLEM — R91.1 PULMONARY NODULE: Status: ACTIVE | Noted: 2023-02-26

## 2023-02-26 PROBLEM — E11.69 HYPERLIPIDEMIA ASSOCIATED WITH TYPE 2 DIABETES MELLITUS (HCC): Status: ACTIVE | Noted: 2023-02-26

## 2023-02-26 PROBLEM — E78.5 HYPERLIPIDEMIA ASSOCIATED WITH TYPE 2 DIABETES MELLITUS (HCC): Status: ACTIVE | Noted: 2023-02-26

## 2023-02-26 PROBLEM — R33.9 URINARY RETENTION: Status: ACTIVE | Noted: 2023-02-26

## 2023-02-26 LAB
A/G RATIO: 1.2 (ref 1.1–2.2)
ALBUMIN SERPL-MCNC: 3.7 G/DL (ref 3.4–5)
ALP BLD-CCNC: 85 U/L (ref 40–129)
ALT SERPL-CCNC: 12 U/L (ref 10–40)
AMORPHOUS: PRESENT
ANION GAP SERPL CALCULATED.3IONS-SCNC: 12 MMOL/L (ref 3–16)
AST SERPL-CCNC: 13 U/L (ref 15–37)
BACTERIA: ABNORMAL /HPF
BASOPHILS ABSOLUTE: 0 K/UL (ref 0–0.2)
BASOPHILS RELATIVE PERCENT: 0.5 %
BILIRUB SERPL-MCNC: 0.3 MG/DL (ref 0–1)
BILIRUBIN URINE: NEGATIVE
BLOOD, URINE: ABNORMAL
BUN BLDV-MCNC: 27 MG/DL (ref 7–20)
CALCIUM SERPL-MCNC: 9.5 MG/DL (ref 8.3–10.6)
CHLORIDE BLD-SCNC: 102 MMOL/L (ref 99–110)
CLARITY: ABNORMAL
CO2: 20 MMOL/L (ref 21–32)
COLOR: YELLOW
COMMENT UA: ABNORMAL
CREAT SERPL-MCNC: 1.2 MG/DL (ref 0.8–1.3)
EOSINOPHILS ABSOLUTE: 0.2 K/UL (ref 0–0.6)
EOSINOPHILS RELATIVE PERCENT: 2.7 %
EPITHELIAL CELLS, UA: 1 /HPF (ref 0–5)
GFR SERPL CREATININE-BSD FRML MDRD: >60 ML/MIN/{1.73_M2}
GLUCOSE BLD-MCNC: 203 MG/DL (ref 70–99)
GLUCOSE URINE: NEGATIVE MG/DL
HCT VFR BLD CALC: 31 % (ref 40.5–52.5)
HEMOGLOBIN: 10.2 G/DL (ref 13.5–17.5)
KETONES, URINE: NEGATIVE MG/DL
LACTIC ACID, SEPSIS: 2.3 MMOL/L (ref 0.4–1.9)
LACTIC ACID, SEPSIS: 2.7 MMOL/L (ref 0.4–1.9)
LEUKOCYTE ESTERASE, URINE: ABNORMAL
LIPASE: 29 U/L (ref 13–60)
LYMPHOCYTES ABSOLUTE: 0.9 K/UL (ref 1–5.1)
LYMPHOCYTES RELATIVE PERCENT: 13.1 %
MCH RBC QN AUTO: 30.1 PG (ref 26–34)
MCHC RBC AUTO-ENTMCNC: 33.1 G/DL (ref 31–36)
MCV RBC AUTO: 90.9 FL (ref 80–100)
MICROSCOPIC EXAMINATION: YES
MONOCYTES ABSOLUTE: 0.5 K/UL (ref 0–1.3)
MONOCYTES RELATIVE PERCENT: 7.8 %
MUCUS: PRESENT
NEUTROPHILS ABSOLUTE: 5 K/UL (ref 1.7–7.7)
NEUTROPHILS RELATIVE PERCENT: 75.9 %
NITRITE, URINE: NEGATIVE
PDW BLD-RTO: 16.1 % (ref 12.4–15.4)
PH UA: 5 (ref 5–8)
PLATELET # BLD: 167 K/UL (ref 135–450)
PMV BLD AUTO: 8.8 FL (ref 5–10.5)
POTASSIUM SERPL-SCNC: 4.2 MMOL/L (ref 3.5–5.1)
PROCALCITONIN: 0.06 NG/ML (ref 0–0.15)
PROTEIN UA: 100 MG/DL
RBC # BLD: 3.41 M/UL (ref 4.2–5.9)
RBC UA: ABNORMAL /HPF (ref 0–4)
SODIUM BLD-SCNC: 134 MMOL/L (ref 136–145)
SPECIFIC GRAVITY UA: 1.01 (ref 1–1.03)
TOTAL PROTEIN: 6.8 G/DL (ref 6.4–8.2)
TROPONIN: <0.01 NG/ML
URINE REFLEX TO CULTURE: YES
URINE TYPE: ABNORMAL
UROBILINOGEN, URINE: 0.2 E.U./DL
WBC # BLD: 6.6 K/UL (ref 4–11)
WBC UA: 39 /HPF (ref 0–5)

## 2023-02-26 PROCEDURE — 96360 HYDRATION IV INFUSION INIT: CPT

## 2023-02-26 PROCEDURE — 85025 COMPLETE CBC W/AUTO DIFF WBC: CPT

## 2023-02-26 PROCEDURE — 93005 ELECTROCARDIOGRAM TRACING: CPT | Performed by: PHYSICIAN ASSISTANT

## 2023-02-26 PROCEDURE — 84145 PROCALCITONIN (PCT): CPT

## 2023-02-26 PROCEDURE — 2580000003 HC RX 258: Performed by: PHYSICIAN ASSISTANT

## 2023-02-26 PROCEDURE — 99285 EMERGENCY DEPT VISIT HI MDM: CPT

## 2023-02-26 PROCEDURE — 84484 ASSAY OF TROPONIN QUANT: CPT

## 2023-02-26 PROCEDURE — 81001 URINALYSIS AUTO W/SCOPE: CPT

## 2023-02-26 PROCEDURE — 6370000000 HC RX 637 (ALT 250 FOR IP): Performed by: PHYSICIAN ASSISTANT

## 2023-02-26 PROCEDURE — 80053 COMPREHEN METABOLIC PANEL: CPT

## 2023-02-26 PROCEDURE — 83690 ASSAY OF LIPASE: CPT

## 2023-02-26 PROCEDURE — 36415 COLL VENOUS BLD VENIPUNCTURE: CPT

## 2023-02-26 PROCEDURE — 74176 CT ABD & PELVIS W/O CONTRAST: CPT

## 2023-02-26 PROCEDURE — 83605 ASSAY OF LACTIC ACID: CPT

## 2023-02-26 PROCEDURE — 1200000000 HC SEMI PRIVATE

## 2023-02-26 PROCEDURE — 51702 INSERT TEMP BLADDER CATH: CPT

## 2023-02-26 PROCEDURE — 87086 URINE CULTURE/COLONY COUNT: CPT

## 2023-02-26 RX ORDER — ONDANSETRON 4 MG/1
4 TABLET, ORALLY DISINTEGRATING ORAL EVERY 8 HOURS PRN
Status: DISCONTINUED | OUTPATIENT
Start: 2023-02-26 | End: 2023-03-01 | Stop reason: HOSPADM

## 2023-02-26 RX ORDER — CETIRIZINE HYDROCHLORIDE 10 MG/1
10 TABLET ORAL DAILY
Status: DISCONTINUED | OUTPATIENT
Start: 2023-02-27 | End: 2023-02-27

## 2023-02-26 RX ORDER — INSULIN LISPRO 100 [IU]/ML
0-4 INJECTION, SOLUTION INTRAVENOUS; SUBCUTANEOUS NIGHTLY
Status: DISCONTINUED | OUTPATIENT
Start: 2023-02-26 | End: 2023-03-01 | Stop reason: HOSPADM

## 2023-02-26 RX ORDER — ENOXAPARIN SODIUM 100 MG/ML
40 INJECTION SUBCUTANEOUS DAILY
Status: DISCONTINUED | OUTPATIENT
Start: 2023-02-27 | End: 2023-03-01 | Stop reason: HOSPADM

## 2023-02-26 RX ORDER — CARVEDILOL 6.25 MG/1
6.25 TABLET ORAL 2 TIMES DAILY WITH MEALS
Status: DISCONTINUED | OUTPATIENT
Start: 2023-02-27 | End: 2023-03-01 | Stop reason: HOSPADM

## 2023-02-26 RX ORDER — ONDANSETRON 2 MG/ML
4 INJECTION INTRAMUSCULAR; INTRAVENOUS EVERY 6 HOURS PRN
Status: DISCONTINUED | OUTPATIENT
Start: 2023-02-26 | End: 2023-03-01 | Stop reason: HOSPADM

## 2023-02-26 RX ORDER — HYDRALAZINE HYDROCHLORIDE 25 MG/1
25 TABLET, FILM COATED ORAL EVERY 8 HOURS SCHEDULED
Status: DISCONTINUED | OUTPATIENT
Start: 2023-02-27 | End: 2023-03-01 | Stop reason: HOSPADM

## 2023-02-26 RX ORDER — SODIUM CHLORIDE, SODIUM LACTATE, POTASSIUM CHLORIDE, CALCIUM CHLORIDE 600; 310; 30; 20 MG/100ML; MG/100ML; MG/100ML; MG/100ML
INJECTION, SOLUTION INTRAVENOUS CONTINUOUS
Status: DISCONTINUED | OUTPATIENT
Start: 2023-02-26 | End: 2023-03-01

## 2023-02-26 RX ORDER — SODIUM CHLORIDE 0.9 % (FLUSH) 0.9 %
5-40 SYRINGE (ML) INJECTION PRN
Status: DISCONTINUED | OUTPATIENT
Start: 2023-02-26 | End: 2023-03-01 | Stop reason: HOSPADM

## 2023-02-26 RX ORDER — MIRTAZAPINE 15 MG/1
7.5 TABLET, FILM COATED ORAL NIGHTLY
Status: DISCONTINUED | OUTPATIENT
Start: 2023-02-27 | End: 2023-03-01 | Stop reason: HOSPADM

## 2023-02-26 RX ORDER — SIMETHICONE 80 MG
125 TABLET,CHEWABLE ORAL EVERY MORNING
Status: DISCONTINUED | OUTPATIENT
Start: 2023-02-27 | End: 2023-03-01 | Stop reason: HOSPADM

## 2023-02-26 RX ORDER — SODIUM CHLORIDE 9 MG/ML
INJECTION, SOLUTION INTRAVENOUS PRN
Status: DISCONTINUED | OUTPATIENT
Start: 2023-02-26 | End: 2023-03-01 | Stop reason: HOSPADM

## 2023-02-26 RX ORDER — LIDOCAINE HYDROCHLORIDE 20 MG/ML
JELLY TOPICAL ONCE
Status: COMPLETED | OUTPATIENT
Start: 2023-02-26 | End: 2023-02-26

## 2023-02-26 RX ORDER — DEXTROSE MONOHYDRATE 100 MG/ML
INJECTION, SOLUTION INTRAVENOUS CONTINUOUS PRN
Status: DISCONTINUED | OUTPATIENT
Start: 2023-02-26 | End: 2023-03-01 | Stop reason: HOSPADM

## 2023-02-26 RX ORDER — POTASSIUM CHLORIDE 20 MEQ/1
40 TABLET, EXTENDED RELEASE ORAL PRN
Status: DISCONTINUED | OUTPATIENT
Start: 2023-02-26 | End: 2023-03-01 | Stop reason: HOSPADM

## 2023-02-26 RX ORDER — PANTOPRAZOLE SODIUM 40 MG/1
40 TABLET, DELAYED RELEASE ORAL
Status: DISCONTINUED | OUTPATIENT
Start: 2023-02-27 | End: 2023-03-01 | Stop reason: HOSPADM

## 2023-02-26 RX ORDER — ASPIRIN 81 MG/1
81 TABLET, CHEWABLE ORAL DAILY
Status: DISCONTINUED | OUTPATIENT
Start: 2023-02-27 | End: 2023-03-01 | Stop reason: HOSPADM

## 2023-02-26 RX ORDER — 0.9 % SODIUM CHLORIDE 0.9 %
1000 INTRAVENOUS SOLUTION INTRAVENOUS ONCE
Status: COMPLETED | OUTPATIENT
Start: 2023-02-26 | End: 2023-02-26

## 2023-02-26 RX ORDER — POLYETHYLENE GLYCOL 3350 17 G/17G
17 POWDER, FOR SOLUTION ORAL DAILY PRN
Status: DISCONTINUED | OUTPATIENT
Start: 2023-02-26 | End: 2023-03-01 | Stop reason: HOSPADM

## 2023-02-26 RX ORDER — QUETIAPINE FUMARATE 25 MG/1
12.5 TABLET, FILM COATED ORAL 2 TIMES DAILY
Status: DISCONTINUED | OUTPATIENT
Start: 2023-02-27 | End: 2023-03-01 | Stop reason: HOSPADM

## 2023-02-26 RX ORDER — POTASSIUM CHLORIDE 7.45 MG/ML
10 INJECTION INTRAVENOUS PRN
Status: DISCONTINUED | OUTPATIENT
Start: 2023-02-26 | End: 2023-03-01 | Stop reason: HOSPADM

## 2023-02-26 RX ORDER — ACETAMINOPHEN 325 MG/1
650 TABLET ORAL EVERY 6 HOURS PRN
Status: DISCONTINUED | OUTPATIENT
Start: 2023-02-26 | End: 2023-03-01 | Stop reason: HOSPADM

## 2023-02-26 RX ORDER — ATORVASTATIN CALCIUM 40 MG/1
80 TABLET, FILM COATED ORAL NIGHTLY
Status: DISCONTINUED | OUTPATIENT
Start: 2023-02-27 | End: 2023-03-01 | Stop reason: HOSPADM

## 2023-02-26 RX ORDER — MAGNESIUM SULFATE IN WATER 40 MG/ML
2000 INJECTION, SOLUTION INTRAVENOUS PRN
Status: DISCONTINUED | OUTPATIENT
Start: 2023-02-26 | End: 2023-03-01 | Stop reason: HOSPADM

## 2023-02-26 RX ORDER — PAROXETINE HYDROCHLORIDE 20 MG/1
40 TABLET, FILM COATED ORAL DAILY
Status: DISCONTINUED | OUTPATIENT
Start: 2023-02-27 | End: 2023-03-01 | Stop reason: HOSPADM

## 2023-02-26 RX ORDER — FLUTICASONE PROPIONATE 50 MCG
1 SPRAY, SUSPENSION (ML) NASAL DAILY
Status: DISCONTINUED | OUTPATIENT
Start: 2023-02-27 | End: 2023-03-01 | Stop reason: HOSPADM

## 2023-02-26 RX ORDER — INSULIN LISPRO 100 [IU]/ML
0-8 INJECTION, SOLUTION INTRAVENOUS; SUBCUTANEOUS
Status: DISCONTINUED | OUTPATIENT
Start: 2023-02-27 | End: 2023-03-01 | Stop reason: HOSPADM

## 2023-02-26 RX ORDER — ACETAMINOPHEN 650 MG/1
650 SUPPOSITORY RECTAL EVERY 6 HOURS PRN
Status: DISCONTINUED | OUTPATIENT
Start: 2023-02-26 | End: 2023-03-01 | Stop reason: HOSPADM

## 2023-02-26 RX ORDER — SODIUM CHLORIDE 0.9 % (FLUSH) 0.9 %
5-40 SYRINGE (ML) INJECTION EVERY 12 HOURS SCHEDULED
Status: DISCONTINUED | OUTPATIENT
Start: 2023-02-26 | End: 2023-03-01 | Stop reason: HOSPADM

## 2023-02-26 RX ORDER — FERROUS SULFATE 325(65) MG
325 TABLET ORAL 2 TIMES DAILY
Status: DISCONTINUED | OUTPATIENT
Start: 2023-02-27 | End: 2023-03-01 | Stop reason: HOSPADM

## 2023-02-26 RX ADMIN — LIDOCAINE HYDROCHLORIDE: 20 JELLY TOPICAL at 20:30

## 2023-02-26 RX ADMIN — SODIUM CHLORIDE 1000 ML: 9 INJECTION, SOLUTION INTRAVENOUS at 20:47

## 2023-02-26 ASSESSMENT — PAIN DESCRIPTION - ORIENTATION: ORIENTATION: LOWER

## 2023-02-26 ASSESSMENT — PAIN DESCRIPTION - LOCATION: LOCATION: ABDOMEN

## 2023-02-26 ASSESSMENT — PAIN SCALES - GENERAL: PAINLEVEL_OUTOF10: 3

## 2023-02-27 ENCOUNTER — APPOINTMENT (OUTPATIENT)
Dept: CT IMAGING | Age: 73
DRG: 690 | End: 2023-02-27
Payer: COMMERCIAL

## 2023-02-27 LAB
ALBUMIN SERPL-MCNC: 3.2 G/DL (ref 3.4–5)
ANION GAP SERPL CALCULATED.3IONS-SCNC: 9 MMOL/L (ref 3–16)
BASOPHILS ABSOLUTE: 0 K/UL (ref 0–0.2)
BASOPHILS RELATIVE PERCENT: 0.5 %
BUN BLDV-MCNC: 20 MG/DL (ref 7–20)
CALCIUM SERPL-MCNC: 9 MG/DL (ref 8.3–10.6)
CHLORIDE BLD-SCNC: 112 MMOL/L (ref 99–110)
CO2: 20 MMOL/L (ref 21–32)
CREAT SERPL-MCNC: 0.9 MG/DL (ref 0.8–1.3)
EKG ATRIAL RATE: 92 BPM
EKG DIAGNOSIS: NORMAL
EKG P AXIS: 39 DEGREES
EKG P-R INTERVAL: 128 MS
EKG Q-T INTERVAL: 380 MS
EKG QRS DURATION: 130 MS
EKG QTC CALCULATION (BAZETT): 467 MS
EKG R AXIS: -34 DEGREES
EKG T AXIS: 25 DEGREES
EKG VENTRICULAR RATE: 91 BPM
EOSINOPHILS ABSOLUTE: 0.2 K/UL (ref 0–0.6)
EOSINOPHILS RELATIVE PERCENT: 3.5 %
ESTIMATED AVERAGE GLUCOSE: 142.7 MG/DL
GFR SERPL CREATININE-BSD FRML MDRD: >60 ML/MIN/{1.73_M2}
GLUCOSE BLD-MCNC: 134 MG/DL (ref 70–99)
GLUCOSE BLD-MCNC: 163 MG/DL (ref 70–99)
GLUCOSE BLD-MCNC: 165 MG/DL (ref 70–99)
GLUCOSE BLD-MCNC: 169 MG/DL (ref 70–99)
GLUCOSE BLD-MCNC: 213 MG/DL (ref 70–99)
GLUCOSE BLD-MCNC: 262 MG/DL (ref 70–99)
HBA1C MFR BLD: 6.6 %
HCT VFR BLD CALC: 27.1 % (ref 40.5–52.5)
HEMOGLOBIN: 9 G/DL (ref 13.5–17.5)
LACTIC ACID: 0.6 MMOL/L (ref 0.4–2)
LYMPHOCYTES ABSOLUTE: 0.9 K/UL (ref 1–5.1)
LYMPHOCYTES RELATIVE PERCENT: 19 %
MAGNESIUM: 1.2 MG/DL (ref 1.8–2.4)
MCH RBC QN AUTO: 29.5 PG (ref 26–34)
MCHC RBC AUTO-ENTMCNC: 33 G/DL (ref 31–36)
MCV RBC AUTO: 89.6 FL (ref 80–100)
MONOCYTES ABSOLUTE: 0.5 K/UL (ref 0–1.3)
MONOCYTES RELATIVE PERCENT: 11.1 %
NEUTROPHILS ABSOLUTE: 3.1 K/UL (ref 1.7–7.7)
NEUTROPHILS RELATIVE PERCENT: 65.9 %
PDW BLD-RTO: 16.1 % (ref 12.4–15.4)
PERFORMED ON: ABNORMAL
PHOSPHORUS: 3.6 MG/DL (ref 2.5–4.9)
PLATELET # BLD: 144 K/UL (ref 135–450)
PMV BLD AUTO: 8.2 FL (ref 5–10.5)
POTASSIUM SERPL-SCNC: 3.9 MMOL/L (ref 3.5–5.1)
PROSTATE SPECIFIC ANTIGEN: 9.85 NG/ML (ref 0–4)
RBC # BLD: 3.03 M/UL (ref 4.2–5.9)
SODIUM BLD-SCNC: 141 MMOL/L (ref 136–145)
TSH REFLEX: 4.14 UIU/ML (ref 0.27–4.2)
URINE CULTURE, ROUTINE: NORMAL
WBC # BLD: 4.7 K/UL (ref 4–11)

## 2023-02-27 PROCEDURE — 2580000003 HC RX 258: Performed by: PHYSICIAN ASSISTANT

## 2023-02-27 PROCEDURE — 1200000000 HC SEMI PRIVATE

## 2023-02-27 PROCEDURE — 84153 ASSAY OF PSA TOTAL: CPT

## 2023-02-27 PROCEDURE — 36569 INSJ PICC 5 YR+ W/O IMAGING: CPT

## 2023-02-27 PROCEDURE — 83605 ASSAY OF LACTIC ACID: CPT

## 2023-02-27 PROCEDURE — 85025 COMPLETE CBC W/AUTO DIFF WBC: CPT

## 2023-02-27 PROCEDURE — 71250 CT THORAX DX C-: CPT

## 2023-02-27 PROCEDURE — 05HF33Z INSERTION OF INFUSION DEVICE INTO LEFT CEPHALIC VEIN, PERCUTANEOUS APPROACH: ICD-10-PCS | Performed by: STUDENT IN AN ORGANIZED HEALTH CARE EDUCATION/TRAINING PROGRAM

## 2023-02-27 PROCEDURE — 93010 ELECTROCARDIOGRAM REPORT: CPT | Performed by: INTERNAL MEDICINE

## 2023-02-27 PROCEDURE — 83735 ASSAY OF MAGNESIUM: CPT

## 2023-02-27 PROCEDURE — 84443 ASSAY THYROID STIM HORMONE: CPT

## 2023-02-27 PROCEDURE — 6370000000 HC RX 637 (ALT 250 FOR IP): Performed by: STUDENT IN AN ORGANIZED HEALTH CARE EDUCATION/TRAINING PROGRAM

## 2023-02-27 PROCEDURE — 6360000002 HC RX W HCPCS: Performed by: PHYSICIAN ASSISTANT

## 2023-02-27 PROCEDURE — 6360000002 HC RX W HCPCS: Performed by: STUDENT IN AN ORGANIZED HEALTH CARE EDUCATION/TRAINING PROGRAM

## 2023-02-27 PROCEDURE — C1751 CATH, INF, PER/CENT/MIDLINE: HCPCS

## 2023-02-27 PROCEDURE — 6370000000 HC RX 637 (ALT 250 FOR IP): Performed by: NURSE PRACTITIONER

## 2023-02-27 PROCEDURE — 83036 HEMOGLOBIN GLYCOSYLATED A1C: CPT

## 2023-02-27 PROCEDURE — 36415 COLL VENOUS BLD VENIPUNCTURE: CPT

## 2023-02-27 PROCEDURE — 2580000003 HC RX 258: Performed by: STUDENT IN AN ORGANIZED HEALTH CARE EDUCATION/TRAINING PROGRAM

## 2023-02-27 PROCEDURE — 80069 RENAL FUNCTION PANEL: CPT

## 2023-02-27 RX ORDER — SODIUM CHLORIDE 9 MG/ML
25 INJECTION, SOLUTION INTRAVENOUS PRN
Status: DISCONTINUED | OUTPATIENT
Start: 2023-02-27 | End: 2023-03-01 | Stop reason: HOSPADM

## 2023-02-27 RX ORDER — SODIUM CHLORIDE 0.9 % (FLUSH) 0.9 %
5-40 SYRINGE (ML) INJECTION EVERY 12 HOURS SCHEDULED
Status: DISCONTINUED | OUTPATIENT
Start: 2023-02-27 | End: 2023-03-01 | Stop reason: HOSPADM

## 2023-02-27 RX ORDER — TAMSULOSIN HYDROCHLORIDE 0.4 MG/1
0.4 CAPSULE ORAL DAILY
Status: DISCONTINUED | OUTPATIENT
Start: 2023-02-27 | End: 2023-03-01 | Stop reason: HOSPADM

## 2023-02-27 RX ORDER — MIDODRINE HYDROCHLORIDE 2.5 MG/1
2.5 TABLET ORAL DAILY
COMMUNITY

## 2023-02-27 RX ORDER — INSULIN ASPART 100 [IU]/ML
INJECTION, SOLUTION INTRAVENOUS; SUBCUTANEOUS 3 TIMES DAILY
COMMUNITY

## 2023-02-27 RX ORDER — SODIUM CHLORIDE 0.9 % (FLUSH) 0.9 %
5-40 SYRINGE (ML) INJECTION PRN
Status: DISCONTINUED | OUTPATIENT
Start: 2023-02-27 | End: 2023-03-01 | Stop reason: HOSPADM

## 2023-02-27 RX ORDER — TAMSULOSIN HYDROCHLORIDE 0.4 MG/1
0.4 CAPSULE ORAL DAILY
COMMUNITY

## 2023-02-27 RX ORDER — CHOLECALCIFEROL (VITAMIN D3) 1250 MCG
CAPSULE ORAL
COMMUNITY

## 2023-02-27 RX ORDER — PIOGLITAZONEHYDROCHLORIDE 45 MG/1
45 TABLET ORAL DAILY
COMMUNITY

## 2023-02-27 RX ORDER — CALCIUM CARBONATE 200(500)MG
500 TABLET,CHEWABLE ORAL 3 TIMES DAILY PRN
Status: DISCONTINUED | OUTPATIENT
Start: 2023-02-27 | End: 2023-03-01 | Stop reason: HOSPADM

## 2023-02-27 RX ORDER — LIDOCAINE HYDROCHLORIDE 10 MG/ML
5 INJECTION, SOLUTION EPIDURAL; INFILTRATION; INTRACAUDAL; PERINEURAL ONCE
Status: DISCONTINUED | OUTPATIENT
Start: 2023-02-27 | End: 2023-03-01 | Stop reason: HOSPADM

## 2023-02-27 RX ADMIN — FLUTICASONE PROPIONATE 1 SPRAY: 50 SPRAY, METERED NASAL at 09:24

## 2023-02-27 RX ADMIN — TAMSULOSIN HYDROCHLORIDE 0.4 MG: 0.4 CAPSULE ORAL at 12:29

## 2023-02-27 RX ADMIN — MAGNESIUM SULFATE HEPTAHYDRATE 2000 MG: 40 INJECTION, SOLUTION INTRAVENOUS at 15:28

## 2023-02-27 RX ADMIN — HYDRALAZINE HYDROCHLORIDE 25 MG: 25 TABLET, FILM COATED ORAL at 07:09

## 2023-02-27 RX ADMIN — ASPIRIN 81 MG: 81 TABLET, CHEWABLE ORAL at 09:24

## 2023-02-27 RX ADMIN — QUETIAPINE FUMARATE 12.5 MG: 25 TABLET ORAL at 21:20

## 2023-02-27 RX ADMIN — CARVEDILOL 6.25 MG: 6.25 TABLET, FILM COATED ORAL at 17:19

## 2023-02-27 RX ADMIN — PANTOPRAZOLE SODIUM 40 MG: 40 TABLET, DELAYED RELEASE ORAL at 07:09

## 2023-02-27 RX ADMIN — FERROUS SULFATE TAB 325 MG (65 MG ELEMENTAL FE) 325 MG: 325 (65 FE) TAB at 09:24

## 2023-02-27 RX ADMIN — SODIUM CHLORIDE, POTASSIUM CHLORIDE, SODIUM LACTATE AND CALCIUM CHLORIDE: 600; 310; 30; 20 INJECTION, SOLUTION INTRAVENOUS at 01:08

## 2023-02-27 RX ADMIN — ATORVASTATIN CALCIUM 80 MG: 40 TABLET, FILM COATED ORAL at 21:20

## 2023-02-27 RX ADMIN — FERROUS SULFATE TAB 325 MG (65 MG ELEMENTAL FE) 325 MG: 325 (65 FE) TAB at 21:16

## 2023-02-27 RX ADMIN — MIRTAZAPINE 7.5 MG: 15 TABLET, FILM COATED ORAL at 21:20

## 2023-02-27 RX ADMIN — CARVEDILOL 6.25 MG: 6.25 TABLET, FILM COATED ORAL at 09:34

## 2023-02-27 RX ADMIN — CEFTRIAXONE SODIUM 1000 MG: 1 INJECTION, POWDER, FOR SOLUTION INTRAMUSCULAR; INTRAVENOUS at 00:37

## 2023-02-27 RX ADMIN — PAROXETINE HYDROCHLORIDE 40 MG: 20 TABLET, FILM COATED ORAL at 09:24

## 2023-02-27 RX ADMIN — ANTACID TABLETS 500 MG: 500 TABLET, CHEWABLE ORAL at 17:19

## 2023-02-27 RX ADMIN — QUETIAPINE FUMARATE 12.5 MG: 25 TABLET ORAL at 09:24

## 2023-02-27 RX ADMIN — ENOXAPARIN SODIUM 40 MG: 100 INJECTION SUBCUTANEOUS at 09:28

## 2023-02-27 RX ADMIN — SIMETHICONE 120 MG: 80 TABLET, CHEWABLE ORAL at 09:24

## 2023-02-27 RX ADMIN — HYDRALAZINE HYDROCHLORIDE 25 MG: 25 TABLET, FILM COATED ORAL at 21:20

## 2023-02-27 RX ADMIN — MAGNESIUM SULFATE HEPTAHYDRATE 2000 MG: 40 INJECTION, SOLUTION INTRAVENOUS at 17:34

## 2023-02-27 RX ADMIN — INSULIN LISPRO 4 UNITS: 100 INJECTION, SOLUTION INTRAVENOUS; SUBCUTANEOUS at 12:29

## 2023-02-27 ASSESSMENT — ENCOUNTER SYMPTOMS
ABDOMINAL PAIN: 1
COUGH: 0
DIARRHEA: 0
RHINORRHEA: 0
NAUSEA: 0
VOMITING: 0
SHORTNESS OF BREATH: 0

## 2023-02-27 NOTE — ED PROVIDER NOTES
[facility]        Pt Name: April Burroughs  MRN: [id number]  [place] 1950  Date of evaluation: 2/26/2023  Provider: Jennifer Pringle PA-C  PCP: DANYELLE LINARES  Note Started: 12:49 AM EST 2/27/23      JAYME. I have evaluated this patient. My supervising physician was available for consultation. CHIEF COMPLAINT       Chief Complaint   Patient presents with    Flank Pain     Pt came to the hospital by [facility] from [facility] due to having an inability to urinate for the past 4 days, straight cath was attempted and unsuccessful only causing bloody discharge and pain. HISTORY OF PRESENT ILLNESS: 1 or more Elements     History From: Patient  Limitations to history : None    April Burroughs is a 67 y.o. male who presents to the emergency department today from [address] for evaluation for urinary retention, lower abdominal pain and left flank pain. The patient states that for the past 3 to 4 days he has been experiencing pain to his suprapubic abdomen, with radiation towards his left flank and left lower back. Patient denies falling or injuring himself in any way. The patient states that he has had difficulty urinating since this morning. The patient states that he has been trying to use the restroom, without much urinary output. Patient is reporting painful urination. Patient denies any hematuria. Apparently they did attempt to cath the patient at the nursing home facility and there was blood noted following the cath. Patient has not had any fever or chills. No cough or congestion. He has no chest pain or shortness of breath. Patient denies any testicular pain. Patient has no history of kidney stones. Nursing Notes were all reviewed and agreed with or any disagreements were addressed in the HPI.     REVIEW OF SYSTEMS :      Review of Systems   Constitutional:  Negative for activity change, appetite change, chills and fever. HENT:  Negative for congestion and rhinorrhea. Respiratory:  Negative for cough and shortness of breath. Cardiovascular:  Negative for chest pain. Gastrointestinal:  Positive for abdominal pain. Negative for diarrhea, nausea and vomiting. Genitourinary:  Positive for difficulty urinating, dysuria and flank pain. Negative for hematuria. Positives and Pertinent negatives as per HPI.      SURGICAL HISTORY     Past Surgical History:   Procedure Laterality Date    ANKLE SURGERY Right 06/09/2018    ORIF of R ankle     COLONOSCOPY N/A 12/13/2018    COLONOSCOPY CONTROL HEMORRHAGE performed by Lillian Scott MD at [facility]      x 4    CYSTOSCOPY  4/4/14    transurethral vaporization of prostate    HERNIA REPAIR      X2    ORIF DISTAL RADIUS FRACTURE Left 6/20/2019    LEFT DISTAL RADIUS OPEN REDUCTION INTERNAL FIXATION   performed by Duncan Mendez MD at [facility] 12/13/2018    EGD BIOPSY performed by Lillian Scott MD at [address] 5/3/2019    EGD WITH ANESTHESIA performed by Lillian Scott MD at [facility]       Previous Medications    ACETAMINOPHEN (TYLENOL) 500 MG TABLET    Take 1,000 mg by mouth every 6 hours as needed     ASPIRIN LOW DOSE 81 MG EC TABLET    TAKE 1 TABLET BY MOUTH ONCE DAILY    ATORVASTATIN (LIPITOR) 80 MG TABLET    TAKE 1 TABLET BY MOUTH AT BEDTIME    CARVEDILOL (COREG) 6.25 MG TABLET    Take 1 tablet by mouth 2 times daily (with meals)    CHOLECALCIFEROL (VITAMIN D3) 1.25 MG (75821 UT) CAPS    Take by mouth every 7 days Thursday    FERROUS SULFATE 325 (65 FE) MG TABLET    Take 1 tablet by mouth 2 times daily    FLUTICASONE (FLONASE) 50 MCG/ACT NASAL SPRAY    1 spray by Nasal route daily     GUAIFENESIN (ROBITUSSIN) 100 MG/5ML SOLN ORAL SOLUTION    Take 400 mg by mouth every 4 hours as needed for Cough    HYDRALAZINE (APRESOLINE) 25 MG TABLET    Take 1 tablet by mouth every 8 hours Hold for BP<120    INSULIN ASPART (NOVOLOG) 100 UNIT/ML INJECTION VIAL    Inject into the skin 3 times daily  no insulin  151-200 2 units  201-250 4 units  251-300 6 units  301-350 8 units  351+ 12 units then recheck in 2 hours    LACTOBACILLUS PO    Take 1 tablet by mouth    LORATADINE (CLARITIN) 10 MG TABLET    Take 10 mg by mouth daily    METFORMIN (GLUCOPHAGE) 1000 MG TABLET    Take 1 tablet by mouth 2 times daily (with meals)     MIDODRINE (PROAMATINE) 2.5 MG TABLET    Take 2.5 mg by mouth daily Hold SBP > 110 and DBP >90    MIRTAZAPINE (REMERON) 7.5 MG TABLET    Take 7.5 mg by mouth nightly    PANTOPRAZOLE (PROTONIX) 40 MG TABLET    Take one tablet daily    PAROXETINE (PAXIL) 40 MG TABLET    Take 1 tablet by mouth daily    PIOGLITAZONE (ACTOS) 45 MG TABLET    Take 45 mg by mouth daily    QUETIAPINE (SEROQUEL) 25 MG TABLET    Take 12.5 mg by mouth 2 times daily    SIMETHICONE (MYLICON) 328 MG CHEWABLE TABLET    Take 125 mg by mouth every morning    TAMSULOSIN (FLOMAX) 0.4 MG CAPSULE    Take 0.4 mg by mouth daily    TRAZODONE (DESYREL) 100 MG TABLET    Take 150 mg by mouth nightly        ALLERGIES     Patient has no known allergies.     FAMILYHISTORY       Family History   Problem Relation Age of Onset    Other Mother         CEREBRAL PALSY    Heart Disease Father         CHF        SOCIAL HISTORY       Social History     Tobacco Use    Smoking status: Former     Packs/day: 1.00     Years: 25.00     Pack years: 25.00     Types: Cigarettes     Quit date: 5/10/2013     Years since quittin.8    Smokeless tobacco: Never   Vaping Use    Vaping Use: Never used   Substance Use Topics    Alcohol use: No    Drug use: No       SCREENINGS        Myron Coma Scale  Eye Opening: Spontaneous  Best Verbal Response: Oriented  Best Motor Response: Obeys commands  Myron Coma Scale Score: 15                CIWA Assessment  BP: 133/60  Heart Rate: 98           PHYSICAL EXAM  1 or more Elements     ED Triage Vitals [02/26/23 1935]   BP Temp Temp Source Heart Rate Resp SpO2 Height Weight   (!) 156/80 98.2 °F (36.8 °C) Oral 93 19 97 % 5' 4\" (1.626 m) 132 lb 8 oz (60.1 kg)       Physical Exam  Vitals and nursing note reviewed. Constitutional:       Appearance: He is well-developed. He is not diaphoretic. HENT:      Head: Normocephalic and atraumatic. Right Ear: External ear normal.      Left Ear: External ear normal.      Nose: Nose normal.   Eyes:      General:         Right eye: No discharge. Left eye: No discharge. Neck:      Trachea: No tracheal deviation. Cardiovascular:      Rate and Rhythm: Normal rate and regular rhythm. Pulmonary:      Effort: Pulmonary effort is normal. No respiratory distress. Breath sounds: Normal breath sounds. No wheezing or rales. Abdominal:      General: Bowel sounds are normal. There is distension. Palpations: Abdomen is soft. Tenderness: There is abdominal tenderness. There is left CVA tenderness. There is no guarding or rebound. Comments: There is tenderness to the suprapubic abdomen, with some mild distention. There is tenderness noted of the left CVA and left flank. Musculoskeletal:         General: Normal range of motion. Cervical back: Normal range of motion and neck supple. Skin:     General: Skin is warm and dry. Neurological:      Mental Status: He is alert and oriented to person, place, and time.    Psychiatric:         Behavior: Behavior normal.           DIAGNOSTIC RESULTS   LABS:    Labs Reviewed   CBC WITH AUTO DIFFERENTIAL - Abnormal; Notable for the following components:       Result Value    RBC 3.41 (*)     Hemoglobin 10.2 (*)     Hematocrit 31.0 (*)     RDW 16.1 (*)     Lymphocytes Absolute 0.9 (*)     All other components within normal limits COMPREHENSIVE METABOLIC PANEL - Abnormal; Notable for the following components:    Sodium 134 (*)     CO2 20 (*)     Glucose 203 (*)     BUN 27 (*)     AST 13 (*)     All other components within normal limits   URINALYSIS WITH REFLEX TO CULTURE - Abnormal; Notable for the following components:    Clarity, UA CLOUDY (*)     Blood, Urine MODERATE (*)     Protein,  (*)     Leukocyte Esterase, Urine MODERATE (*)     All other components within normal limits   LACTATE, SEPSIS - Abnormal; Notable for the following components:    Lactic Acid, Sepsis 2.3 (*)     All other components within normal limits   LACTATE, SEPSIS - Abnormal; Notable for the following components:    Lactic Acid, Sepsis 2.7 (*)     All other components within normal limits   MICROSCOPIC URINALYSIS - Abnormal; Notable for the following components:    WBC, UA 39 (*)     Amorphous, UA Present (*)     Mucus, UA Present (*)     All other components within normal limits   CULTURE, URINE   LIPASE   TROPONIN   PROCALCITONIN   PSA SCREENING   RENAL FUNCTION PANEL   CBC WITH AUTO DIFFERENTIAL   LACTIC ACID   TSH WITH REFLEX   MAGNESIUM   HEMOGLOBIN A1C   LACTIC ACID   POCT GLUCOSE   POCT GLUCOSE       When ordered only abnormal lab results are displayed. All other labs were within normal range or not returned as of this dictation. EKG: When ordered, EKG's are interpreted by the Emergency Department Physician in the absence of a cardiologist.  Please see their note for interpretation of EKG. RADIOLOGY:   Non-plain film images such as CT, Ultrasound and MRI are read by the radiologist. Plain radiographic images are visualized and preliminarily interpreted by the ED Provider with the below findings:        Interpretation per the Radiologist below, if available at the time of this note:    CT ABDOMEN PELVIS WO CONTRAST Additional Contrast? None   Final Result   1. No obstructive uropathy identified.    2. Circumferential wall thickening of the bladder which potentially may be   related to incomplete distension. Correlate clinically to exclude cystitis. 3. 1.1 cm pulmonary nodule at the left lower lobe. See guidelines below. RECOMMENDATIONS:   11 mm left solid pulmonary nodule detected on incomplete chest CT. Recommend   prompt non-contrast Chest CT for further evaluation. These guidelines do not apply to immunocompromised patients and patients with   cancer. Follow up in patients with significant comorbidities as clinically   warranted. For lung cancer screening, adhere to Lung-RADS guidelines. Reference: Radiology. 2017; 284(1):228-43. CT CHEST WO CONTRAST    (Results Pending)     CT ABDOMEN PELVIS WO CONTRAST Additional Contrast? None    Result Date: 2/26/2023  EXAMINATION: CT OF THE ABDOMEN AND PELVIS WITHOUT CONTRAST 2/26/2023 8:09 pm TECHNIQUE: CT of the abdomen and pelvis was performed without the administration of intravenous contrast. Multiplanar reformatted images are provided for review. Automated exposure control, iterative reconstruction, and/or weight based adjustment of the mA/kV was utilized to reduce the radiation dose to as low as reasonably achievable. COMPARISON: CT abdomen pelvis March 13, 2014 HISTORY: ORDERING SYSTEM PROVIDED HISTORY: L flank pain TECHNOLOGIST PROVIDED HISTORY: Reason for exam:->L flank pain Additional Contrast?->None Decision Support Exception - unselect if not a suspected or confirmed emergency medical condition->Emergency Medical Condition (MA) Reason for Exam: L flank pain Relevant Medical/Surgical History: Flank Pain (Pt came to the hospital by [facility] from [facility] due to having an inability to urinate for the past 4 days, straight cath was attempted and unsuccessful only causing bloody discharge and pain.) FINDINGS: Lower Chest: 1.1 cm pulmonary nodule at the left lower lobe (image 8 series 2). Lung bases otherwise appear clear. Coronary artery atherosclerotic disease. Organs: Evaluation of the solid organs is limited due to lack of intravenous contrast.  The nonenhanced liver, gallbladder, spleen, pancreas, adrenal glands, and kidneys demonstrate no acute findings. No hydronephrosis. No obstructive uropathy identified. GI/Bowel: No bowel obstruction is identified. Normal appendix. Small bowel is normal in caliber. Ingested contents present throughout the stomach. Pelvis: The bladder is collapsed. Pronounced circumferential wall thickening of the bladder which is nonspecific and may partially be related to incomplete distension. Correlate clinically to exclude cystitis. Solid pelvic organs demonstrate no acute findings. Peritoneum/Retroperitoneum: The abdominal aorta is normal in caliber with moderate to severe atherosclerotic plaque present. No retroperitoneal adenopathy identified. No free fluid or free air. Bones/Soft Tissues: Postoperative changes of the left proximal femur with underlying healed fracture deformity of the intratrochanteric left femur. No acute fractures are identified. Levoconvex curvature of the thoracolumbar spine. Multilevel degenerative changes of the spine. No acute soft tissue abnormality is identified. 1. No obstructive uropathy identified. 2. Circumferential wall thickening of the bladder which potentially may be related to incomplete distension. Correlate clinically to exclude cystitis. 3. 1.1 cm pulmonary nodule at the left lower lobe. See guidelines below. RECOMMENDATIONS: 11 mm left solid pulmonary nodule detected on incomplete chest CT. Recommend prompt non-contrast Chest CT for further evaluation. These guidelines do not apply to immunocompromised patients and patients with cancer. Follow up in patients with significant comorbidities as clinically warranted. For lung cancer screening, adhere to Lung-RADS guidelines. Reference: Radiology. 2017; 284(1):228-43. No results found.     PROCEDURES   Unless otherwise noted below, none     Procedures    CRITICAL CARE TIME (.cctime)       PAST MEDICAL HISTORY      has a past medical history of Arthritis, Ataxia (3/14/2014), BPH (benign prostatic hyperplasia), CAD (coronary artery disease), Coronary atherosclerosis of native coronary artery (5/11/2013), Diabetes mellitus ([facility] Environmental allergies, Hepatitis, Hydronephrosis (3/14/2014), Hyperlipidemia, Hypertension, Kidney disease, Neuropathy, Parkinson's disease ([facility] S/P coronary artery stent placement x 4, and Schizoaffective disorder, bipolar type ([facility]      EMERGENCY DEPARTMENT COURSE and DIFFERENTIAL DIAGNOSIS/MDM:   Vitals:    Vitals:    02/26/23 2230 02/26/23 2300 02/26/23 2330 02/27/23 0000   BP: (!) 146/71 (!) 142/62 136/64 133/60   Pulse: 73 83 84 98   Resp: 15 16 13 18   Temp:       TempSrc:       SpO2: 98%      Weight:       Height:           Patient was given the following medications:  Medications   cefTRIAXone (ROCEPHIN) 1,000 mg in sodium chloride 0.9 % 50 mL IVPB (mini-bag) (1,000 mg IntraVENous New Bag 2/27/23 0037)   aspirin chewable tablet 81 mg (has no administration in time range)   atorvastatin (LIPITOR) tablet 80 mg (has no administration in time range)   carvedilol (COREG) tablet 6.25 mg (has no administration in time range)   ferrous sulfate (IRON 325) tablet 325 mg (has no administration in time range)   fluticasone (FLONASE) 50 MCG/ACT nasal spray 1 spray (has no administration in time range)   guaiFENesin (ROBITUSSIN) 100 MG/5ML oral solution 400 mg (has no administration in time range)   hydrALAZINE (APRESOLINE) tablet 25 mg (has no administration in time range)   mirtazapine (REMERON) tablet 7.5 mg (has no administration in time range)   pantoprazole (PROTONIX) tablet 40 mg (has no administration in time range)   PARoxetine (PAXIL) tablet 40 mg (has no administration in time range)   QUEtiapine (SEROQUEL) tablet 12.5 mg (has no administration in time range)   simethicone (MYLICON) chewable tablet 120 mg (has no administration in time range)   cefTRIAXone (ROCEPHIN) 1,000 mg in sodium chloride 0.9 % 50 mL IVPB (mini-bag) (has no administration in time range)   dextrose bolus 10% 125 mL (has no administration in time range)     Or   dextrose bolus 10% 250 mL (has no administration in time range)   glucagon (rDNA) injection 1 mg (has no administration in time range)   dextrose 10 % infusion (has no administration in time range)   insulin lispro (HUMALOG) injection vial 0-8 Units (has no administration in time range)   insulin lispro (HUMALOG) injection vial 0-4 Units (has no administration in time range)   sodium chloride flush 0.9 % injection 5-40 mL (has no administration in time range)   sodium chloride flush 0.9 % injection 5-40 mL (has no administration in time range)   0.9 % sodium chloride infusion (has no administration in time range)   enoxaparin (LOVENOX) injection 40 mg (has no administration in time range)   ondansetron (ZOFRAN-ODT) disintegrating tablet 4 mg (has no administration in time range)     Or   ondansetron (ZOFRAN) injection 4 mg (has no administration in time range)   polyethylene glycol (GLYCOLAX) packet 17 g (has no administration in time range)   acetaminophen (TYLENOL) tablet 650 mg (has no administration in time range)     Or   acetaminophen (TYLENOL) suppository 650 mg (has no administration in time range)   lactated ringers IV soln infusion (has no administration in time range)   potassium chloride (KLOR-CON M) extended release tablet 40 mEq (has no administration in time range)     Or   potassium bicarb-citric acid (EFFER-K) effervescent tablet 40 mEq (has no administration in time range)     Or   potassium chloride 10 mEq/100 mL IVPB (Peripheral Line) (has no administration in time range)   magnesium sulfate 2000 mg in 50 mL IVPB premix (has no administration in time range)   0.9 % sodium chloride bolus (0 mLs IntraVENous Stopped 2/26/23 2211)   lidocaine (XYLOCAINE) 2 % uro-jet ( Topical Given 2/26/23 2030)             Is this patient to be included in the SEP-1 Core Measure due to severe sepsis or septic shock? No   Exclusion criteria - the patient is NOT to be included for SEP-1 Core Measure due to:  2+ SIRS criteria are not met    Chronic Conditions affecting care:  has a past medical history of Arthritis, Ataxia (3/14/2014), BPH (benign prostatic hyperplasia), CAD (coronary artery disease), Coronary atherosclerosis of native coronary artery (5/11/2013), Diabetes mellitus ([facility] Environmental allergies, Hepatitis, Hydronephrosis (3/14/2014), Hyperlipidemia, Hypertension, Kidney disease, Neuropathy, Parkinson's disease ([facility] S/P coronary artery stent placement x 4, and Schizoaffective disorder, bipolar type ([facility] CONSULTS: (Who and What was discussed)  IP CONSULT TO UROLOGY      Social Determinants Significantly Affecting Health : None    Records Reviewed (External and Source) previous admission records    CC/HPI Summary, DDx, ED Course, and Reassessment: Briefly, this is a 66-year-old male who presents to the emergency department today for evaluation for lower abdominal pain and flank pain. The patient has been experiencing pain to his lower abdomen into his left flank, pain has been ongoing for the past 3 to 4 days. Patient states that he has had difficulty urinating today. On examination, patient does have tenderness, and mild distention noted to the suprapubic abdomen. Does have some mild tenderness to left flank and left CVA. Disposition Considerations (tests considered but not done, Admit vs D/C, Shared Decision Making, Pt Expectation of Test or Tx.):    EKG will be obtained, as documented by my attending, please see his note for further details    CBC shows no evidence of leukocytosis, he is anemic although is actually improved.   CMP does show a BUN of 27, creatinine is normal.  Lipase is normal.  Urine does show infection with moderate leukocytes and 39 white blood cells.    Corey catheter was placed, patient was given IV fluids. Initial lactic acid was 2.3, initially thought that this was due to dehydration however this was repeated is actually trending upward to 2.7. Rocephin has been ordered, and due to the patient's trending upward lactic acid, with infection, I feel that he would benefit from admission and IV antibiotics. Hospitalist has been paged for admission, patient is updated, agreeable with plan, stable for admission   I am the Primary Clinician of Record. FINAL IMPRESSION      1. Acute cystitis without hematuria    2. Elevated lactic acid level          DISPOSITION/PLAN     DISPOSITION Admitted 02/26/2023 11:13:03 PM      PATIENT REFERRED TO:  No follow-up provider specified.     DISCHARGE MEDICATIONS:  New Prescriptions    No medications on file       DISCONTINUED MEDICATIONS:  Discontinued Medications    No medications on file              (Please note that portions of this note were completed with a voice recognition program.  Efforts were made to edit the dictations but occasionally words are mis-transcribed.)    Ebony Mathis PA-C (electronically signed)        Ebony Mathis PA-C  02/27/23 [id number]

## 2023-02-27 NOTE — CONSULTS
Urology Consult Note  [facility]     Patient: Naina Dowling MRN: [id number]  Room/Bed: 3JI-8697/0988-80   YOB: 1950  Age/Sex: 67 y.o.male  Admission Date: 2/26/2023     Date of Service:  2/27/2023    Consulting Provider: PRESTON Young - CNP  Admitting/Requesting Physician: Derrick Figueroa DO  Primary Care Physician: DANYELLE LINARES    Reason for Consult: BPH, Hematuria, Incomplete Bladder Emptying    ASSESSMENT/PLAN     66 yo male with hx of BPH s/p TUVP 2014  Presents from [facility] with ongoing right flank pain, n/v and urinary retention with gross hematuria. Failed catheterization attempt at the SNF and thus he was transferred to [facility]. Workup in the ED concerning for urinary infection, CT a/p shows no focal bladder abnormality- bladder is collapsed without a siddiqui in place, normal upper tracts, no stones and no filling defects. Looks like he did have a klebsiella UTI 01/2023. Recommendations:  Start Flomax  Continue abx and f/u cultures  Remove siddiqui tomorrow morning, bladder scan periodically to monitor for successful voiding  Outpatient cystoscopy with BL RPG ordered for gross hematuria and BPH  Will continue to follow      All patient questions were answered. He understands the plan as listed above. HISTORY     Chief Complaint:   Chief Complaint   Patient presents with    Flank Pain     Pt came to the hospital by [facility] from [facility] due to having an inability to urinate for the past 4 days, straight cath was attempted and unsuccessful only causing bloody discharge and pain. History of Present Illness: Naina Dowling is a 67 y.o. male with urinary retention and gross hematuria. Onset of symptoms was days ago with improving course since that time. Symptoms are aggravated by uti and bph. Symptoms improved with siddiqui. Associated symptoms include pelvic and flank pain. Patient also reports decreased urine output.  He has tried the following treatments: siddiqui and flomax and abx. Past Medical History:  He has a past medical history of Arthritis, Ataxia (3/14/2014), BPH (benign prostatic hyperplasia), CAD (coronary artery disease), Coronary atherosclerosis of native coronary artery (5/11/2013), Diabetes mellitus ([address] Environmental allergies, Hepatitis, Hydronephrosis (3/14/2014), Hyperlipidemia, Hypertension, Kidney disease, Neuropathy, Parkinson's disease ([address] S/P coronary artery stent placement x 4, and Schizoaffective disorder, bipolar type ([address] Hospital Problem List:  Principal Problem:    Urinary retention  Active Problems:    UTI (urinary tract infection)    Schizoaffective disorder, bipolar type ([address]    Hyperlipidemia associated with type 2 diabetes mellitus (HCC)    Elevated lactic acid level    Pulmonary nodule    Type 2 diabetes mellitus, without long-term current use of insulin ([address]    Hypertension associated with diabetes ([facility] [address]  Resolved Problems:    * No resolved hospital problems. *      Past Surgical History:  He has a past surgical history that includes Coronary angioplasty; Coronary angioplasty with stent; hernia repair; Vasectomy; Cystocopy (4/4/14); Ankle surgery (Right, 06/09/2018); Upper gastrointestinal endoscopy (N/A, 12/13/2018); Colonoscopy (N/A, 12/13/2018); Upper gastrointestinal endoscopy (N/A, 5/3/2019); and ORIF DISTAL RADIUS FRACTURE (Left, 6/20/2019). Social History:  He reports that he quit smoking about 9 years ago. His smoking use included cigarettes. He has a 25.00 pack-year smoking history. He has never used smokeless tobacco. He reports that he does not drink alcohol and does not use drugs. Family History:  family history includes Heart Disease in his father; Other in his mother.     Allergies:  No Known Allergies    Medications:  Scheduled Meds:   aspirin  81 mg Oral Daily    atorvastatin  80 mg Oral Nightly    carvedilol  6.25 mg Oral BID WC    ferrous sulfate  325 mg Oral BID    fluticasone  1 spray Nasal Daily hydrALAZINE  25 mg Oral 3 times per day    mirtazapine  7.5 mg Oral Nightly    pantoprazole  40 mg Oral QAM AC    PARoxetine  40 mg Oral Daily    QUEtiapine  12.5 mg Oral BID    simethicone  120 mg Oral QAM    [START ON 2/28/2023] cefTRIAXone (ROCEPHIN) IV  1,000 mg IntraVENous Q24H    insulin lispro  0-8 Units SubCUTAneous TID WC    insulin lispro  0-4 Units SubCUTAneous Nightly    sodium chloride flush  5-40 mL IntraVENous 2 times per day    enoxaparin  40 mg SubCUTAneous Daily     Continuous Infusions:   dextrose      sodium chloride      lactated ringers IV soln 75 mL/hr at 02/27/23 0108     PRN Meds:guaiFENesin, dextrose bolus **OR** dextrose bolus, glucagon (rDNA), dextrose, sodium chloride flush, sodium chloride, ondansetron **OR** ondansetron, polyethylene glycol, acetaminophen **OR** acetaminophen, potassium chloride **OR** potassium alternative oral replacement **OR** potassium chloride, magnesium sulfate    Review of Systems:  Pertinent positives/negatives reviewed in HPI. All other systems reviewed and negative, unless noted below. Constitutional: Negative  Genitourinary: see HPI  HEENT: Negative   Cardiovascular: Negative   Respiratory: Negative   Gastrointestinal: Negative   Musculoskeletal: Negative   Neurological: Negative   Psychiatric: Negative   Integumentary: Negative     PHYSICAL EXAM     Vitals:    02/27/23 0745   BP: (!) 147/75   Pulse: 77   Resp: 18   Temp: 97.4 °F (36.3 °C)   SpO2: 96%     CONSTITUTIONAL: The patient is well nourished/developed, with no distress noted. NEUROLOGICAL/PSYCHIATRIC: Oriented to place and time, normal affected noted. NECK: The neck is symmetrical and supple, with no masses noted. CARDIOVASCULAR: Regular rate and rhythm, no evidence of swelling noted. RESPIRATORY: Normal respiratory effort with no wheezing noted. ABDOMEN: Abdomen soft, non-tender, non-distended. No enlarged liver or spleen. No hernias noted. Stool occult blood not indicated.    SKIN: Skin appears normal.  LYMPHATICS: No adenopathy noted. CVA: No CVA tenderness bilaterally. GENITOURINARY: Corey in place draining CYU. Ins/Outs:  No intake or output data in the 24 hours ending 02/27/23 1039    LABS     CBC   Lab Results   Component Value Date/Time    WBC 4.7 02/27/2023 05:52 AM    RBC 3.03 02/27/2023 05:52 AM    HGB 9.0 02/27/2023 05:52 AM    HCT 27.1 02/27/2023 05:52 AM    MCV 89.6 02/27/2023 05:52 AM    MCH 29.5 02/27/2023 05:52 AM    MCHC 33.0 02/27/2023 05:52 AM    RDW 16.1 02/27/2023 05:52 AM     02/27/2023 05:52 AM    MPV 8.2 02/27/2023 05:52 AM     BMP   Lab Results   Component Value Date/Time     02/27/2023 05:52 AM    K 3.9 02/27/2023 05:52 AM    K 4.1 01/08/2023 06:32 AM     02/27/2023 05:52 AM    CO2 20 02/27/2023 05:52 AM    BUN 20 02/27/2023 05:52 AM    CREATININE 0.9 02/27/2023 05:52 AM    GLUCOSE 165 02/27/2023 05:52 AM    CALCIUM 9.0 02/27/2023 05:52 AM     Urinalysis:   Lab Results   Component Value Date/Time    COLORU Yellow 02/26/2023 08:48 PM    GLUCOSEU Negative 02/26/2023 08:48 PM    BLOODU MODERATE 02/26/2023 08:48 PM    NITRU Negative 02/26/2023 08:48 PM    LEUKOCYTESUR MODERATE 02/26/2023 08:48 PM     Urine culture: No results for input(s): LABURIN in the last 72 hours. PSA: No results found for: PSA      IMAGING     CT ABDOMEN PELVIS WO CONTRAST Additional Contrast? None    Result Date: 2/26/2023  EXAMINATION: CT OF THE ABDOMEN AND PELVIS WITHOUT CONTRAST 2/26/2023 8:09 pm TECHNIQUE: CT of the abdomen and pelvis was performed without the administration of intravenous contrast. Multiplanar reformatted images are provided for review. Automated exposure control, iterative reconstruction, and/or weight based adjustment of the mA/kV was utilized to reduce the radiation dose to as low as reasonably achievable.  COMPARISON: CT abdomen pelvis March 13, 2014 HISTORY: ORDERING SYSTEM PROVIDED HISTORY: L flank pain TECHNOLOGIST PROVIDED HISTORY: Reason for exam:->L flank pain Additional Contrast?->None Decision Support Exception - unselect if not a suspected or confirmed emergency medical condition->Emergency Medical Condition (MA) Reason for Exam: L flank pain Relevant Medical/Surgical History: Flank Pain (Pt came to the hospital by [facility] from [facility] due to having an inability to urinate for the past 4 days, straight cath was attempted and unsuccessful only causing bloody discharge and pain.) FINDINGS: Lower Chest: 1.1 cm pulmonary nodule at the left lower lobe (image 8 series 2). Lung bases otherwise appear clear. Coronary artery atherosclerotic disease. Organs: Evaluation of the solid organs is limited due to lack of intravenous contrast.  The nonenhanced liver, gallbladder, spleen, pancreas, adrenal glands, and kidneys demonstrate no acute findings. No hydronephrosis. No obstructive uropathy identified. GI/Bowel: No bowel obstruction is identified. Normal appendix. Small bowel is normal in caliber. Ingested contents present throughout the stomach. Pelvis: The bladder is collapsed. Pronounced circumferential wall thickening of the bladder which is nonspecific and may partially be related to incomplete distension. Correlate clinically to exclude cystitis. Solid pelvic organs demonstrate no acute findings. Peritoneum/Retroperitoneum: The abdominal aorta is normal in caliber with moderate to severe atherosclerotic plaque present. No retroperitoneal adenopathy identified. No free fluid or free air. Bones/Soft Tissues: Postoperative changes of the left proximal femur with underlying healed fracture deformity of the intratrochanteric left femur. No acute fractures are identified. Levoconvex curvature of the thoracolumbar spine. Multilevel degenerative changes of the spine. No acute soft tissue abnormality is identified. 1. No obstructive uropathy identified.  2. Circumferential wall thickening of the bladder which potentially may be related to incomplete distension. Correlate clinically to exclude cystitis. 3. 1.1 cm pulmonary nodule at the left lower lobe. See guidelines below. RECOMMENDATIONS: 11 mm left solid pulmonary nodule detected on incomplete chest CT. Recommend prompt non-contrast Chest CT for further evaluation. These guidelines do not apply to immunocompromised patients and patients with cancer. Follow up in patients with significant comorbidities as clinically warranted. For lung cancer screening, adhere to Lung-RADS guidelines. Reference: Radiology. 2017; 284(1):228-43. CT CHEST WO CONTRAST    Result Date: 2/27/2023  EXAMINATION: CT OF THE CHEST WITHOUT CONTRAST 2/27/2023 12:17 am TECHNIQUE: CT of the chest was performed without the administration of intravenous contrast. Multiplanar reformatted images are provided for review. Automated exposure control, iterative reconstruction, and/or weight based adjustment of the mA/kV was utilized to reduce the radiation dose to as low as reasonably achievable. COMPARISON: CT abdomen and pelvis 02/26/2023 HISTORY: ORDERING SYSTEM PROVIDED HISTORY: pulm nodule TECHNOLOGIST PROVIDED HISTORY: Reason for exam:->pulm nodule FINDINGS: Mediastinum: There is mild calcified plaque throughout the aorta which is minimally dilated. The pulmonary arteries unremarkable. There are coronary artery calcifications. There is no mediastinal mass or adenopathy. Lungs/pleura: The lungs are hyperinflated and there are small calcified granulomas scattered along the right upper lobe and both lung bases. There are subsegmental linear and ground-glass opacities along the lung bases posteriorly. There is a 12 mm pleural based nodule along the superior segment left lower lobe with minimal irregularity to the margins and is unchanged. There are no other pulmonary nodules. No effusion or pneumothorax is seen. No endobronchial lesion is seen. Upper abdomen:  The adrenals are normal.  There is perirenal stranding around both kidneys. There are calcifications scattered in the spleen. The liver is normal size there are tiny punctate air collections scattered in both lobes of the liver centrally and peripherally which may be within the biliary tree but were not seen previously visualized gallbladder and bile ducts are unremarkable there is perirenal stranding around both kidneys which is unchanged. Soft tissue/bones: The bones are intact. No aggressive osseous lesion is seen. 12 mm pleural base nodule left lower lobe posteriorly which is unchanged and could represent an infectious nodule vs developing neoplasm. Recommend short-term follow-up or PET-CT correlation. Mild bibasilar atelectasis or early infiltrates posteriorly which is more prominent. Moderate coronary artery calcifications with no mediastinal adenopathy Tiny punctate air collections scattered in the liver which may represent pneumobilia but was not seen previously and remains indeterminate. Recommend a follow-up CT of the abdomen and pelvis with contrast for further characterization. Findings were called to the license caregiver by the call center at the time of the interpretation at 1 a.m.             Electronically signed by: PRESTON Laureano CNP  2/27/2023   [facility]  Office Contact: [phone]

## 2023-02-27 NOTE — CONSULTS
TRIMMABLE POWER MIDLINE PROCEDURE NOTE  Chart reviewed for allergies, diagnosis, labs, known contraindications, reason for line placement and planned length of treatment. Insertion procedure discussed with patient/family member. Informed consent not required for Midline placement. Three patient identifiers - Patient name,   and MRN -  completed &  confirmed verbally. Hat, mask and eye shield donned. Midline site scrubbed with Chloraprep  One-Step applicator  for 30 seconds x 1. Hand Hygiene  performed with 3% Chlorhexidine surgical scrub x1 min prior to  Sterile gloves, sterile gown being donned. Patient draped using maximal sterile barrier technique ( head to toe ). Midline site scrubbed a 2nd time with Chloraprep One-Step applicator x 30 sec. Vein located by [employer] and site marked with sterile pen. 1% Lidocaine 5 ml injected intradermal pre-insertion. Midline inserted. Positive brisk blood return obtained. Valve applied to lumen. Midline flushed with 10 mls  0.9% Sterile Sodium Chloride. Midline flushes easily with no resistance. Skin prep applied to site. Catheter secured with non-sutured locking device per hospital protocol. Bio-patch/CHG impregnated sterile tegaderm dressing applied. Alcohol Swab Caps applied to valve. Sterile field maintained during procedure. Positioning wire accounted for post procedure. Pt. Response to procedure, tolerated well. Appearance of site: Clean dry and intact without bleeding or edema. All edges of Tegaderm occlusive. Site marked with date and initials of RN placing line. Top 2 side rails in up position, call button in reach, RN notified of all of the above. A [employer] Midline  14  CM placed in the  CARLOS A   Cephalic vein. 0 cm showing from insertion site.

## 2023-02-27 NOTE — PROGRESS NOTES
Pt arrived via stretcher. Pt guarded and refused full skin assessment. Compliant with remainder of assessment. Corey present and draining upon admit. VSS. A+Ox4. . Bed alarm on. Call light in reach. The care plan and education has been reviewed and mutually agreed upon with the patient. Patient remains free from falls. All fall precautions in place. Yellow bracelet on patient. SAFE sign on door. Bed and chair alarms being used. Bed in lowest position. Will monitor.

## 2023-02-27 NOTE — ED PROVIDER NOTES
The Ekg interpreted by me in the absence of a cardiologist shows. Sinus rhythm with a ventricular rate of 91. Axis left. Right bundle branch block changes noted. No specific ST or T wave abnormality. Compared to prior 1/6/23, no significant change. I only performed EKG interpretation and was not otherwise involved in the care of this patient.        Mary Jacobsen MD  02/26/23 1441       Mary Jacobsen MD  02/26/23 1935

## 2023-02-27 NOTE — ED NOTES
Report given to [facility] RN prior to Pt transport to 4T via stretcher, VSS prior to departure, and ordered rocephin given prior to departure.      Page Harm, RN  02/27/23 [id number]

## 2023-02-27 NOTE — H&P
Hospital Medicine History & Physical        Name:  Shyam Andrews  /Age/Sex: 1950  (67 y.o. male)  MRN & CSN:  [id number] & [id number]     PCP: DANYELLE LINARES    Date of Admission: 2023    Date of Service: Pt seen/examined on 2023    Patient Status:  Inpatient - Patient will most likely require more than 48 hours of treatment and requires intensive medical treatment/monitoring     Chief Complaint:    Chief Complaint   Patient presents with    Flank Pain     Pt came to the hospital by [facility] from [facility] due to having an inability to urinate for the past 4 days, straight cath was attempted and unsuccessful only causing bloody discharge and pain. History Of Present Illness:     67 y.o. male with PMHx of HTN, CAD, HLD, DM II who presented to [facility] with complaints of flank pain. Patient states he resides in a nursing facility. He was starting to notice some right flank pain and urinary retention earlier today. Additionally, he noted some blood clots that came out of his penis earlier today. The nurse at the facility tried to place a straight catheter, but could not place it due to an enlarged prostate so patient was sent to the hospital.  Patient is somewhat of a poor historian and was unable to state whether or not this is happened to him before in the past.  He states he had a lot of nausea and supposedly vomited a lot in the ER, but no blood was noted. Denies any chest pain, shortness of breath, edema, fever, or chills. Denies tobacco, alcohol, or illicit drug use.     Past Medical History:          Diagnosis Date    Arthritis     Ataxia 3/14/2014    BPH (benign prostatic hyperplasia)     CAD (coronary artery disease)     4 stents    Coronary atherosclerosis of native coronary artery 2013    Diabetes mellitus ([facility] [address]     Environmental allergies     Hepatitis     Hydronephrosis 3/14/2014    Hyperlipidemia     Hypertension     Kidney disease Neuropathy     Parkinson's disease ([facility] [address]     ? S/P coronary artery stent placement x 4     x 4    Schizoaffective disorder, bipolar type [facility])        Past Surgical History:          Procedure Laterality Date    ANKLE SURGERY Right 06/09/2018    ORIF of R ankle     COLONOSCOPY N/A 12/13/2018    COLONOSCOPY CONTROL HEMORRHAGE performed by Brian Renee MD at [facility]      x 4    CYSTOSCOPY  4/4/14    transurethral vaporization of prostate    HERNIA REPAIR      X2    ORIF DISTAL RADIUS FRACTURE Left 6/20/2019    LEFT DISTAL RADIUS OPEN REDUCTION INTERNAL FIXATION   performed by Jeanine Resendiz MD at [address] [facility] [facility] 12/13/2018    EGD BIOPSY performed by Brian Renee MD at [facility] 5/3/2019    EGD WITH ANESTHESIA performed by Brian Renee MD at [address]         Medications Prior to Admission:      Prior to Admission medications    Medication Sig Start Date End Date Taking?  Authorizing Provider   pioglitazone (ACTOS) 45 MG tablet Take 45 mg by mouth daily   Yes Historical Provider, MD   LACTOBACILLUS PO Take 1 tablet by mouth   Yes Historical Provider, MD   tamsulosin (FLOMAX) 0.4 MG capsule Take 0.4 mg by mouth daily   Yes Historical Provider, MD   Cholecalciferol (VITAMIN D3) 1.25 MG (86665 UT) CAPS Take by mouth every 7 days Thursday   Yes Historical Provider, MD   midodrine (PROAMATINE) 2.5 MG tablet Take 2.5 mg by mouth daily Hold SBP > 110 and DBP >90   Yes Historical Provider, MD   insulin aspart (NOVOLOG) 100 UNIT/ML injection vial Inject into the skin 3 times daily  no insulin  151-200 2 units  201-250 4 units  251-300 6 units  301-350 8 units  351+ 12 units then recheck in 2 hours   Yes Historical Provider, MD   hydrALAZINE (APRESOLINE) 25 MG tablet Take 1 tablet by mouth every 8 hours Hold for BP<120 5/31/22   Corina Dickson MD   simethicone (MYLICON) 515 MG chewable tablet Take 125 mg by mouth every morning    Historical Provider, MD   guaiFENesin (ROBITUSSIN) 100 MG/5ML SOLN oral solution Take 400 mg by mouth every 4 hours as needed for Cough    Historical Provider, MD   loratadine (CLARITIN) 10 MG tablet Take 10 mg by mouth daily  Patient not taking: Reported on 2/27/2023    Historical Provider, MD   mirtazapine (REMERON) 7.5 MG tablet Take 7.5 mg by mouth nightly    Historical Provider, MD   acetaminophen (TYLENOL) 500 MG tablet Take 1,000 mg by mouth every 6 hours as needed     Historical Provider, MD   carvedilol (COREG) 6.25 MG tablet Take 1 tablet by mouth 2 times daily (with meals) 6/15/21   Ginna Major MD   QUEtiapine (SEROQUEL) 25 MG tablet Take 12.5 mg by mouth 2 times daily    Historical Provider, MD   pantoprazole (PROTONIX) 40 MG tablet Take one tablet daily 12/20/19   Anthony Patel MD   ASPIRIN LOW DOSE 81 MG EC tablet TAKE 1 TABLET BY MOUTH ONCE DAILY 8/23/19   Stefano Gallo MD   metFORMIN (GLUCOPHAGE) 1000 MG tablet Take 1 tablet by mouth 2 times daily (with meals)   Patient not taking: Reported on 2/27/2023    Historical Provider, MD   PARoxetine (PAXIL) 40 MG tablet Take 1 tablet by mouth daily 12/6/18   Historical Provider, MD   ferrous sulfate 325 (65 Fe) MG tablet Take 1 tablet by mouth 2 times daily  Patient taking differently: Take 325 mg by mouth in the morning and at bedtime 5/3/19   Jane Martinez MD   atorvastatin (LIPITOR) 80 MG tablet TAKE 1 TABLET BY MOUTH AT BEDTIME 10/31/18   Anthony Patel MD   traZODone (DESYREL) 100 MG tablet Take 150 mg by mouth nightly     Historical Provider, MD   fluticasone (FLONASE) 50 MCG/ACT nasal spray 1 spray by Nasal route daily     Historical Provider, MD       Allergies:  Patient has no known allergies. Social History:      TOBACCO:   reports that he quit smoking about 9 years ago.  His smoking use included cigarettes. He has a 25.00 pack-year smoking history. He has never used smokeless tobacco.  ETOH:   reports no history of alcohol use. E-cigarette/Vaping       Questions Responses    E-cigarette/Vaping Use Never User    Start Date     Passive Exposure     Quit Date     Counseling Given     Comments               Family History:      Reviewed and negative in regards to presenting illness/complaint. Problem Relation Age of Onset    Other Mother         CEREBRAL PALSY    Heart Disease Father         CHF       REVIEW OF SYSTEMS COMPLETED:   Pertinent positives as noted in the HPI. All other systems reviewed and negative. PHYSICAL EXAM PERFORMED:    BP (!) 148/82   Pulse 80   Temp 98.3 °F (36.8 °C) (Oral)   Resp 20   Ht 5' 4\" (1.626 m)   Wt 135 lb (61.2 kg)   SpO2 97%   BMI 23.17 kg/m²     General appearance:  No apparent distress, appears stated age and cooperative. HEENT:  Normal cephalic, atraumatic without obvious deformity. Pupils equal, round, and reactive to light. Extra ocular muscles intact. Conjunctivae/corneas clear. Neck: Supple, with full range of motion. No jugular venous distention. Trachea midline. Respiratory:  Normal respiratory effort. Clear to auscultation, bilaterally without Rales/Wheezes/Rhonchi. Cardiovascular:  Regular rate and rhythm with normal S1/S2 without murmurs, rubs or gallops. No peripheral edema. Abdomen: Soft, non-tender, non-distended with normal bowel sounds. : No CVA tenderness  Musculoskeletal:  No clubbing or cyanosis. Full range of motion without deformity. Skin: Skin color, texture, turgor normal.  No rashes or lesions. Neurologic:  Neurovascularly intact without any focal sensory/motor deficits.  Cranial nerves: II-XII intact, grossly non-focal.  Psychiatric:  Alert and oriented, thought content appropriate, normal insight  Capillary Refill: Brisk, 3 seconds, normal   Peripheral Pulses: +2 palpable, equal bilaterally Labs:     Recent Labs     02/26/23 2048   WBC 6.6   HGB 10.2*   HCT 31.0*        Recent Labs     02/26/23 2048   *   K 4.2      CO2 20*   BUN 27*   CREATININE 1.2   CALCIUM 9.5     Recent Labs     02/26/23 2048   AST 13*   ALT 12   BILITOT 0.3   ALKPHOS 85     No results for input(s): INR in the last 72 hours. Recent Labs     02/26/23 2048   TROPONINI <0.01       Urinalysis:      Lab Results   Component Value Date/Time    NITRU Negative 02/26/2023 08:48 PM    WBCUA 39 02/26/2023 08:48 PM    BACTERIA None Seen 02/26/2023 08:48 PM    RBCUA 0-2 02/26/2023 08:48 PM    BLOODU MODERATE 02/26/2023 08:48 PM    SPECGRAV 1.010 02/26/2023 08:48 PM    GLUCOSEU Negative 02/26/2023 08:48 PM       Radiology:     CT CHEST WO CONTRAST   Final Result   12 mm pleural base nodule left lower lobe posteriorly which is unchanged and   could represent an infectious nodule vs developing neoplasm. Recommend   short-term follow-up or PET-CT correlation. Mild bibasilar atelectasis or early infiltrates posteriorly which is more   prominent. Moderate coronary artery calcifications with no mediastinal adenopathy      Tiny punctate air collections scattered in the liver which may represent   pneumobilia but was not seen previously and remains indeterminate. Recommend   a follow-up CT of the abdomen and pelvis with contrast for further   characterization. Findings were called to the license caregiver by the call center at the time   of the interpretation at 1 a.m.         CT ABDOMEN PELVIS WO CONTRAST Additional Contrast? None   Final Result   1. No obstructive uropathy identified. 2. Circumferential wall thickening of the bladder which potentially may be   related to incomplete distension. Correlate clinically to exclude cystitis. 3. 1.1 cm pulmonary nodule at the left lower lobe. See guidelines below. RECOMMENDATIONS:   11 mm left solid pulmonary nodule detected on incomplete chest CT. Recommend   prompt non-contrast Chest CT for further evaluation.      These guidelines do not apply to immunocompromised patients and patients with   cancer. Follow up in patients with significant comorbidities as clinically   warranted. For lung cancer screening, adhere to Lung-RADS guidelines.   Reference: Radiology. 2017; 284(1):228-43.                Medications:  Medications Prior to Admission: pioglitazone (ACTOS) 45 MG tablet, Take 45 mg by mouth daily  LACTOBACILLUS PO, Take 1 tablet by mouth  tamsulosin (FLOMAX) 0.4 MG capsule, Take 0.4 mg by mouth daily  Cholecalciferol (VITAMIN D3) 1.25 MG (16903 UT) CAPS, Take by mouth every 7 days Thursday  midodrine (PROAMATINE) 2.5 MG tablet, Take 2.5 mg by mouth daily Hold SBP > 110 and DBP >90  insulin aspart (NOVOLOG) 100 UNIT/ML injection vial, Inject into the skin 3 times daily  no insulin 151-200 2 units 201-250 4 units 251-300 6 units 301-350 8 units 351+ 12 units then recheck in 2 hours  hydrALAZINE (APRESOLINE) 25 MG tablet, Take 1 tablet by mouth every 8 hours Hold for BP<120  simethicone (MYLICON) 125 MG chewable tablet, Take 125 mg by mouth every morning  guaiFENesin (ROBITUSSIN) 100 MG/5ML SOLN oral solution, Take 400 mg by mouth every 4 hours as needed for Cough  loratadine (CLARITIN) 10 MG tablet, Take 10 mg by mouth daily (Patient not taking: Reported on 2/27/2023)  mirtazapine (REMERON) 7.5 MG tablet, Take 7.5 mg by mouth nightly  acetaminophen (TYLENOL) 500 MG tablet, Take 1,000 mg by mouth every 6 hours as needed   carvedilol (COREG) 6.25 MG tablet, Take 1 tablet by mouth 2 times daily (with meals)  QUEtiapine (SEROQUEL) 25 MG tablet, Take 12.5 mg by mouth 2 times daily  pantoprazole (PROTONIX) 40 MG tablet, Take one tablet daily  ASPIRIN LOW DOSE 81 MG EC tablet, TAKE 1 TABLET BY MOUTH ONCE DAILY  metFORMIN (GLUCOPHAGE) 1000 MG tablet, Take 1 tablet by mouth 2 times daily (with meals)  (Patient not taking: Reported on 2/27/2023)  PARoxetine  (PAXIL) 40 MG tablet, Take 1 tablet by mouth daily  ferrous sulfate 325 (65 Fe) MG tablet, Take 1 tablet by mouth 2 times daily (Patient taking differently: Take 325 mg by mouth in the morning and at bedtime)  atorvastatin (LIPITOR) 80 MG tablet, TAKE 1 TABLET BY MOUTH AT BEDTIME  traZODone (DESYREL) 100 MG tablet, Take 150 mg by mouth nightly   fluticasone (FLONASE) 50 MCG/ACT nasal spray, 1 spray by Nasal route daily   Current Facility-Administered Medications   Medication Dose Route Frequency Provider Last Rate Last Admin    aspirin chewable tablet 81 mg  81 mg Oral Daily Kamran Leslie DO        atorvastatin (LIPITOR) tablet 80 mg  80 mg Oral Nightly Kamran Leslie DO        carvedilol (COREG) tablet 6.25 mg  6.25 mg Oral BID  Kamran Leslie DO        ferrous sulfate (IRON 325) tablet 325 mg  325 mg Oral BID Kamran Leslie,         fluticasone (FLONASE) 50 MCG/ACT nasal spray 1 spray  1 spray Nasal Daily Kamran Leslie DO        guaiFENesin (ROBITUSSIN) 100 MG/5ML oral solution 400 mg  400 mg Oral Q4H PRN Wes Coughlin DO        hydrALAZINE (APRESOLINE) tablet 25 mg  25 mg Oral 3 times per day Wes Coughlin DO        mirtazapine (REMERON) tablet 7.5 mg  7.5 mg Oral Nightly Kamran Leslie,         pantoprazole (PROTONIX) tablet 40 mg  40 mg Oral QAM AC Kamran Leslie DO        PARoxetine (PAXIL) tablet 40 mg  40 mg Oral Daily Kamran Leslie,         QUEtiapine (SEROQUEL) tablet 12.5 mg  12.5 mg Oral BID Wes Coughlin DO        simethicone (MYLICON) chewable tablet 120 mg  120 mg Oral QAM Kamran Leslie DO        [START ON 2/28/2023] cefTRIAXone (ROCEPHIN) 1,000 mg in sodium chloride 0.9 % 50 mL IVPB (mini-bag)  1,000 mg IntraVENous Q24H Kamran Leslie,         dextrose bolus 10% 125 mL  125 mL IntraVENous PRN Wes Coughlin DO        Or    dextrose bolus 10% 250 mL  250 mL IntraVENous PRN Kamran Leslie, DO        glucagon (rDNA) injection 1 mg  1 mg SubCUTAneous PRN Kamran Leslie, DO        dextrose 10 % infusion   IntraVENous Continuous PRN Vicki Lav, DO        insulin lispro (HUMALOG) injection vial 0-8 Units  0-8 Units SubCUTAneous TID WC Kamran Terlep, DO        insulin lispro (HUMALOG) injection vial 0-4 Units  0-4 Units SubCUTAneous Nightly Kamran Terlep, DO        sodium chloride flush 0.9 % injection 5-40 mL  5-40 mL IntraVENous 2 times per day Vicki Lav, DO        sodium chloride flush 0.9 % injection 5-40 mL  5-40 mL IntraVENous PRN Kamran Terlep, DO        0.9 % sodium chloride infusion   IntraVENous PRN Kamran Terlep, DO        enoxaparin (LOVENOX) injection 40 mg  40 mg SubCUTAneous Daily Kamran Terlep, DO        ondansetron (ZOFRAN-ODT) disintegrating tablet 4 mg  4 mg Oral Q8H PRN Vicki Lav, DO        Or    ondansetron (ZOFRAN) injection 4 mg  4 mg IntraVENous Q6H PRN Vicki Lav, DO        polyethylene glycol (GLYCOLAX) packet 17 g  17 g Oral Daily PRN Vicki Lav, DO        acetaminophen (TYLENOL) tablet 650 mg  650 mg Oral Q6H PRN Vicki Lav, DO        Or    acetaminophen (TYLENOL) suppository 650 mg  650 mg Rectal Q6H PRN Vicki Lav, DO        lactated ringers IV soln infusion   IntraVENous Continuous Vicki Lav, DO 75 mL/hr at 02/27/23 0108 New Bag at 02/27/23 0108    potassium chloride (KLOR-CON M) extended release tablet 40 mEq  40 mEq Oral PRN Vicki Lav, DO        Or    potassium bicarb-citric acid (EFFER-K) effervescent tablet 40 mEq  40 mEq Oral PRN Vicki Lav, DO        Or    potassium chloride 10 mEq/100 mL IVPB (Peripheral Line)  10 mEq IntraVENous PRN Vicki Lav, DO        magnesium sulfate 2000 mg in 50 mL IVPB premix  2,000 mg IntraVENous PRN Vicki Lav, DO           Consults:    IP CONSULT TO UROLOGY    ASSESSMENT:    Active Hospital Problems    Diagnosis Date Noted    Hyperlipidemia associated with type 2 diabetes mellitus ([facility] [address] [E11.69, E78.5] 02/26/2023     Priority: Medium    Urinary retention [R33.9] 02/26/2023     Priority: Medium Elevated lactic acid level [R79.89] 02/26/2023     Priority: Medium    Pulmonary nodule [R91.1] 02/26/2023     Priority: Medium    UTI (urinary tract infection) [N39.0] 05/27/2022     Priority: Medium    Schizoaffective disorder, bipolar type ([facility] [address] [F25.0] 12/19/2011     Priority: Medium    Type 2 diabetes mellitus, without long-term current use of insulin ([facility] [address] [E11.9] 10/09/2014    Hypertension associated with diabetes ([facility] [address] [E11.59, I15.2] 10/09/2014         PLAN:  This is a 67 y.o. male who presented to [facility] and is being treated for:    Urinary retention  UTI  -UA indicative of UTI  -Urine culture pending  -Rocephin  -IVF  -Daily labs; replace electrolytes as needed  -Urology consulted; appreciate recommendations    Elevated lactic acid  -Likely 2/2 above  -IVF  -Lactic 2.3 on admission; 2.7 on repeat; trend    Pulmonary nodule  -CT chest showed 12 mm pleural-based nodule left lower lobe posteriorly which is unchanged from prior  -May need outpatient PET scan    Hypertension  -Continue home antihypertensives    Hyperlipidemia  -Continue home statin    Type 2 diabetes  -SSI    Mood disorder  -Continue home paxil and seroquel      DVT ppx: Lovenox  GI ppx: Diet/Tube Feeds  Diet: ADULT DIET;  Regular  Code Status: Full Code    PT/OT Eval Status: Ordered    Disposition - home after medical stabilization and treatment       Wes Coughlin DO  2/27/2023  2:26 AM

## 2023-02-27 NOTE — PROGRESS NOTES
[facility] HOSPITALISTS PROGRESS NOTE    2/27/2023 11:53 AM        Name: Chauncey Mario .              Admitted: 2/26/2023  Primary Care Provider: DANYELLE LINARES (Tel: [phone])      Brief History: 73 yo male with hx HTN, CAD, DM2, BPH. He is resident at [facility] and presented to hospital with right flank pain, urinary retention and hematuria. Attempts at facility to place siddiqui were not successful. Siddiqui was placed in ER, found to have evidence of UTI.     Subjective:  Resting in bed. Offers c/o hiccups states he has been having them intermittently for past few days. Denies chest pain shortness of breath, abdominal pain nausea. Has lost IV access    Reviewed interval ancillary notes    Current Medications  tamsulosin (FLOMAX) capsule 0.4 mg, Daily  aspirin chewable tablet 81 mg, Daily  atorvastatin (LIPITOR) tablet 80 mg, Nightly  carvedilol (COREG) tablet 6.25 mg, BID WC  ferrous sulfate (IRON 325) tablet 325 mg, BID  fluticasone (FLONASE) 50 MCG/ACT nasal spray 1 spray, Daily  guaiFENesin (ROBITUSSIN) 100 MG/5ML oral solution 400 mg, Q4H PRN  hydrALAZINE (APRESOLINE) tablet 25 mg, 3 times per day  mirtazapine (REMERON) tablet 7.5 mg, Nightly  pantoprazole (PROTONIX) tablet 40 mg, QAM AC  PARoxetine (PAXIL) tablet 40 mg, Daily  QUEtiapine (SEROQUEL) tablet 12.5 mg, BID  simethicone (MYLICON) chewable tablet 120 mg, QAM  [START ON 2/28/2023] cefTRIAXone (ROCEPHIN) 1,000 mg in sodium chloride 0.9 % 50 mL IVPB (mini-bag), Q24H  dextrose bolus 10% 125 mL, PRN   Or  dextrose bolus 10% 250 mL, PRN  glucagon (rDNA) injection 1 mg, PRN  dextrose 10 % infusion, Continuous PRN  insulin lispro (HUMALOG) injection vial 0-8 Units, TID WC  insulin lispro (HUMALOG) injection vial 0-4 Units, Nightly  sodium chloride flush 0.9 % injection 5-40 mL, 2 times per day  sodium chloride flush 0.9 % injection 5-40 mL, PRN  0.9 % sodium chloride infusion,  PRN  enoxaparin (LOVENOX) injection 40 mg, Daily  ondansetron (ZOFRAN-ODT) disintegrating tablet 4 mg, Q8H PRN   Or  ondansetron (ZOFRAN) injection 4 mg, Q6H PRN  polyethylene glycol (GLYCOLAX) packet 17 g, Daily PRN  acetaminophen (TYLENOL) tablet 650 mg, Q6H PRN   Or  acetaminophen (TYLENOL) suppository 650 mg, Q6H PRN  lactated ringers IV soln infusion, Continuous  potassium chloride (KLOR-CON M) extended release tablet 40 mEq, PRN   Or  potassium bicarb-citric acid (EFFER-K) effervescent tablet 40 mEq, PRN   Or  potassium chloride 10 mEq/100 mL IVPB (Peripheral Line), PRN  magnesium sulfate 2000 mg in 50 mL IVPB premix, PRN        Objective:  /69   Pulse 81   Temp 97.5 °F (36.4 °C) (Oral)   Resp 18   Ht 5' 4\" (1.626 m)   Wt 135 lb (61.2 kg)   SpO2 97%   BMI 23.17 kg/m²     Intake/Output Summary (Last 24 hours) at 2/27/2023 1153  Last data filed at 2/27/2023 0745  Gross per 24 hour   Intake --   Output 700 ml   Net -700 ml      Wt Readings from Last 3 Encounters:   02/27/23 135 lb (61.2 kg)   01/08/23 135 lb 5 oz (61.4 kg)   12/27/22 146 lb (66.2 kg)       General appearance:  Appears comfortable  Eyes: Sclera clear. Pupils equal.  ENT: Moist oral mucosa. Trachea midline, no adenopathy. Cardiovascular: Regular rhythm, normal S1, S2. No murmur. No edema in lower extremities  Respiratory: Not using accessory muscles. Good inspiratory effort. Clear to auscultation bilaterally, no wheeze or crackles. GI: Abdomen soft, no tenderness, not distended, normal bowel sounds  Musculoskeletal: No cyanosis in digits, neck supple  Neurology: CN 2-12 grossly intact. No speech or motor deficits  Psych: Normal affect.  Alert and oriented in time, place and person  Skin: Warm, dry, normal turgor    Labs and Tests:  CBC:   Recent Labs     02/26/23 2048 02/27/23  0552   WBC 6.6 4.7   HGB 10.2* 9.0*    144     BMP:    Recent Labs     02/26/23 2048 02/27/23  0552   * 141   K 4.2 3.9    112*   CO2 20* 20*   BUN 27* 20   CREATININE 1.2 0.9   GLUCOSE 203* 165*     Hepatic:   Recent Labs     02/26/23 2048   AST 13*   ALT 12   BILITOT 0.3   ALKPHOS 85       CT Abd / Pelvis 2/26/2023:  1. No obstructive uropathy identified. 2. Circumferential wall thickening of the bladder which potentially may be   related to incomplete distension. Correlate clinically to exclude cystitis. 3. 1.1 cm pulmonary nodule at the left lower lobe. See guidelines below. RECOMMENDATIONS:   11 mm left solid pulmonary nodule detected on incomplete chest CT. Recommend   prompt non-contrast Chest CT for further evaluation. CT Chest 2/27/2023:  12 mm pleural base nodule left lower lobe posteriorly which is unchanged and   could represent an infectious nodule vs developing neoplasm. Recommend   short-term follow-up or PET-CT correlation. Mild bibasilar atelectasis or early infiltrates posteriorly which is more   prominent. Moderate coronary artery calcifications with no mediastinal adenopathy       Tiny punctate air collections scattered in the liver which may represent   pneumobilia but was not seen previously and remains indeterminate. Recommend   a follow-up CT of the abdomen and pelvis with contrast for further   characterization. Problem List  Principal Problem:    Urinary retention  Active Problems:    UTI (urinary tract infection)    Schizoaffective disorder, bipolar type ([address]    Hyperlipidemia associated with type 2 diabetes mellitus (HCC)    Elevated lactic acid level    Pulmonary nodule    Type 2 diabetes mellitus, without long-term current use of insulin ([address]    Hypertension associated with diabetes ([address]  Resolved Problems:    * No resolved hospital problems. *       Assessment & Plan:   Urinary retention. Presented with gross hematuria and flank pain. CT abd/pelvis with no obstructive uropathy identified. Hx BPH, siddiqui placed in ER.  Evaluated by urology and started on Flomax with plans to remove in am for voiding trial. Will need outpatient cystoscopy for gross hematuria and hematuria.    UTI. Presented with right flank pain. CT scan with circumferential wall thickening of the bladder. UA with moderate leukocytes. Started on IV ceftriaxone. Culture results pending. Elevated lactic acid, 2.3->2.7->0.6. Likely secondary to UTI but also mildly dehydrated on presentation with serum sodium 134, BUN 27, and creatinine 1.2. Received IV fluids with resolution. Pulmonary nodule. Incidental finding 11 mm pulmonary in LLL noted on CT abd/pelvis. CT chest showed 12 mm pleural-based nodule left lower lobe posteriorly which is unchanged and could represent an infectious nodule vs developing neoplasm. Recommend short-term follow-up or PET CT correlation. Consult pulmonary. HTN. BP controlled. Continue carvedilol, hydralazine. Continue to follow. DM2. A1c 6.6. Takes Actos, metformin and mealtime sliding scale insulin at facility. Being covered with medium dose correction. BG values reviewed, [id number]. Continue to follow. Hypomagnesemia. Magnesium level 1.2. Replaced, on replacement protocol. Place on telemetry. Recheck magnesium level in am.      Diet: ADULT DIET;  Regular  Code:Full Code  DVT PPX: enoxaparin      PRESTON Espinosa - CNP   2/27/2023 11:53 AM

## 2023-02-28 LAB
ALBUMIN SERPL-MCNC: 3.1 G/DL (ref 3.4–5)
ANION GAP SERPL CALCULATED.3IONS-SCNC: 8 MMOL/L (ref 3–16)
BASOPHILS ABSOLUTE: 0 K/UL (ref 0–0.2)
BASOPHILS RELATIVE PERCENT: 0.6 %
BUN BLDV-MCNC: 15 MG/DL (ref 7–20)
CALCIUM SERPL-MCNC: 9.2 MG/DL (ref 8.3–10.6)
CHLORIDE BLD-SCNC: 111 MMOL/L (ref 99–110)
CO2: 22 MMOL/L (ref 21–32)
CREAT SERPL-MCNC: 0.8 MG/DL (ref 0.8–1.3)
EOSINOPHILS ABSOLUTE: 0.2 K/UL (ref 0–0.6)
EOSINOPHILS RELATIVE PERCENT: 6.1 %
GFR SERPL CREATININE-BSD FRML MDRD: >60 ML/MIN/{1.73_M2}
GLUCOSE BLD-MCNC: 125 MG/DL (ref 70–99)
GLUCOSE BLD-MCNC: 166 MG/DL (ref 70–99)
GLUCOSE BLD-MCNC: 174 MG/DL (ref 70–99)
GLUCOSE BLD-MCNC: 241 MG/DL (ref 70–99)
GLUCOSE BLD-MCNC: 330 MG/DL (ref 70–99)
HCT VFR BLD CALC: 25.2 % (ref 40.5–52.5)
HEMOGLOBIN: 8.7 G/DL (ref 13.5–17.5)
LYMPHOCYTES ABSOLUTE: 0.9 K/UL (ref 1–5.1)
LYMPHOCYTES RELATIVE PERCENT: 21.6 %
MAGNESIUM: 2.3 MG/DL (ref 1.8–2.4)
MCH RBC QN AUTO: 30.1 PG (ref 26–34)
MCHC RBC AUTO-ENTMCNC: 34.5 G/DL (ref 31–36)
MCV RBC AUTO: 87.2 FL (ref 80–100)
MONOCYTES ABSOLUTE: 0.4 K/UL (ref 0–1.3)
MONOCYTES RELATIVE PERCENT: 10.7 %
NEUTROPHILS ABSOLUTE: 2.4 K/UL (ref 1.7–7.7)
NEUTROPHILS RELATIVE PERCENT: 61 %
PDW BLD-RTO: 15.8 % (ref 12.4–15.4)
PERFORMED ON: ABNORMAL
PHOSPHORUS: 3.2 MG/DL (ref 2.5–4.9)
PLATELET # BLD: 155 K/UL (ref 135–450)
PMV BLD AUTO: 8.2 FL (ref 5–10.5)
POTASSIUM SERPL-SCNC: 4.1 MMOL/L (ref 3.5–5.1)
RBC # BLD: 2.89 M/UL (ref 4.2–5.9)
SODIUM BLD-SCNC: 141 MMOL/L (ref 136–145)
WBC # BLD: 4 K/UL (ref 4–11)

## 2023-02-28 PROCEDURE — 6370000000 HC RX 637 (ALT 250 FOR IP): Performed by: NURSE PRACTITIONER

## 2023-02-28 PROCEDURE — 97530 THERAPEUTIC ACTIVITIES: CPT

## 2023-02-28 PROCEDURE — 83735 ASSAY OF MAGNESIUM: CPT

## 2023-02-28 PROCEDURE — 2580000003 HC RX 258: Performed by: NURSE PRACTITIONER

## 2023-02-28 PROCEDURE — 97161 PT EVAL LOW COMPLEX 20 MIN: CPT

## 2023-02-28 PROCEDURE — 99223 1ST HOSP IP/OBS HIGH 75: CPT | Performed by: INTERNAL MEDICINE

## 2023-02-28 PROCEDURE — 1200000000 HC SEMI PRIVATE

## 2023-02-28 PROCEDURE — 6360000002 HC RX W HCPCS: Performed by: STUDENT IN AN ORGANIZED HEALTH CARE EDUCATION/TRAINING PROGRAM

## 2023-02-28 PROCEDURE — 97542 WHEELCHAIR MNGMENT TRAINING: CPT

## 2023-02-28 PROCEDURE — 80069 RENAL FUNCTION PANEL: CPT

## 2023-02-28 PROCEDURE — 97165 OT EVAL LOW COMPLEX 30 MIN: CPT

## 2023-02-28 PROCEDURE — 2580000003 HC RX 258: Performed by: STUDENT IN AN ORGANIZED HEALTH CARE EDUCATION/TRAINING PROGRAM

## 2023-02-28 PROCEDURE — 51798 US URINE CAPACITY MEASURE: CPT

## 2023-02-28 PROCEDURE — 6370000000 HC RX 637 (ALT 250 FOR IP): Performed by: STUDENT IN AN ORGANIZED HEALTH CARE EDUCATION/TRAINING PROGRAM

## 2023-02-28 PROCEDURE — 85025 COMPLETE CBC W/AUTO DIFF WBC: CPT

## 2023-02-28 RX ORDER — TRAZODONE HYDROCHLORIDE 150 MG/1
150 TABLET ORAL NIGHTLY PRN
Status: DISCONTINUED | OUTPATIENT
Start: 2023-02-28 | End: 2023-03-01 | Stop reason: HOSPADM

## 2023-02-28 RX ADMIN — FERROUS SULFATE TAB 325 MG (65 MG ELEMENTAL FE) 325 MG: 325 (65 FE) TAB at 22:18

## 2023-02-28 RX ADMIN — HYDRALAZINE HYDROCHLORIDE 25 MG: 25 TABLET, FILM COATED ORAL at 22:18

## 2023-02-28 RX ADMIN — SODIUM CHLORIDE, PRESERVATIVE FREE 10 ML: 5 INJECTION INTRAVENOUS at 09:25

## 2023-02-28 RX ADMIN — ATORVASTATIN CALCIUM 80 MG: 40 TABLET, FILM COATED ORAL at 22:18

## 2023-02-28 RX ADMIN — TRAZODONE HYDROCHLORIDE 150 MG: 150 TABLET ORAL at 23:02

## 2023-02-28 RX ADMIN — PANTOPRAZOLE SODIUM 40 MG: 40 TABLET, DELAYED RELEASE ORAL at 05:27

## 2023-02-28 RX ADMIN — SODIUM CHLORIDE, POTASSIUM CHLORIDE, SODIUM LACTATE AND CALCIUM CHLORIDE: 600; 310; 30; 20 INJECTION, SOLUTION INTRAVENOUS at 12:13

## 2023-02-28 RX ADMIN — QUETIAPINE FUMARATE 12.5 MG: 25 TABLET ORAL at 09:23

## 2023-02-28 RX ADMIN — MIRTAZAPINE 7.5 MG: 15 TABLET, FILM COATED ORAL at 22:18

## 2023-02-28 RX ADMIN — CEFTRIAXONE SODIUM 1000 MG: 1 INJECTION, POWDER, FOR SOLUTION INTRAMUSCULAR; INTRAVENOUS at 00:50

## 2023-02-28 RX ADMIN — CARVEDILOL 6.25 MG: 6.25 TABLET, FILM COATED ORAL at 17:07

## 2023-02-28 RX ADMIN — FERROUS SULFATE TAB 325 MG (65 MG ELEMENTAL FE) 325 MG: 325 (65 FE) TAB at 09:23

## 2023-02-28 RX ADMIN — ENOXAPARIN SODIUM 40 MG: 100 INJECTION SUBCUTANEOUS at 09:22

## 2023-02-28 RX ADMIN — PAROXETINE HYDROCHLORIDE 40 MG: 20 TABLET, FILM COATED ORAL at 09:23

## 2023-02-28 RX ADMIN — TAMSULOSIN HYDROCHLORIDE 0.4 MG: 0.4 CAPSULE ORAL at 09:23

## 2023-02-28 RX ADMIN — SODIUM CHLORIDE, PRESERVATIVE FREE 10 ML: 5 INJECTION INTRAVENOUS at 22:18

## 2023-02-28 RX ADMIN — HYDRALAZINE HYDROCHLORIDE 25 MG: 25 TABLET, FILM COATED ORAL at 05:27

## 2023-02-28 RX ADMIN — SIMETHICONE 120 MG: 80 TABLET, CHEWABLE ORAL at 09:23

## 2023-02-28 RX ADMIN — INSULIN LISPRO 6 UNITS: 100 INJECTION, SOLUTION INTRAVENOUS; SUBCUTANEOUS at 12:11

## 2023-02-28 RX ADMIN — HYDRALAZINE HYDROCHLORIDE 25 MG: 25 TABLET, FILM COATED ORAL at 15:27

## 2023-02-28 RX ADMIN — QUETIAPINE FUMARATE 12.5 MG: 25 TABLET ORAL at 22:18

## 2023-02-28 RX ADMIN — ASPIRIN 81 MG: 81 TABLET, CHEWABLE ORAL at 09:23

## 2023-02-28 RX ADMIN — FLUTICASONE PROPIONATE 1 SPRAY: 50 SPRAY, METERED NASAL at 09:24

## 2023-02-28 RX ADMIN — CARVEDILOL 6.25 MG: 6.25 TABLET, FILM COATED ORAL at 09:23

## 2023-02-28 RX ADMIN — CEFTRIAXONE SODIUM 1000 MG: 1 INJECTION, POWDER, FOR SOLUTION INTRAMUSCULAR; INTRAVENOUS at 23:04

## 2023-02-28 NOTE — CONSULTS
PULMONARY AND CRITICAL CARE MEDICINE CONSULTATION NOTE    CONSULTING PHYSICIAN:  PRESTON Doe CNP    ADMISSION DATE: 2/26/2023  ADMISSION DIAGNOSIS: Urinary retention [R33.9]  Acute cystitis without hematuria [N30.00]  Elevated lactic acid level [R79.89]    REASON FOR CONSULT:   Chief Complaint   Patient presents with    Flank Pain     Pt came to the hospital by [facility] from [facility] due to having an inability to urinate for the past 4 days, straight cath was attempted and unsuccessful only causing bloody discharge and pain. DATE OF CONSULT: 2/26/2023    HISTORY OF PRESENT ILLNESS: [address]. year old male with a past medical history significant for coronary artery disease, diabetes, hypertension, Parkinson disease, schizoaffective disorder presented to the hospital from SNF with right flank pain, nausea, vomiting and urinary retention with gross hematuria. Patient had a urological work-up done during this hospitalization. During this work-up a CT chest was performed which showed a left-sided pulmonary nodule. Pulmonary has been consulted for further evaluation. At the time of my evaluation patient is sitting in chair in no apparent respiratory distress. Currently on room air. Does not endorse any respiratory symptoms. Denies cough, hemoptysis, fevers, night sweats. Used to smoke 1 pack of cigarettes daily for 20 years before quitting about 11 years ago. Denies any anorexia but reports that he has not been eating at the SNF as he does not like the food. This is led to some weight loss. REVIEW OF SYSTEMS:     CONSTITUTIONAL SYMPTOMS: The patient denies fever, fatigue, night sweats, weight loss or weight gain. HEENT: No vision changes. No tinnitus, Denies sinus pain. No hoarseness, or dysphagia. NECK: Patient denies swelling in the neck. CARDIOVASCULAR: Denies chest pain, palpitation, syncope. RESPIRATORY: As per HPI.   GASTROINTESTINAL: Denies nausea, abdominal pain or change in bowel function. GENITOURINARY: Denies obstructive symptoms. No history of incontinence. BREASTS: No masses or lumps in the breasts. SKIN: No rashes or itching. MUSCULOSKELETAL: Denies weakness or bone pain. NEUROLOGICAL: No headaches or seizures. PSYCHIATRIC: Denies mood swings or depression. ENDOCRINE: Denies heat or cold intolerance or excessive thirst.  HEMATOLOGIC/LYMPHATIC: Denies easy bruising or lymph node swelling. ALLERGIC/IMMUNOLOGIC: No environmental allergies. PAST MEDICAL HISTORY:   Past Medical History:   Diagnosis Date    Arthritis     Ataxia 3/14/2014    BPH (benign prostatic hyperplasia)     CAD (coronary artery disease)     4 stents    Coronary atherosclerosis of native coronary artery 5/11/2013    Diabetes mellitus ([facility] [address]     Environmental allergies     Hepatitis     Hydronephrosis 3/14/2014    Hyperlipidemia     Hypertension     Kidney disease     Neuropathy     Parkinson's disease ([facility] [address]     ?     S/P coronary artery stent placement x 4     x 4    Schizoaffective disorder, bipolar type [facility])        PAST SURGICAL HISTORY:   Past Surgical History:   Procedure Laterality Date    ANKLE SURGERY Right 06/09/2018    ORIF of R ankle     COLONOSCOPY N/A 12/13/2018    COLONOSCOPY CONTROL HEMORRHAGE performed by Jerson Florez MD at [facility]      x 4    CYSTOSCOPY  4/4/14    transurethral vaporization of prostate    HERNIA REPAIR      X2    ORIF DISTAL RADIUS FRACTURE Left 6/20/2019    LEFT DISTAL RADIUS OPEN REDUCTION INTERNAL FIXATION   performed by Devika Herrera MD at [address] [facility] 12/13/2018    EGD BIOPSY performed by Jerson Florez MD at [address] 5/3/2019    EGD WITH ANESTHESIA performed by Jerson Florez MD at [address] SOCIAL HISTORY:   Social History     Tobacco Use    Smoking status: Former     Packs/day: 1.00     Years: 25.00     Pack years: 25.00     Types: Cigarettes     Quit date: 5/10/2013     Years since quittin.8    Smokeless tobacco: Never   Vaping Use    Vaping Use: Never used   Substance Use Topics    Alcohol use: No    Drug use: No       FAMILY HISTORY:   Family History   Problem Relation Age of Onset    Other Mother         CEREBRAL PALSY    Heart Disease Father         CHF       MEDICATIONS:     No current facility-administered medications on file prior to encounter.      Current Outpatient Medications on File Prior to Encounter   Medication Sig Dispense Refill    pioglitazone (ACTOS) 45 MG tablet Take 45 mg by mouth daily      LACTOBACILLUS PO Take 1 tablet by mouth      tamsulosin (FLOMAX) 0.4 MG capsule Take 0.4 mg by mouth daily      Cholecalciferol (VITAMIN D3) 1.25 MG (66896 UT) CAPS Take by mouth every 7 days Thursday      midodrine (PROAMATINE) 2.5 MG tablet Take 2.5 mg by mouth daily Hold SBP > 110 and DBP >90      insulin aspart (NOVOLOG) 100 UNIT/ML injection vial Inject into the skin 3 times daily  no insulin  151-200 2 units  201-250 4 units  251-300 6 units  301-350 8 units  351+ 12 units then recheck in 2 hours      hydrALAZINE (APRESOLINE) 25 MG tablet Take 1 tablet by mouth every 8 hours Hold for BP<120 90 tablet 1    simethicone (MYLICON) 813 MG chewable tablet Take 125 mg by mouth every morning      guaiFENesin (ROBITUSSIN) 100 MG/5ML SOLN oral solution Take 400 mg by mouth every 4 hours as needed for Cough      loratadine (CLARITIN) 10 MG tablet Take 10 mg by mouth daily (Patient not taking: Reported on 2023)      mirtazapine (REMERON) 7.5 MG tablet Take 7.5 mg by mouth nightly      acetaminophen (TYLENOL) 500 MG tablet Take 1,000 mg by mouth every 6 hours as needed       carvedilol (COREG) 6.25 MG tablet Take 1 tablet by mouth 2 times daily (with meals) 30 tablet 0 QUEtiapine (SEROQUEL) 25 MG tablet Take 12.5 mg by mouth 2 times daily      pantoprazole (PROTONIX) 40 MG tablet Take one tablet daily 90 tablet 0    ASPIRIN LOW DOSE 81 MG EC tablet TAKE 1 TABLET BY MOUTH ONCE DAILY 30 tablet 10    metFORMIN (GLUCOPHAGE) 1000 MG tablet Take 1 tablet by mouth 2 times daily (with meals)  (Patient not taking: Reported on 2/27/2023)      PARoxetine (PAXIL) 40 MG tablet Take 1 tablet by mouth daily      ferrous sulfate 325 (65 Fe) MG tablet Take 1 tablet by mouth 2 times daily (Patient taking differently: Take 325 mg by mouth in the morning and at bedtime) 60 tablet 11    atorvastatin (LIPITOR) 80 MG tablet TAKE 1 TABLET BY MOUTH AT BEDTIME 90 tablet 0    traZODone (DESYREL) 100 MG tablet Take 150 mg by mouth nightly       fluticasone (FLONASE) 50 MCG/ACT nasal spray 1 spray by Nasal route daily           tamsulosin  0.4 mg Oral Daily    lidocaine 1 % injection  5 mL IntraDERmal Once    sodium chloride flush  5-40 mL IntraVENous 2 times per day    aspirin  81 mg Oral Daily    atorvastatin  80 mg Oral Nightly    carvedilol  6.25 mg Oral BID WC    ferrous sulfate  325 mg Oral BID    fluticasone  1 spray Nasal Daily    hydrALAZINE  25 mg Oral 3 times per day    mirtazapine  7.5 mg Oral Nightly    pantoprazole  40 mg Oral QAM AC    PARoxetine  40 mg Oral Daily    QUEtiapine  12.5 mg Oral BID    simethicone  120 mg Oral QAM    cefTRIAXone (ROCEPHIN) IV  1,000 mg IntraVENous Q24H    insulin lispro  0-8 Units SubCUTAneous TID WC    insulin lispro  0-4 Units SubCUTAneous Nightly    sodium chloride flush  5-40 mL IntraVENous 2 times per day    enoxaparin  40 mg SubCUTAneous Daily      sodium chloride      dextrose      sodium chloride      lactated ringers IV soln 75 mL/hr at 02/28/23 1213     sodium chloride flush, sodium chloride, calcium carbonate, guaiFENesin, dextrose bolus **OR** dextrose bolus, glucagon (rDNA), dextrose, sodium chloride flush, sodium chloride, ondansetron **OR** ondansetron, polyethylene glycol, acetaminophen **OR** acetaminophen, potassium chloride **OR** potassium alternative oral replacement **OR** potassium chloride, magnesium sulfate      ALLERGIES:   Allergies as of 02/26/2023    (No Known Allergies)      OBJECTIVE:   height is 5' 4\" (1.626 m) and weight is 124 lb 8 oz (56.5 kg). His oral temperature is 98 °F (36.7 °C). His blood pressure is 137/66 and his pulse is 70. His respiration is 18 and oxygen saturation is 97%. I/O this shift:  In: 46 [P.O.:380; I.V.:10]  Out: 0      PHYSICAL EXAM:    CONSTITUTIONAL: He is a 67y.o.-year-old who appears his stated age. He is alert and oriented x 3 and in no acute distress. HEENT: PERRLA, EOMI. No scleral icterus. No thrush, atraumatic, normocephalic. NECK: Supple, without cervical or supraclavicular lymphadenopathy:  CARDIOVASCULAR: S1 S2 RRR. Without murmer. No JVD or hepatojugular reflux seen. RESPIRATORY & CHEST: Lungs are clear to auscultation and percussion. No wheezing, no crackles. Good air movement  GASTROINTESTINAL & ABDOMEN: Soft, nontender, positive bowel sounds in all quadrants, non-distended, without hepatosplenomegaly. GENITOURINARY: No gross anatomical abnormalities seen. MUSCULOSKELETAL: No tenderness to palpation of the axial skeleton. There is no clubbing. No cyanosis. No edema of the lower extremities. SKIN OF BODY: No rash or jaundice. PSYCHIATRIC EVALUATION: Normal affect. Patient answers questions appropriately. HEMATOLOGIC/LYMPHATIC/ IMMUNOLOGIC: No palpable lymphadenopathy. NEUROLOGIC: Alert and oriented x 3. Groslly non-focal. Motor strength is 5+/5 in all muscle groups. The patient has a normal sensorium globally.       LABS:  Recent Labs     02/26/23 2048 02/27/23  0552 02/28/23  0447   WBC 6.6 4.7 4.0   HGB 10.2* 9.0* 8.7*   HCT 31.0* 27.1* 25.2*    144 155   ALT 12  --   --    AST 13*  --   --    * 141 141   K 4.2 3.9 4.1    112* 111*   CREATININE 1.2 0.9 0.8 BUN 27* 20 15   CO2 20* 20* 22   PSA  --  9.85*  --        Recent Labs     02/26/23  2048 02/27/23  0552 02/28/23  0447   GLUCOSE 203* 165* 174*   CALCIUM 9.5 9.0 9.2   * 141 141   K 4.2 3.9 4.1   CO2 20* 20* 22    112* 111*   BUN 27* 20 15   CREATININE 1.2 0.9 0.8       No results for input(s): PHART, BCO0TRJ, PO2ART, MAM9BIY, V9IZCSPQ, BEART, N4XKKRXG in the last 72 hours. Lab Results   Component Value Date    INR 1.13 05/26/2019    INR 1.02 06/08/2018    INR 0.96 10/08/2014    PROTIME 12.9 05/26/2019    PROTIME 11.6 06/08/2018    PROTIME 10.4 10/08/2014     No results found for: AMYLASE   Lab Results   Component Value Date    LABA1C 6.6 02/27/2023     Lab Results   Component Value Date    .7 02/27/2023     Lab Results   Component Value Date    TSH 3.87 10/09/2014    T4FREE 1.1 05/29/2022     Lab Results   Component Value Date    CKTOTAL 43 01/05/2023    TROPONINI <0.01 02/26/2023      No results found for: CRP   No results found for: BNP   Lab Results   Component Value Date    DDIMER <200 10/08/2014      Lab Results   Component Value Date    FERRITIN 45.7 11/30/2018      Lab Results   Component Value Date    LACTA 0.6 02/27/2023           IMAGING:      Narrative   EXAMINATION:   CT OF THE CHEST WITHOUT CONTRAST 2/27/2023 12:17 am           FINDINGS:   Mediastinum: There is mild calcified plaque throughout the aorta which is   minimally dilated. The pulmonary arteries unremarkable. There are coronary   artery calcifications. There is no mediastinal mass or adenopathy. Lungs/pleura: The lungs are hyperinflated and there are small calcified   granulomas scattered along the right upper lobe and both lung bases. There   are subsegmental linear and ground-glass opacities along the lung bases   posteriorly. There is a 12 mm pleural based nodule along the superior   segment left lower lobe with minimal irregularity to the margins and is   unchanged.   There are no other pulmonary nodules. No effusion or   pneumothorax is seen. No endobronchial lesion is seen. Upper abdomen: The adrenals are normal.  There is perirenal stranding around   both kidneys. There are calcifications scattered in the spleen. The liver   is normal size there are tiny punctate air collections scattered in both   lobes of the liver centrally and peripherally which may be within the biliary   tree but were not seen previously visualized gallbladder and bile ducts are   unremarkable there is perirenal stranding around both kidneys which is   unchanged. Soft tissue/bones: The bones are intact. No aggressive osseous lesion is   seen. Impression   12 mm pleural base nodule left lower lobe posteriorly which is unchanged and   could represent an infectious nodule vs developing neoplasm. Recommend   short-term follow-up or PET-CT correlation. Mild bibasilar atelectasis or early infiltrates posteriorly which is more   prominent. Moderate coronary artery calcifications with no mediastinal adenopathy       Tiny punctate air collections scattered in the liver which may represent   pneumobilia but was not seen previously and remains indeterminate. Recommend   a follow-up CT of the abdomen and pelvis with contrast for further   characterization. IMPRESSION:     Acute urinary retention  Incidentally found pulmonary nodule  Previous history of tobacco abuse    RECOMMENDATION:     Patient has presented to the hospital with acute urinary retention. Being currently managed by urology. During the work-up had a CT chest done which showed 12 mm left lower lobe pleural-based nodule. I personally reviewed the CT chest.  This nodule has an appearance of rounded atelectasis. With his previous history of extensive tobacco abuse malignancy and this nodule cannot be completely ruled out.   At this time, I will recommend that he should undergo an outpatient PET CT scan to assess if this nodule is PET avid. If found to be PET positive, he will need a CT-guided needle biopsy to ascertain the etiology behind this pulmonary nodule. After the PET scan he can follow-up in pulmonary office. Strongly urged patient to stay quit from smoking. Thank you for your consultation. We will sign off. Shaina Nava MD  Pulmonary Critical Care and Sleep Medicine  2/26/2023, 12:30 PM    This note was completed using dragon medical speech recognition software. Grammatical errors, random word insertions, pronoun errors and incomplete sentences are occasional consequences of this technology due to software limitations. If there are questions or concerns about the content of this note of information contained within the body of this dictation they should be addressed with the provider for clarification.

## 2023-02-28 NOTE — PROGRESS NOTES
I assumed care of this patient, with h/o CAD with 12 stents on Plavix, who presented with flank pain. He was found to have renal and bladder stones. Following my assumption of care he began complaining of L sided chest pressure and shortness of breath. ECG showed coving in V2-3 with borderline 1mm ST elevation in V3 alone. Repeat ECG was concerning for Wellen's syndrome. At this time he was complaining of squeezing CP and noted increased work of breathing. Troponin added on to his initial labs was 0.120.    He reports hives with aspirin and as such was given 300mg clopidogrel, and started on heparin bolus and infusion. Given 10mg labetalol and sublingual nitro for initial BP control.    Cardiology was consulted via the transfer center and these 2 ECG's transmitted. Patient's family consented to transfer. I spoke with Sherrie, cardiology NP/PA, and relayed all of the above information. She stated she had discussed this with Dr. Tyson Downing at [facility], who stated this presentation was not an acute STEMI. At approximately 1900 I discussed this with Dr. Gorman, who confirmed the above impression. However, he stated that there was no cardiac bed or CCU availability, that the patient would not be served by being sent to their ED or MICU, and that moreover the patient would not be cath'ed anyway immediately due to elevated creatinine, and that it would be appropriate to admit him to this hospital ([place]) and treat as an NSTEMI and achieve BP control instead of transfer.    At this point he was admitted to ICU, who assumed care of this patient.    Critical care time during my care of this patient was 60 minutes, performed in interpretation of results, consultation with other physicians and with the patient and family members, direct patient care, and documentation. Patient awake in bed. Call light within reach and patient denies needs. Care plan and education reviewed and mutually agreed upon by nurse and patient. I assumed care of this patient, with h/o CAD with 12 stents on Plavix, who presented with flank pain. He was found to have renal and bladder stones. Following my assumption of care he began complaining of L sided chest pressure and shortness of breath. ECG showed coving in V2-3 with borderline 1mm ST elevation in V3 alone. Repeat ECG was concerning for Wellen's syndrome. At this time he was complaining of squeezing CP and noted increased work of breathing. Troponin added on to his initial labs was 0.120.    He reports hives with aspirin and as such was given 300mg clopidogrel, and started on heparin bolus and infusion. Given 10mg labetalol and sublingual nitro for initial BP control.    Cardiology was consulted via the transfer center and these 2 ECG's transmitted. Patient's family consented to transfer. I spoke with Sherrie, cardiology NP/PA, and relayed all of the above information. She stated she had discussed this with Dr. Tyson Downing at [facility], who stated this presentation was not an acute STEMI. At approximately 1900 I discussed this with Dr. Gorman, who confirmed the above impression. However, he stated that there was no cardiac bed or CCU availability, that the patient would not be served by being sent to their ED or MICU, and that moreover the patient would not be cath'ed anyway immediately due to elevated creatinine, and that it would be appropriate to admit him to this hospital ([place]) and treat as an NSTEMI and achieve BP control instead of transfer.    At this point he was admitted to ICU, who assumed care of this patient.    Diagnoses at this point: NSTEMI, initial episode of care. Renal calculi, bladder calculus, hypertension, acute kidney injury, acute urinary tract infection with hematuria.    Critical care time during my care of this patient was 60 minutes, performed in interpretation of results, consultation with other physicians and with the patient and family members, direct patient care, and documentation.

## 2023-02-28 NOTE — PROGRESS NOTES
Urology Progress Note  [facility]    Provider: PRESTON Ortez CNP  Patient ID:  Admission Date: 2023 Name: Ehsan Schuster  OR Date: 2023 MRN: [id number]   Patient Location: [id number] : [id number]  Attending: Anatoly King MD Date of Service: 2023  PCP: DANYELLE LINARES     Diagnoses:  1. Acute cystitis without hematuria    2. Elevated lactic acid level         Assessment/Plan:  68 yo male with hx of BPH s/p VP   Presents from SNF with ongoing right flank pain, n/v and urinary retention with gross hematuria. Failed catheterization attempt at the SNF and thus he was transferred to [facility]. Workup in the ED concerning for urinary infection, CT a/p shows no focal bladder abnormality- bladder is collapsed without a siddiqui in place, normal upper tracts, no stones and no filling defects. Looks like he did have a klebsiella UTI 2023. Recommendations:  Continue  Flomax  Continue abx and f/u cultures  Siddiqui removed, bladder scan after first void  Outpatient cystoscopy with BL RPG ordered for gross hematuria and BPH  Will continue to follow    The patient had a chance to ask questions which were answered. he understands the above plan. Subjective:   Ehsan Schuster is a 67 y.o. male. He was seen and examined this morning. Today we discussed bladder scan, monitor for retention. Objective:   Vitals:  Vitals:    23 0450   BP: 132/63   Pulse: 70   Resp: 18   Temp: 98.1 °F (36.7 °C)   SpO2: 97%     No intake or output data in the 24 hours ending 23 0901  Physical Exam:  Gen: Alert and oriented x3, no acute distress  CV: Regular rate   Resp: unlabored respirations  Abd: Soft, non-distended, non-tender, no masses  Ext: no peripheral edema noted, moves upper and lower extremities spontaneously  Skin: warmand well perfused, no rashes noted on the face, or arms.      Labs:  Lab Results   Component Value Date    WBC 4.0 2023    HGB 8.7 (L) 2023    HCT 25.2 (L) 02/28/2023    MCV 87.2 02/28/2023     02/28/2023     Lab Results   Component Value Date    CREATININE 0.8 02/28/2023    BUN 15 02/28/2023     02/28/2023    K 4.1 02/28/2023     (H) 02/28/2023    CO2 22 02/28/2023       Aleshia Berry, APRN - CNP   2/28/2023

## 2023-02-28 NOTE — PLAN OF CARE
Problem: Discharge Planning  Goal: Discharge to home or other facility with appropriate resources  2/28/2023 0739 by Shania Flores RN  Outcome: Progressing  2/28/2023 0106 by Marsha Rangel RN  Outcome: Progressing     Problem: Skin/Tissue Integrity  Goal: Absence of new skin breakdown  Description: 1. Monitor for areas of redness and/or skin breakdown  2. Assess vascular access sites hourly  3. Every 4-6 hours minimum:  Change oxygen saturation probe site  4. Every 4-6 hours:  If on nasal continuous positive airway pressure, respiratory therapy assess nares and determine need for appliance change or resting period.   2/28/2023 0739 by Shania Flores RN  Outcome: Progressing  2/28/2023 0106 by Marsha Rangel RN  Outcome: Progressing     Problem: ABCDS Injury Assessment  Goal: Absence of physical injury  2/28/2023 0106 by Marsha Rangel RN  Outcome: Progressing     Problem: Safety - Adult  Goal: Free from fall injury  2/28/2023 0739 by Shania Flores RN  Outcome: Progressing  2/28/2023 0106 by Marsha Rangel RN  Outcome: Progressing     Problem: Chronic Conditions and Co-morbidities  Goal: Patient's chronic conditions and co-morbidity symptoms are monitored and maintained or improved  2/28/2023 0106 by Marsha Rangel RN  Outcome: Progressing

## 2023-02-28 NOTE — PROGRESS NOTES
[facility] Department   Phone: [phone]    Occupational Therapy    [x] Initial Evaluation            [] Daily Treatment Note         [] Discharge Summary      Patient: Ember Jamison   : 1950   MRN: [id number]   Date of Service:  2023    Admitting Diagnosis:  Urinary retention  Current Admission Summary: Ember Jamison is a 67 y.o. male who presents to the emergency department today from [address] for evaluation for urinary retention, lower abdominal pain and left flank pain. The patient states that for the past 3 to 4 days he has been experiencing pain to his suprapubic abdomen, with radiation towards his left flank and left lower back. Patient denies falling or injuring himself in any way. The patient states that he has had difficulty urinating since this morning. The patient states that he has been trying to use the restroom, without much urinary output. Patient is reporting painful urination. Patient denies any hematuria. Apparently they did attempt to cath the patient at the nursing home facility and there was blood noted following the cath. Patient has not had any fever or chills. No cough or congestion. He has no chest pain or shortness of breath. Patient denies any testicular pain. Patient has no history of kidney stones. Past Medical History:  has a past medical history of Arthritis, Ataxia, BPH (benign prostatic hyperplasia), CAD (coronary artery disease), Coronary atherosclerosis of native coronary artery, Diabetes mellitus ([address] Environmental allergies, Hepatitis, Hydronephrosis, Hyperlipidemia, Hypertension, Kidney disease, Neuropathy, Parkinson's disease ([address] S/P coronary artery stent placement x 4, and Schizoaffective disorder, bipolar type ([address] Past Surgical History:  has a past surgical history that includes Coronary angioplasty; Coronary angioplasty with stent; hernia repair; Vasectomy; Cystocopy (14);  Ankle surgery (Right, 06/09/2018); Upper gastrointestinal endoscopy (N/A, 12/13/2018); Colonoscopy (N/A, 12/13/2018); Upper gastrointestinal endoscopy (N/A, 5/3/2019); and ORIF DISTAL RADIUS FRACTURE (Left, 6/20/2019). Discharge Recommendations: Alison Gerber scored a 18/24 on the AM-PAC ADL Inpatient form. Current research shows that an AM-PAC score of 18 or greater is typically associated with a discharge to the patient's home setting. Based on the patient's AM-PAC score, and their current ADL deficits, it is recommended that the patient have 2-3 sessions per week of Occupational Therapy at d/c to increase the patient's independence. At this time, this patient demonstrates the endurance and safety to discharge home with home health OT services at [address] [place] [address] and a follow up treatment frequency of 2-3x/wk. Please see assessment section for further patient specific details. HOME HEALTH CARE: LEVEL 3 SAFETY     - Initial home health evaluation to occur within 24-48 hours, in patient home   - Therapy evaluations in home within 24-48 hours of discharge; including DME and home safety   - Frontload therapy 5 days, then 3x a week   - Therapy to evaluate if patient has [address] needs for personal care   -  evaluation within 24-48 hours, includes evaluation of resources and insurance to determine [place], [place], LTC, and Medicaid options      If patient discharges prior to next session this note will serve as a discharge summary. Please see below for the latest assessment towards goals.       DME Required For Discharge: patient has all required DME for discharge    Precautions/Restrictions: high fall risk  Weight Bearing Restrictions: no restrictions  [] Right Upper Extremity  [] Left Upper Extremity [] Right Lower Extremity  [] Left Lower Extremity     Required Braces/Orthotics: no braces required   [] Right  [] Left  Positional Restrictions:no positional restrictions      Pre-Admission Information   Lives With: .  Comment: Care of the [facility] staff                          Type of Home: long term care facility  Home Layout: able to live on main level  Home Access: level entry  Bathroom Layout: walker accessible, wheelchair accessible, walk in shower  Toilet Height: standard height  Bathroom Equipment: grab bars in shower, grab bars around toilet, built in [address]: manual wheelchair  Transfer Assistance: modified independent with use of w/c  Ambulation Assistance: modified independent with use of w/c  ADL Assistance: Pt reports he is independent with toileting, dressing, bathing, and self-care. \"I hardly call for help\"  IADL Assistance: SNF provides meals and does pt's woodson  Active :        [] Yes                 [x] No  Hand Dominance: [] Left                 [x] Right  Current Employment: retired. Occupation: pt notes he did a little bit of everything  Hobbies: denies  Recent Falls: \"probably 1 per month for the past 6 months\"      Examination     Vision:   Vision Corrective Device: wears glasses for reading  Hearing:   hard of hearing, left hearing aid, right hearing aid; pt reports hearing aids do not currently work  Perception:   WFL  Observation:   General Observation:  Inappropriate comments during session to RN and therapists. Posture: Forward head, rounded shoulders  Sensation:   denies numbness and tingling  Proprioception:    WFL  Tone:   Normotonic  Coordination Testing:   WFL    ROM:   (B) UE AROM WFL  Strength:   (B) UE strength grossly WFL    Decision Making: low complexity  Clinical Presentation: stable      Subjective    General: Pt seated in chair upon arrival; agreeable to OT/PT eval.  Pain: 0/10  Pain Interventions: not applicable     Activities of Daily Living    Basic Activities of Daily Living  Lower Extremity Dressing: stand by assistance  Dressing Comments: brief change seated in chair  Toileting: stand by assistance contact guard assistance.     Toileting Comments: completed pericare seated on toilet; did not void   General Comments: Pt declined additional ADLs due to recent gown and sock change with nursing; reports \"I do not brush my teeth. \"  Instrumental Activities of Daily Living  No IADL completed on this date. Functional Mobility    Bed Mobility  Bed mobility not completed on this date. Comments: Pt seated in chair beginning and end of session. Transfers  Sit to stand transfer:contact guard assistance  Stand to sit transfer: contact guard assistance  Comments: Chair > wheelchair > propelling > ambulating > bathroom > chair  Functional Mobility:  Sitting Balance: stand by assistance. Standing Balance: contact guard assistance. Functional Mobility: .  contact guard assistance, minimal assistance  Functional Mobility Activity: to/from bathroom, hallway  Functional Mobility Device Use: no device  Functional Mobility Comment: Pt propelled w/c ~400 ft in hallway with use of BLE and limited UE use; requiring several rest breaks.  Upon return to room, pt ambulated to bathroom to sit on toilet  without use of device    Other Therapeutic Interventions      Functional Outcomes  AM-PAC Inpatient Daily Activity Raw Score: 18      Cognition  Overall Cognitive Status: Impaired  Arousal/Alterness: appropriate responses to stimuli  Following Commands: follows one step commands with repetition  Attention Span: attends with cues to redirect  Memory: decreased short term memory  Safety Judgement: decreased awareness of need for assistance  Problem Solving: assistance required to generate solutions  Insights: decreased awareness of deficits  Initiation: requires cues for some  Sequencing: requires cues for some  Comments:  inappropriate comments throughout eval to RN and therapists  Orientation:    alert and oriented x 4  Command Following:   accurately follows one step commands     Education    Barriers To Learning: cognition and hearing  Patient Education: patient educated on goals, OT role and benefits, plan of care, discharge recommendations  Learning Assessment:  patient verbalizes understanding, would benefit from continued reinforcement    Assessment    Activity Tolerance: Pt tolerated activities well requiring several rest breaks during self-propulsion of w/c in hallway. Impairments Requiring Therapeutic Intervention: decreased functional mobility, decreased ADL status, decreased strength, decreased cognition, decreased endurance, decreased balance  Prognosis: good  Clinical Assessment: Pt is currently functioning slightly below baseline requiring CGA for transfers and CGA > min A for ambulation without device. Pt completed toileting with SBA > CGA. Pt would benefit from skilled OT services to address above deficits and return to OF.    Safety Interventions: patient left in chair, chair alarm in place, call light within reach, and nurse notified       Plan    Frequency: 3-5 x/per week  Current Treatment Recommendations: balance training, functional mobility training, transfer training, endurance training, wheelchair mobility training, and ADL/self-care training    Goals    Patient Goals: Return home     Short Term Goals:  Time Frame: d/c  Patient will complete lower body ADL at [facility] independent   Patient will complete toileting at modified independent   Patient will complete grooming at [facility] independent   Patient will complete functional transfers at [facility] independent   Patient will complete functional mobility at [facility]        Therapy Session Time     Individual Group Co-treatment   Time In    0835   Time Out    0930   Minutes    55        Timed Code Treatment Minutes:  Timed Code Treatment Minutes: 40 Minutes    Total Treatment Minutes:  55 minutes    Electronically Signed By: NURA Wilson/MIKE, C/NDT ([id number])  Andreas Grider S/OT     Occupational Therapist was present, directed the patient's care, made skilled judgement, and was responsible for assessment and treatment of the patient.

## 2023-02-28 NOTE — PROGRESS NOTES
Physical Therapy    [facility]   Phone: [phone]    Physical Therapy    [x] Initial Evaluation            [] Daily Treatment Note         [] Discharge Summary      Patient: Emilie Trevizo   : 1950   MRN: [id number]   Date of Service:  2023  Admitting Diagnosis: Urinary retention  Current Admission Summary: Emilie Trevizo is a 67 y.o. male who presents to the emergency department today from [address] for evaluation for urinary retention, lower abdominal pain and left flank pain. The patient states that for the past 3 to 4 days he has been experiencing pain to his suprapubic abdomen, with radiation towards his left flank and left lower back. Patient denies falling or injuring himself in any way. The patient states that he has had difficulty urinating since this morning. The patient states that he has been trying to use the restroom, without much urinary output. Patient is reporting painful urination. Patient denies any hematuria. Apparently they did attempt to cath the patient at the nursing home facility and there was blood noted following the cath. Patient has not had any fever or chills. No cough or congestion. He has no chest pain or shortness of breath. Patient denies any testicular pain. Patient has no history of kidney stones. Past Medical History:  has a past medical history of Arthritis, Ataxia, BPH (benign prostatic hyperplasia), CAD (coronary artery disease), Coronary atherosclerosis of native coronary artery, Diabetes mellitus ([address] Environmental allergies, Hepatitis, Hydronephrosis, Hyperlipidemia, Hypertension, Kidney disease, Neuropathy, Parkinson's disease ([address] S/P coronary artery stent placement x 4, and Schizoaffective disorder, bipolar type ([facility] Past Surgical History:  has a past surgical history that includes Coronary angioplasty; Coronary angioplasty with stent; hernia repair; Vasectomy;  Cystocopy (14); Ankle surgery (Right, 06/09/2018); Upper gastrointestinal endoscopy (N/A, 12/13/2018); Colonoscopy (N/A, 12/13/2018); Upper gastrointestinal endoscopy (N/A, 5/3/2019); and ORIF DISTAL RADIUS FRACTURE (Left, 6/20/2019). Discharge Recommendations: William Santos scored a 15/24 on the AM-PAC short mobility form. Current research shows that an AM-PAC score of 18 or greater is typically associated with a discharge to the patient's home setting. Based on the patient's AM-PAC score and their current functional mobility deficits, it is recommended that the patient have 2-3 sessions per week of Physical Therapy at d/c to increase the patient's independence. At this time, this patient demonstrates the endurance and safety to discharge home with HHPT and a follow up treatment frequency of 2-3x/wk. Please see assessment section for further patient specific details. HOME HEALTH CARE: LEVEL 3 SAFETY     - Initial home health evaluation to occur within 24-48 hours, in patient home   - Therapy evaluations in home within 24-48 hours of discharge; including DME and home safety   - Frontload therapy 5 days, then 3x a week   - Therapy to evaluate if patient has [address] needs for personal care   -  evaluation within 24-48 hours, includes evaluation of resources and insurance to determine [place], [place], LTC, and Medicaid options    If patient discharges prior to next session this note will serve as a discharge summary. Please see below for the latest assessment towards goals.      Recommend therapy at [address] [place] [address]   DME Required For Discharge: patient has all required DME for discharge  Precautions/Restrictions: high fall risk  Weight Bearing Restrictions: no restrictions  [] Right Upper Extremity  [] Left Upper Extremity [] Right Lower Extremity  [] Left Lower Extremity     Required Braces/Orthotics: no braces required   [] Right  [] Left  Positional Restrictions:no positional restrictions    Pre-Admission Information Lives With: . Comment: Care of the [facility] staff                          Type of Home: long term care facility  Home Layout: able to live on main level  Home Access: level entry  Bathroom Layout: walker accessible, wheelchair accessible, walk in shower  Toilet Height: standard height  Bathroom Equipment: grab bars in shower, grab bars around toilet, built in [address]: manual wheelchair  Transfer Assistance: modified independent with use of w/c  Ambulation Assistance: modified independent with use of w/c  ADL Assistance: Pt reports he is independent with toileting, dressing, bathing, and self-care. \"I hardly call for help\"  IADL Assistance: SNF provides meals and does pt's woodson  Active :        [] Yes                 [x] No  Hand Dominance: [] Left                 [x] Right  Current Employment: retired. Occupation: pt notes he did a little bit of everything  Hobbies: denies  Recent Falls: \"probably 1 per month for the past 6 months\"    Examination   Vision:   Vision Gross Assessment: Impaired and Vision Corrective Device: wears glasses for reading  Hearing:   hard of hearing, left hearing aid, right hearing aid Pt reports hearing aides dont work so he does not use them   Observation:   General Observation:  Mildly inappropriate comments throughout evaluation   Posture: Forward head, rounded shoulders   Sensation:   denies numbness and tingling  ROM:   (B) LE AROM WFL  Strength:   Functional strength assessed via completion of STS with CGA   Therapist Clinical Decision Making (Complexity): low complexity  Clinical Presentation: stable      Subjective  General: Pt seated in room chair upon arrival. Agreeable to PT/OT eval.   Pain: 0/10  Pain Interventions: not applicable       Functional Mobility  Bed Mobility  Bed mobility not completed on this date.   Comments: Pt seated in chair at beginning and EOS   Transfers  Sit to stand transfer: contact guard assistance  Stand to sit transfer: contact guard assistance  Stand step transfer: contact guard assistance  Toilet transfer: contact guard assistance  Comments: Impulsive with transfers. Stand step transfer from chair<>wc with CGA no device   Ambulation  Surface:level surface  Assistive Device: no device  Assistance: contact guard assistance, minimal assistance  Distance: 10ft+10ft   Gait Mechanics: mild staggering, decreased step length/height   Comments:  Pt impulsively ambulates to bathroom with no AD and CGA-min A   Stair Mobility  Stair mobility not completed on this date. Comments:  Wheelchair Mobility:  Chair: manual  Surface: level surface  Method: (R) UE, (L) UE, (R) LE, and (L) LE  Distance: 400 ft  Assistance: supervision  Comments: several rest breaks   Balance  Static Sitting Balance: good: independent with functional balance in unsupported position  Dynamic Sitting Balance: good: independent with functional balance in unsupported position  Static Standing Balance: fair (-): maintains balance at CGA with use of UE support  Dynamic Standing Balance: poor (+): requires min (A) to maintain balance  Comments: Pt stands at chair ~3 mins with CGA, B LE posterior support on chair.      Other Therapeutic Interventions    Functional Outcomes  AM-PAC Inpatient Mobility Raw Score : 15              Cognition  Overall Cognitive Status: Impaired  Arousal/Alterness: appropriate responses to stimuli  Following Commands: follows one step commands with repetition  Attention Span: attends with cues to redirect  Memory: decreased short term memory  Safety Judgement: decreased awareness of need for assistance  Problem Solving: assistance required to generate solutions  Insights: decreased awareness of deficits  Initiation: requires cues for some  Sequencing: requires cues for some  Comments: Mildly inappropriate comments throughout session  Orientation:    alert and oriented x 4  Command Following:   accurately follows one step commands    Education  Barriers To Learning: cognition and hearing  Patient Education: patient educated on goals, PT role and benefits, plan of care, general safety, transfer training, discharge recommendations  Learning Assessment:  patient verbalizes understanding, would benefit from continued reinforcement    Assessment  Activity Tolerance: Pt tolerates session well. Only able to stand ~3 mins before B LE fatigue and legs become shaky/unstable and requesting to sit back down. Impairments Requiring Therapeutic Intervention: decreased functional mobility, decreased strength, decreased safety awareness, decreased cognition, decreased endurance, decreased balance  Prognosis: good  Clinical Assessment: Pt presents from his LTC facility with urinary retention. Pt reports he is able to perform bed mobility and transfers to [place] [address] independently at baseline however this has been more difficult for him lately due to feeling weak. At this time, pt complete transfers with CGA and self propels wc 400ft. Pt does not seem to be far off his baseline but would continue to benefit from skilled PT to address above deficits. Safety Interventions: patient left in chair, chair alarm in place, call light within reach, gait belt, patient at risk for falls, and nurse notified    Plan  Frequency: 3-5 x/per week  Current Treatment Recommendations: strengthening, balance training, functional mobility training, transfer training, gait training, stair training, home exercise program, and safety education    Goals  Patient Goals:  \"Be able to walk\"    Short Term Goals:  Time Frame: upon discharge   Patient will complete bed mobility at supervision   Patient will complete transfers at supervision   Patient will ambulate 15 ft with use of LRAD at contact guard assistance  Patient will complete manual w/c propulsion 150 ft at [address]   Time In     0835   Time Out     0930   Minutes 55     Timed Code Treatment Minutes:   40  Total Treatment Minutes:  55       Electronically Signed By: Matt Santos [place] [id number], [place], [address]

## 2023-02-28 NOTE — PROGRESS NOTES
[facility] PROGRESS NOTE    2/28/2023 8:46 AM        Name: Anam Mackey            Admitted: 2/26/2023  Primary Care Provider: DANYELLE Busby (Tel: [phone])      Subjective:    Anam Mackey is a 67 y.o. male with a past medical history of hypertension, hyperlipidemia, hydronephrosis, Parkinson's diease, schizophrenia, CAD s/p stents, BPH  who presented with right flank pain urinary retention and hematuria. Attempts at facility to place Siddiqui were unsuccessful. Siddiqui was placed in the ER, found to have evidence of UTI and started on IV ceftriaxone. Interval History: Today, he is feeling better. Siddiqui removed this am with plans for post-void residual later today. Urine culture is negative to date. Urology pans to follow culture for now. Independently reviewed interval ancillary notes from urology. Problem List  Principal Problem:    Urinary retention  Active Problems:    UTI (urinary tract infection)    Schizoaffective disorder, bipolar type ([address]    Hyperlipidemia associated with type 2 diabetes mellitus (HCC)    Elevated lactic acid level    Pulmonary nodule    Type 2 diabetes mellitus, without long-term current use of insulin ([address]    Hypertension associated with diabetes ([address]  Resolved Problems:    * No resolved hospital problems.  *     Assessment and Plan:    BPH with Urinary Retention   - Presented with gross hematuria and flank pain, siddiqui placed in ER   - CT abd/pelvis with no obstructive uropathy  - Continue with Flomax, plans for voiding trial per urology today  - Needs OP cystoscopy   - Post void residual >100 ml, will repeat later this afternoon   UTI   - Ceftriaxone started culture is negative for growth, stop abx therapy tomorrow if no growth, reviewed with Dr. Shakir Carias   - Klebsiella UTI on 1/2023  Elevated Lactic Acid   - Etiology?, can be due to dehydration on presentation, resolved with IVF   Pulmonary Nodule   - 11 mm pulmonary dosule on CT scan, recommend PET scan for follow up   - Pulmonary consultation pending for further recs and OP following  Hypertension   - Controlled, continue current medications  DM2   - A1C 6.6; BG reviewed last 24 hrs   - Continue medium correction SSI    - Resume home medications at discharge  Hypomagnesemia   - Resolved, mag 2.3 today     Discussed care with patient and nursing  Discussed assessment, diagnostics, and plan of care with Dr. Loyd Seay and will remain IP today due to elevated post-void residual    Diet: ADULT DIET;  Regular  Code:Full Code  DVT PPX: Lovenox PPx    Disposition: Return to LTC when medically able    Current Medications  tamsulosin (FLOMAX) capsule 0.4 mg, Daily  lidocaine PF 1 % injection 5 mL, Once  sodium chloride flush 0.9 % injection 5-40 mL, 2 times per day  sodium chloride flush 0.9 % injection 5-40 mL, PRN  0.9 % sodium chloride infusion, PRN  calcium carbonate (TUMS) chewable tablet 500 mg, TID PRN  aspirin chewable tablet 81 mg, Daily  atorvastatin (LIPITOR) tablet 80 mg, Nightly  carvedilol (COREG) tablet 6.25 mg, BID WC  ferrous sulfate (IRON 325) tablet 325 mg, BID  fluticasone (FLONASE) 50 MCG/ACT nasal spray 1 spray, Daily  guaiFENesin (ROBITUSSIN) 100 MG/5ML oral solution 400 mg, Q4H PRN  hydrALAZINE (APRESOLINE) tablet 25 mg, 3 times per day  mirtazapine (REMERON) tablet 7.5 mg, Nightly  pantoprazole (PROTONIX) tablet 40 mg, QAM AC  PARoxetine (PAXIL) tablet 40 mg, Daily  QUEtiapine (SEROQUEL) tablet 12.5 mg, BID  simethicone (MYLICON) chewable tablet 120 mg, QAM  cefTRIAXone (ROCEPHIN) 1,000 mg in sodium chloride 0.9 % 50 mL IVPB (mini-bag), Q24H  dextrose bolus 10% 125 mL, PRN   Or  dextrose bolus 10% 250 mL, PRN  glucagon (rDNA) injection 1 mg, PRN  dextrose 10 % infusion, Continuous PRN  insulin lispro (HUMALOG) injection vial 0-8 Units, TID WC  insulin lispro (HUMALOG) injection vial 0-4 Units, Nightly  sodium chloride flush 0.9 % injection 5-40 mL, 2 times per day  sodium chloride flush 0.9 % injection 5-40 mL, PRN  0.9 % sodium chloride infusion, PRN  enoxaparin (LOVENOX) injection 40 mg, Daily  ondansetron (ZOFRAN-ODT) disintegrating tablet 4 mg, Q8H PRN   Or  ondansetron (ZOFRAN) injection 4 mg, Q6H PRN  polyethylene glycol (GLYCOLAX) packet 17 g, Daily PRN  acetaminophen (TYLENOL) tablet 650 mg, Q6H PRN   Or  acetaminophen (TYLENOL) suppository 650 mg, Q6H PRN  lactated ringers IV soln infusion, Continuous  potassium chloride (KLOR-CON M) extended release tablet 40 mEq, PRN   Or  potassium bicarb-citric acid (EFFER-K) effervescent tablet 40 mEq, PRN   Or  potassium chloride 10 mEq/100 mL IVPB (Peripheral Line), PRN  magnesium sulfate 2000 mg in 50 mL IVPB premix, PRN        Objective:  /63   Pulse 70   Temp 98.1 °F (36.7 °C) (Oral)   Resp 18   Ht 5' 4\" (1.626 m)   Wt 124 lb 8 oz (56.5 kg)   SpO2 97%   BMI 21.37 kg/m²   Vitals:    02/28/23 0450   BP: 132/63   Pulse: 70   Resp: 18   Temp: 98.1 °F (36.7 °C)   SpO2: 97%     No intake or output data in the 24 hours ending 02/28/23 0846   Wt Readings from Last 3 Encounters:   02/28/23 124 lb 8 oz (56.5 kg)   01/08/23 135 lb 5 oz (61.4 kg)   12/27/22 146 lb (66.2 kg)       Review of Systems:  Constitutional: No fever, No sudden weight changes, No weakness  Skin: No excessive bruising, No bleeding, No changes in skin pigment, normal turgor  Respiratory: No SOB, No cough, No wheezing, No recent URI  Cardiovascular: Negative for CP, palpitations, dizziness, or syncope  Gastrointestinal: No abdominal pain, No nausea, No vomiting, No diarrhea, No constipation, No black/tarry stools  Genito-Urinary: No hematuria, No dysuria, No polyuria,   Musculoskeletal: No pain, No swelling, No new mobility concerns  Endocrine: No excessive sweating   Neurological/Psych: No for confusion, No TIA-like symptoms. Denies any acute depression, anxiety, or insomnia.       Physical Examination:  Telemetry: Personally Reviewed none for review  Constitutional: Cooperative and in no apparent distress, appears well nourished, No obesity  Skin: Warm and pink; no cyanosis, bruising, or clubbing, No lesions/incisions  HEENT: Symmetric and normocephalic. Conjunctiva pink with clear sclera. Mucus membranes pink and moist.   Cardiovascular: AP regular. S1 & S2, negative for murmurs. Peripheral pulses 2+, No peripheral edema  Respiratory: Respirations symmetric and unlabored. Lungs clear to auscultation bilaterally, no wheezing, crackles, or rhonchi  Gastrointestinal: Abdomen soft and round. normal bowel sounds. No tenderness  Musculoskeletal: No focal weakness, muscle strength 5/5 bilaterally  Neurologic/Psych: Awake and orientated to person, place and time. Calm affect, appropriate mood. Pertinent labs, diagnostic, and imaging results reviewed as a part of this visit    Labs and Tests:  CBC:   Recent Labs     02/26/23 2048 02/27/23  0552 02/28/23  0447   WBC 6.6 4.7 4.0   HGB 10.2* 9.0* 8.7*    144 155     BMP:    Recent Labs     02/26/23 2048 02/27/23  0552 02/28/23  0447   * 141 141   K 4.2 3.9 4.1    112* 111*   CO2 20* 20* 22   BUN 27* 20 15   CREATININE 1.2 0.9 0.8   GLUCOSE 203* 165* 174*     Hepatic:   Recent Labs     02/26/23 2048   AST 13*   ALT 12   BILITOT 0.3   ALKPHOS 85     Relevant results:  CT Abd / Pelvis 2/26/2023:  1. No obstructive uropathy identified. 2. Circumferential wall thickening of the bladder which potentially may be   related to incomplete distension. Correlate clinically to exclude cystitis. 3. 1.1 cm pulmonary nodule at the left lower lobe. See guidelines below. RECOMMENDATIONS:   11 mm left solid pulmonary nodule detected on incomplete chest CT. Recommend   prompt non-contrast Chest CT for further evaluation.       CT Chest 2/27/2023:  12 mm pleural base nodule left lower lobe posteriorly which is unchanged and   could represent an infectious nodule vs developing neoplasm. Recommend   short-term follow-up or PET-CT correlation. Mild bibasilar atelectasis or early infiltrates posteriorly which is more   prominent. Moderate coronary artery calcifications with no mediastinal adenopathy       Tiny punctate air collections scattered in the liver which may represent   pneumobilia but was not seen previously and remains indeterminate. Recommend   a follow-up CT of the abdomen and pelvis with contrast for further   characterization.      U/A:   Moderate blood, moderate leucocytes; culture with no growth   EC2023  Sinus rhythm with occasional Premature ventricular complexesLeft axis deviationRight bundle branch blockVoltage criteria for left ventricular hypertrophy   Prior Studies:    Laurel Swartz, PRESTON - CNP   2023 8:46 AM

## 2023-03-01 VITALS
WEIGHT: 127.7 LBS | SYSTOLIC BLOOD PRESSURE: 127 MMHG | DIASTOLIC BLOOD PRESSURE: 68 MMHG | HEART RATE: 76 BPM | TEMPERATURE: 98 F | OXYGEN SATURATION: 96 % | HEIGHT: 64 IN | RESPIRATION RATE: 18 BRPM | BODY MASS INDEX: 21.8 KG/M2

## 2023-03-01 LAB
ALBUMIN SERPL-MCNC: 3.1 G/DL (ref 3.4–5)
ANION GAP SERPL CALCULATED.3IONS-SCNC: 6 MMOL/L (ref 3–16)
BASOPHILS ABSOLUTE: 0 K/UL (ref 0–0.2)
BASOPHILS RELATIVE PERCENT: 0.7 %
BUN BLDV-MCNC: 14 MG/DL (ref 7–20)
CALCIUM SERPL-MCNC: 8.6 MG/DL (ref 8.3–10.6)
CHLORIDE BLD-SCNC: 112 MMOL/L (ref 99–110)
CO2: 25 MMOL/L (ref 21–32)
CREAT SERPL-MCNC: 0.8 MG/DL (ref 0.8–1.3)
EOSINOPHILS ABSOLUTE: 0.2 K/UL (ref 0–0.6)
EOSINOPHILS RELATIVE PERCENT: 6.8 %
GFR SERPL CREATININE-BSD FRML MDRD: >60 ML/MIN/{1.73_M2}
GLUCOSE BLD-MCNC: 166 MG/DL (ref 70–99)
GLUCOSE BLD-MCNC: 220 MG/DL (ref 70–99)
GLUCOSE BLD-MCNC: 234 MG/DL (ref 70–99)
GLUCOSE BLD-MCNC: 295 MG/DL (ref 70–99)
HCT VFR BLD CALC: 24.8 % (ref 40.5–52.5)
HEMOGLOBIN: 8.3 G/DL (ref 13.5–17.5)
LYMPHOCYTES ABSOLUTE: 0.8 K/UL (ref 1–5.1)
LYMPHOCYTES RELATIVE PERCENT: 27.8 %
MAGNESIUM: 1.7 MG/DL (ref 1.8–2.4)
MCH RBC QN AUTO: 29.2 PG (ref 26–34)
MCHC RBC AUTO-ENTMCNC: 33.5 G/DL (ref 31–36)
MCV RBC AUTO: 87.2 FL (ref 80–100)
MONOCYTES ABSOLUTE: 0.3 K/UL (ref 0–1.3)
MONOCYTES RELATIVE PERCENT: 11.1 %
NEUTROPHILS ABSOLUTE: 1.6 K/UL (ref 1.7–7.7)
NEUTROPHILS RELATIVE PERCENT: 53.6 %
PDW BLD-RTO: 15.3 % (ref 12.4–15.4)
PERFORMED ON: ABNORMAL
PHOSPHORUS: 3.1 MG/DL (ref 2.5–4.9)
PLATELET # BLD: 157 K/UL (ref 135–450)
PMV BLD AUTO: 7.6 FL (ref 5–10.5)
POTASSIUM SERPL-SCNC: 3.7 MMOL/L (ref 3.5–5.1)
RBC # BLD: 2.85 M/UL (ref 4.2–5.9)
SARS-COV-2, NAAT: NOT DETECTED
SODIUM BLD-SCNC: 143 MMOL/L (ref 136–145)
WBC # BLD: 2.9 K/UL (ref 4–11)

## 2023-03-01 PROCEDURE — 2580000003 HC RX 258: Performed by: STUDENT IN AN ORGANIZED HEALTH CARE EDUCATION/TRAINING PROGRAM

## 2023-03-01 PROCEDURE — 6370000000 HC RX 637 (ALT 250 FOR IP): Performed by: NURSE PRACTITIONER

## 2023-03-01 PROCEDURE — 6360000002 HC RX W HCPCS: Performed by: STUDENT IN AN ORGANIZED HEALTH CARE EDUCATION/TRAINING PROGRAM

## 2023-03-01 PROCEDURE — 6370000000 HC RX 637 (ALT 250 FOR IP): Performed by: STUDENT IN AN ORGANIZED HEALTH CARE EDUCATION/TRAINING PROGRAM

## 2023-03-01 PROCEDURE — 80069 RENAL FUNCTION PANEL: CPT

## 2023-03-01 PROCEDURE — 87635 SARS-COV-2 COVID-19 AMP PRB: CPT

## 2023-03-01 PROCEDURE — 85025 COMPLETE CBC W/AUTO DIFF WBC: CPT

## 2023-03-01 PROCEDURE — 2580000003 HC RX 258: Performed by: NURSE PRACTITIONER

## 2023-03-01 PROCEDURE — 51798 US URINE CAPACITY MEASURE: CPT

## 2023-03-01 PROCEDURE — 83735 ASSAY OF MAGNESIUM: CPT

## 2023-03-01 RX ORDER — LANOLIN ALCOHOL/MO/W.PET/CERES
400 CREAM (GRAM) TOPICAL ONCE
Status: COMPLETED | OUTPATIENT
Start: 2023-03-01 | End: 2023-03-01

## 2023-03-01 RX ADMIN — FERROUS SULFATE TAB 325 MG (65 MG ELEMENTAL FE) 325 MG: 325 (65 FE) TAB at 08:52

## 2023-03-01 RX ADMIN — ASPIRIN 81 MG: 81 TABLET, CHEWABLE ORAL at 08:52

## 2023-03-01 RX ADMIN — INSULIN LISPRO 2 UNITS: 100 INJECTION, SOLUTION INTRAVENOUS; SUBCUTANEOUS at 08:54

## 2023-03-01 RX ADMIN — INSULIN LISPRO 4 UNITS: 100 INJECTION, SOLUTION INTRAVENOUS; SUBCUTANEOUS at 13:08

## 2023-03-01 RX ADMIN — HYDRALAZINE HYDROCHLORIDE 25 MG: 25 TABLET, FILM COATED ORAL at 06:00

## 2023-03-01 RX ADMIN — CARVEDILOL 6.25 MG: 6.25 TABLET, FILM COATED ORAL at 17:57

## 2023-03-01 RX ADMIN — ENOXAPARIN SODIUM 40 MG: 100 INJECTION SUBCUTANEOUS at 08:52

## 2023-03-01 RX ADMIN — CARVEDILOL 6.25 MG: 6.25 TABLET, FILM COATED ORAL at 08:52

## 2023-03-01 RX ADMIN — SIMETHICONE 120 MG: 80 TABLET, CHEWABLE ORAL at 08:52

## 2023-03-01 RX ADMIN — HYDRALAZINE HYDROCHLORIDE 25 MG: 25 TABLET, FILM COATED ORAL at 13:09

## 2023-03-01 RX ADMIN — SODIUM CHLORIDE, PRESERVATIVE FREE 10 ML: 5 INJECTION INTRAVENOUS at 08:53

## 2023-03-01 RX ADMIN — TAMSULOSIN HYDROCHLORIDE 0.4 MG: 0.4 CAPSULE ORAL at 08:52

## 2023-03-01 RX ADMIN — MAGNESIUM OXIDE 400 MG (241.3 MG MAGNESIUM) TABLET 400 MG: TABLET at 10:03

## 2023-03-01 RX ADMIN — PAROXETINE HYDROCHLORIDE 40 MG: 20 TABLET, FILM COATED ORAL at 08:52

## 2023-03-01 RX ADMIN — PANTOPRAZOLE SODIUM 40 MG: 40 TABLET, DELAYED RELEASE ORAL at 06:01

## 2023-03-01 RX ADMIN — FLUTICASONE PROPIONATE 1 SPRAY: 50 SPRAY, METERED NASAL at 08:53

## 2023-03-01 RX ADMIN — QUETIAPINE FUMARATE 12.5 MG: 25 TABLET ORAL at 08:52

## 2023-03-01 ASSESSMENT — PAIN SCALES - GENERAL
PAINLEVEL_OUTOF10: 0
PAINLEVEL_OUTOF10: 0

## 2023-03-01 NOTE — PROGRESS NOTES
Urology Progress Note  [facility]    Provider: PRESTON Skaggs CNP  Patient ID:  Admission Date: 2023 Name: Spring Javier  OR Date: 2023 MRN: [id number]   Patient Location: [id number] : 1950  Attending: Sanjay Downing MD Date of Service: 3/1/2023  PCP: DANYELLE LINARES     Diagnoses:  1. Acute cystitis without hematuria    2. Elevated lactic acid level         Assessment/Plan:  68 yo male with hx of BPH s/p TUVP   Presents from SNF with ongoing right flank pain, n/v and urinary retention with gross hematuria. Failed catheterization attempt at the SNF and thus he was transferred to [facility]. Workup in the ED concerning for urinary infection, CT a/p shows no focal bladder abnormality- bladder is collapsed without a siddiqui in place, normal upper tracts, no stones and no filling defects. Looks like he did have a klebsiella UTI 2023. Recommendations:  Continue  Flomax  Continue abx and f/u cultures  Siddiqui removed, pvr low, will monitor again today with a pvr  Outpatient cystoscopy with BL RPG ordered for gross hematuria and BPH  Can discharge when medically cleared, call urology with any questions    The patient had a chance to ask questions which were answered. he understands the above plan. Subjective:   Spring Javier is a 67 y.o. male. He was seen and examined this morning. Today we discussed bladder scan, monitor for retention.       Objective:   Vitals:  Vitals:    23 0600   BP: (!) 157/76   Pulse:    Resp:    Temp:    SpO2:        Intake/Output Summary (Last 24 hours) at 3/1/2023 0827  Last data filed at 3/1/2023 0500  Gross per 24 hour   Intake 1281 ml   Output 1300 ml   Net -19 ml     Physical Exam:  Gen: Alert and oriented x3, no acute distress  CV: Regular rate   Resp: unlabored respirations  Abd: Soft, non-distended, non-tender, no masses  Ext: no peripheral edema noted, moves upper and lower extremities spontaneously  Skin: warmand well perfused, no rashes noted on the face, or arms.     Labs:  Lab Results   Component Value Date    WBC 2.9 (L) 03/01/2023    HGB 8.3 (L) 03/01/2023    HCT 24.8 (L) 03/01/2023    MCV 87.2 03/01/2023     03/01/2023     Lab Results   Component Value Date    CREATININE 0.8 03/01/2023    BUN 14 03/01/2023     03/01/2023    K 3.7 03/01/2023     (H) 03/01/2023    CO2 25 03/01/2023       Espinoza Patel, APRN - CNP   3/1/2023

## 2023-03-01 NOTE — CARE COORDINATION
Discharge Plan:      Patient discharged to: [facility] @ [place]  PHONE: [phone]: [phone]  SW/DC Planner faxed, [address] [place] and [place]   Narcotic Prescriptions faxed were: not applicable  RN: Rafa Carlos will call report      Medical Transport with: [facility] [phone]   time: 8:00/8:30 PM  Family advised of discharge?: No family listed on patient contacts,  Attempted to contact \"Other\" # not active. HENS Submitted?:  N/A  All discharge needs met per case management.     PALLAVI Lemus, [address]  [facility]  [phone]

## 2023-03-01 NOTE — CARE COORDINATION
ANASTACIA spoke with the admission staff @ [facility] [place]. Patient can return to the facility @ discharge but will need a COVID-19 lab prior to discharge. ANASTACIA informed patient's RN. ANASTACIA will follow.     Electronically signed by PALLAVI Taveras on 3/1/2023 at 1:49 PM

## 2023-03-01 NOTE — DISCHARGE INSTR - COC
Continuity of Care Form    Patient Name: Alison Gerber   :  1950  MRN:  [id number]    Admit date:  2023  Discharge date:  3/1/2023    Code Status Order: Full Code   Advance Directives:     Admitting Physician:  Carolina Box DO  PCP: DANYELLE LINARES    Discharging Nurse: [facility] Unit/Room#: 4XP-9548/9275-89  Discharging Unit Phone Number: [phone]    Emergency Contact:   Extended Emergency Contact Information  Primary Emergency Contact: gil alicia  Home Phone: [phone]  Relation: Other    Past Surgical History:  Past Surgical History:   Procedure Laterality Date    ANKLE SURGERY Right 2018    ORIF of R ankle     COLONOSCOPY N/A 2018    COLONOSCOPY CONTROL HEMORRHAGE performed by Jon Villasenor MD at [facility]      x 4    CYSTOSCOPY  14    transurethral vaporization of prostate    HERNIA REPAIR      X2    ORIF DISTAL RADIUS FRACTURE Left 2019    LEFT DISTAL RADIUS OPEN REDUCTION INTERNAL FIXATION   performed by Elier Vu MD at [facility] N/A 2018    EGD BIOPSY performed by Jon Villasenor MD at [address] 5/3/2019    EGD WITH ANESTHESIA performed by Jon Villasenor MD at [address]         Immunization History:   Immunization History   Administered Date(s) Administered    COVID-19, PFIZER PURPLE top, DILUTE for use, (age 15 y+), 30mcg/0.3mL 2021, 2021, 2021, 10/21/2021, 2021    Influenza Virus Vaccine 10/10/2014    Pneumococcal Conjugate 13-valent ([facility]) 2017    Tdap (Boostrix, Adacel) 2020       Active Problems:  Patient Active Problem List   Diagnosis Code    DM2 (diabetes mellitus, type 2) ([facility] E11.9    Dizziness R42    Hematuria R31.9    Hyponatremia E87.1    S/P coronary artery stent placement Z95.5    S/P coronary artery stent placement x 4 Z95.5    Orthostatic hypotension I95.1    Acute posthemorrhagic anemia D62    Contusion, knee S80.00XA    Type 2 diabetes mellitus, without long-term current use of insulin ([facility]) E11.9    Coronary artery disease involving native coronary artery of native heart without angina pectoris I25.10    Hypertension associated with diabetes ([facility]) E11.59, I15.2    Chest pain R07.9    Ischemic chest pain ([facility]) I20.9    Syncope and collapse R55    CKD (chronic kidney disease), stage III ([facility]) N18.30    Anemia D64.9    CHF (congestive heart failure) ([facility]) I50.9    DM (diabetes mellitus) ([facility] E11.9    Closed trimalleolar fracture of right ankle S82.851A    Closed fracture of fifth metacarpal bone of left hand S62.307A    Closed nondisplaced fracture of fifth metatarsal bone of left foot S92.355A    Hypokalemia E87.6    Hyperkalemia E87.5    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 K55.21    OCTAVIO (acute kidney injury) ([facility]) N17.9    Heartburn R12    Esophageal dysphagia R13.19    Esophagitis, Maury grade B K20.80    Closed fracture of metatarsal neck S92.309A    Closed fracture of metatarsal neck, left, initial encounter S92.302A    Ataxia R27.0    Sepsis ([facility]) A41.9    UTI (urinary tract infection) N39.0    Schizoaffective disorder, bipolar type ([facility]) F25.0    Leukocytosis D72.829    Hyperlipidemia associated with type 2 diabetes mellitus ([facility]) E11.69, E78.5    Urinary retention R33.9    Elevated lactic acid level R79.89    Pulmonary nodule R91.1       Isolation/Infection:   Isolation            No Isolation          Patient Infection Status       Infection Onset Added Last Indicated Last Indicated By Review Planned Expiration Resolved Resolved By    None active    Resolved    COVID-19 (Rule Out) 01/05/23 01/05/23 01/05/23 COVID-19, Rapid (Ordered)   01/05/23 Rule-Out Test Resulted    COVID-19 (Rule Out) 12/27/22 12/27/22 12/27/22 COVID-19, Rapid (Ordered)   12/27/22 Rule-Out Test Resulted    C-diff Rule Out 05/27/22 05/27/22 05/27/22 GI Bacterial Pathogens By PCR (Ordered)   05/31/22 Yasmin Humphrey RN    COVID-19 (Rule Out) 06/13/21 06/13/21 06/13/21 COVID-19 (Ordered)   06/14/21 Rule-Out Test Resulted    COVID-19 (Rule Out) 06/13/21 06/13/21 06/13/21 COVID-19, Rapid (Ordered)   06/13/21 Rule-Out Test Resulted            Nurse Assessment:  Last Vital Signs: BP (!) 152/75   Pulse 86   Temp 97.6 °F (36.4 °C) (Oral)   Resp 18   Ht 5' 4\" (1.626 m)   Wt 127 lb 11.2 oz (57.9 kg)   SpO2 97%   BMI 21.92 kg/m²     Last documented pain score (0-10 scale): Pain Level: 0  Last Weight:   Wt Readings from Last 1 Encounters:   03/01/23 127 lb 11.2 oz (57.9 kg)     Mental Status:  oriented and alert, forgetful at times    IV Access:  - None    Nursing Mobility/ADLs:  Walking   Assisted  Transfer  Assisted  Bathing  Assisted  Dressing  Assisted  Toileting  Assisted  Feeding  Independent  Med Admin  Independent  Med Delivery   whole    Wound Care Documentation and Therapy:        Elimination:  Continence: Bowel: Yes  Bladder: No, incontinent at times  Urinary Catheter: Removal Date 2/27    Colostomy/Ileostomy/Ileal Conduit: No       Date of Last BM: 2/28/2023    Intake/Output Summary (Last 24 hours) at 3/1/2023 1148  Last data filed at 3/1/2023 0851  Gross per 24 hour   Intake 1191 ml   Output 1650 ml   Net -459 ml     I/O last 3 completed shifts: In: 7514 [P.O.:1020; I.V.:211; IV Piggyback:50]  Out: 1300 [Urine:1300]    Safety Concerns:      At Risk for Falls    Impairments/Disabilities:      Hearing    Nutrition Therapy:  Current Nutrition Therapy:   - Oral Diet:  General    Routes of Feeding: Oral  Liquids: No Restrictions  Daily Fluid Restriction: no  Last Modified Barium Swallow with Video (Video Swallowing Test): not done    Treatments at the Time of Hospital Discharge:   Respiratory Treatments: n/a  Oxygen Therapy:  is not on home oxygen therapy. Ventilator:    - No ventilator support    Rehab Therapies: Physical Therapy and Occupational Therapy  Weight Bearing Status/Restrictions: No weight bearing restrictions  Other Medical Equipment (for information only, NOT a DME order):  walker  Other Treatments: none    Patient's personal belongings (please select all that are sent with patient): All personal belongings    RN SIGNATURE:  Electronically signed by Areli Escobedo RN on 3/1/23 at 1:33 PM EST    CASE MANAGEMENT/SOCIAL WORK SECTION    Inpatient Status Date: 2/26/2023    Readmission Risk Assessment Score: 26%  Readmission Risk              Risk of Unplanned Readmission:  31           Discharging to Facility/ Agency   Name: [facility] [place]  Address: [address] [place] [address]  Phone: [phone]      / signature: Electronically signed by PALLAVI Parra on 3/1/2023 at 3:15 PM     PHYSICIAN SECTION    Prognosis: Fair    Condition at Discharge: Stable    Rehab Potential (if transferring to Rehab): Fair    Recommended Labs or Other Treatments After Discharge: PET scan  with pulmonology follow up, cystoscopy for hematuria to be scheduled by urology; labs per routine    Physician Certification: I certify the above information and transfer of [place] Carlos  is necessary for the continuing treatment of the diagnosis listed and that he requires [facility] for greater 30 days.      Update Admission H&P: No change in H&P    PHYSICIAN SIGNATURE:  Electronically signed by PRESTON Rogel CNP on 3/1/23 at 11:51 AM EST

## 2023-03-01 NOTE — PLAN OF CARE
Problem: Discharge Planning  Goal: Discharge to home or other facility with appropriate resources  Outcome: Progressing  Flowsheets (Taken 2/28/2023 2023)  Discharge to home or other facility with appropriate resources: Identify barriers to discharge with patient and caregiver     Problem: Skin/Tissue Integrity  Goal: Absence of new skin breakdown  Description: 1. Monitor for areas of redness and/or skin breakdown  2. Assess vascular access sites hourly  3. Every 4-6 hours minimum:  Change oxygen saturation probe site  4. Every 4-6 hours:  If on nasal continuous positive airway pressure, respiratory therapy assess nares and determine need for appliance change or resting period.   Outcome: Progressing     Problem: ABCDS Injury Assessment  Goal: Absence of physical injury  Outcome: Progressing     Problem: Safety - Adult  Goal: Free from fall injury  Outcome: Progressing  Flowsheets (Taken 3/1/2023 0424)  Free From Fall Injury: Based on caregiver fall risk screen, instruct family/caregiver to ask for assistance with transferring infant if caregiver noted to have fall risk factors     Problem: Chronic Conditions and Co-morbidities  Goal: Patient's chronic conditions and co-morbidity symptoms are monitored and maintained or improved  Outcome: Progressing  Flowsheets (Taken 2/28/2023 2023)  Care Plan - Patient's Chronic Conditions and Co-Morbidity Symptoms are Monitored and Maintained or Improved: Monitor and assess patient's chronic conditions and comorbid symptoms for stability, deterioration, or improvement

## 2023-03-01 NOTE — CARE COORDINATION
SW/CM attempted to contact [facility]. No one answered the phone. Could not lleave a VM message. JT/BASILIO will follow.     Electronically signed by PALLAVI Tinajero on 3/1/2023 at 8:52 AM

## 2023-03-01 NOTE — PLAN OF CARE
Problem: Discharge Planning  Goal: Discharge to home or other facility with appropriate resources  3/1/2023 0800 by Pita Shearer RN  Outcome: Progressing  3/1/2023 0424 by Nette Nugent RN  Outcome: Progressing  Flowsheets (Taken 2/28/2023 2023)  Discharge to home or other facility with appropriate resources: Identify barriers to discharge with patient and caregiver     Problem: Skin/Tissue Integrity  Goal: Absence of new skin breakdown  Description: 1. Monitor for areas of redness and/or skin breakdown  2. Assess vascular access sites hourly  3. Every 4-6 hours minimum:  Change oxygen saturation probe site  4. Every 4-6 hours:  If on nasal continuous positive airway pressure, respiratory therapy assess nares and determine need for appliance change or resting period.   3/1/2023 0800 by Pita Shearer RN  Outcome: Progressing  3/1/2023 0424 by Nette Nugent RN  Outcome: Progressing     Problem: ABCDS Injury Assessment  Goal: Absence of physical injury  3/1/2023 0800 by Pita Shearer RN  Outcome: Progressing  3/1/2023 0424 by Nette Nugent RN  Outcome: Progressing     Problem: Safety - Adult  Goal: Free from fall injury  3/1/2023 0800 by Pita Shearer RN  Outcome: Progressing  3/1/2023 0424 by Nette Nugent RN  Outcome: Progressing  Flowsheets (Taken 3/1/2023 0424)  Free From Fall Injury: Based on caregiver fall risk screen, instruct family/caregiver to ask for assistance with transferring infant if caregiver noted to have fall risk factors     Problem: Chronic Conditions and Co-morbidities  Goal: Patient's chronic conditions and co-morbidity symptoms are monitored and maintained or improved  3/1/2023 0424 by Nette Nugent RN  Outcome: Progressing  Flowsheets (Taken 2/28/2023 2023)  Care Plan - Patient's Chronic Conditions and Co-Morbidity Symptoms are Monitored and Maintained or Improved: Monitor and assess patient's chronic conditions and comorbid symptoms for stability, deterioration, or improvement

## 2023-03-01 NOTE — DISCHARGE SUMMARY
[facility]    Patient Demographics    Patient. Tia Rolle  Date of Birth. 1950  MRN. [id number]     Primary care provider. DANYELLE Stewart  (Tel: [phone])    Admit date: 2/26/2023    Discharge date (blank if same as Note Date): Note Date: 3/1/2023     Reason for Hospitalization. Chief Complaint   Patient presents with    Flank Pain     Pt came to the hospital by [facility] from [facility] due to having an inability to urinate for the past 4 days, straight cath was attempted and unsuccessful only causing bloody discharge and pain. Significant Findings. Principal Problem:    Urinary retention  Active Problems:    UTI (urinary tract infection)    Schizoaffective disorder, bipolar type (HCC)    Hyperlipidemia associated with type 2 diabetes mellitus (HCC)    Elevated lactic acid level    Pulmonary nodule    Type 2 diabetes mellitus, without long-term current use of insulin ([address]    Hypertension associated with diabetes ([address]  Resolved Problems:    * No resolved hospital problems. [facility]. Tia Rolle is a 67 y.o. male with a past medical history of hypertension, hyperlipidemia, hydronephrosis, Parkinson's diease, schizophrenia, CAD s/p stents, BPH  who presented with right flank pain urinary retention and hematuria. Attempts at facility to place Siddiqui were unsuccessful. Siddiqui was placed in the ER, found to have evidence of UTI and started on IV ceftriaxone. Siddiqui removed 2/28 and passed PVR trials x2 with zero residual today. Urine cultures are negative and abx therapy has been stopped. Urology OK with discharge with OP cystoscopy recommended.      Assessment and Plan:  BPH with Urinary Retention              - Presented with gross hematuria and flank pain, siddiqui placed in ER   - CT abd/pelvis with no obstructive uropathy  - Continue with Flomax  - Needs OP cystoscopy   - Follow removed 2/28, PVR today was zero, urology OK for patient to discharge   UTI              - Ceftriaxone started culture is negative for growth and abx therapy stopped              - Klebsiella UTI on 1/2023  Elevated Lactic Acid              - Etiology?, can be due to dehydration on presentation, resolved with IVF   Pulmonary Nodule              - 11 mm pulmonary dosule on CT scan, recommend PET scan for follow up              - Pulmonary consulted and rec OP follow up and PET scan  Hypertension              - Controlled, continue current medications  DM2              - A1C 6.6; BG reviewed last 24 hrs              - Continue medium correction SSI               - Resume home medications at discharge  Hypomagnesemia              - Resolved, mag 2.3 today      Discharge recommendations given to patient. Follow Up. PCP in 1 week, urology 1 week for cystoscopy scheduling  Disposition. long term care facility  Activity. activity as tolerated  Diet: ADULT DIET; Regular      Problems and results from this hospitalization that need follow up. Hematuria; cystoscopy per urology  Pulmonary nodule; PET scan and pulmonology follow up    Significant test results and incidental findings. CT CHEST WO CONTRAST   Final Result   12 mm pleural base nodule left lower lobe posteriorly which is unchanged and   could represent an infectious nodule vs developing neoplasm. Recommend   short-term follow-up or PET-CT correlation. Mild bibasilar atelectasis or early infiltrates posteriorly which is more   prominent. Moderate coronary artery calcifications with no mediastinal adenopathy      Tiny punctate air collections scattered in the liver which may represent   pneumobilia but was not seen previously and remains indeterminate. Recommend   a follow-up CT of the abdomen and pelvis with contrast for further   characterization.       Findings were called to the license caregiver by the call center at the time   of the interpretation at 1 a.m.         CT ABDOMEN PELVIS WO CONTRAST Additional Contrast? None   Final Result   1. No obstructive uropathy identified. 2. Circumferential wall thickening of the bladder which potentially may be   related to incomplete distension. Correlate clinically to exclude cystitis. 3. 1.1 cm pulmonary nodule at the left lower lobe. See guidelines below. RECOMMENDATIONS:   11 mm left solid pulmonary nodule detected on incomplete chest CT. Recommend   prompt non-contrast Chest CT for further evaluation. These guidelines do not apply to immunocompromised patients and patients with   cancer. Follow up in patients with significant comorbidities as clinically   warranted. For lung cancer screening, adhere to Lung-RADS guidelines. Reference: Radiology. 2017; 284(1):228-43. Invasive procedures and treatments. none    Consults. IP CONSULT TO UROLOGY  IP CONSULT TO PULMONOLOGY    Physical examination on discharge day. BP (!) 157/76   Pulse 85   Temp 98.6 °F (37 °C) (Oral)   Resp 16   Ht 5' 4\" (1.626 m)   Wt 127 lb 11.2 oz (57.9 kg)   SpO2 96%   BMI 21.92 kg/m²   General appearance. Alert. Looks comfortable. Well nourished. HEENT. Sclera clear. Moist mucus membranes. Cardiovascular. Regular rate and rhythm, normal S1, S2. No murmur. No significant peripheral edema  Respiratory. Breathing unlabored, not using accessory muscles. Clear to auscultation bilaterally, no wheeze, crackles or rhonchi. Gastrointestinal. Abdomen soft, non-tender, not distended, normal bowel sounds. Neurology. Facial symmetry. No speech deficits. Moving all extremities equally. Extremities. No focal weakness. Pulses palpable. Skin. Warm, dry, normal turgor    Condition at time of discharge: Good    Medication instructions provided to patient at discharge.      Medication List        ASK your doctor about these medications      acetaminophen 500 MG tablet  Commonly known as: TYLENOL     Aspirin Low Dose 81 MG EC tablet  Generic drug: aspirin  TAKE 1 TABLET BY MOUTH ONCE DAILY     atorvastatin 80 MG tablet  Commonly known as: LIPITOR  TAKE 1 TABLET BY MOUTH AT BEDTIME     carvedilol 6.25 MG tablet  Commonly known as: COREG  Take 1 tablet by mouth 2 times daily (with meals)     ferrous sulfate 325 (65 Fe) MG tablet  Commonly known as: IRON 325  Take 1 tablet by mouth 2 times daily     fluticasone 50 MCG/ACT nasal spray  Commonly known as: FLONASE     guaiFENesin 100 MG/5ML Soln oral solution  Commonly known as: ROBITUSSIN     hydrALAZINE 25 MG tablet  Commonly known as: APRESOLINE  Take 1 tablet by mouth every 8 hours Hold for BP<120     LACTOBACILLUS PO     loratadine 10 MG tablet  Commonly known as: CLARITIN     metFORMIN 1000 MG tablet  Commonly known as: GLUCOPHAGE     midodrine 2.5 MG tablet  Commonly known as: PROAMATINE     mirtazapine 7.5 MG tablet  Commonly known as: REMERON     NovoLOG 100 UNIT/ML injection vial  Generic drug: insulin aspart     pantoprazole 40 MG tablet  Commonly known as: PROTONIX  Take one tablet daily     PARoxetine 40 MG tablet  Commonly known as: PAXIL     pioglitazone 45 MG tablet  Commonly known as: ACTOS     QUEtiapine 25 MG tablet  Commonly known as: SEROQUEL     simethicone 125 MG chewable tablet  Commonly known as: MYLICON     tamsulosin 0.4 MG capsule  Commonly known as: FLOMAX     traZODone 100 MG tablet  Commonly known as: DESYREL     Vitamin D3 1.25 MG (48642 UT) Caps            Spent 45 minutes in discharge process.     Signed:  PRESTON Lopes CNP     3/1/2023 8:28 AM

## 2023-03-02 NOTE — PROGRESS NOTES
Order to discharge. Removed RUE midline, tip intact, no bleeding noted, clean dressing applied. Pt being transported to \[facility]\"" in between 8pm-8:30pm. Malu Garcia nurse Romaine Antonio at nursing facility called patient's report.

## 2023-03-04 NOTE — PROGRESS NOTES
Physician Progress Note      [facility] #:                  [id number]  :                       1950  ADMIT DATE:       2023 7:30 PM  [address] [place] DATE:        3/1/2023 8:30 PM  RESPONDING  PROVIDER #:        Esther JOHNSTON - LC          QUERY TEXT:    Patient admitted with urinary retention. Noted documentation of possible UTI,   culture was negative and abx stopped. Please clarify whether: The medical record reflects the following:  Risk Factors: 67 y.o. male w/ pmhx BPH, Parkinson's  Clinical Indicators: per DCS: UTI - Cefriaxone started culture is negative and   abx therapy stopped - Klebsiella UTI on 2023  Treatment: Ceftriaxone IV x 3 doses, labs, micro/cx, supportive care    Thank you,  Leta Ferrari RN CDS  [email or web address]  Options provided:  -- UTI was ruled out  -- UTI still suspected despite negative culture, treated and resolved. -- Other - I will add my own diagnosis  -- Disagree - Not applicable / Not valid  -- Disagree - Clinically unable to determine / Unknown  -- Refer to Clinical Documentation Reviewer    PROVIDER RESPONSE TEXT:    UTI was ruled out after study. Query created by: Emili Galvan on 3/3/2023 3:46 PM      QUERY TEXT:    Patient admitted with urinary retention. Urology consult HPI: \"symptoms are   aggravated by UTI and BPH\". Pt is on Flomax at home. ?If possible, please   document in progress notes and discharge summary if you are evaluating and/or   treating any of the following: The medical record reflects the following:  Risk Factors: 67 y.o. male pmhx BPH, Parkinson's  Clinical Indicators: Urology consult note: \"Urinary retention and gross   hematuria. Inna Fuller Symptoms are aggravated by UTI and BPH\"  Treatment: Corey catheter, Flomax, Urology consult, imaging, labs, supportive   care    Thank you,  Leta Ferrari RN CDS  [email or web address]  Options provided:  -- BPH with partial/complete urinary obstruction  -- BPH with urinary retention without obstruction  -- Other - I will add my own diagnosis  -- Disagree - Not applicable / Not valid  -- Disagree - Clinically unable to determine / Unknown  -- Refer to Clinical Documentation Reviewer    PROVIDER RESPONSE TEXT:    This patient has BPH with urinary retention without obstruction. Query created by:  Trish Espinal on 3/3/2023 3:46 PM      Electronically signed by:  Rowena Guzman CNP 3/4/2023 7:21 AM

## 2023-03-07 ENCOUNTER — TELEPHONE (OUTPATIENT)
Dept: PULMONOLOGY | Age: 73
End: 2023-03-07

## 2023-03-07 DIAGNOSIS — R91.1 PULMONARY NODULE, LEFT: Primary | ICD-10-CM

## 2023-03-07 NOTE — TELEPHONE ENCOUNTER
Cm Bar with [address] called regarding pt discharge papers which mentions a PET scan.     Please call her after it is scheduled [phone]

## 2023-03-07 NOTE — TELEPHONE ENCOUNTER
Audie Rodríguez, wasn't sure if you were working on this or even knew about this. .. If you need me to do PA for PET Scan let me know.

## 2023-03-08 NOTE — TELEPHONE ENCOUNTER
No, I do not know anything about scheduling the PET. If you can schedule it, I would greatly appreciate it.  Thank you

## 2023-03-09 NOTE — TELEPHONE ENCOUNTER
Pt is scheduled 4-3-23 @ [facility] Orders and records faxed. Rufino haines at [facility] as well.

## 2023-03-28 PROBLEM — N39.0 UTI (URINARY TRACT INFECTION): Status: RESOLVED | Noted: 2022-05-27 | Resolved: 2023-03-28

## 2023-04-03 ENCOUNTER — HOSPITAL ENCOUNTER (OUTPATIENT)
Age: 73
Discharge: HOME | End: 2023-04-03

## 2023-04-19 ENCOUNTER — TELEPHONE (OUTPATIENT)
Dept: CASE MANAGEMENT | Age: 73
End: 2023-04-19

## 2023-04-19 NOTE — TELEPHONE ENCOUNTER
Call to the [address] Spoke with Migue Drummond after an extensive hold. She is not sure why he was a no show for a 2nt time for his PET scan. Given phone number to call and reschedule again.

## 2023-04-27 NOTE — H&P
5/3/2019    EGD WITH ANESTHESIA performed by Jami Maloney MD at [address]         Medications Prior to Admission:      Prior to Admission medications    Medication Sig Start Date End Date Taking? Authorizing Provider   lisinopril (PRINIVIL;ZESTRIL) 5 MG tablet Take 1 tablet by mouth daily   Yes Historical Provider, MD   metFORMIN (GLUCOPHAGE) 1000 MG tablet Take 1 tablet by mouth daily   Yes Historical Provider, MD   PARoxetine (PAXIL) 40 MG tablet Take 1 tablet by mouth daily 12/6/18  Yes Historical Provider, MD   oxyCODONE-acetaminophen (PERCOCET) 5-325 MG per tablet Take 1 tablet by mouth every 6 hours as needed for Pain for up to 3 days. Intended supply: 3 days. Take lowest dose possible to manage pain 5/26/19 5/29/19 Yes ZEESHAN Alvarado   omeprazole (PRILOSEC) 20 MG delayed release capsule Take 1 capsule by mouth daily 5/3/19   Jami Maloney MD   ferrous sulfate 325 (65 Fe) MG tablet Take 1 tablet by mouth 2 times daily 5/3/19   Jami Maloney MD   Dulaglutide (TRULICITY SC) Inject 1.5 mg into the skin once a week     Historical Provider, MD   carvedilol (COREG) 3.125 MG tablet TAKE ONE (1) TABLET BY MOUTH TWICE DAILY WITH MEALS 1/29/19   Florentin Rasheed MD   gabapentin (NEURONTIN) 300 MG capsule Take 300 mg by mouth 3 times daily. Anais Vazquez     Historical Provider, MD   clopidogrel (PLAVIX) 75 MG tablet TAKE 1 TABLET BY MOUTH ONCE DAILY 11/6/18   Sean Alaniz MD   atorvastatin (LIPITOR) 80 MG tablet TAKE 1 TABLET BY MOUTH AT BEDTIME 10/31/18   Florentin Rasheed MD   ASPIRIN LOW DOSE 81 MG EC tablet TAKE 1 TABLET BY MOUTH ONCE DAILY 9/5/18   Florentin Rasheed MD   pantoprazole (PROTONIX) 40 MG tablet TAKE 1 TABLET BY MOUTH ONCE DAILY 8/6/18   Florentin Rasheed MD   insulin glargine (LANTUS) 100 UNIT/ML injection vial Inject 30 Units into the skin nightly 6/13/18   PRESTON Wing - CNP   pioglitazone (ACTOS) 45 MG tablet Take 45 mg by mouth daily 6/7/17   Historical Provider, MD   traZODone (DESYREL) 100 MG tablet Take 100 mg by mouth nightly    Historical Provider, MD   fluticasone (FLONASE) 50 MCG/ACT nasal spray 1 spray by Nasal route daily as needed. Historical Provider, MD       Allergies:  Patient has no known allergies. Social History:      The patient currently lives at home    TOBACCO:   reports that he quit smoking about 6 years ago. His smoking use included cigarettes. He has a 25.00 pack-year smoking history. He has never used smokeless tobacco.  ETOH:   reports that he does not drink alcohol. Family History: Positive as follows:        Problem Relation Age of Onset    Other Mother         CEREBRAL PALSY    Heart Disease Father         CHF       REVIEW OF SYSTEMS:   Pertinent positives as noted in the HPI. All other systems reviewed and negative. PHYSICAL EXAM PERFORMED:    BP (!) 157/89   Pulse 84   Temp 98.7 °F (37.1 °C) (Oral)   Resp 16   Ht 5' 3\" (1.6 m)   Wt 146 lb (66.2 kg)   SpO2 97%   BMI 25.86 kg/m²     General appearance:  No apparent distress, appears stated age and cooperative. HEENT:  Normal cephalic, atraumatic without obvious deformity. Pupils equal, round, and reactive to light. Extra ocular muscles intact. Conjunctivae/corneas clear. Neck: Supple, with full range of motion. No jugular venous distention. Trachea midline. Respiratory:  Normal respiratory effort. Clear to auscultation, bilaterally without Rales/Wheezes/Rhonchi. Cardiovascular:  Regular rate and rhythm with normal S1/S2 without murmurs, rubs or gallops. Abdomen: Soft, non-tender, non-distended with normal bowel sounds. Musculoskeletal:  No clubbing, cyanosis or edema bilaterally. Full range of motion without deformity except left ankle, limited and painful ROM around the left ankle joint  Skin: Skin color, texture, turgor normal.  No rashes or lesions. Neurologic:  Neurovascularly intact without any focal sensory/motor deficits. Cranial nerves: II-XII intact, grossly non-focal.  Psychiatric:  Alert and oriented, thought content appropriate, normal insight  Capillary Refill: Brisk,< 3 seconds   Peripheral Pulses: +2 palpable, equal bilaterally       Labs:     Recent Labs     05/26/19  2332   WBC 7.1   HGB 8.8*   HCT 25.5*        Recent Labs     05/26/19 2332   *   K 4.4      CO2 22   BUN 28*   CREATININE 1.6*   CALCIUM 8.7     No results for input(s): AST, ALT, BILIDIR, BILITOT, ALKPHOS in the last 72 hours. Recent Labs     05/26/19  2332   INR 1.13       Radiology:     I Have reviewed the x-ray and the CT of the left foot personally including the radiologist interpretation      CT FOOT LEFT WO CONTRAST   Final Result   Fractures involving the base of the 1st, 2nd, and 3rd metatarsals, cuboid,   and 2nd through 5th metatarsal heads. XR FOOT LEFT (MIN 3 VIEWS)   Preliminary Result   There are 2nd through 4th metatarsal neck fractures. Base of 1st metatarsal fracture with articular extension. ASSESSMENT:PLAN:    Closed fracture of metatarsal head and neck. Left, multiple  2/2 trauma  - Orthopedics consult  - Will keep nothing by mouth in case he needs surgery, orthopedics to decide in a.m.  - Hold aspirin and Plavix  - Pain management  - DVT. ppx    CAD S/P coronary artery stent placement x 4   - Dual antiplatelet therapy on hold in case he needs surgery  - Resume Coreg + statin    HTN (hypertension) -  controlled, resume home meds    DM2 (diabetes mellitus, type 2)  A1c 8.4, uncontrolled  Resume insulin Lantus, pioglitazone, and SSI with Accu-Cheks    CKD (chronic kidney disease), stage III - stable, monitor    DVT Prophylaxis: lmwh  Diet: Diet NPO Effective Now  Code Status: Full Code    PT/OT Eval Status: na    Dispo - ip stay       Aditya Suarez MD    Thank you PRESTON Valiente - LC for the opportunity to be involved in this patient's care.  If you have any questions or concerns PAST MEDICAL HISTORY:  Anemia     Aortic stenosis     Asthma with COPD many years since last attack    CAD (coronary artery disease), native coronary artery     Diabetes     Hiatal hernia     History of bleeding disorder having work up 5/2/21- HAD WORKUP BUT NO DIAGNOSIS "NOTHING WAS FOUND"    History of transfusion reaction

## 2023-04-30 ENCOUNTER — HOSPITAL ENCOUNTER (EMERGENCY)
Age: 73
Discharge: HOME | End: 2023-04-30
Payer: COMMERCIAL

## 2023-04-30 ENCOUNTER — APPOINTMENT (OUTPATIENT)
Dept: CT IMAGING | Age: 73
End: 2023-04-30
Payer: COMMERCIAL

## 2023-04-30 VITALS
SYSTOLIC BLOOD PRESSURE: 154 MMHG | DIASTOLIC BLOOD PRESSURE: 67 MMHG | TEMPERATURE: 97.5 F | OXYGEN SATURATION: 100 % | WEIGHT: 127.7 LBS | HEART RATE: 80 BPM | BODY MASS INDEX: 21.8 KG/M2 | RESPIRATION RATE: 16 BRPM | HEIGHT: 64 IN
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
## 2023-04-30 DIAGNOSIS — S09.90XA MINOR CLOSED HEAD INJURY: ICD-10-CM

## 2023-04-30 DIAGNOSIS — S01.312A LACERATION OF HELIX OF LEFT EAR, INITIAL ENCOUNTER: Primary | ICD-10-CM

## 2023-04-30 PROCEDURE — 70450 CT HEAD/BRAIN W/O DYE: CPT

## 2023-04-30 PROCEDURE — 6370000000 HC RX 637 (ALT 250 FOR IP): Performed by: PHYSICIAN ASSISTANT

## 2023-04-30 PROCEDURE — 12011 RPR F/E/E/N/L/M 2.5 CM/<: CPT

## 2023-04-30 PROCEDURE — 72125 CT NECK SPINE W/O DYE: CPT

## 2023-04-30 PROCEDURE — 99284 EMERGENCY DEPT VISIT MOD MDM: CPT

## 2023-04-30 RX ORDER — LIDOCAINE AND PRILOCAINE 25; 25 MG/G; MG/G
CREAM TOPICAL ONCE
Status: DISCONTINUED | OUTPATIENT
Start: 2023-04-30 | End: 2023-04-30 | Stop reason: HOSPADM

## 2023-04-30 RX ORDER — HYDROXYZINE PAMOATE 25 MG/1
25 CAPSULE ORAL ONCE
Status: COMPLETED | OUTPATIENT
Start: 2023-04-30 | End: 2023-04-30

## 2023-04-30 RX ORDER — CIPROFLOXACIN 500 MG/1
500 TABLET, FILM COATED ORAL ONCE
Status: COMPLETED | OUTPATIENT
Start: 2023-04-30 | End: 2023-04-30

## 2023-04-30 RX ORDER — CIPROFLOXACIN 250 MG/1
250 TABLET, FILM COATED ORAL 2 TIMES DAILY
Qty: 10 TABLET | Refills: 0 | Status: SHIPPED | OUTPATIENT
Start: 2023-04-30 | End: 2023-05-05

## 2023-04-30 RX ADMIN — HYDROXYZINE PAMOATE 25 MG: 25 CAPSULE ORAL at 17:38

## 2023-04-30 RX ADMIN — CIPROFLOXACIN 500 MG: 500 TABLET, FILM COATED ORAL at 18:10

## 2023-04-30 ASSESSMENT — PAIN - FUNCTIONAL ASSESSMENT: PAIN_FUNCTIONAL_ASSESSMENT: 0-10

## 2023-04-30 ASSESSMENT — PAIN DESCRIPTION - PAIN TYPE: TYPE: ACUTE PAIN

## 2023-04-30 ASSESSMENT — PAIN DESCRIPTION - ORIENTATION: ORIENTATION: LEFT

## 2023-04-30 ASSESSMENT — PAIN DESCRIPTION - LOCATION: LOCATION: EAR

## 2023-04-30 ASSESSMENT — PAIN DESCRIPTION - DESCRIPTORS: DESCRIPTORS: THROBBING

## 2023-04-30 ASSESSMENT — LIFESTYLE VARIABLES
HOW MANY STANDARD DRINKS CONTAINING ALCOHOL DO YOU HAVE ON A TYPICAL DAY: PATIENT DOES NOT DRINK
HOW OFTEN DO YOU HAVE A DRINK CONTAINING ALCOHOL: NEVER

## 2023-04-30 ASSESSMENT — PAIN SCALES - GENERAL: PAINLEVEL_OUTOF10: 8

## 2023-05-05 NOTE — ED PROVIDER NOTES
In addition to the advanced practice provider, I personally saw Shereen Zacarias and performed a substantive portion of the visit including all aspects of the medical decision making. Briefly, this is a 67 y.o. male here for weakness. Patient is a resident at [facility], typically he is able to transfer from bed to chair however today he was unable to do so. He was thus sent to the emergency department for further evaluation. On my evaluation patient denies any other symptomatic complaints besides feeling generally weak. .    On exam, patient afebrile and nontoxic. No distress. Heart RRR. Lungs CTAB. Abdomen soft, nondistended, nontender to palpation in all quadrants. A&Ox4. PERRL, no nystagmus. CN 2-12 intact. 5/5 motor and sensation grossly intact all extremities. No pronator drift. Gait not tested. Appears generally weak without focal deficit. EKG  EKG was reviewed by emergency department physician in the absence of a cardiologist    Wide complex sinus rhythm, rate 90, normal axis, normal LA and wide QRS intervals, normal Qtc, no ST elevations or depressions, TWI I, aVL, V2, impression sinus rhythm with RBBB and nonspecific T wave morphology, no STEMI      Screenings   Myron Coma Scale  Eye Opening: Spontaneous  Best Verbal Response: Oriented  Best Motor Response: Obeys commands  Myron Coma Scale Score: 15      Is this patient to be included in the SEP-1 Core Measure due to severe sepsis or septic shock? No   Exclusion criteria - the patient is NOT to be included for SEP-1 Core Measure due to:  2+ SIRS criteria are not met      MDM    Patient afebrile and nontoxic. No distress. EKG no STEMI, troponin normal, no chest pain, doubt ACS. No malignant dysrhythmia. Neuro exam without focal deficit, low suspicion for CVA/TIA. Laboratory work-up with borderline acute kidney injury, mild hyperkalemia noted without EKG changes. CK normal, no evidence of myositis or rhabdomyolysis.   Urinalysis does return with evidence of infection.  Patient received IV fluids as well as intravenous antibiotics.  Will give dose of Lokelma for mild hyperkalemia as well.  Case discussed with internal medicine team and will plan for admission for further evaluation and care for above medical problems.  Patient alert, hemodynamically stable and in no distress at time of admission.    I Dr. Pascual am the primary clinician of record.        Patient Referrals:  No follow-up provider specified.    Discharge Medications:  Current Discharge Medication List          FINAL IMPRESSION  1. Urinary tract infection without hematuria, site unspecified    2. Hyperkalemia    3. Other fatigue        Blood pressure (!) 144/62, pulse 84, temperature 97.9 °F (36.6 °C), temperature source Oral, resp. rate 18, height 5' 4\" (1.626 m), weight 136 lb (61.7 kg), SpO2 93 %.     For further details of Chauncey Mario's emergency department encounter, please see documentation by advanced practice provider, ZEESHAN Quezada.    Ravinder Pascual DO (electronically signed)  Attending Emergency Physician       Ravinder Pascual DO  01/06/23 1128     Hyponatremia

## 2023-05-24 ENCOUNTER — TELEPHONE (OUTPATIENT)
Dept: CASE MANAGEMENT | Age: 73
End: 2023-05-24

## 2023-05-24 NOTE — TELEPHONE ENCOUNTER
Call to the [facility]. Unable to speak to anyone-left message for SW that pt needed to make appt for his PET scan-has had 2 appts and no show both times.   # given for scheduling PET

## 2023-06-07 NOTE — PLAN OF CARE
Encounter to provider.   Patient's EF (Ejection Fraction) is greater than 40%    Patient has a past medical history of Arthritis, Ataxia, BPH (benign prostatic hyperplasia), CAD (coronary artery disease), Coronary atherosclerosis of native coronary artery, Diabetes mellitus ([facility] Environmental allergies, Hydronephrosis, Hyperlipidemia, Hypertension, Kidney disease, Neuropathy, Parkinson's disease ([facility] [address] S/P coronary artery stent placement x 4, and Schizoaffective disorder, bipolar type ([facility] [address] Comorbidities reviewed and education provided. Patient and family's stated goal of care: better understand heart failure and disease management prior to discharge    Patient's current functional capacity:  Slight limitation of physical activity. Comfortable at rest. Ordinary physical activity results in fatigue, palpitation, dyspnea. Pt resting in bed at this time on room air. Pt denies shortness of breath. Pt without lower extremity edema.  Patient's weights and intake/output reviewed:    Patient Vitals for the past 96 hrs (Last 3 readings):   Weight   04/05/19 1300 145 lb (65.8 kg)       Intake/Output Summary (Last 24 hours) at 4/6/2019 0128  Last data filed at 4/5/2019 2100  Gross per 24 hour   Intake 0 ml   Output 370 ml   Net -370 ml         >>For CHF and Comorbidity documentation on Education Time and Topics, please see Education Tab

## 2023-06-26 ENCOUNTER — HOSPITAL ENCOUNTER (OUTPATIENT)
Dept: PET IMAGING | Age: 73
Discharge: HOME | End: 2023-06-26
Payer: COMMERCIAL

## 2023-06-26 DIAGNOSIS — R91.1 PULMONARY NODULE, LEFT: ICD-10-CM

## 2023-06-26 PROCEDURE — 3430000000 HC RX DIAGNOSTIC RADIOPHARMACEUTICAL: Performed by: INTERNAL MEDICINE

## 2023-06-26 PROCEDURE — A9552 F18 FDG: HCPCS | Performed by: INTERNAL MEDICINE

## 2023-06-26 PROCEDURE — 78815 PET IMAGE W/CT SKULL-THIGH: CPT

## 2023-06-26 RX ORDER — FLUDEOXYGLUCOSE F 18 200 MCI/ML
15.02 INJECTION, SOLUTION INTRAVENOUS
Status: COMPLETED | OUTPATIENT
Start: 2023-06-26 | End: 2023-06-26

## 2023-06-26 RX ADMIN — FLUDEOXYGLUCOSE F 18 15.02 MILLICURIE: 200 INJECTION, SOLUTION INTRAVENOUS at 10:46

## 2023-11-04 ENCOUNTER — HOSPITAL ENCOUNTER (INPATIENT)
Age: 73
LOS: 3 days | Discharge: NURSING, REHAB OR LONG-TERM CARE | DRG: 690 | End: 2023-11-07
Attending: STUDENT IN AN ORGANIZED HEALTH CARE EDUCATION/TRAINING PROGRAM | Admitting: INTERNAL MEDICINE
Payer: COMMERCIAL

## 2023-11-04 ENCOUNTER — APPOINTMENT (OUTPATIENT)
Dept: GENERAL RADIOLOGY | Age: 73
DRG: 690 | End: 2023-11-04
Payer: COMMERCIAL

## 2023-11-04 ENCOUNTER — APPOINTMENT (OUTPATIENT)
Dept: CT IMAGING | Age: 73
DRG: 690 | End: 2023-11-04
Payer: COMMERCIAL
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
## 2023-11-04 DIAGNOSIS — N17.9 AKI (ACUTE KIDNEY INJURY) (HCC): ICD-10-CM

## 2023-11-04 DIAGNOSIS — N30.00 ACUTE CYSTITIS WITHOUT HEMATURIA: Primary | ICD-10-CM

## 2023-11-04 DIAGNOSIS — S09.90XA CLOSED HEAD INJURY WITHOUT LOSS OF CONSCIOUSNESS, INITIAL ENCOUNTER: ICD-10-CM

## 2023-11-04 DIAGNOSIS — R73.9 HYPERGLYCEMIA: ICD-10-CM

## 2023-11-04 DIAGNOSIS — S10.93XA CONTUSION OF NECK, INITIAL ENCOUNTER: ICD-10-CM

## 2023-11-04 PROBLEM — N39.0 UTI (URINARY TRACT INFECTION): Status: ACTIVE | Noted: 2023-11-04

## 2023-11-04 PROBLEM — N39.0 COMPLICATED UTI (URINARY TRACT INFECTION): Status: ACTIVE | Noted: 2023-11-04

## 2023-11-04 PROBLEM — I10 HTN (HYPERTENSION): Status: ACTIVE | Noted: 2023-11-04

## 2023-11-04 LAB
ALBUMIN SERPL-MCNC: 4.2 G/DL (ref 3.4–5)
ALBUMIN/GLOB SERPL: 1.4 {RATIO} (ref 1.1–2.2)
ALP SERPL-CCNC: 148 U/L (ref 40–129)
ALT SERPL-CCNC: 8 U/L (ref 10–40)
ANION GAP SERPL CALCULATED.3IONS-SCNC: 13 MMOL/L (ref 3–16)
ANION GAP SERPL CALCULATED.3IONS-SCNC: 15 MMOL/L (ref 3–16)
AST SERPL-CCNC: 10 U/L (ref 15–37)
BACTERIA URNS QL MICRO: ABNORMAL /HPF
BASE EXCESS BLDV CALC-SCNC: -3.2 MMOL/L (ref -3–3)
BASOPHILS # BLD: 0 K/UL (ref 0–0.2)
BASOPHILS NFR BLD: 0.4 %
BETA-HYDROXYBUTYRATE: 1 MMOL/L (ref 0–0.27)
BILIRUB SERPL-MCNC: 0.3 MG/DL (ref 0–1)
BILIRUB UR QL STRIP.AUTO: NEGATIVE
BUN SERPL-MCNC: 29 MG/DL (ref 7–20)
BUN SERPL-MCNC: 31 MG/DL (ref 7–20)
CALCIUM SERPL-MCNC: 8.3 MG/DL (ref 8.3–10.6)
CALCIUM SERPL-MCNC: 9.5 MG/DL (ref 8.3–10.6)
CHLORIDE SERPL-SCNC: 100 MMOL/L (ref 99–110)
CHLORIDE SERPL-SCNC: 102 MMOL/L (ref 99–110)
CLARITY UR: ABNORMAL
CO2 BLDV-SCNC: 55 MMOL/L
CO2 SERPL-SCNC: 18 MMOL/L (ref 21–32)
CO2 SERPL-SCNC: 21 MMOL/L (ref 21–32)
COHGB MFR BLDV: 2.7 % (ref 0–1.5)
COLOR UR: YELLOW
CREAT SERPL-MCNC: 1.5 MG/DL (ref 0.8–1.3)
CREAT SERPL-MCNC: 1.6 MG/DL (ref 0.8–1.3)
DEPRECATED RDW RBC AUTO: 15.5 % (ref 12.4–15.4)
DO-HGB MFR BLDV: 21 %
EOSINOPHIL # BLD: 0.1 K/UL (ref 0–0.6)
EOSINOPHIL NFR BLD: 0.9 %
EPI CELLS #/AREA URNS AUTO: 2 /HPF (ref 0–5)
GFR SERPLBLD CREATININE-BSD FMLA CKD-EPI: 45 ML/MIN/{1.73_M2}
GFR SERPLBLD CREATININE-BSD FMLA CKD-EPI: 49 ML/MIN/{1.73_M2}
GLUCOSE BLD-MCNC: 187 MG/DL (ref 70–99)
GLUCOSE BLD-MCNC: 325 MG/DL (ref 70–99)
GLUCOSE SERPL-MCNC: 392 MG/DL (ref 70–99)
GLUCOSE SERPL-MCNC: 432 MG/DL (ref 70–99)
GLUCOSE UR STRIP.AUTO-MCNC: >=1000 MG/DL
HCO3 BLDV-SCNC: 23.3 MMOL/L (ref 23–29)
HCT VFR BLD AUTO: 31.4 % (ref 40.5–52.5)
HGB BLD-MCNC: 10.3 G/DL (ref 13.5–17.5)
HGB UR QL STRIP.AUTO: NEGATIVE
HYALINE CASTS #/AREA URNS AUTO: 0 /LPF (ref 0–8)
KETONES UR STRIP.AUTO-MCNC: ABNORMAL MG/DL
LEUKOCYTE ESTERASE UR QL STRIP.AUTO: ABNORMAL
LIPASE SERPL-CCNC: 21 U/L (ref 13–60)
LYMPHOCYTES # BLD: 0.5 K/UL (ref 1–5.1)
LYMPHOCYTES NFR BLD: 4.3 %
MCH RBC QN AUTO: 29.3 PG (ref 26–34)
MCHC RBC AUTO-ENTMCNC: 32.9 G/DL (ref 31–36)
MCV RBC AUTO: 88.9 FL (ref 80–100)
METHGB MFR BLDV: 0.8 %
MONOCYTES # BLD: 0.6 K/UL (ref 0–1.3)
MONOCYTES NFR BLD: 5.6 %
NEUTROPHILS # BLD: 9.4 K/UL (ref 1.7–7.7)
NEUTROPHILS NFR BLD: 88.8 %
NITRITE UR QL STRIP.AUTO: NEGATIVE
NT-PROBNP SERPL-MCNC: 492 PG/ML (ref 0–124)
O2 CT VFR BLDV CALC: 12 VOL %
O2 THERAPY: ABNORMAL
PCO2 BLDV: 47 MMHG (ref 40–50)
PERFORMED ON: ABNORMAL
PERFORMED ON: ABNORMAL
PH BLDV: 7.3 [PH] (ref 7.35–7.45)
PH UR STRIP.AUTO: 5 [PH] (ref 5–8)
PLATELET # BLD AUTO: 193 K/UL (ref 135–450)
PMV BLD AUTO: 8.2 FL (ref 5–10.5)
PO2 BLDV: 46.9 MMHG (ref 25–40)
POTASSIUM SERPL-SCNC: 5.1 MMOL/L (ref 3.5–5.1)
POTASSIUM SERPL-SCNC: 5.3 MMOL/L (ref 3.5–5.1)
PROT SERPL-MCNC: 7.1 G/DL (ref 6.4–8.2)
PROT UR STRIP.AUTO-MCNC: ABNORMAL MG/DL
RBC # BLD AUTO: 3.53 M/UL (ref 4.2–5.9)
RBC CLUMPS #/AREA URNS AUTO: 0 /HPF (ref 0–4)
SAO2 % BLDV: 78 %
SODIUM SERPL-SCNC: 133 MMOL/L (ref 136–145)
SODIUM SERPL-SCNC: 136 MMOL/L (ref 136–145)
SP GR UR STRIP.AUTO: 1.02 (ref 1–1.03)
TROPONIN, HIGH SENSITIVITY: 17 NG/L (ref 0–22)
TROPONIN, HIGH SENSITIVITY: 19 NG/L (ref 0–22)
UA COMPLETE W REFLEX CULTURE PNL UR: YES
UA DIPSTICK W REFLEX MICRO PNL UR: YES
URN SPEC COLLECT METH UR: ABNORMAL
UROBILINOGEN UR STRIP-ACNC: 0.2 E.U./DL
WBC # BLD AUTO: 10.5 K/UL (ref 4–11)
WBC #/AREA URNS AUTO: 67 /HPF (ref 0–5)

## 2023-11-04 PROCEDURE — 83036 HEMOGLOBIN GLYCOSYLATED A1C: CPT

## 2023-11-04 PROCEDURE — 82010 KETONE BODYS QUAN: CPT

## 2023-11-04 PROCEDURE — 2580000003 HC RX 258: Performed by: PHYSICIAN ASSISTANT

## 2023-11-04 PROCEDURE — 83880 ASSAY OF NATRIURETIC PEPTIDE: CPT

## 2023-11-04 PROCEDURE — 2580000003 HC RX 258: Performed by: INTERNAL MEDICINE

## 2023-11-04 PROCEDURE — 70450 CT HEAD/BRAIN W/O DYE: CPT

## 2023-11-04 PROCEDURE — 83690 ASSAY OF LIPASE: CPT

## 2023-11-04 PROCEDURE — 1200000000 HC SEMI PRIVATE

## 2023-11-04 PROCEDURE — 85025 COMPLETE CBC W/AUTO DIFF WBC: CPT

## 2023-11-04 PROCEDURE — 6370000000 HC RX 637 (ALT 250 FOR IP): Performed by: INTERNAL MEDICINE

## 2023-11-04 PROCEDURE — 6360000002 HC RX W HCPCS: Performed by: PHYSICIAN ASSISTANT

## 2023-11-04 PROCEDURE — 36415 COLL VENOUS BLD VENIPUNCTURE: CPT

## 2023-11-04 PROCEDURE — 99285 EMERGENCY DEPT VISIT HI MDM: CPT

## 2023-11-04 PROCEDURE — 87086 URINE CULTURE/COLONY COUNT: CPT

## 2023-11-04 PROCEDURE — 81001 URINALYSIS AUTO W/SCOPE: CPT

## 2023-11-04 PROCEDURE — 87186 SC STD MICRODIL/AGAR DIL: CPT

## 2023-11-04 PROCEDURE — 80053 COMPREHEN METABOLIC PANEL: CPT

## 2023-11-04 PROCEDURE — 72125 CT NECK SPINE W/O DYE: CPT

## 2023-11-04 PROCEDURE — 6360000002 HC RX W HCPCS: Performed by: INTERNAL MEDICINE

## 2023-11-04 PROCEDURE — 93005 ELECTROCARDIOGRAM TRACING: CPT | Performed by: PHYSICIAN ASSISTANT

## 2023-11-04 PROCEDURE — 82803 BLOOD GASES ANY COMBINATION: CPT

## 2023-11-04 PROCEDURE — 87088 URINE BACTERIA CULTURE: CPT

## 2023-11-04 PROCEDURE — 94760 N-INVAS EAR/PLS OXIMETRY 1: CPT

## 2023-11-04 PROCEDURE — 6370000000 HC RX 637 (ALT 250 FOR IP): Performed by: PHYSICIAN ASSISTANT

## 2023-11-04 PROCEDURE — 84484 ASSAY OF TROPONIN QUANT: CPT

## 2023-11-04 PROCEDURE — 71045 X-RAY EXAM CHEST 1 VIEW: CPT

## 2023-11-04 RX ORDER — SIMETHICONE 80 MG
125 TABLET,CHEWABLE ORAL EVERY MORNING
Status: DISCONTINUED | OUTPATIENT
Start: 2023-11-05 | End: 2023-11-07 | Stop reason: HOSPADM

## 2023-11-04 RX ORDER — ONDANSETRON 2 MG/ML
4 INJECTION INTRAMUSCULAR; INTRAVENOUS EVERY 6 HOURS PRN
Status: DISCONTINUED | OUTPATIENT
Start: 2023-11-04 | End: 2023-11-07 | Stop reason: HOSPADM

## 2023-11-04 RX ORDER — GLUCAGON 1 MG/ML
1 KIT INJECTION PRN
Status: DISCONTINUED | OUTPATIENT
Start: 2023-11-04 | End: 2023-11-05 | Stop reason: SDUPTHER

## 2023-11-04 RX ORDER — TRAZODONE HYDROCHLORIDE 50 MG/1
150 TABLET ORAL NIGHTLY
Status: DISCONTINUED | OUTPATIENT
Start: 2023-11-04 | End: 2023-11-07 | Stop reason: HOSPADM

## 2023-11-04 RX ORDER — LOPERAMIDE HYDROCHLORIDE 2 MG/1
2 CAPSULE ORAL EVERY 12 HOURS PRN
COMMUNITY

## 2023-11-04 RX ORDER — ASPIRIN 81 MG/1
81 TABLET ORAL DAILY
Status: DISCONTINUED | OUTPATIENT
Start: 2023-11-04 | End: 2023-11-07 | Stop reason: HOSPADM

## 2023-11-04 RX ORDER — DEXTROSE MONOHYDRATE 100 MG/ML
INJECTION, SOLUTION INTRAVENOUS CONTINUOUS PRN
Status: DISCONTINUED | OUTPATIENT
Start: 2023-11-04 | End: 2023-11-05 | Stop reason: SDUPTHER

## 2023-11-04 RX ORDER — INSULIN LISPRO 100 [IU]/ML
0-16 INJECTION, SOLUTION INTRAVENOUS; SUBCUTANEOUS
Status: DISCONTINUED | OUTPATIENT
Start: 2023-11-04 | End: 2023-11-05

## 2023-11-04 RX ORDER — ACETAMINOPHEN 325 MG/1
650 TABLET ORAL EVERY 6 HOURS PRN
Status: DISCONTINUED | OUTPATIENT
Start: 2023-11-04 | End: 2023-11-07

## 2023-11-04 RX ORDER — HYDRALAZINE HYDROCHLORIDE 20 MG/ML
10 INJECTION INTRAMUSCULAR; INTRAVENOUS EVERY 6 HOURS PRN
Status: DISCONTINUED | OUTPATIENT
Start: 2023-11-04 | End: 2023-11-07 | Stop reason: HOSPADM

## 2023-11-04 RX ORDER — PIOGLITAZONEHYDROCHLORIDE 15 MG/1
45 TABLET ORAL DAILY
Status: DISCONTINUED | OUTPATIENT
Start: 2023-11-04 | End: 2023-11-07 | Stop reason: HOSPADM

## 2023-11-04 RX ORDER — 0.9 % SODIUM CHLORIDE 0.9 %
500 INTRAVENOUS SOLUTION INTRAVENOUS ONCE
Status: COMPLETED | OUTPATIENT
Start: 2023-11-04 | End: 2023-11-04

## 2023-11-04 RX ORDER — SODIUM CHLORIDE 0.9 % (FLUSH) 0.9 %
5-40 SYRINGE (ML) INJECTION PRN
Status: DISCONTINUED | OUTPATIENT
Start: 2023-11-04 | End: 2023-11-07 | Stop reason: HOSPADM

## 2023-11-04 RX ORDER — CARVEDILOL 6.25 MG/1
6.25 TABLET ORAL 2 TIMES DAILY WITH MEALS
Status: DISCONTINUED | OUTPATIENT
Start: 2023-11-04 | End: 2023-11-07 | Stop reason: HOSPADM

## 2023-11-04 RX ORDER — ATORVASTATIN CALCIUM 40 MG/1
80 TABLET, FILM COATED ORAL NIGHTLY
Status: DISCONTINUED | OUTPATIENT
Start: 2023-11-04 | End: 2023-11-07 | Stop reason: HOSPADM

## 2023-11-04 RX ORDER — SODIUM CHLORIDE 0.9 % (FLUSH) 0.9 %
5-40 SYRINGE (ML) INJECTION EVERY 12 HOURS SCHEDULED
Status: DISCONTINUED | OUTPATIENT
Start: 2023-11-04 | End: 2023-11-07 | Stop reason: HOSPADM

## 2023-11-04 RX ORDER — HYDROCODONE BITARTRATE AND ACETAMINOPHEN 5; 325 MG/1; MG/1
1 TABLET ORAL ONCE
Status: COMPLETED | OUTPATIENT
Start: 2023-11-04 | End: 2023-11-04

## 2023-11-04 RX ORDER — ENOXAPARIN SODIUM 100 MG/ML
40 INJECTION SUBCUTANEOUS NIGHTLY
Status: DISCONTINUED | OUTPATIENT
Start: 2023-11-04 | End: 2023-11-07 | Stop reason: HOSPADM

## 2023-11-04 RX ORDER — POTASSIUM CHLORIDE 7.45 MG/ML
10 INJECTION INTRAVENOUS PRN
Status: DISCONTINUED | OUTPATIENT
Start: 2023-11-04 | End: 2023-11-07 | Stop reason: HOSPADM

## 2023-11-04 RX ORDER — 0.9 % SODIUM CHLORIDE 0.9 %
1000 INTRAVENOUS SOLUTION INTRAVENOUS ONCE
Status: COMPLETED | OUTPATIENT
Start: 2023-11-04 | End: 2023-11-04

## 2023-11-04 RX ORDER — POTASSIUM CHLORIDE 20 MEQ/1
40 TABLET, EXTENDED RELEASE ORAL PRN
Status: DISCONTINUED | OUTPATIENT
Start: 2023-11-04 | End: 2023-11-07 | Stop reason: HOSPADM

## 2023-11-04 RX ORDER — SODIUM CHLORIDE 9 MG/ML
INJECTION, SOLUTION INTRAVENOUS CONTINUOUS
Status: ACTIVE | OUTPATIENT
Start: 2023-11-04 | End: 2023-11-06

## 2023-11-04 RX ORDER — FLUTICASONE PROPIONATE 50 MCG
1 SPRAY, SUSPENSION (ML) NASAL DAILY
Status: DISCONTINUED | OUTPATIENT
Start: 2023-11-04 | End: 2023-11-07 | Stop reason: HOSPADM

## 2023-11-04 RX ORDER — TAMSULOSIN HYDROCHLORIDE 0.4 MG/1
0.4 CAPSULE ORAL DAILY
Status: DISCONTINUED | OUTPATIENT
Start: 2023-11-04 | End: 2023-11-07 | Stop reason: HOSPADM

## 2023-11-04 RX ORDER — PAROXETINE HYDROCHLORIDE 20 MG/1
40 TABLET, FILM COATED ORAL DAILY
Status: DISCONTINUED | OUTPATIENT
Start: 2023-11-04 | End: 2023-11-07 | Stop reason: HOSPADM

## 2023-11-04 RX ORDER — MIRTAZAPINE 15 MG/1
7.5 TABLET, FILM COATED ORAL NIGHTLY
Status: DISCONTINUED | OUTPATIENT
Start: 2023-11-04 | End: 2023-11-07 | Stop reason: HOSPADM

## 2023-11-04 RX ORDER — PANTOPRAZOLE SODIUM 40 MG/1
40 TABLET, DELAYED RELEASE ORAL
Status: DISCONTINUED | OUTPATIENT
Start: 2023-11-05 | End: 2023-11-07 | Stop reason: HOSPADM

## 2023-11-04 RX ORDER — 0.9 % SODIUM CHLORIDE 0.9 %
500 INTRAVENOUS SOLUTION INTRAVENOUS PRN
Status: DISCONTINUED | OUTPATIENT
Start: 2023-11-04 | End: 2023-11-07 | Stop reason: HOSPADM

## 2023-11-04 RX ORDER — INSULIN LISPRO 100 [IU]/ML
0-4 INJECTION, SOLUTION INTRAVENOUS; SUBCUTANEOUS NIGHTLY
Status: DISCONTINUED | OUTPATIENT
Start: 2023-11-04 | End: 2023-11-05

## 2023-11-04 RX ORDER — SODIUM CHLORIDE 9 MG/ML
INJECTION, SOLUTION INTRAVENOUS PRN
Status: DISCONTINUED | OUTPATIENT
Start: 2023-11-04 | End: 2023-11-07 | Stop reason: HOSPADM

## 2023-11-04 RX ORDER — ACETAMINOPHEN 500 MG
1000 TABLET ORAL EVERY 6 HOURS PRN
Status: DISCONTINUED | OUTPATIENT
Start: 2023-11-04 | End: 2023-11-07 | Stop reason: HOSPADM

## 2023-11-04 RX ORDER — ACETAMINOPHEN 650 MG/1
650 SUPPOSITORY RECTAL EVERY 6 HOURS PRN
Status: DISCONTINUED | OUTPATIENT
Start: 2023-11-04 | End: 2023-11-07 | Stop reason: HOSPADM

## 2023-11-04 RX ORDER — ONDANSETRON 4 MG/1
4 TABLET, ORALLY DISINTEGRATING ORAL EVERY 8 HOURS PRN
Status: DISCONTINUED | OUTPATIENT
Start: 2023-11-04 | End: 2023-11-07 | Stop reason: HOSPADM

## 2023-11-04 RX ORDER — FERROUS SULFATE 325(65) MG
325 TABLET ORAL 2 TIMES DAILY WITH MEALS
Status: DISCONTINUED | OUTPATIENT
Start: 2023-11-04 | End: 2023-11-07 | Stop reason: HOSPADM

## 2023-11-04 RX ORDER — HYDRALAZINE HYDROCHLORIDE 25 MG/1
25 TABLET, FILM COATED ORAL EVERY 8 HOURS SCHEDULED
Status: DISCONTINUED | OUTPATIENT
Start: 2023-11-04 | End: 2023-11-07 | Stop reason: HOSPADM

## 2023-11-04 RX ORDER — MIDODRINE HYDROCHLORIDE 5 MG/1
2.5 TABLET ORAL DAILY
Status: DISCONTINUED | OUTPATIENT
Start: 2023-11-05 | End: 2023-11-07 | Stop reason: HOSPADM

## 2023-11-04 RX ORDER — QUETIAPINE FUMARATE 25 MG/1
12.5 TABLET, FILM COATED ORAL 2 TIMES DAILY
Status: DISCONTINUED | OUTPATIENT
Start: 2023-11-04 | End: 2023-11-07 | Stop reason: HOSPADM

## 2023-11-04 RX ADMIN — SODIUM CHLORIDE: 9 INJECTION, SOLUTION INTRAVENOUS at 17:28

## 2023-11-04 RX ADMIN — SODIUM CHLORIDE 500 ML: 9 INJECTION, SOLUTION INTRAVENOUS at 14:42

## 2023-11-04 RX ADMIN — ATORVASTATIN CALCIUM 80 MG: 40 TABLET, FILM COATED ORAL at 20:59

## 2023-11-04 RX ADMIN — TAMSULOSIN HYDROCHLORIDE 0.4 MG: 0.4 CAPSULE ORAL at 18:11

## 2023-11-04 RX ADMIN — HYDROCODONE BITARTRATE AND ACETAMINOPHEN 1 TABLET: 5; 325 TABLET ORAL at 11:59

## 2023-11-04 RX ADMIN — INSULIN HUMAN 6 UNITS: 100 INJECTION, SOLUTION PARENTERAL at 14:42

## 2023-11-04 RX ADMIN — SODIUM CHLORIDE 1000 ML: 9 INJECTION, SOLUTION INTRAVENOUS at 12:15

## 2023-11-04 RX ADMIN — MIRTAZAPINE 7.5 MG: 15 TABLET, FILM COATED ORAL at 20:58

## 2023-11-04 RX ADMIN — INSULIN LISPRO 12 UNITS: 100 INJECTION, SOLUTION INTRAVENOUS; SUBCUTANEOUS at 18:12

## 2023-11-04 RX ADMIN — PIOGLITAZONE 45 MG: 15 TABLET ORAL at 18:09

## 2023-11-04 RX ADMIN — CARVEDILOL 6.25 MG: 6.25 TABLET, FILM COATED ORAL at 18:09

## 2023-11-04 RX ADMIN — FERROUS SULFATE TAB 325 MG (65 MG ELEMENTAL FE) 325 MG: 325 (65 FE) TAB at 18:09

## 2023-11-04 RX ADMIN — PAROXETINE HYDROCHLORIDE 40 MG: 20 TABLET, FILM COATED ORAL at 18:09

## 2023-11-04 RX ADMIN — ENOXAPARIN SODIUM 40 MG: 100 INJECTION SUBCUTANEOUS at 20:59

## 2023-11-04 RX ADMIN — QUETIAPINE FUMARATE 12.5 MG: 25 TABLET ORAL at 20:59

## 2023-11-04 RX ADMIN — TRAZODONE HYDROCHLORIDE 150 MG: 50 TABLET ORAL at 20:59

## 2023-11-04 RX ADMIN — ASPIRIN 81 MG: 81 TABLET, COATED ORAL at 18:09

## 2023-11-04 RX ADMIN — HYDRALAZINE HYDROCHLORIDE 25 MG: 25 TABLET, FILM COATED ORAL at 18:09

## 2023-11-04 RX ADMIN — FLUTICASONE PROPIONATE 1 SPRAY: 50 SPRAY, METERED NASAL at 18:09

## 2023-11-04 RX ADMIN — CEFTRIAXONE SODIUM 1000 MG: 1 INJECTION, POWDER, FOR SOLUTION INTRAMUSCULAR; INTRAVENOUS at 15:22

## 2023-11-04 ASSESSMENT — ENCOUNTER SYMPTOMS
DIARRHEA: 0
CHEST TIGHTNESS: 0
SHORTNESS OF BREATH: 0
NAUSEA: 0
BACK PAIN: 0
VOMITING: 0
ABDOMINAL PAIN: 0

## 2023-11-04 NOTE — ED PROVIDER NOTES
In addition to the advanced practice provider, I personally saw Isabelle Tucker and performed a substantive portion of the visit including all aspects of the medical decision making. Medical Decision Making    Pt fell out of his wheelchair today. He has head and neck pain after his fall. He also tells me that his blood glucose has been running high over the last 1 week. He is not sure what his nursing facility is giving him to manage his blood glucose. He is somewhat of a poor historian. EKG  The Ekg interpreted by me in the absence of a cardiologist shows. sinus tachycardia, ohcn=[id number]    Axis is   Left axis deviation  QTc is  normal  RBBB  No specific ST-T wave changes appreciated. No evidence of acute ischemia. No significant change from prior EKG dated 2-. SEP-1  Is this patient to be included in the SEP-1 Core Measure due to severe sepsis or septic shock? No   Exclusion criteria - the patient is NOT to be included for SEP-1 Core Measure due to:  May have criteria for sepsis, but does not meet criteria for severe sepsis or septic shock    Screenings     Myron Coma Scale  Eye Opening: Spontaneous  Best Verbal Response: Oriented  Best Motor Response: Obeys commands  Woodruff Coma Scale Score: 15           CT CERVICAL SPINE WO CONTRAST   Final Result   No acute cervical spine abnormality      Multilevel degenerative changes         CT HEAD WO CONTRAST   Final Result   No acute intracranial abnormality. XR CHEST PORTABLE   Final Result   No acute process.            Labs Reviewed   CBC WITH AUTO DIFFERENTIAL - Abnormal; Notable for the following components:       Result Value    RBC 3.53 (*)     Hemoglobin 10.3 (*)     Hematocrit 31.4 (*)     RDW 15.5 (*)     Neutrophils Absolute 9.4 (*)     Lymphocytes Absolute 0.5 (*)     All other components within normal limits   COMPREHENSIVE METABOLIC PANEL - Abnormal; Notable for the following components:    Potassium 5.3 (*)     Glucose 432 (*)

## 2023-11-04 NOTE — H&P
administration of intravenous contrast. Multiplanar reformatted images are provided for review. Automated exposure control, iterative reconstruction, and/or weight based adjustment of the mA/kV was utilized to reduce the radiation dose to as low as reasonably achievable. COMPARISON: 04/30/2023 HISTORY: ORDERING SYSTEM PROVIDED HISTORY: Fall with head injury TECHNOLOGIST PROVIDED HISTORY: Reason for exam:->Fall with head injury Reason for Exam: Fall with head injury FINDINGS: BONES/ALIGNMENT: There is no acute fracture or traumatic malalignment. DEGENERATIVE CHANGES: Multilevel degenerative changes. SOFT TISSUES: There is no prevertebral soft tissue swelling. No acute cervical spine abnormality Multilevel degenerative changes     CT HEAD WO CONTRAST    Result Date: 11/4/2023  EXAMINATION: CT OF THE HEAD WITHOUT CONTRAST  11/4/2023 11:53 am TECHNIQUE: CT of the head was performed without the administration of intravenous contrast. Automated exposure control, iterative reconstruction, and/or weight based adjustment of the mA/kV was utilized to reduce the radiation dose to as low as reasonably achievable. COMPARISON: 04/30/2023 HISTORY: ORDERING SYSTEM PROVIDED HISTORY: Fall with head injury TECHNOLOGIST PROVIDED HISTORY: If patient is on cardiac monitor and/or pulse ox, they may be taken off cardiac monitor and pulse ox, left on O2 if currently on. All monitors reattached when patient returns to room. Has a \"code stroke\" or \"stroke alert\" been called? ->No Reason for exam:->Fall with head injury Reason for Exam: Fall with head injury FINDINGS: BRAIN/VENTRICLES: The ventricles and sulci are diffusely enlarged. Low attenuation is seen in the periventricular and subcortical white matter. No acute intracranial hemorrhage or acute infarct is identified ORBITS: The visualized portion of the orbits demonstrate no acute abnormality.  SINUSES: The visualized paranasal sinuses and mastoid air cells demonstrate no acute

## 2023-11-04 NOTE — ED PROVIDER NOTES
[facility]        Pt Name: Jey Faustin  MRN: [id number]  [address] 1950  Date of evaluation: 11/4/2023  Provider: Kwadwo Serna PA-C  PCP: Broderick Abad  Note Started: 11:36 AM EDT 11/4/23       I have seen and evaluated this patient with my supervising physician Bhanu Brooks MD.      CHIEF COMPLAINT       Chief Complaint   Patient presents with    Fall     Patient has had frequent falls out of wheelchair; pain in back of head into neck; denies LOC ; has FSBS of over 400 per [facility] that brought him in       HISTORY OF PRESENT ILLNESS: 1 or more Elements     History from : Patient    Limitations to history : None    Jey Faustin is a 67 y.o. male with a history of hypertension, hyperlipidemia, CAD, CHF, diabetes and neuropathy who presents to the emergency department today via ambulance from the nursing home after he states he had a mechanical fall. Patient states he has been pretty much wheelchair-bound since a left hip fracture 3 to 4 years ago. He states he was reaching from his wheelchair to grab the railing around the toilet to stand up and urinate just prior to arrival when he lost his balance and fell. He states he hit the back of his head and neck on a trash can. He did not lose consciousness. He does report mild pain in the back of his head and neck but denies headache when asked. He denies having any lightheadedness or dizziness. He denies worsening numbness or tingling in his extremities. He denies having any chest pain or shortness of breath. Paramedics report a fingerstick blood glucose over 400. Patient states he has had polyuria and reports chronic polydipsia. Patient is noted to be somewhat hypotensive on arrival at 104/87 and tachycardic at 116. He is nonfebrile. Nursing Notes were all reviewed and agreed with or any disagreements were addressed in the HPI.     REVIEW OF SYSTEMS :

## 2023-11-05 LAB
ANION GAP SERPL CALCULATED.3IONS-SCNC: 11 MMOL/L (ref 3–16)
BASOPHILS # BLD: 0 K/UL (ref 0–0.2)
BASOPHILS NFR BLD: 0.5 %
BUN SERPL-MCNC: 26 MG/DL (ref 7–20)
CALCIUM SERPL-MCNC: 8.1 MG/DL (ref 8.3–10.6)
CHLORIDE SERPL-SCNC: 108 MMOL/L (ref 99–110)
CO2 SERPL-SCNC: 18 MMOL/L (ref 21–32)
CREAT SERPL-MCNC: 1.2 MG/DL (ref 0.8–1.3)
DEPRECATED RDW RBC AUTO: 15.2 % (ref 12.4–15.4)
EKG ATRIAL RATE: 114 BPM
EKG DIAGNOSIS: NORMAL
EKG P AXIS: 67 DEGREES
EKG P-R INTERVAL: 208 MS
EKG Q-T INTERVAL: 346 MS
EKG QRS DURATION: 122 MS
EKG QTC CALCULATION (BAZETT): 476 MS
EKG R AXIS: -34 DEGREES
EKG T AXIS: 20 DEGREES
EKG VENTRICULAR RATE: 114 BPM
EOSINOPHIL # BLD: 0.2 K/UL (ref 0–0.6)
EOSINOPHIL NFR BLD: 3.1 %
EST. AVERAGE GLUCOSE BLD GHB EST-MCNC: 180 MG/DL
GFR SERPLBLD CREATININE-BSD FMLA CKD-EPI: >60 ML/MIN/{1.73_M2}
GLUCOSE BLD-MCNC: 152 MG/DL (ref 70–99)
GLUCOSE BLD-MCNC: 216 MG/DL (ref 70–99)
GLUCOSE BLD-MCNC: 253 MG/DL (ref 70–99)
GLUCOSE BLD-MCNC: 381 MG/DL (ref 70–99)
GLUCOSE SERPL-MCNC: 211 MG/DL (ref 70–99)
HBA1C MFR BLD: 7.9 %
HCT VFR BLD AUTO: 24.1 % (ref 40.5–52.5)
HGB BLD-MCNC: 7.9 G/DL (ref 13.5–17.5)
LACTATE BLDV-SCNC: 0.7 MMOL/L (ref 0.4–2)
LYMPHOCYTES # BLD: 1.2 K/UL (ref 1–5.1)
LYMPHOCYTES NFR BLD: 15.1 %
MCH RBC QN AUTO: 29.1 PG (ref 26–34)
MCHC RBC AUTO-ENTMCNC: 32.6 G/DL (ref 31–36)
MCV RBC AUTO: 89.4 FL (ref 80–100)
MONOCYTES # BLD: 0.5 K/UL (ref 0–1.3)
MONOCYTES NFR BLD: 6.1 %
NEUTROPHILS # BLD: 5.8 K/UL (ref 1.7–7.7)
NEUTROPHILS NFR BLD: 75.2 %
PERFORMED ON: ABNORMAL
PLATELET # BLD AUTO: 165 K/UL (ref 135–450)
PMV BLD AUTO: 8 FL (ref 5–10.5)
POTASSIUM SERPL-SCNC: 4.4 MMOL/L (ref 3.5–5.1)
PROCALCITONIN SERPL IA-MCNC: 3.32 NG/ML (ref 0–0.15)
RBC # BLD AUTO: 2.7 M/UL (ref 4.2–5.9)
SODIUM SERPL-SCNC: 137 MMOL/L (ref 136–145)
WBC # BLD AUTO: 7.7 K/UL (ref 4–11)

## 2023-11-05 PROCEDURE — 87040 BLOOD CULTURE FOR BACTERIA: CPT

## 2023-11-05 PROCEDURE — 97530 THERAPEUTIC ACTIVITIES: CPT

## 2023-11-05 PROCEDURE — 97165 OT EVAL LOW COMPLEX 30 MIN: CPT

## 2023-11-05 PROCEDURE — 6370000000 HC RX 637 (ALT 250 FOR IP): Performed by: INTERNAL MEDICINE

## 2023-11-05 PROCEDURE — 6360000002 HC RX W HCPCS: Performed by: INTERNAL MEDICINE

## 2023-11-05 PROCEDURE — 85025 COMPLETE CBC W/AUTO DIFF WBC: CPT

## 2023-11-05 PROCEDURE — 36415 COLL VENOUS BLD VENIPUNCTURE: CPT

## 2023-11-05 PROCEDURE — 83605 ASSAY OF LACTIC ACID: CPT

## 2023-11-05 PROCEDURE — 93010 ELECTROCARDIOGRAM REPORT: CPT | Performed by: INTERNAL MEDICINE

## 2023-11-05 PROCEDURE — 84145 PROCALCITONIN (PCT): CPT

## 2023-11-05 PROCEDURE — 83036 HEMOGLOBIN GLYCOSYLATED A1C: CPT

## 2023-11-05 PROCEDURE — 80048 BASIC METABOLIC PNL TOTAL CA: CPT

## 2023-11-05 PROCEDURE — 97161 PT EVAL LOW COMPLEX 20 MIN: CPT

## 2023-11-05 PROCEDURE — 2580000003 HC RX 258: Performed by: INTERNAL MEDICINE

## 2023-11-05 PROCEDURE — 1200000000 HC SEMI PRIVATE

## 2023-11-05 RX ORDER — INSULIN LISPRO 100 [IU]/ML
0-8 INJECTION, SOLUTION INTRAVENOUS; SUBCUTANEOUS
Status: DISCONTINUED | OUTPATIENT
Start: 2023-11-05 | End: 2023-11-07 | Stop reason: HOSPADM

## 2023-11-05 RX ORDER — DEXTROSE MONOHYDRATE 100 MG/ML
INJECTION, SOLUTION INTRAVENOUS CONTINUOUS PRN
Status: DISCONTINUED | OUTPATIENT
Start: 2023-11-05 | End: 2023-11-07 | Stop reason: HOSPADM

## 2023-11-05 RX ORDER — INSULIN LISPRO 100 [IU]/ML
0-4 INJECTION, SOLUTION INTRAVENOUS; SUBCUTANEOUS NIGHTLY
Status: DISCONTINUED | OUTPATIENT
Start: 2023-11-05 | End: 2023-11-07 | Stop reason: HOSPADM

## 2023-11-05 RX ORDER — GLUCAGON 1 MG/ML
1 KIT INJECTION PRN
Status: DISCONTINUED | OUTPATIENT
Start: 2023-11-05 | End: 2023-11-07 | Stop reason: HOSPADM

## 2023-11-05 RX ADMIN — SODIUM CHLORIDE: 9 INJECTION, SOLUTION INTRAVENOUS at 10:41

## 2023-11-05 RX ADMIN — TRAZODONE HYDROCHLORIDE 150 MG: 50 TABLET ORAL at 21:36

## 2023-11-05 RX ADMIN — CARVEDILOL 6.25 MG: 6.25 TABLET, FILM COATED ORAL at 16:20

## 2023-11-05 RX ADMIN — FERROUS SULFATE TAB 325 MG (65 MG ELEMENTAL FE) 325 MG: 325 (65 FE) TAB at 16:20

## 2023-11-05 RX ADMIN — FLUTICASONE PROPIONATE 1 SPRAY: 50 SPRAY, METERED NASAL at 08:20

## 2023-11-05 RX ADMIN — INSULIN LISPRO 4 UNITS: 100 INJECTION, SOLUTION INTRAVENOUS; SUBCUTANEOUS at 21:37

## 2023-11-05 RX ADMIN — TAMSULOSIN HYDROCHLORIDE 0.4 MG: 0.4 CAPSULE ORAL at 08:15

## 2023-11-05 RX ADMIN — ATORVASTATIN CALCIUM 80 MG: 40 TABLET, FILM COATED ORAL at 21:36

## 2023-11-05 RX ADMIN — QUETIAPINE FUMARATE 12.5 MG: 25 TABLET ORAL at 21:36

## 2023-11-05 RX ADMIN — PANTOPRAZOLE SODIUM 40 MG: 40 TABLET, DELAYED RELEASE ORAL at 05:33

## 2023-11-05 RX ADMIN — SIMETHICONE 120 MG: 80 TABLET, CHEWABLE ORAL at 08:15

## 2023-11-05 RX ADMIN — HYDRALAZINE HYDROCHLORIDE 25 MG: 25 TABLET, FILM COATED ORAL at 13:12

## 2023-11-05 RX ADMIN — CEFTRIAXONE SODIUM 1000 MG: 1 INJECTION, POWDER, FOR SOLUTION INTRAMUSCULAR; INTRAVENOUS at 13:55

## 2023-11-05 RX ADMIN — ENOXAPARIN SODIUM 40 MG: 100 INJECTION SUBCUTANEOUS at 21:36

## 2023-11-05 RX ADMIN — INSULIN LISPRO 4 UNITS: 100 INJECTION, SOLUTION INTRAVENOUS; SUBCUTANEOUS at 16:25

## 2023-11-05 RX ADMIN — ASPIRIN 81 MG: 81 TABLET, COATED ORAL at 08:15

## 2023-11-05 RX ADMIN — CARVEDILOL 6.25 MG: 6.25 TABLET, FILM COATED ORAL at 08:15

## 2023-11-05 RX ADMIN — MIRTAZAPINE 7.5 MG: 15 TABLET, FILM COATED ORAL at 21:36

## 2023-11-05 RX ADMIN — QUETIAPINE FUMARATE 12.5 MG: 25 TABLET ORAL at 08:14

## 2023-11-05 RX ADMIN — FERROUS SULFATE TAB 325 MG (65 MG ELEMENTAL FE) 325 MG: 325 (65 FE) TAB at 08:22

## 2023-11-05 RX ADMIN — HYDRALAZINE HYDROCHLORIDE 25 MG: 25 TABLET, FILM COATED ORAL at 05:33

## 2023-11-05 RX ADMIN — INSULIN LISPRO 4 UNITS: 100 INJECTION, SOLUTION INTRAVENOUS; SUBCUTANEOUS at 08:16

## 2023-11-05 RX ADMIN — PIOGLITAZONE 45 MG: 15 TABLET ORAL at 08:14

## 2023-11-05 RX ADMIN — HYDRALAZINE HYDROCHLORIDE 25 MG: 25 TABLET, FILM COATED ORAL at 21:36

## 2023-11-05 RX ADMIN — PAROXETINE HYDROCHLORIDE 40 MG: 20 TABLET, FILM COATED ORAL at 08:15

## 2023-11-05 ASSESSMENT — PAIN SCALES - GENERAL: PAINLEVEL_OUTOF10: 0

## 2023-11-06 LAB
ANION GAP SERPL CALCULATED.3IONS-SCNC: 9 MMOL/L (ref 3–16)
BACTERIA UR CULT: ABNORMAL
BASOPHILS # BLD: 0 K/UL (ref 0–0.2)
BASOPHILS NFR BLD: 0.5 %
BUN SERPL-MCNC: 19 MG/DL (ref 7–20)
CALCIUM SERPL-MCNC: 8.1 MG/DL (ref 8.3–10.6)
CHLORIDE SERPL-SCNC: 112 MMOL/L (ref 99–110)
CO2 SERPL-SCNC: 17 MMOL/L (ref 21–32)
CREAT SERPL-MCNC: 1.1 MG/DL (ref 0.8–1.3)
DEPRECATED RDW RBC AUTO: 15.6 % (ref 12.4–15.4)
EOSINOPHIL # BLD: 0.3 K/UL (ref 0–0.6)
EOSINOPHIL NFR BLD: 5.4 %
EST. AVERAGE GLUCOSE BLD GHB EST-MCNC: 177.2 MG/DL
GFR SERPLBLD CREATININE-BSD FMLA CKD-EPI: >60 ML/MIN/{1.73_M2}
GLUCOSE BLD-MCNC: 154 MG/DL (ref 70–99)
GLUCOSE BLD-MCNC: 182 MG/DL (ref 70–99)
GLUCOSE BLD-MCNC: 201 MG/DL (ref 70–99)
GLUCOSE BLD-MCNC: 246 MG/DL (ref 70–99)
GLUCOSE BLD-MCNC: 311 MG/DL (ref 70–99)
GLUCOSE SERPL-MCNC: 150 MG/DL (ref 70–99)
HBA1C MFR BLD: 7.8 %
HCT VFR BLD AUTO: 26.2 % (ref 40.5–52.5)
HGB BLD-MCNC: 8.6 G/DL (ref 13.5–17.5)
LACTATE BLDV-SCNC: 0.6 MMOL/L (ref 0.4–2)
LYMPHOCYTES # BLD: 1.1 K/UL (ref 1–5.1)
LYMPHOCYTES NFR BLD: 20.8 %
MCH RBC QN AUTO: 29.2 PG (ref 26–34)
MCHC RBC AUTO-ENTMCNC: 32.7 G/DL (ref 31–36)
MCV RBC AUTO: 89.5 FL (ref 80–100)
MONOCYTES # BLD: 0.4 K/UL (ref 0–1.3)
MONOCYTES NFR BLD: 8.2 %
NEUTROPHILS # BLD: 3.5 K/UL (ref 1.7–7.7)
NEUTROPHILS NFR BLD: 65.1 %
ORGANISM: ABNORMAL
PERFORMED ON: ABNORMAL
PLATELET # BLD AUTO: 161 K/UL (ref 135–450)
PMV BLD AUTO: 8.6 FL (ref 5–10.5)
POTASSIUM SERPL-SCNC: 4.2 MMOL/L (ref 3.5–5.1)
PROCALCITONIN SERPL IA-MCNC: 2.04 NG/ML (ref 0–0.15)
RBC # BLD AUTO: 2.93 M/UL (ref 4.2–5.9)
SODIUM SERPL-SCNC: 138 MMOL/L (ref 136–145)
WBC # BLD AUTO: 5.4 K/UL (ref 4–11)

## 2023-11-06 PROCEDURE — 85025 COMPLETE CBC W/AUTO DIFF WBC: CPT

## 2023-11-06 PROCEDURE — 1200000000 HC SEMI PRIVATE

## 2023-11-06 PROCEDURE — 6370000000 HC RX 637 (ALT 250 FOR IP): Performed by: INTERNAL MEDICINE

## 2023-11-06 PROCEDURE — 80048 BASIC METABOLIC PNL TOTAL CA: CPT

## 2023-11-06 PROCEDURE — 36415 COLL VENOUS BLD VENIPUNCTURE: CPT

## 2023-11-06 PROCEDURE — 84145 PROCALCITONIN (PCT): CPT

## 2023-11-06 PROCEDURE — 83605 ASSAY OF LACTIC ACID: CPT

## 2023-11-06 PROCEDURE — 2580000003 HC RX 258: Performed by: INTERNAL MEDICINE

## 2023-11-06 PROCEDURE — 6360000002 HC RX W HCPCS: Performed by: INTERNAL MEDICINE

## 2023-11-06 RX ADMIN — ATORVASTATIN CALCIUM 80 MG: 40 TABLET, FILM COATED ORAL at 20:50

## 2023-11-06 RX ADMIN — HYDRALAZINE HYDROCHLORIDE 25 MG: 25 TABLET, FILM COATED ORAL at 04:58

## 2023-11-06 RX ADMIN — INSULIN LISPRO 2 UNITS: 100 INJECTION, SOLUTION INTRAVENOUS; SUBCUTANEOUS at 17:44

## 2023-11-06 RX ADMIN — PIOGLITAZONE 45 MG: 15 TABLET ORAL at 08:26

## 2023-11-06 RX ADMIN — INSULIN LISPRO 2 UNITS: 100 INJECTION, SOLUTION INTRAVENOUS; SUBCUTANEOUS at 11:50

## 2023-11-06 RX ADMIN — ASPIRIN 81 MG: 81 TABLET, COATED ORAL at 08:26

## 2023-11-06 RX ADMIN — CEFTRIAXONE SODIUM 1000 MG: 1 INJECTION, POWDER, FOR SOLUTION INTRAMUSCULAR; INTRAVENOUS at 13:39

## 2023-11-06 RX ADMIN — MIDODRINE HYDROCHLORIDE 2.5 MG: 5 TABLET ORAL at 17:40

## 2023-11-06 RX ADMIN — FERROUS SULFATE TAB 325 MG (65 MG ELEMENTAL FE) 325 MG: 325 (65 FE) TAB at 17:40

## 2023-11-06 RX ADMIN — TAMSULOSIN HYDROCHLORIDE 0.4 MG: 0.4 CAPSULE ORAL at 08:25

## 2023-11-06 RX ADMIN — PANTOPRAZOLE SODIUM 40 MG: 40 TABLET, DELAYED RELEASE ORAL at 05:00

## 2023-11-06 RX ADMIN — FERROUS SULFATE TAB 325 MG (65 MG ELEMENTAL FE) 325 MG: 325 (65 FE) TAB at 08:25

## 2023-11-06 RX ADMIN — SIMETHICONE 120 MG: 80 TABLET, CHEWABLE ORAL at 08:26

## 2023-11-06 RX ADMIN — SODIUM CHLORIDE, PRESERVATIVE FREE 10 ML: 5 INJECTION INTRAVENOUS at 08:27

## 2023-11-06 RX ADMIN — HYDRALAZINE HYDROCHLORIDE 25 MG: 25 TABLET, FILM COATED ORAL at 13:39

## 2023-11-06 RX ADMIN — SODIUM CHLORIDE, PRESERVATIVE FREE 10 ML: 5 INJECTION INTRAVENOUS at 20:50

## 2023-11-06 RX ADMIN — TRAZODONE HYDROCHLORIDE 150 MG: 50 TABLET ORAL at 20:50

## 2023-11-06 RX ADMIN — CARVEDILOL 6.25 MG: 6.25 TABLET, FILM COATED ORAL at 17:39

## 2023-11-06 RX ADMIN — MIRTAZAPINE 7.5 MG: 15 TABLET, FILM COATED ORAL at 20:50

## 2023-11-06 RX ADMIN — FLUTICASONE PROPIONATE 1 SPRAY: 50 SPRAY, METERED NASAL at 08:29

## 2023-11-06 RX ADMIN — CARVEDILOL 6.25 MG: 6.25 TABLET, FILM COATED ORAL at 08:25

## 2023-11-06 RX ADMIN — INSULIN LISPRO 4 UNITS: 100 INJECTION, SOLUTION INTRAVENOUS; SUBCUTANEOUS at 20:50

## 2023-11-06 RX ADMIN — ENOXAPARIN SODIUM 40 MG: 100 INJECTION SUBCUTANEOUS at 20:49

## 2023-11-06 RX ADMIN — QUETIAPINE FUMARATE 12.5 MG: 25 TABLET ORAL at 08:26

## 2023-11-06 RX ADMIN — QUETIAPINE FUMARATE 12.5 MG: 25 TABLET ORAL at 20:50

## 2023-11-06 RX ADMIN — ACETAMINOPHEN 1000 MG: 500 TABLET ORAL at 13:39

## 2023-11-06 RX ADMIN — PAROXETINE HYDROCHLORIDE 40 MG: 20 TABLET, FILM COATED ORAL at 08:25

## 2023-11-06 RX ADMIN — HYDRALAZINE HYDROCHLORIDE 25 MG: 25 TABLET, FILM COATED ORAL at 22:28

## 2023-11-06 ASSESSMENT — PAIN SCALES - GENERAL: PAINLEVEL_OUTOF10: 0

## 2023-11-06 NOTE — PLAN OF CARE
Problem: Discharge Planning  Goal: Discharge to home or other facility with appropriate resources  Outcome: Progressing     Problem: Skin/Tissue Integrity  Goal: Absence of new skin breakdown  Description: 1. Monitor for areas of redness and/or skin breakdown  2. Assess vascular access sites hourly  3. Every 4-6 hours minimum:  Change oxygen saturation probe site  4. Every 4-6 hours:  If on nasal continuous positive airway pressure, respiratory therapy assess nares and determine need for appliance change or resting period.   Outcome: Progressing     Problem: Safety - Adult  Goal: Free from fall injury  11/6/2023 0832 by Kevyn Jackson RN  Outcome: Progressing  11/6/2023 0554 by Abelardo King RN  Outcome: Progressing     Problem: ABCDS Injury Assessment  Goal: Absence of physical injury  Outcome: Progressing     Problem: Pain  Goal: Verbalizes/displays adequate comfort level or baseline comfort level  11/6/2023 0832 by Kevyn Jackson RN  Outcome: Progressing  11/6/2023 0554 by Abelardo King RN  Outcome: Progressing

## 2023-11-06 NOTE — CARE COORDINATION
Case Management Assessment  Initial Evaluation    Date/Time of Evaluation: 11/6/2023 1:43 PM  Assessment Completed by: Analisa Chacon RN    If patient is discharged prior to next notation, then this note serves as note for discharge by case management. Patient Name: Km George                   YOB: 1950  Diagnosis: Hyperglycemia [R73.9]  OCTAVIO (acute kidney injury) ([address]) [N17.9]  Acute cystitis without hematuria [N29.91]  Complicated UTI (urinary tract infection) [N39.0]  Contusion of neck, initial encounter [S10.93XA]  Closed head injury without loss of consciousness, initial encounter [S09.90XA]  Fall at nursing home, initial encounter [W19. Odette Sanchez, Y92.129]                   Date / Time: 11/4/2023 10:52 AM    Patient Admission Status: Inpatient   Readmission Risk (Low < 19, Mod (19-27), High > 27): Readmission Risk Score: 18.9    Current PCP: Warm, Bismark Drown  PCP verified by CM? (P) Yes    Chart Reviewed: Yes      History Provided by: (P) Patient  Patient Orientation: (P) Alert and Oriented    Patient Cognition: (P) Alert    Hospitalization in the last 30 days (Readmission):  No    If yes, Readmission Assessment in CM Navigator will be completed. Advance Directives:      Code Status: Full Code   Patient's Primary Decision Maker is: (P) Legal Next of Kin      Discharge Planning:    Patient expects to discharge to: Long-term care  Plan for transportation at discharge: (P) Other (see comment) (TBD)    Financial    Payor: Rolf Huitron / Plan: Tomas Prow / Product Type: *No Product type* /         Current Nursing Home Information:  Patient admitted from:     [id number] [facility] at [facility], [facility] [address]  Phone: [phone]  Fax: [phone]    Call to Ina Sesay at [facility] who confirmed the patient is: Ina Sesay at [facility]     Patient Covid vaccination status:    Internal Administration   First Dose COVID-19, PFIZER PURPLE top, DILUTE

## 2023-11-06 NOTE — PLAN OF CARE
Problem: Safety - Adult  Goal: Free from fall injury  Outcome: Progressing   Pt has been free from falls this shift, bed alarm on, bed in lowest position, 2/4 side rails up, nonskid socks on, wheels locked, bedside table and call light in reach. Encouraged pt to call out if needed anything. Problem: Pain  Goal: Verbalizes/displays adequate comfort level or baseline comfort level  Outcome: Progressing   Pt denies pain at this time, RN encouraged pt to call out if needed anything. Will continue to assess pain level throughout shift.

## 2023-11-07 VITALS
BODY MASS INDEX: 21.65 KG/M2 | WEIGHT: 126.1 LBS | HEART RATE: 69 BPM | SYSTOLIC BLOOD PRESSURE: 150 MMHG | RESPIRATION RATE: 17 BRPM | DIASTOLIC BLOOD PRESSURE: 75 MMHG | OXYGEN SATURATION: 95 % | TEMPERATURE: 98 F

## 2023-11-07 LAB
ANION GAP SERPL CALCULATED.3IONS-SCNC: 12 MMOL/L (ref 3–16)
BASOPHILS # BLD: 0 K/UL (ref 0–0.2)
BASOPHILS NFR BLD: 0.9 %
BUN SERPL-MCNC: 18 MG/DL (ref 7–20)
CALCIUM SERPL-MCNC: 8.1 MG/DL (ref 8.3–10.6)
CHLORIDE SERPL-SCNC: 112 MMOL/L (ref 99–110)
CO2 SERPL-SCNC: 16 MMOL/L (ref 21–32)
CREAT SERPL-MCNC: 1.1 MG/DL (ref 0.8–1.3)
DEPRECATED RDW RBC AUTO: 15.7 % (ref 12.4–15.4)
EOSINOPHIL # BLD: 0.3 K/UL (ref 0–0.6)
EOSINOPHIL NFR BLD: 8.2 %
GFR SERPLBLD CREATININE-BSD FMLA CKD-EPI: >60 ML/MIN/{1.73_M2}
GLUCOSE BLD-MCNC: 189 MG/DL (ref 70–99)
GLUCOSE BLD-MCNC: 305 MG/DL (ref 70–99)
GLUCOSE SERPL-MCNC: 180 MG/DL (ref 70–99)
HCT VFR BLD AUTO: 25.5 % (ref 40.5–52.5)
HGB BLD-MCNC: 8.2 G/DL (ref 13.5–17.5)
LYMPHOCYTES # BLD: 0.9 K/UL (ref 1–5.1)
LYMPHOCYTES NFR BLD: 22.2 %
MCH RBC QN AUTO: 28.8 PG (ref 26–34)
MCHC RBC AUTO-ENTMCNC: 32.1 G/DL (ref 31–36)
MCV RBC AUTO: 89.7 FL (ref 80–100)
MONOCYTES # BLD: 0.3 K/UL (ref 0–1.3)
MONOCYTES NFR BLD: 6.4 %
NEUTROPHILS # BLD: 2.5 K/UL (ref 1.7–7.7)
NEUTROPHILS NFR BLD: 62.3 %
PERFORMED ON: ABNORMAL
PERFORMED ON: ABNORMAL
PLATELET # BLD AUTO: 159 K/UL (ref 135–450)
PMV BLD AUTO: 8.4 FL (ref 5–10.5)
POTASSIUM SERPL-SCNC: 4.4 MMOL/L (ref 3.5–5.1)
PROCALCITONIN SERPL IA-MCNC: 1.03 NG/ML (ref 0–0.15)
RBC # BLD AUTO: 2.84 M/UL (ref 4.2–5.9)
SODIUM SERPL-SCNC: 140 MMOL/L (ref 136–145)
WBC # BLD AUTO: 4 K/UL (ref 4–11)

## 2023-11-07 PROCEDURE — 85025 COMPLETE CBC W/AUTO DIFF WBC: CPT

## 2023-11-07 PROCEDURE — 6370000000 HC RX 637 (ALT 250 FOR IP): Performed by: INTERNAL MEDICINE

## 2023-11-07 PROCEDURE — 84145 PROCALCITONIN (PCT): CPT

## 2023-11-07 PROCEDURE — 2580000003 HC RX 258: Performed by: INTERNAL MEDICINE

## 2023-11-07 PROCEDURE — 36415 COLL VENOUS BLD VENIPUNCTURE: CPT

## 2023-11-07 PROCEDURE — 80048 BASIC METABOLIC PNL TOTAL CA: CPT

## 2023-11-07 RX ORDER — CEPHALEXIN 500 MG/1
500 CAPSULE ORAL 3 TIMES DAILY
Qty: 21 CAPSULE | Refills: 0 | Status: SHIPPED | OUTPATIENT
Start: 2023-11-07 | End: 2023-11-14

## 2023-11-07 RX ADMIN — TAMSULOSIN HYDROCHLORIDE 0.4 MG: 0.4 CAPSULE ORAL at 08:46

## 2023-11-07 RX ADMIN — HYDRALAZINE HYDROCHLORIDE 25 MG: 25 TABLET, FILM COATED ORAL at 06:55

## 2023-11-07 RX ADMIN — SODIUM CHLORIDE, PRESERVATIVE FREE 10 ML: 5 INJECTION INTRAVENOUS at 08:46

## 2023-11-07 RX ADMIN — PIOGLITAZONE 45 MG: 15 TABLET ORAL at 08:44

## 2023-11-07 RX ADMIN — PANTOPRAZOLE SODIUM 40 MG: 40 TABLET, DELAYED RELEASE ORAL at 06:55

## 2023-11-07 RX ADMIN — ASPIRIN 81 MG: 81 TABLET, COATED ORAL at 08:45

## 2023-11-07 RX ADMIN — QUETIAPINE FUMARATE 12.5 MG: 25 TABLET ORAL at 08:44

## 2023-11-07 RX ADMIN — CARVEDILOL 6.25 MG: 6.25 TABLET, FILM COATED ORAL at 08:44

## 2023-11-07 RX ADMIN — FERROUS SULFATE TAB 325 MG (65 MG ELEMENTAL FE) 325 MG: 325 (65 FE) TAB at 08:42

## 2023-11-07 RX ADMIN — FLUTICASONE PROPIONATE 1 SPRAY: 50 SPRAY, METERED NASAL at 08:46

## 2023-11-07 RX ADMIN — PAROXETINE HYDROCHLORIDE 40 MG: 20 TABLET, FILM COATED ORAL at 08:46

## 2023-11-07 RX ADMIN — INSULIN LISPRO 6 UNITS: 100 INJECTION, SOLUTION INTRAVENOUS; SUBCUTANEOUS at 12:49

## 2023-11-07 RX ADMIN — SIMETHICONE 120 MG: 80 TABLET, CHEWABLE ORAL at 08:45

## 2023-11-07 NOTE — DISCHARGE INSTR - COC
Continuity of Care Form    Patient Name: Mone Sales   :  1950  MRN:  [id number]    Admit date:  2023  Discharge date:  2023    Code Status Order: Full Code   Advance Directives:     Admitting Physician:  Armen Prakash MD  PCP: Sukhdev Nur    Discharging Nurse: [facility] Unit/Room#: 9QZ-1299/3673-70  Discharging Unit Phone Number: [phone]    Emergency Contact:   Extended Emergency Contact Information  Primary Emergency Contact: clair bonilla  Home Phone: [phone]  Relation: Legal Guardian    Past Surgical History:  Past Surgical History:   Procedure Laterality Date    ANKLE SURGERY Right 2018    ORIF of R ankle     COLONOSCOPY N/A 2018    COLONOSCOPY CONTROL HEMORRHAGE performed by Remigio Holguin MD at [facility]      x 4    CYSTOSCOPY  14    transurethral vaporization of prostate    HERNIA REPAIR      X2    ORIF DISTAL RADIUS FRACTURE Left 2019    LEFT DISTAL RADIUS OPEN REDUCTION INTERNAL FIXATION   performed by Boogie Love MD at [address] [place] [address] 2018    EGD BIOPSY performed by Remigio Holguin MD at [facility] N/A 5/3/2019    EGD WITH ANESTHESIA performed by Remigio Holguin MD at [facility]         Immunization History:   Immunization History   Administered Date(s) Administered    COVID-19, PFIZER PURPLE top, DILUTE for use, (age 15 y+), 30mcg/0.3mL 2021, 2021, 2021, 10/21/2021, 2021    Influenza Virus Vaccine 10/10/2014    Pneumococcal, PCV-13, PREVNAR 15, (age 6w+), IM, 0.5mL 2017    TDaP, ADACEL (age 6y-58y), BOOSTRIX (age 10y+), IM, 0.5mL 2020       Active Problems:  Patient Active Problem List   Diagnosis Code    DM2 (diabetes mellitus, type 2) ([address]) E11.9    Dizziness R42

## 2023-11-07 NOTE — CARE COORDINATION
Discharge Plan:     Patient discharged to:    [facility]. [place], [address]    SW/DC Planner faxed, [address] and [address] to: [phone]    Narcotic Prescriptions faxed were: N/A    RN: will call report to: [phone]        Medical Transport with: [facility] [phone]     time: 1400    Family advised of discharge?: N/A, Patient states he has no family    HENS Submitted?:  N/A, Patient is returning    All discharge needs met per case management.     ALYSSIA MenaN RN    [facility]  Phone: [phone]

## 2023-11-07 NOTE — DISCHARGE SUMMARY
[facility] -- Physician Discharge Summary     Cristina Marin  1950  MRN: [id number]    Admit Date: 11/4/2023  Discharge Date: No discharge date for patient encounter. Attending MD: Layla Sandoval MD  Discharging MD: Layla Sandoval MD  PCP: Jorden Orozco [facility]- 2nd Floor / Kentrell Solano* [phone]    Admission Diagnosis: Hyperglycemia [R73.9]  OCTAVIO (acute kidney injury) [facility]) [N17.9]  Acute cystitis without hematuria [B78.94]  Complicated UTI (urinary tract infection) [N39.0]  Contusion of neck, initial encounter [S10.93XA]  Closed head injury without loss of consciousness, initial encounter [S09.90XA]  Fall at nursing home, initial encounter [W19. Ashlynshannon Nesbitt, Y92.129]  DISCHARGE DIAGNOSIS: same    Full Hospital Problem List:  Active Hospital Problems    Diagnosis Date Noted    UTI (urinary tract infection) [N39.0] 11/04/2023    HTN (hypertension) [I10] 16/72/0919    Complicated UTI (urinary tract infection) [N39.0] 11/04/2023    OCTAVIO (acute kidney injury) ([address]) [N17.9] 04/05/2019    Anemia [D64.9] 09/10/2017    DM2 (diabetes mellitus, type 2) ([address] ) [E11.9] 05/12/2013           Hospital Course:     67 y.o. male with a history of hypertension, hyperlipidemia, CAD, CHF, diabetes and neuropathy who presents to the emergency department today via ambulance from the nursing home after he states he had a mechanical fall. Patient states he has been pretty much wheelchair-bound since a left hip fracture 3 to 4 years ago. He states he was reaching from his wheelchair to grab the railing around the toilet to stand up and urinate just prior to arrival when he lost his balance and fell. He states he hit the back of his head and neck on a trash can. He did not lose consciousness. He does report mild pain in the back of his head and neck but denies headache when asked. He denies having any lightheadedness or dizziness.   He denies worsening numbness or tingling in his

## 2023-11-07 NOTE — PROGRESS NOTES
[address] [place] [address] Department   Phone: [phone]    Occupational Therapy    [] Initial Evaluation            [] Daily Treatment Note         [] Discharge Summary      Patient: Cristina Marin   : 1950   MRN: [id number]   Date of Service:  2023    Admitting Diagnosis:  UTI (urinary tract infection)  Current Admission Summary: Patient has had frequent falls out of wheelchair; pain in back of head into neck; denies LOC ; has FSBS of over 400 per [facility] that brought him in  CT of head and C spine negative for injury   Past Medical History:  has a past medical history of Arthritis, Ataxia, BPH (benign prostatic hyperplasia), CAD (coronary artery disease), Coronary atherosclerosis of native coronary artery, Diabetes mellitus ([address]), Environmental allergies, Hepatitis, Hydronephrosis, Hyperlipidemia, Hypertension, Kidney disease, Neuropathy, Parkinson's disease ([address]), S/P coronary artery stent placement x 4, and Schizoaffective disorder, bipolar type ([address]). Past Surgical History:  has a past surgical history that includes Coronary angioplasty; Coronary angioplasty with stent; hernia repair; Vasectomy; Cystocopy (14); Ankle surgery (Right, 2018); Upper gastrointestinal endoscopy (N/A, 2018); Colonoscopy (N/A, 2018); Upper gastrointestinal endoscopy (N/A, 5/3/2019); and ORIF DISTAL RADIUS FRACTURE (Left, 2019). Discharge Recommendations: Cristina Marin scored a 23/24 on the AM-PAC ADL Inpatient form. At this time, no further OT is recommended upon discharge due to at or close to baseline function. Recommend patient returns to prior setting with prior services.         DME Required For Discharge: no DME required at discharge    Precautions/Restrictions: high fall risk  Weight Bearing Restrictions: no restrictions  [] Right Upper Extremity  [] Left Upper Extremity [] Right Lower Extremity  [] Left Lower Extremity
[address] [facility] Department   Phone: [phone]    Physical Therapy    [x] Initial Evaluation            [] Daily Treatment Note         [x] Discharge Summary      Patient: Sherren Heinz   : 1950   MRN: [id number]   Date of Service:  2023  Admitting Diagnosis: UTI (urinary tract infection)  Current Admission Summary: Sherren Heinz is a 67 y.o. male with a history of hypertension, hyperlipidemia, CAD, CHF, diabetes and neuropathy who presents to the emergency department today via ambulance from the nursing home after he states he had a mechanical fall. Patient states he has been pretty much wheelchair-bound since a left hip fracture 3 to 4 years ago. He states he was reaching from his wheelchair to grab the railing around the toilet to stand up and urinate just prior to arrival when he lost his balance and fell. He states he hit the back of his head and neck on a trash can. He did not lose consciousness. He does report mild pain in the back of his head and neck but denies headache when asked. He denies having any lightheadedness or dizziness. He denies worsening numbness or tingling in his extremities. He denies having any chest pain or shortness of breath. Paramedics report a fingerstick blood glucose over 400. Patient states he has had polyuria and reports chronic polydipsia. Patient is noted to be somewhat hypotensive on arrival at 104/87 and tachycardic at 116. He is nonfebrile. Past Medical History:  has a past medical history of Arthritis, Ataxia, BPH (benign prostatic hyperplasia), CAD (coronary artery disease), Coronary atherosclerosis of native coronary artery, Diabetes mellitus ([address]), Environmental allergies, Hepatitis, Hydronephrosis, Hyperlipidemia, Hypertension, Kidney disease, Neuropathy, Parkinson's disease ([address]), S/P coronary artery stent placement x 4, and Schizoaffective disorder, bipolar type ([address]).   Past Surgical History:  has a past surgical
PRN given for reported tooth pain, patient states he has had issues with his teeth for the past few months. Primarily on the back, right upper and lower parts of his jaw.
Patient is admitted from ED. AXOX4. Call light within reach. Bed alarm on.
Pharmacy Home Medication Reconciliation Note    A medication reconciliation has been completed for Vicki Hamilton 1950    Pharmacy: [facility], [address] [place] [address], [facility] Erasmo Glasgow  Information provided by: patient and facility    The patient's home medication list is as follows: No current facility-administered medications on file prior to encounter. Current Outpatient Medications on File Prior to Encounter   Medication Sig Dispense Refill    loperamide (IMODIUM) 2 MG capsule Take 1 capsule by mouth every 12 hours as needed for Diarrhea      pioglitazone (ACTOS) 45 MG tablet Take 1 tablet by mouth daily      Lactobacillus TABS Take 1 tablet by mouth 2 times daily      tamsulosin (FLOMAX) 0.4 MG capsule Take 1 capsule by mouth nightly      Cholecalciferol (VITAMIN D3) 1.25 MG (61712 UT) CAPS Take 1 capsule by mouth every 7 days Thursdays      midodrine (PROAMATINE) 2.5 MG tablet Take 1 tablet by mouth daily Hold SBP > 110 and DBP >90; may give second dose as needed for low BP. insulin aspart (NOVOLOG) 100 UNIT/ML injection vial Inject 0-12 Units into the skin 4 times daily  no insulin  151-200 2 units  201-250 4 units  251-300 6 units  301-350 8 units  351+ 12 units then recheck in 2 hours      hydrALAZINE (APRESOLINE) 25 MG tablet Take 1 tablet by mouth every 8 hours Hold for BP<120 (Patient taking differently: Take 1 tablet by mouth 3 times daily Hold for BP<120) 90 tablet 1    simethicone (MYLICON) 707 MG chewable tablet Take 1 tablet by mouth every morning      guaiFENesin (ROBITUSSIN) 100 MG/5ML SOLN oral solution Take 20 mLs by mouth every 4 hours as needed for Cough      loratadine (CLARITIN) 10 MG tablet Take 10 mg by mouth daily (Patient not taking: Reported on 2/27/2023)      mirtazapine (REMERON) 7.5 MG tablet Take 1 tablet by mouth nightly Indications: Appetite stimulant.       acetaminophen (TYLENOL) 500 MG tablet Take 2 tablets by mouth every 6 hours as needed
Progress Note - Dr. Adamaris Terrell - Internal Medicine  PCP: Julio C Gao [address] [facility]- 2nd Floor / Ric Castaneda* [phone]    Hospital Day: 1  Code Status: Full Code  Current Diet: ADULT DIET; Regular        CC: follow up on medical issues    Subjective:   L[place] Brya is a 67 y.o. male. Pt seen and examined  Chart reviewed since last visit, labs and imaging below      Doing ok  Still confused  Working with therapy currently      Review of Systems: (1 system for EPF, 2-9 for detailed, 10+ for comprehensive)  Constitutional: Negative for chills and fever. HENT: Negative for dental problem, nosebleeds and rhinorrhea. Eyes: Negative for photophobia and visual disturbance. Respiratory: Negative for cough, chest tightness and shortness of breath. Cardiovascular: Negative for chest pain and leg swelling. Gastrointestinal: Negative for diarrhea, nausea and vomiting. Endocrine: Negative for polydipsia and polyphagia. Genitourinary: Negative for frequency, hematuria and urgency. Musculoskeletal: Negative for back pain and myalgias. Skin: Negative for rash. Allergic/Immunologic: Negative for food allergies. Neurological: Negative for dizziness, seizures, syncope and facial asymmetry. Hematological: Negative for adenopathy. Psychiatric/Behavioral: Negative for dysphoric mood. The patient is not nervous/anxious. I have reviewed the patient's medical and social history in detail and updated the computerized patient record. To recap: He  has a past medical history of Arthritis, Ataxia, BPH (benign prostatic hyperplasia), CAD (coronary artery disease), Coronary atherosclerosis of native coronary artery, Diabetes mellitus ([address]), Environmental allergies, Hepatitis, Hydronephrosis, Hyperlipidemia, Hypertension, Kidney disease, Neuropathy, Parkinson's disease ([address]), S/P coronary artery stent placement x 4, and Schizoaffective disorder, bipolar type ([address] Keenan Avelar
Progress Note - Dr. Obie Quintanilla - Internal Medicine  PCP: Keturah Frank [address] [facility]- 2nd Floor / Sade Gray* [phone]    Hospital Day: 2  Code Status: Full Code  Current Diet: ADULT DIET; Regular        CC: follow up on medical issues    Subjective:   Germain Arzate is a 67 y.o. male. Pt seen and examined  Chart reviewed since last visit, labs and imaging below      Doing ok  Still confused  Ucx shows  Escherichia coli (1)    Antibiotic Interpretation Microscan  Method Status    ampicillin Sensitive 8 mcg/mL BACTERIAL SUSCEPTIBILITY PANEL BY CAPRI     ampicillin-sulbactam Sensitive 4 mcg/mL BACTERIAL SUSCEPTIBILITY PANEL BY CAPRI     ceFAZolin Sensitive <=4 mcg/mL BACTERIAL SUSCEPTIBILITY PANEL BY CAPRI      NOTE: Cefazolin should only be used for uncomplicated UTI         for E.coli or Klebsiella pneumoniae. cefepime Sensitive <=0.12 mcg/mL BACTERIAL SUSCEPTIBILITY PANEL BY CAPRI     cefTRIAXone Sensitive <=0.25 mcg/mL BACTERIAL SUSCEPTIBILITY PANEL BY CAPRI     ciprofloxacin Resistant >=4 mcg/mL BACTERIAL SUSCEPTIBILITY PANEL BY CAPRI     ertapenem Sensitive <=0.12 mcg/mL BACTERIAL SUSCEPTIBILITY PANEL BY CAPRI     gentamicin Sensitive <=1 mcg/mL BACTERIAL SUSCEPTIBILITY PANEL BY CAPRI     levofloxacin Resistant >=8 mcg/mL BACTERIAL SUSCEPTIBILITY PANEL BY CAPRI     nitrofurantoin Sensitive <=16 mcg/mL BACTERIAL SUSCEPTIBILITY PANEL BY CAPRI     piperacillin-tazobactam Sensitive <=4 mcg/mL BACTERIAL SUSCEPTIBILITY PANEL BY CAPRI     trimethoprim-sulfamethoxazole Sensitive <=20 mcg/mL BACTERIAL SUSCEPTIBILITY PANEL BY CAPRI         Procal still markedly elevated  Pt continues on iv abx      Review of Systems: (1 system for EPF, 2-9 for detailed, 10+ for comprehensive)  Constitutional: Negative for chills and fever. HENT: Negative for dental problem, nosebleeds and rhinorrhea. Eyes: Negative for photophobia and visual disturbance.      Respiratory: Negative for cough, chest tightness and shortness of
Pt admitted for uti, elevated glucose    Full h+p to follow    Active Hospital Problems    Diagnosis Date Noted    UTI (urinary tract infection) [N39.0] 11/04/2023    HTN (hypertension) [I10] 11/04/2023    OCTAVIO (acute kidney injury) ([address]) [N17.9] 04/05/2019    Anemia [D64.9] 09/10/2017    DM2 (diabetes mellitus, type 2) ([address]) [E11.9] 05/12/2013       Please use PerfectServe to contact me with any questions during the day. The hospitalist service will provide cross-coverage for this patient from 7pm to 7am.    During those hours, contact the on-call hospitalist MD/JAYME for questions.
Pt alert and oriented x4, VSS, IV fluids infusing in right arm. Pt voiding per leroy. Skin has scattered bruising and abrasions. Bed alarm on, bedside table and call light in reach.
Pt resting in bed. VSS. Alert and oriented. Denies any needs. External catheter in place. On RA. Right arm piv intact and flushes well. Tolerates a regular diet. Call light in reach, will continue to monitor.
Resp: 12 11 21    Temp:       TempSrc:       SpO2: 98% 94% 97% 96%     FiO2 (%): None  O2 Flow Rate:      Cardiac Rhythm:    Pain Assessment: 4 [] Verbal [] Annitta Ramus Scale  Pain Scale:    Last documented pain score (0-10 scale)    Last documented pain medication administered: n/a  Mental Status: oriented and alert  Orientation Level:    NIH Score:    C-SSRS: Risk of Suicide: No Risk  Bedside swallow:    Rome Coma Scale (GCS): Rome Coma Scale  Eye Opening: Spontaneous  Best Verbal Response: Oriented  Best Motor Response: Obeys commands  Myron Coma Scale Score: 15  Active LDA's:   Peripheral IV 11/04/23 Right Antecubital (Active)     PO Status: Regular  Pertinent or High Risk Medications/Drips: no   If Yes, please provide details: n/a  Pending Blood Product Administration: no       You may also review the ED PT Care Timeline found under the Summary Nursing Index tab. Recommendation    Pending orders n/a  Plan for Discharge (if knN/aown):    Additional Comments:    If any further questions, please call Sending RN at [phone]    Electronically signed by: Electronically signed by Nav Dhillon RN on 11/4/2023 at 4:11 PM

## 2023-11-09 LAB — BACTERIA BLD CULT: NORMAL

## 2023-11-21 ENCOUNTER — HOSPITAL ENCOUNTER (EMERGENCY)
Age: 73
Discharge: HOME | End: 2023-11-22
Attending: EMERGENCY MEDICINE
Payer: COMMERCIAL

## 2023-11-21 VITALS
RESPIRATION RATE: 16 BRPM | SYSTOLIC BLOOD PRESSURE: 124 MMHG | OXYGEN SATURATION: 96 % | TEMPERATURE: 97.5 F | DIASTOLIC BLOOD PRESSURE: 45 MMHG

## 2023-11-21 DIAGNOSIS — R53.83 OTHER FATIGUE: ICD-10-CM

## 2023-11-21 DIAGNOSIS — R05.9 COUGH, UNSPECIFIED TYPE: Primary | ICD-10-CM

## 2023-11-21 PROCEDURE — 99285 EMERGENCY DEPT VISIT HI MDM: CPT

## 2023-11-21 PROCEDURE — 85025 COMPLETE CBC W/AUTO DIFF WBC: CPT

## 2023-11-21 PROCEDURE — 87636 SARSCOV2 & INF A&B AMP PRB: CPT

## 2023-11-21 PROCEDURE — 36415 COLL VENOUS BLD VENIPUNCTURE: CPT

## 2023-11-21 PROCEDURE — 84484 ASSAY OF TROPONIN QUANT: CPT

## 2023-11-21 PROCEDURE — 93005 ELECTROCARDIOGRAM TRACING: CPT | Performed by: EMERGENCY MEDICINE

## 2023-11-21 PROCEDURE — 80048 BASIC METABOLIC PNL TOTAL CA: CPT

## 2023-11-22 ENCOUNTER — APPOINTMENT (OUTPATIENT)
Dept: GENERAL RADIOLOGY | Age: 73
End: 2023-11-22
Payer: COMMERCIAL

## 2023-11-22 ENCOUNTER — APPOINTMENT (OUTPATIENT)
Dept: CT IMAGING | Age: 73
End: 2023-11-22
Payer: COMMERCIAL

## 2023-11-22 LAB
ANION GAP SERPL CALCULATED.3IONS-SCNC: 10 MMOL/L (ref 3–16)
BACTERIA URNS QL MICRO: NORMAL /HPF
BASOPHILS # BLD: 0 K/UL (ref 0–0.2)
BASOPHILS NFR BLD: 0.4 %
BILIRUB UR QL STRIP.AUTO: NEGATIVE
BUN SERPL-MCNC: 19 MG/DL (ref 7–20)
CALCIUM SERPL-MCNC: 9.3 MG/DL (ref 8.3–10.6)
CHLORIDE SERPL-SCNC: 103 MMOL/L (ref 99–110)
CLARITY UR: CLEAR
CO2 SERPL-SCNC: 22 MMOL/L (ref 21–32)
COLOR UR: YELLOW
CREAT SERPL-MCNC: 1.3 MG/DL (ref 0.8–1.3)
DEPRECATED RDW RBC AUTO: 15.3 % (ref 12.4–15.4)
EKG ATRIAL RATE: 85 BPM
EKG DIAGNOSIS: NORMAL
EKG P AXIS: 61 DEGREES
EKG P-R INTERVAL: 164 MS
EKG Q-T INTERVAL: 388 MS
EKG QRS DURATION: 128 MS
EKG QTC CALCULATION (BAZETT): 461 MS
EKG R AXIS: -11 DEGREES
EKG T AXIS: 20 DEGREES
EKG VENTRICULAR RATE: 85 BPM
EOSINOPHIL # BLD: 0.1 K/UL (ref 0–0.6)
EOSINOPHIL NFR BLD: 1.4 %
EPI CELLS #/AREA URNS AUTO: 0 /HPF (ref 0–5)
FLUAV RNA RESP QL NAA+PROBE: NOT DETECTED
FLUBV RNA RESP QL NAA+PROBE: NOT DETECTED
GFR SERPLBLD CREATININE-BSD FMLA CKD-EPI: 58 ML/MIN/{1.73_M2}
GLUCOSE SERPL-MCNC: 233 MG/DL (ref 70–99)
GLUCOSE UR STRIP.AUTO-MCNC: 500 MG/DL
HCT VFR BLD AUTO: 29.2 % (ref 40.5–52.5)
HGB BLD-MCNC: 9.6 G/DL (ref 13.5–17.5)
HGB UR QL STRIP.AUTO: NEGATIVE
HYALINE CASTS #/AREA URNS AUTO: 0 /LPF (ref 0–8)
KETONES UR STRIP.AUTO-MCNC: NEGATIVE MG/DL
LEUKOCYTE ESTERASE UR QL STRIP.AUTO: ABNORMAL
LYMPHOCYTES # BLD: 1 K/UL (ref 1–5.1)
LYMPHOCYTES NFR BLD: 11.4 %
MCH RBC QN AUTO: 28.8 PG (ref 26–34)
MCHC RBC AUTO-ENTMCNC: 32.8 G/DL (ref 31–36)
MCV RBC AUTO: 87.6 FL (ref 80–100)
MONOCYTES # BLD: 0.7 K/UL (ref 0–1.3)
MONOCYTES NFR BLD: 8.2 %
NEUTROPHILS # BLD: 6.9 K/UL (ref 1.7–7.7)
NEUTROPHILS NFR BLD: 78.6 %
NITRITE UR QL STRIP.AUTO: NEGATIVE
PH UR STRIP.AUTO: 5.5 [PH] (ref 5–8)
PLATELET # BLD AUTO: 227 K/UL (ref 135–450)
PMV BLD AUTO: 7.6 FL (ref 5–10.5)
POTASSIUM SERPL-SCNC: 5 MMOL/L (ref 3.5–5.1)
PROT UR STRIP.AUTO-MCNC: 30 MG/DL
RBC # BLD AUTO: 3.33 M/UL (ref 4.2–5.9)
RBC CLUMPS #/AREA URNS AUTO: 1 /HPF (ref 0–4)
SARS-COV-2 RNA RESP QL NAA+PROBE: NOT DETECTED
SODIUM SERPL-SCNC: 135 MMOL/L (ref 136–145)
SP GR UR STRIP.AUTO: 1.01 (ref 1–1.03)
TROPONIN, HIGH SENSITIVITY: 18 NG/L (ref 0–22)
UA COMPLETE W REFLEX CULTURE PNL UR: ABNORMAL
UA DIPSTICK W REFLEX MICRO PNL UR: YES
URN SPEC COLLECT METH UR: ABNORMAL
UROBILINOGEN UR STRIP-ACNC: 0.2 E.U./DL
WBC # BLD AUTO: 8.8 K/UL (ref 4–11)
WBC #/AREA URNS AUTO: 0 /HPF (ref 0–5)

## 2023-11-22 PROCEDURE — 81001 URINALYSIS AUTO W/SCOPE: CPT

## 2023-11-22 PROCEDURE — 93010 ELECTROCARDIOGRAM REPORT: CPT | Performed by: INTERNAL MEDICINE

## 2023-11-22 PROCEDURE — 70450 CT HEAD/BRAIN W/O DYE: CPT

## 2023-11-22 PROCEDURE — 71045 X-RAY EXAM CHEST 1 VIEW: CPT

## 2023-11-22 ASSESSMENT — ENCOUNTER SYMPTOMS
DIARRHEA: 0
SHORTNESS OF BREATH: 0
VOMITING: 0
COUGH: 1
RHINORRHEA: 0
ABDOMINAL PAIN: 0
NAUSEA: 0

## 2023-11-22 NOTE — ED PROVIDER NOTES
[facility]        Pt Name: Radha Cordero  MRN: [id number]  [address] [facility] [place] 1950  Date of evaluation: 11/21/2023  Provider: Emil Daly PA-C  PCP: Héctor Guerrero Started: 1:58 AM EST 11/22/23       I have seen and evaluated this patient with my supervising physician Charleen Martinez, [address] [place] [address] [address]       Chief Complaint   Patient presents with    Cough    Extremity Weakness     Pt came from [facility] via [facility]. They sent him here d/t decrease in ADL's. HISTORY OF PRESENT ILLNESS: 1 or more Elements     History From: Patient  Limitations to history : None    Radha Cordero is a 67 y.o. male who presents to the emergency department today from [facility] for evaluation for concerns of generalized weakness, and cough. Per EMS, patient does live in a nursing home facilities, and they report that he has had increasing weakness, which prompted his visit to the ED tonight. Nursing home facility was concerned that the patient had increasing fatigue. Patient was recently admitted for increasing fatigue. Patient does have a cough which seems to have been ongoing for several weeks. Patient has no complaints of any pain. No reports of any fever or vomiting, no other history is able to be obtained. Nursing Notes were all reviewed and agreed with or any disagreements were addressed in the HPI. REVIEW OF SYSTEMS :      Review of Systems   Constitutional:  Negative for activity change, appetite change, chills and fever. HENT:  Negative for congestion and rhinorrhea. Respiratory:  Positive for cough. Negative for shortness of breath. Cardiovascular:  Negative for chest pain. Gastrointestinal:  Negative for abdominal pain, diarrhea, nausea and vomiting. Genitourinary:  Negative for difficulty urinating, dysuria and hematuria. Positives and Pertinent negatives as per HPI.      SURGICAL

## 2023-12-04 PROBLEM — N39.0 UTI (URINARY TRACT INFECTION): Status: RESOLVED | Noted: 2023-11-04 | Resolved: 2023-12-04

## 2024-09-11 PROBLEM — N18.31 CKD STAGE 3A, GFR 45-59 ML/MIN (HCC): Status: ACTIVE | Noted: 2024-09-11

## 2024-10-25 ENCOUNTER — OFFICE VISIT (OUTPATIENT)
Dept: ORTHOPEDIC SURGERY | Age: 74
End: 2024-10-25

## 2024-10-25 VITALS — BODY MASS INDEX: 22.02 KG/M2 | HEIGHT: 64 IN | WEIGHT: 129 LBS

## 2024-10-25 DIAGNOSIS — S42.031A TRAUMATIC CLOSED FRACTURE OF DISTAL CLAVICLE WITH MINIMAL DISPLACEMENT, RIGHT, INITIAL ENCOUNTER: Primary | ICD-10-CM

## 2024-10-25 NOTE — PROGRESS NOTES
ORTHOPAEDIC OFFICE NOTE    Chief Complaint   Patient presents with    Shoulder Pain     Right shoulder and clavicle pain       HPI  10/25/24  73 y.o. male RHD seen for evaluation of right shoulder injury:  The patient reports he fell approximately 2 weeks ago, fell onto the right side when going to the bathroom in the middle the night  He did not get x-rays until a few days ago  He is a nursing home resident, he is at [facility]  The pain is described over the superior lateral shoulder  Associated with swelling and bruising  Pain is rated 4/10  He uses a rolling walker for ambulation      No Known Allergies     Current Outpatient Medications   Medication Sig Dispense Refill    sodium bicarbonate 650 MG tablet Take 2 tablets by mouth 2 times daily      Vitamin D3 125 MCG (5000 UT) TABS tablet Take 1 tablet by mouth daily      magnesium oxide (MAG-OX) 400 MG tablet Take 1 tablet by mouth daily 30 tablet 1    ACIDOPHILUS LACTOBACILLUS PO Take by mouth      levothyroxine (SYNTHROID) 50 MCG tablet Take 1 tablet by mouth Daily      tamsulosin (FLOMAX) 0.4 MG capsule Take 1 capsule by mouth nightly      midodrine (PROAMATINE) 2.5 MG tablet Take 1 tablet by mouth daily Hold SBP > 110 and DBP >90; may give second dose as needed for low BP.      insulin aspart (NOVOLOG) 100 UNIT/ML injection vial Inject 0-12 Units into the skin 4 times daily  no insulin  151-200 2 units  201-250 4 units  251-300 6 units  301-350 8 units  351+ 12 units then recheck in 2 hours      simethicone (MYLICON) 125 MG chewable tablet Take 1 tablet by mouth every morning      mirtazapine (REMERON) 7.5 MG tablet Take 1 tablet by mouth nightly Indications: Appetite stimulant.      carvedilol (COREG) 6.25 MG tablet Take 1 tablet by mouth 2 times daily (with meals) 30 tablet 0    pantoprazole (PROTONIX) 40 MG tablet Take one tablet daily (Patient taking differently: Take 1 tablet by mouth every morning) 90 tablet 0    ASPIRIN LOW DOSE 81

## 2024-10-25 NOTE — PROGRESS NOTES
Procedures    Breg DLX Shoulder Immobilizer     Patient was prescribed a DLX Shoulder Immobilizer.  The right shoulder will require stabilization / immobilization from this orthosis.  The orthosis will assist in protecting the affected area, provide functional support and facilitate healing.    The patient was educated and fit by a healthcare professional with expert knowledge and specialization in brace application while under the direct supervision of the treating physician.  Verbal and written instructions for the use of and application of this item were provided.   They were instructed to contact the office immediately should the brace result in increased pain, decreased sensation, increased swelling or worsening of the condition.

## 2024-10-30 ENCOUNTER — TELEPHONE (OUTPATIENT)
Dept: ORTHOPEDIC SURGERY | Age: 74
End: 2024-10-30

## 2024-10-30 NOTE — TELEPHONE ENCOUNTER
Faxing Patient Information     Facility Name: [facility]  Contact Name: DERIAN  Contact Number: [phone]  Facility Fax Number: [phone]    [facility] VISIT INSTRUCTIONS. SHE ALSO STATES SHE WOULD LIKE TO KNOW IF THERE ARE ANY PHYSICAL THERAPY RESTRICTIONS. PLEASE CALL DERIAN AT ABOVE NUMBER.

## 2024-11-03 ENCOUNTER — APPOINTMENT (OUTPATIENT)
Dept: CT IMAGING | Age: 74
End: 2024-11-03
Payer: COMMERCIAL

## 2024-11-03 ENCOUNTER — APPOINTMENT (OUTPATIENT)
Dept: GENERAL RADIOLOGY | Age: 74
End: 2024-11-03
Payer: COMMERCIAL

## 2024-11-03 ENCOUNTER — HOSPITAL ENCOUNTER (INPATIENT)
Age: 74
LOS: 5 days | Discharge: NURSING, REHAB OR LONG-TERM CARE | End: 2024-11-08
Attending: EMERGENCY MEDICINE | Admitting: INTERNAL MEDICINE
Payer: COMMERCIAL
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
## 2024-11-03 DIAGNOSIS — S72.111A DISPLACED FRACTURE OF GREATER TROCHANTER OF RIGHT FEMUR, INITIAL ENCOUNTER FOR CLOSED FRACTURE (HCC): ICD-10-CM
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
## 2024-11-03 PROBLEM — S72.001D CLOSED FRACTURE OF RIGHT HIP WITH ROUTINE HEALING: Status: ACTIVE | Noted: 2024-11-03

## 2024-11-03 PROBLEM — S72.001A CLOSED RIGHT HIP FRACTURE, INITIAL ENCOUNTER (HCC): Status: ACTIVE | Noted: 2024-11-03

## 2024-11-03 LAB
ALBUMIN SERPL-MCNC: 3.9 G/DL (ref 3.4–5)
ALBUMIN/GLOB SERPL: 1.4 {RATIO} (ref 1.1–2.2)
ALP SERPL-CCNC: 158 U/L (ref 40–129)
ALT SERPL-CCNC: 9 U/L (ref 10–40)
ANION GAP SERPL CALCULATED.3IONS-SCNC: 9 MMOL/L (ref 3–16)
AST SERPL-CCNC: 14 U/L (ref 15–37)
BASOPHILS # BLD: 0.1 K/UL (ref 0–0.2)
BASOPHILS NFR BLD: 0.8 %
BILIRUB SERPL-MCNC: <0.2 MG/DL (ref 0–1)
BUN SERPL-MCNC: 32 MG/DL (ref 7–20)
CALCIUM SERPL-MCNC: 9 MG/DL (ref 8.3–10.6)
CHLORIDE SERPL-SCNC: 105 MMOL/L (ref 99–110)
CO2 SERPL-SCNC: 22 MMOL/L (ref 21–32)
CREAT SERPL-MCNC: 1.5 MG/DL (ref 0.8–1.3)
DEPRECATED RDW RBC AUTO: 13.5 % (ref 12.4–15.4)
EOSINOPHIL # BLD: 0.2 K/UL (ref 0–0.6)
EOSINOPHIL NFR BLD: 3.2 %
GFR SERPLBLD CREATININE-BSD FMLA CKD-EPI: 49 ML/MIN/{1.73_M2}
GLUCOSE BLD-MCNC: 72 MG/DL (ref 70–99)
GLUCOSE SERPL-MCNC: 75 MG/DL (ref 70–99)
HCT VFR BLD AUTO: 31.5 % (ref 40.5–52.5)
HGB BLD-MCNC: 10.8 G/DL (ref 13.5–17.5)
LYMPHOCYTES # BLD: 0.8 K/UL (ref 1–5.1)
LYMPHOCYTES NFR BLD: 10.9 %
MCH RBC QN AUTO: 31 PG (ref 26–34)
MCHC RBC AUTO-ENTMCNC: 34.5 G/DL (ref 31–36)
MCV RBC AUTO: 89.9 FL (ref 80–100)
MONOCYTES # BLD: 0.6 K/UL (ref 0–1.3)
MONOCYTES NFR BLD: 8.2 %
NEUTROPHILS # BLD: 6 K/UL (ref 1.7–7.7)
NEUTROPHILS NFR BLD: 76.9 %
PERFORMED ON: NORMAL
PLATELET # BLD AUTO: 233 K/UL (ref 135–450)
PMV BLD AUTO: 7.5 FL (ref 5–10.5)
POTASSIUM SERPL-SCNC: 5.4 MMOL/L (ref 3.5–5.1)
PROT SERPL-MCNC: 6.6 G/DL (ref 6.4–8.2)
RBC # BLD AUTO: 3.5 M/UL (ref 4.2–5.9)
SODIUM SERPL-SCNC: 136 MMOL/L (ref 136–145)
WBC # BLD AUTO: 7.7 K/UL (ref 4–11)

## 2024-11-03 PROCEDURE — 99285 EMERGENCY DEPT VISIT HI MDM: CPT

## 2024-11-03 PROCEDURE — 73700 CT LOWER EXTREMITY W/O DYE: CPT

## 2024-11-03 PROCEDURE — 83036 HEMOGLOBIN GLYCOSYLATED A1C: CPT

## 2024-11-03 PROCEDURE — 1200000000 HC SEMI PRIVATE

## 2024-11-03 PROCEDURE — 6360000002 HC RX W HCPCS: Performed by: INTERNAL MEDICINE

## 2024-11-03 PROCEDURE — 6370000000 HC RX 637 (ALT 250 FOR IP): Performed by: EMERGENCY MEDICINE

## 2024-11-03 PROCEDURE — 80053 COMPREHEN METABOLIC PANEL: CPT

## 2024-11-03 PROCEDURE — 6370000000 HC RX 637 (ALT 250 FOR IP): Performed by: INTERNAL MEDICINE

## 2024-11-03 PROCEDURE — 85025 COMPLETE CBC W/AUTO DIFF WBC: CPT

## 2024-11-03 PROCEDURE — 73502 X-RAY EXAM HIP UNI 2-3 VIEWS: CPT

## 2024-11-03 PROCEDURE — 2580000003 HC RX 258: Performed by: INTERNAL MEDICINE

## 2024-11-03 PROCEDURE — 93005 ELECTROCARDIOGRAM TRACING: CPT | Performed by: EMERGENCY MEDICINE

## 2024-11-03 PROCEDURE — 6360000002 HC RX W HCPCS: Performed by: NURSE PRACTITIONER

## 2024-11-03 RX ORDER — SENNOSIDES A AND B 8.6 MG/1
2 TABLET, FILM COATED ORAL 2 TIMES DAILY
COMMUNITY

## 2024-11-03 RX ORDER — MORPHINE SULFATE 2 MG/ML
1 INJECTION, SOLUTION INTRAMUSCULAR; INTRAVENOUS
Status: DISCONTINUED | OUTPATIENT
Start: 2024-11-03 | End: 2024-11-03 | Stop reason: SDUPTHER

## 2024-11-03 RX ORDER — DEXTROSE MONOHYDRATE 100 MG/ML
INJECTION, SOLUTION INTRAVENOUS CONTINUOUS PRN
Status: DISCONTINUED | OUTPATIENT
Start: 2024-11-03 | End: 2024-11-08 | Stop reason: HOSPADM

## 2024-11-03 RX ORDER — MORPHINE SULFATE 4 MG/ML
4 INJECTION, SOLUTION INTRAMUSCULAR; INTRAVENOUS
Status: DISCONTINUED | OUTPATIENT
Start: 2024-11-03 | End: 2024-11-08 | Stop reason: HOSPADM

## 2024-11-03 RX ORDER — ACETAMINOPHEN 500 MG
1000 TABLET ORAL ONCE
Status: DISCONTINUED | OUTPATIENT
Start: 2024-11-03 | End: 2024-11-03 | Stop reason: HOSPADM

## 2024-11-03 RX ORDER — FERROUS SULFATE 325(65) MG
325 TABLET ORAL 2 TIMES DAILY
Status: DISCONTINUED | OUTPATIENT
Start: 2024-11-03 | End: 2024-11-08 | Stop reason: HOSPADM

## 2024-11-03 RX ORDER — POLYETHYLENE GLYCOL 3350 17 G/17G
17 POWDER, FOR SOLUTION ORAL DAILY PRN
Status: DISCONTINUED | OUTPATIENT
Start: 2024-11-03 | End: 2024-11-08 | Stop reason: HOSPADM

## 2024-11-03 RX ORDER — POTASSIUM CHLORIDE 1500 MG/1
40 TABLET, EXTENDED RELEASE ORAL PRN
Status: DISCONTINUED | OUTPATIENT
Start: 2024-11-03 | End: 2024-11-03 | Stop reason: SDUPTHER

## 2024-11-03 RX ORDER — INSULIN LISPRO 100 [IU]/ML
0-8 INJECTION, SOLUTION INTRAVENOUS; SUBCUTANEOUS
Status: DISCONTINUED | OUTPATIENT
Start: 2024-11-03 | End: 2024-11-08 | Stop reason: HOSPADM

## 2024-11-03 RX ORDER — SODIUM CHLORIDE 9 MG/ML
INJECTION, SOLUTION INTRAVENOUS CONTINUOUS
Status: DISCONTINUED | OUTPATIENT
Start: 2024-11-03 | End: 2024-11-08 | Stop reason: HOSPADM

## 2024-11-03 RX ORDER — TRANEXAMIC ACID 10 MG/ML
1000 INJECTION, SOLUTION INTRAVENOUS
Status: DISCONTINUED | OUTPATIENT
Start: 2024-11-03 | End: 2024-11-03 | Stop reason: ALTCHOICE

## 2024-11-03 RX ORDER — CETIRIZINE HYDROCHLORIDE 10 MG/1
10 TABLET ORAL
Status: DISCONTINUED | OUTPATIENT
Start: 2024-11-03 | End: 2024-11-08 | Stop reason: HOSPADM

## 2024-11-03 RX ORDER — MORPHINE SULFATE 2 MG/ML
2 INJECTION, SOLUTION INTRAMUSCULAR; INTRAVENOUS
Status: DISCONTINUED | OUTPATIENT
Start: 2024-11-03 | End: 2024-11-08 | Stop reason: HOSPADM

## 2024-11-03 RX ORDER — POTASSIUM CHLORIDE 1500 MG/1
40 TABLET, EXTENDED RELEASE ORAL PRN
Status: DISCONTINUED | OUTPATIENT
Start: 2024-11-03 | End: 2024-11-08 | Stop reason: HOSPADM

## 2024-11-03 RX ORDER — INSULIN GLARGINE 100 [IU]/ML
17 INJECTION, SOLUTION SUBCUTANEOUS NIGHTLY
COMMUNITY

## 2024-11-03 RX ORDER — SENNOSIDES A AND B 8.6 MG/1
2 TABLET, FILM COATED ORAL 2 TIMES DAILY
Status: DISCONTINUED | OUTPATIENT
Start: 2024-11-03 | End: 2024-11-08 | Stop reason: HOSPADM

## 2024-11-03 RX ORDER — TRAZODONE HYDROCHLORIDE 50 MG/1
100 TABLET, FILM COATED ORAL NIGHTLY
Status: DISCONTINUED | OUTPATIENT
Start: 2024-11-03 | End: 2024-11-08 | Stop reason: HOSPADM

## 2024-11-03 RX ORDER — TAMSULOSIN HYDROCHLORIDE 0.4 MG/1
0.4 CAPSULE ORAL NIGHTLY
Status: DISCONTINUED | OUTPATIENT
Start: 2024-11-03 | End: 2024-11-08 | Stop reason: HOSPADM

## 2024-11-03 RX ORDER — SODIUM CHLORIDE 0.9 % (FLUSH) 0.9 %
10 SYRINGE (ML) INJECTION PRN
Status: DISCONTINUED | OUTPATIENT
Start: 2024-11-03 | End: 2024-11-08 | Stop reason: HOSPADM

## 2024-11-03 RX ORDER — CARVEDILOL 6.25 MG/1
6.25 TABLET ORAL 2 TIMES DAILY WITH MEALS
Status: DISCONTINUED | OUTPATIENT
Start: 2024-11-03 | End: 2024-11-08 | Stop reason: HOSPADM

## 2024-11-03 RX ORDER — SODIUM CHLORIDE 0.9 % (FLUSH) 0.9 %
5-40 SYRINGE (ML) INJECTION EVERY 12 HOURS SCHEDULED
Status: DISCONTINUED | OUTPATIENT
Start: 2024-11-03 | End: 2024-11-08 | Stop reason: HOSPADM

## 2024-11-03 RX ORDER — GLUCAGON 1 MG/ML
1 KIT INJECTION PRN
Status: DISCONTINUED | OUTPATIENT
Start: 2024-11-03 | End: 2024-11-08 | Stop reason: HOSPADM

## 2024-11-03 RX ORDER — HYDRALAZINE HYDROCHLORIDE 20 MG/ML
10 INJECTION INTRAMUSCULAR; INTRAVENOUS EVERY 6 HOURS PRN
Status: DISCONTINUED | OUTPATIENT
Start: 2024-11-03 | End: 2024-11-08 | Stop reason: HOSPADM

## 2024-11-03 RX ORDER — LIDOCAINE 50 MG/G
1 PATCH TOPICAL DAILY PRN
COMMUNITY

## 2024-11-03 RX ORDER — 0.9 % SODIUM CHLORIDE 0.9 %
500 INTRAVENOUS SOLUTION INTRAVENOUS PRN
Status: DISCONTINUED | OUTPATIENT
Start: 2024-11-03 | End: 2024-11-08 | Stop reason: HOSPADM

## 2024-11-03 RX ORDER — ACETAMINOPHEN 500 MG
500 TABLET ORAL EVERY 6 HOURS PRN
Status: DISCONTINUED | OUTPATIENT
Start: 2024-11-03 | End: 2024-11-08 | Stop reason: HOSPADM

## 2024-11-03 RX ORDER — POTASSIUM CHLORIDE 7.45 MG/ML
10 INJECTION INTRAVENOUS PRN
Status: DISCONTINUED | OUTPATIENT
Start: 2024-11-03 | End: 2024-11-08 | Stop reason: HOSPADM

## 2024-11-03 RX ORDER — MIDODRINE HYDROCHLORIDE 5 MG/1
2.5 TABLET ORAL DAILY
Status: DISCONTINUED | OUTPATIENT
Start: 2024-11-04 | End: 2024-11-08 | Stop reason: HOSPADM

## 2024-11-03 RX ORDER — LEVOTHYROXINE SODIUM 25 UG/1
50 TABLET ORAL DAILY
Status: DISCONTINUED | OUTPATIENT
Start: 2024-11-04 | End: 2024-11-08 | Stop reason: HOSPADM

## 2024-11-03 RX ORDER — MIRTAZAPINE 15 MG/1
7.5 TABLET, FILM COATED ORAL NIGHTLY
Status: DISCONTINUED | OUTPATIENT
Start: 2024-11-03 | End: 2024-11-08 | Stop reason: HOSPADM

## 2024-11-03 RX ORDER — INSULIN ASPART INJECTION 100 [IU]/ML
4-12 INJECTION, SOLUTION SUBCUTANEOUS
COMMUNITY

## 2024-11-03 RX ORDER — FAMOTIDINE 20 MG/1
20 TABLET, FILM COATED ORAL DAILY
COMMUNITY

## 2024-11-03 RX ORDER — PROMETHAZINE HYDROCHLORIDE 25 MG/ML
12.5 INJECTION, SOLUTION INTRAMUSCULAR; INTRAVENOUS
Status: COMPLETED | OUTPATIENT
Start: 2024-11-03 | End: 2024-11-03

## 2024-11-03 RX ORDER — OXYCODONE HYDROCHLORIDE 5 MG/1
10 TABLET ORAL EVERY 4 HOURS PRN
Status: DISCONTINUED | OUTPATIENT
Start: 2024-11-03 | End: 2024-11-08 | Stop reason: HOSPADM

## 2024-11-03 RX ORDER — SIMETHICONE 80 MG
125 TABLET,CHEWABLE ORAL EVERY MORNING
Status: DISCONTINUED | OUTPATIENT
Start: 2024-11-04 | End: 2024-11-08 | Stop reason: HOSPADM

## 2024-11-03 RX ORDER — INSULIN GLARGINE 100 [IU]/ML
17 INJECTION, SOLUTION SUBCUTANEOUS NIGHTLY
Status: DISCONTINUED | OUTPATIENT
Start: 2024-11-03 | End: 2024-11-08 | Stop reason: HOSPADM

## 2024-11-03 RX ORDER — SODIUM BICARBONATE 650 MG/1
1300 TABLET ORAL 2 TIMES DAILY
Status: DISCONTINUED | OUTPATIENT
Start: 2024-11-03 | End: 2024-11-08 | Stop reason: HOSPADM

## 2024-11-03 RX ORDER — LANOLIN ALCOHOL/MO/W.PET/CERES
400 CREAM (GRAM) TOPICAL DAILY
Status: DISCONTINUED | OUTPATIENT
Start: 2024-11-04 | End: 2024-11-08 | Stop reason: HOSPADM

## 2024-11-03 RX ORDER — ONDANSETRON 2 MG/ML
4 INJECTION INTRAMUSCULAR; INTRAVENOUS EVERY 6 HOURS PRN
Status: DISCONTINUED | OUTPATIENT
Start: 2024-11-03 | End: 2024-11-08 | Stop reason: HOSPADM

## 2024-11-03 RX ORDER — POTASSIUM CHLORIDE 7.45 MG/ML
10 INJECTION INTRAVENOUS PRN
Status: DISCONTINUED | OUTPATIENT
Start: 2024-11-03 | End: 2024-11-03 | Stop reason: SDUPTHER

## 2024-11-03 RX ORDER — ACETAMINOPHEN 500 MG
500 TABLET ORAL EVERY 6 HOURS PRN
COMMUNITY

## 2024-11-03 RX ORDER — FLUTICASONE PROPIONATE 50 MCG
1 SPRAY, SUSPENSION (ML) NASAL DAILY
Status: DISCONTINUED | OUTPATIENT
Start: 2024-11-04 | End: 2024-11-08 | Stop reason: HOSPADM

## 2024-11-03 RX ORDER — PAROXETINE 20 MG/1
40 TABLET, FILM COATED ORAL DAILY
Status: DISCONTINUED | OUTPATIENT
Start: 2024-11-04 | End: 2024-11-08 | Stop reason: HOSPADM

## 2024-11-03 RX ORDER — LORATADINE 10 MG/1
10 CAPSULE, LIQUID FILLED ORAL NIGHTLY
COMMUNITY

## 2024-11-03 RX ORDER — POLYETHYLENE GLYCOL 3350 17 G/17G
17 POWDER, FOR SOLUTION ORAL DAILY PRN
COMMUNITY

## 2024-11-03 RX ORDER — SODIUM CHLORIDE 9 MG/ML
INJECTION, SOLUTION INTRAVENOUS PRN
Status: DISCONTINUED | OUTPATIENT
Start: 2024-11-03 | End: 2024-11-08 | Stop reason: HOSPADM

## 2024-11-03 RX ORDER — ASPIRIN 81 MG/1
81 TABLET ORAL DAILY
Status: DISCONTINUED | OUTPATIENT
Start: 2024-11-04 | End: 2024-11-08 | Stop reason: HOSPADM

## 2024-11-03 RX ORDER — OXYCODONE HYDROCHLORIDE 5 MG/1
5 TABLET ORAL EVERY 4 HOURS PRN
Status: DISCONTINUED | OUTPATIENT
Start: 2024-11-03 | End: 2024-11-08 | Stop reason: HOSPADM

## 2024-11-03 RX ORDER — ENOXAPARIN SODIUM 100 MG/ML
40 INJECTION SUBCUTANEOUS DAILY
Status: DISCONTINUED | OUTPATIENT
Start: 2024-11-04 | End: 2024-11-08 | Stop reason: HOSPADM

## 2024-11-03 RX ORDER — ONDANSETRON 4 MG/1
4 TABLET, ORALLY DISINTEGRATING ORAL EVERY 8 HOURS PRN
Status: DISCONTINUED | OUTPATIENT
Start: 2024-11-03 | End: 2024-11-08 | Stop reason: HOSPADM

## 2024-11-03 RX ADMIN — ONDANSETRON 4 MG: 4 TABLET, ORALLY DISINTEGRATING ORAL at 18:36

## 2024-11-03 RX ADMIN — SODIUM CHLORIDE: 9 INJECTION, SOLUTION INTRAVENOUS at 19:55

## 2024-11-03 RX ADMIN — DICLOFENAC SODIUM 2 G: 10 GEL TOPICAL at 22:33

## 2024-11-03 RX ADMIN — FERROUS SULFATE TAB 325 MG (65 MG ELEMENTAL FE) 325 MG: 325 (65 FE) TAB at 22:28

## 2024-11-03 RX ADMIN — ACETAMINOPHEN 1000 MG: 500 TABLET ORAL at 15:14

## 2024-11-03 RX ADMIN — SENNOSIDES 17.2 MG: 8.6 TABLET, FILM COATED ORAL at 22:27

## 2024-11-03 RX ADMIN — TRAZODONE HYDROCHLORIDE 100 MG: 50 TABLET ORAL at 22:27

## 2024-11-03 RX ADMIN — PROMETHAZINE HYDROCHLORIDE 12.5 MG: 25 INJECTION INTRAMUSCULAR; INTRAVENOUS at 22:05

## 2024-11-03 RX ADMIN — MORPHINE SULFATE 2 MG: 2 INJECTION, SOLUTION INTRAMUSCULAR; INTRAVENOUS at 22:27

## 2024-11-03 RX ADMIN — MIRTAZAPINE 7.5 MG: 15 TABLET, FILM COATED ORAL at 22:28

## 2024-11-03 RX ADMIN — SODIUM BICARBONATE 1300 MG: 650 TABLET ORAL at 22:27

## 2024-11-03 RX ADMIN — OXYCODONE 10 MG: 5 TABLET ORAL at 18:36

## 2024-11-03 RX ADMIN — TAMSULOSIN HYDROCHLORIDE 0.4 MG: 0.4 CAPSULE ORAL at 22:28

## 2024-11-03 ASSESSMENT — PAIN SCALES - GENERAL
PAINLEVEL_OUTOF10: 6
PAINLEVEL_OUTOF10: 6
PAINLEVEL_OUTOF10: 0
PAINLEVEL_OUTOF10: 8
PAINLEVEL_OUTOF10: 8

## 2024-11-03 ASSESSMENT — PAIN DESCRIPTION - DESCRIPTORS
DESCRIPTORS: SHARP;SHOOTING
DESCRIPTORS: ACHING
DESCRIPTORS: ACHING

## 2024-11-03 ASSESSMENT — LIFESTYLE VARIABLES
HOW OFTEN DO YOU HAVE A DRINK CONTAINING ALCOHOL: NEVER
HOW MANY STANDARD DRINKS CONTAINING ALCOHOL DO YOU HAVE ON A TYPICAL DAY: PATIENT DOES NOT DRINK
HOW OFTEN DO YOU HAVE A DRINK CONTAINING ALCOHOL: NEVER
HOW MANY STANDARD DRINKS CONTAINING ALCOHOL DO YOU HAVE ON A TYPICAL DAY: PATIENT DOES NOT DRINK

## 2024-11-03 ASSESSMENT — PAIN DESCRIPTION - LOCATION
LOCATION: HIP
LOCATION: HIP

## 2024-11-03 ASSESSMENT — PAIN DESCRIPTION - FREQUENCY: FREQUENCY: INTERMITTENT

## 2024-11-03 ASSESSMENT — PAIN DESCRIPTION - PAIN TYPE: TYPE: ACUTE PAIN

## 2024-11-03 ASSESSMENT — PAIN DESCRIPTION - ORIENTATION
ORIENTATION: RIGHT

## 2024-11-03 NOTE — ED PROVIDER NOTES
[facility] EMERGENCY DEPARTMENT  EMERGENCY DEPARTMENT ENCOUNTER        Pt Name: Chauncey Mario  MRN: [id number]  Birthdate 1950  Date of evaluation: 11/3/2024  Provider: Abelardo Sanderson PA-C  PCP: Dae Rob MD  Note Started: 4:38 PM EST 11/3/24       I have seen and evaluated this patient with my supervising physician Ravinder Pascual DO.      CHIEF COMPLAINT       Chief Complaint   Patient presents with    Fall     Patient brought in by EMS [place] from [facility] after a fall (fell face forward). On arrival patient was hypotensive (56/20) and hypoglycemic (56).        HISTORY OF PRESENT ILLNESS: 1 or more Elements     History from : Patient    Limitations to history : None    Chauncey Mario is a 73 y.o. male with a history of hypertension, hyperlipidemia, CAD, CHF, former tobacco abuse, COPD, CKD, diabetes, Parkinson's disease and ataxia who presents to the emergency department today via ambulance from [facility] with report of right hip pain.  Patient states he was using his walker walking out of his room when his left leg \"gave out\".  He states he fractured his left femur about 4 months ago and has a gamma nail.  He has been doing physical therapy.  Patient's blood glucose was found to be 56.  He was also reportedly hypotensive at 56/20.  Patient's blood pressure is 131/108 on arrival to the emergency department.  Glucose is 72.  He is alert and oriented x 3 with GCS 15.  He has a normal neurologic exam.  NIH is 0.  He denies hitting his head or losing consciousness when he fell.  He denies neck or back injury.  He states all of his pain is in the right hip        Nursing Notes were all reviewed and agreed with or any disagreements were addressed in the HPI.    REVIEW OF SYSTEMS :      Review of Systems   Constitutional:  Negative for chills and fever.   Respiratory:  Negative for cough, chest tightness and shortness of breath.    Cardiovascular:  Negative

## 2024-11-03 NOTE — PROGRESS NOTES
Pt admitted for R hip fx    Full h+p to follow    Active Hospital Problems    Diagnosis Date Noted    Hyperlipidemia associated with type 2 diabetes mellitus (HCC) [E11.69, E78.5] 02/26/2023     Priority: Medium    Closed fracture of right hip with routine healing [S72.001D] 11/03/2024    HTN (hypertension) [I10] 11/04/2023    Anemia [D64.9] 09/10/2017    DM2 (diabetes mellitus, type 2) (HCC) [E11.9] 05/12/2013       Please use Allovueve to contact me with any questions during the day.   The hospitalist service will provide cross-coverage for this patient from 7pm to 7am.    During those hours, contact the on-call hospitalist MD/JAYME for questions.

## 2024-11-03 NOTE — H&P
History and Physical  Dr. Ibarra  11/3/2024    PCP: Dae Rob MD    Cc:   Chief Complaint   Patient presents with    Fall     Patient brought in by EMS [place] from [facility] after a fall (fell face forward). On arrival patient was hypotensive (56/20) and hypoglycemic (56).        HPI:  Chauncey Mario is a 73 y.o. male who has a past medical history of Arthritis, Ataxia, BPH (benign prostatic hyperplasia), CAD (coronary artery disease), Coronary atherosclerosis of native coronary artery, Diabetes mellitus (HCC), Environmental allergies, Hepatitis, Hydronephrosis, Hyperlipidemia, Hypertension, Kidney disease, Neuropathy, Parkinson's disease (HCC), S/P coronary artery stent placement x 4, and Schizoaffective disorder, bipolar type (HCC).     Patient presents with Closed fracture of right hip with routine healing.  HPI  (1-3 for expanded problem focused, >=4 for detailed/comprehensive)     73 y.o. male with a history of hypertension, hyperlipidemia, CAD, CHF, former tobacco abuse, COPD, CKD, diabetes, Parkinson's disease and ataxia who presents to the emergency department today via ambulance from [facility] with report of right hip pain.  Patient states he was using his walker walking out of his room when his left leg \"gave out\".  He states he fractured his left femur about 4 months ago and has a gamma nail.  He has been doing physical therapy.  Patient's blood glucose was found to be 56.  He was also reportedly hypotensive at 56/20.  Patient's blood pressure is 131/108 on arrival to the emergency department.  Glucose is 72.  He is alert and oriented x 3 with GCS 15.  He has a normal neurologic exam.  NIH is 0.      EKG completed shows no signs of acute ischemia or infarction     CBC is without leukocytosis or profound anemia.  BMP with a mild CKD.  LFTs with a mildly elevated alkaline phosphatase of 158     X-ray right hip and pelvis shows a comminuted nondisplaced fracture of the proximal right

## 2024-11-03 NOTE — PROGRESS NOTES
How does patient ambulate?   []Low Fall Risk (ambulates by themselves without support)  []Stand by assist   []Contact Guard   []Front wheel walker  []Wheelchair   []Steady  [x]Bed bound (Baseline Walker)  []History of Lower Extremity Amputation  []Unknown, did not assess in the emergency department   How does patient take pills?  [x]Whole with Water  []Crushed in applesauce  []Crushed in pudding  []Other  []Unknown no oral medications were given in the ED  Is patient alert?   [x]Alert  []Drowsy but responds to voice  []Doesn't respond to voice but responds to painful stimuli  []Unresponsive  Is patient oriented?   [x]To person  [x]To place  [x]To time  [x]To situation  []Confused  []Agitated  [x]Follows commands  If patient is disoriented or from a Skill Nursing Facility has family been notified of admission?   [x]Yes ([facility])  []No  Patient belongings?   []Cell phone  []Wallet   []Dentures  [x]Clothing - Sweater, undershirt (white), black pants. 2 socks and 1 pair of shoes)  Any specific patient or family belongings/needs/dynamics?   Patient is very heard of hearing.  Miscellaneous comments/pending orders?  All ED orders complete.     If there are any additional questions please reach out to the Emergency Department.

## 2024-11-03 NOTE — PROGRESS NOTES
4 Eyes Skin Assessment     NAME:  Chauncey Mario  YOB: 1950  MEDICAL RECORD NUMBER:  [id number]    The patient is being assessed for  Admission    I agree that at least one RN has performed a thorough Head to Toe Skin Assessment on the patient. ALL assessment sites listed below have been assessed.      Areas assessed by both nurses:    Head, Face, Ears, Shoulders, Back, Chest, Arms, Elbows, Hands, Sacrum. Buttock, Coccyx, Ischium, Legs. Feet and Heels, and Under Medical Devices         Does the Patient have a Wound? Yes wound(s) were present on assessment. LDA wound assessment was Initiated and completed by RN       Edison Prevention initiated by RN: Yes  Wound Care Orders initiated by RN: Yes    Pressure Injury (Stage 3,4, Unstageable, DTI, NWPT, and Complex wounds) if present, place Wound referral order by RN under : No    Patient with excoriation to buttocks. Scattered bruising    New Ostomies, if present place, Ostomy referral order under : No     Nurse 1 eSignature: Electronically signed by Trena Mandujano RN on 11/3/24 at 6:39 PM EST    **SHARE this note so that the co-signing nurse can place an eSignature**    Nurse 2 eSignature: Electronically signed by Eun Acosta RN on 11/3/24 at 6:43 PM EST

## 2024-11-03 NOTE — PROGRESS NOTES
Patient states the nurse at nursing facility gave both long acting and short acting insulin this morning. Patient states he only takes long acting at night and was still administered lantus. He is concerned this attributed to his  fall.

## 2024-11-03 NOTE — PLAN OF CARE
Problem: Chronic Conditions and Co-morbidities  Goal: Patient's chronic conditions and co-morbidity symptoms are monitored and maintained or improved  Outcome: Progressing  Flowsheets (Taken 11/3/2024 1827)  Care Plan - Patient's Chronic Conditions and Co-Morbidity Symptoms are Monitored and Maintained or Improved:   Collaborate with multidisciplinary team to address chronic and comorbid conditions and prevent exacerbation or deterioration   Update acute care plan with appropriate goals if chronic or comorbid symptoms are exacerbated and prevent overall improvement and discharge     Problem: Pain  Goal: Verbalizes/displays adequate comfort level or baseline comfort level  Outcome: Progressing  Flowsheets (Taken 11/3/2024 1827)  Verbalizes/displays adequate comfort level or baseline comfort level:   Assess pain using appropriate pain scale   Administer analgesics based on type and severity of pain and evaluate response   Encourage patient to monitor pain and request assistance     Problem: Safety - Adult  Goal: Free from fall injury  Outcome: Progressing  Flowsheets (Taken 11/3/2024 1827)  Free From Fall Injury:   Instruct family/caregiver on patient safety   Based on caregiver fall risk screen, instruct family/caregiver to ask for assistance with transferring infant if caregiver noted to have fall risk factors     Problem: ABCDS Injury Assessment  Goal: Absence of physical injury  Flowsheets (Taken 11/3/2024 1827)  Absence of Physical Injury: Implement safety measures based on patient assessment     Problem: Musculoskeletal - Adult  Goal: Return mobility to safest level of function  Outcome: Progressing  Flowsheets (Taken 11/3/2024 1827)  Return Mobility to Safest Level of Function: Assess patient stability and activity tolerance for standing, transferring and ambulating with or without assistive devices

## 2024-11-03 NOTE — ED PROVIDER NOTES
In addition to the advanced practice provider, I personally saw Chauncey Mario and performed a substantive portion of the visit including all aspects of the medical decision making. I made/approved the management plan and take responsibility for the patient management    Briefly, this is a 73 y.o. male here for fall which occurred at his nursing home just prior to arrival.  Patient states he was using his walker when he lost his balance and fell down onto his right side.  His blood glucose was reportedly low in the 50s at that time and he was hypotensive.  Received oral glucose en route by EMS.  Does not believe he struck his head.  He is reporting pain in his right hip.  Has prior history of left hip fracture, more recently a a right clavicular fracture 3 weeks ago for which she is in an arm sling.  Normotensive on arrival to the emergency department.    On exam, patient afebrile and nontoxic. No distress. Heart RRR. Lungs CTAB. Abdomen soft, nondistended, nontender to palpation in all quadrants. A&Ox4, speech is tangential however. Speech clear, face symmetric. CN 2-12 intact. 5/5 motor and sensation grossly intact all extremities. No pronator drift.  Gait not tested.  Right DP 2+.  Exquisite tenderness with compression of right hemipelvis elicited.  No focal thoracic or lumbar tenderness.  No midline cervical tenderness.  No external evidence of head injury.      EKG  EKG was reviewed by emergency department physician in the absence of a cardiologist    Wide complex sinus rhythm, rate 76, normal axis, normal TX and wide QRS intervals, normal Qtc, no ST elevations or depressions, normal t-wave morphology, impression NSR with RBBB, no STEMI, no significant change from comparison 11/21/2023        MDM    Patient afebrile and nontoxic.  He is in no distress.  Alert and protecting his airway.  Repeat blood glucose on arrival to the emergency department has normalized after oral glucose given by EMS.  He is not

## 2024-11-04 ENCOUNTER — ANESTHESIA EVENT (OUTPATIENT)
Dept: OPERATING ROOM | Age: 74
End: 2024-11-04
Payer: COMMERCIAL

## 2024-11-04 LAB
ANION GAP SERPL CALCULATED.3IONS-SCNC: 6 MMOL/L (ref 3–16)
BASOPHILS # BLD: 0.1 K/UL (ref 0–0.2)
BASOPHILS NFR BLD: 0.8 %
BUN SERPL-MCNC: 30 MG/DL (ref 7–20)
CALCIUM SERPL-MCNC: 8.3 MG/DL (ref 8.3–10.6)
CHLORIDE SERPL-SCNC: 104 MMOL/L (ref 99–110)
CO2 SERPL-SCNC: 24 MMOL/L (ref 21–32)
CREAT SERPL-MCNC: 1.6 MG/DL (ref 0.8–1.3)
DEPRECATED RDW RBC AUTO: 13.6 % (ref 12.4–15.4)
EKG ATRIAL RATE: 76 BPM
EKG DIAGNOSIS: NORMAL
EKG P AXIS: 71 DEGREES
EKG P-R INTERVAL: 178 MS
EKG Q-T INTERVAL: 370 MS
EKG QRS DURATION: 130 MS
EKG QTC CALCULATION (BAZETT): 416 MS
EKG R AXIS: -29 DEGREES
EKG T AXIS: -8 DEGREES
EKG VENTRICULAR RATE: 76 BPM
EOSINOPHIL # BLD: 0.1 K/UL (ref 0–0.6)
EOSINOPHIL NFR BLD: 1.9 %
EST. AVERAGE GLUCOSE BLD GHB EST-MCNC: 162.8 MG/DL
GFR SERPLBLD CREATININE-BSD FMLA CKD-EPI: 45 ML/MIN/{1.73_M2}
GLUCOSE BLD-MCNC: 244 MG/DL (ref 70–99)
GLUCOSE BLD-MCNC: 256 MG/DL (ref 70–99)
GLUCOSE BLD-MCNC: 262 MG/DL (ref 70–99)
GLUCOSE BLD-MCNC: 269 MG/DL (ref 70–99)
GLUCOSE SERPL-MCNC: 246 MG/DL (ref 70–99)
HBA1C MFR BLD: 7.3 %
HCT VFR BLD AUTO: 26.2 % (ref 40.5–52.5)
HGB BLD-MCNC: 9 G/DL (ref 13.5–17.5)
LYMPHOCYTES # BLD: 1 K/UL (ref 1–5.1)
LYMPHOCYTES NFR BLD: 14.7 %
MCH RBC QN AUTO: 30.7 PG (ref 26–34)
MCHC RBC AUTO-ENTMCNC: 34.2 G/DL (ref 31–36)
MCV RBC AUTO: 89.7 FL (ref 80–100)
MONOCYTES # BLD: 0.7 K/UL (ref 0–1.3)
MONOCYTES NFR BLD: 10.6 %
NEUTROPHILS # BLD: 4.7 K/UL (ref 1.7–7.7)
NEUTROPHILS NFR BLD: 72 %
PERFORMED ON: ABNORMAL
PLATELET # BLD AUTO: 196 K/UL (ref 135–450)
PMV BLD AUTO: 7.6 FL (ref 5–10.5)
POTASSIUM SERPL-SCNC: 5.1 MMOL/L (ref 3.5–5.1)
RBC # BLD AUTO: 2.92 M/UL (ref 4.2–5.9)
SODIUM SERPL-SCNC: 134 MMOL/L (ref 136–145)
WBC # BLD AUTO: 6.5 K/UL (ref 4–11)

## 2024-11-04 PROCEDURE — APPNB45 APP NON BILLABLE 31-45 MINUTES: Performed by: NURSE PRACTITIONER

## 2024-11-04 PROCEDURE — 2580000003 HC RX 258: Performed by: INTERNAL MEDICINE

## 2024-11-04 PROCEDURE — 1200000000 HC SEMI PRIVATE

## 2024-11-04 PROCEDURE — 6360000002 HC RX W HCPCS: Performed by: INTERNAL MEDICINE

## 2024-11-04 PROCEDURE — 80048 BASIC METABOLIC PNL TOTAL CA: CPT

## 2024-11-04 PROCEDURE — 6370000000 HC RX 637 (ALT 250 FOR IP): Performed by: INTERNAL MEDICINE

## 2024-11-04 PROCEDURE — 85025 COMPLETE CBC W/AUTO DIFF WBC: CPT

## 2024-11-04 PROCEDURE — 93010 ELECTROCARDIOGRAM REPORT: CPT | Performed by: INTERNAL MEDICINE

## 2024-11-04 PROCEDURE — 6370000000 HC RX 637 (ALT 250 FOR IP): Performed by: NURSE PRACTITIONER

## 2024-11-04 RX ORDER — ACETAMINOPHEN 500 MG
1000 TABLET ORAL 3 TIMES DAILY
Status: DISCONTINUED | OUTPATIENT
Start: 2024-11-04 | End: 2024-11-08 | Stop reason: HOSPADM

## 2024-11-04 RX ADMIN — CARVEDILOL 6.25 MG: 6.25 TABLET, FILM COATED ORAL at 08:39

## 2024-11-04 RX ADMIN — PAROXETINE HYDROCHLORIDE 40 MG: 20 TABLET, FILM COATED ORAL at 08:39

## 2024-11-04 RX ADMIN — MIRTAZAPINE 7.5 MG: 15 TABLET, FILM COATED ORAL at 20:27

## 2024-11-04 RX ADMIN — SODIUM BICARBONATE 1300 MG: 650 TABLET ORAL at 20:27

## 2024-11-04 RX ADMIN — INSULIN LISPRO 4 UNITS: 100 INJECTION, SOLUTION INTRAVENOUS; SUBCUTANEOUS at 08:40

## 2024-11-04 RX ADMIN — TRAZODONE HYDROCHLORIDE 100 MG: 50 TABLET ORAL at 20:27

## 2024-11-04 RX ADMIN — MORPHINE SULFATE 2 MG: 2 INJECTION, SOLUTION INTRAMUSCULAR; INTRAVENOUS at 01:35

## 2024-11-04 RX ADMIN — INSULIN LISPRO 4 UNITS: 100 INJECTION, SOLUTION INTRAVENOUS; SUBCUTANEOUS at 18:12

## 2024-11-04 RX ADMIN — METFORMIN HYDROCHLORIDE 500 MG: 500 TABLET ORAL at 08:40

## 2024-11-04 RX ADMIN — ONDANSETRON 4 MG: 2 INJECTION, SOLUTION INTRAMUSCULAR; INTRAVENOUS at 05:38

## 2024-11-04 RX ADMIN — SENNOSIDES 17.2 MG: 8.6 TABLET, FILM COATED ORAL at 20:27

## 2024-11-04 RX ADMIN — ACETAMINOPHEN 1000 MG: 500 TABLET ORAL at 10:11

## 2024-11-04 RX ADMIN — METFORMIN HYDROCHLORIDE 500 MG: 500 TABLET ORAL at 18:12

## 2024-11-04 RX ADMIN — SENNOSIDES 17.2 MG: 8.6 TABLET, FILM COATED ORAL at 08:40

## 2024-11-04 RX ADMIN — ACETAMINOPHEN 1000 MG: 500 TABLET ORAL at 20:27

## 2024-11-04 RX ADMIN — ASPIRIN 81 MG: 81 TABLET, COATED ORAL at 08:40

## 2024-11-04 RX ADMIN — INSULIN LISPRO 4 UNITS: 100 INJECTION, SOLUTION INTRAVENOUS; SUBCUTANEOUS at 11:26

## 2024-11-04 RX ADMIN — MORPHINE SULFATE 2 MG: 2 INJECTION, SOLUTION INTRAMUSCULAR; INTRAVENOUS at 05:38

## 2024-11-04 RX ADMIN — Medication 400 MG: at 08:39

## 2024-11-04 RX ADMIN — INSULIN GLARGINE 17 UNITS: 100 INJECTION, SOLUTION SUBCUTANEOUS at 20:30

## 2024-11-04 RX ADMIN — SODIUM BICARBONATE 1300 MG: 650 TABLET ORAL at 08:39

## 2024-11-04 RX ADMIN — MORPHINE SULFATE 4 MG: 4 INJECTION, SOLUTION INTRAMUSCULAR; INTRAVENOUS at 14:48

## 2024-11-04 RX ADMIN — MIDODRINE HYDROCHLORIDE 2.5 MG: 5 TABLET ORAL at 08:39

## 2024-11-04 RX ADMIN — CARVEDILOL 6.25 MG: 6.25 TABLET, FILM COATED ORAL at 18:12

## 2024-11-04 RX ADMIN — INSULIN LISPRO 2 UNITS: 100 INJECTION, SOLUTION INTRAVENOUS; SUBCUTANEOUS at 20:30

## 2024-11-04 RX ADMIN — SIMETHICONE 120 MG: 80 TABLET, CHEWABLE ORAL at 08:39

## 2024-11-04 RX ADMIN — CETIRIZINE HYDROCHLORIDE 10 MG: 10 TABLET, FILM COATED ORAL at 20:27

## 2024-11-04 RX ADMIN — TAMSULOSIN HYDROCHLORIDE 0.4 MG: 0.4 CAPSULE ORAL at 20:27

## 2024-11-04 RX ADMIN — FERROUS SULFATE TAB 325 MG (65 MG ELEMENTAL FE) 325 MG: 325 (65 FE) TAB at 20:27

## 2024-11-04 RX ADMIN — DICLOFENAC SODIUM 2 G: 10 GEL TOPICAL at 20:28

## 2024-11-04 RX ADMIN — FERROUS SULFATE TAB 325 MG (65 MG ELEMENTAL FE) 325 MG: 325 (65 FE) TAB at 08:40

## 2024-11-04 RX ADMIN — SODIUM CHLORIDE: 9 INJECTION, SOLUTION INTRAVENOUS at 11:25

## 2024-11-04 RX ADMIN — DICLOFENAC SODIUM 2 G: 10 GEL TOPICAL at 08:41

## 2024-11-04 RX ADMIN — ACETAMINOPHEN 1000 MG: 500 TABLET ORAL at 14:47

## 2024-11-04 RX ADMIN — ENOXAPARIN SODIUM 40 MG: 100 INJECTION SUBCUTANEOUS at 08:40

## 2024-11-04 RX ADMIN — DICLOFENAC SODIUM 2 G: 10 GEL TOPICAL at 14:48

## 2024-11-04 RX ADMIN — FLUTICASONE PROPIONATE 1 SPRAY: 50 SPRAY, METERED NASAL at 10:11

## 2024-11-04 ASSESSMENT — PAIN SCALES - GENERAL
PAINLEVEL_OUTOF10: 8
PAINLEVEL_OUTOF10: 6
PAINLEVEL_OUTOF10: 10
PAINLEVEL_OUTOF10: 3
PAINLEVEL_OUTOF10: 9

## 2024-11-04 ASSESSMENT — PAIN DESCRIPTION - LOCATION: LOCATION: HIP

## 2024-11-04 ASSESSMENT — PAIN DESCRIPTION - ORIENTATION: ORIENTATION: RIGHT

## 2024-11-04 NOTE — ANESTHESIA PRE PROCEDURE
Department of Anesthesiology  Preprocedure Note       Name:  Chauncey Mario   Age:  73 y.o.  :  1950                                          MRN:  [id number]         Date:  2024      Surgeon: Surgeon(s):  Ash Alexandre MD    Procedure: Procedure(s):  RIGHT HIP INTRA MEDULLARY NAILING - ALEXANDRE & NEPHEW    Medications prior to admission:   Prior to Admission medications    Medication Sig Start Date End Date Taking? Authorizing Provider   senna (SENOKOT) 8.6 MG tablet Take 2 tablets by mouth 2 times daily Take until 2024   Yes Mina Rey MD   diclofenac sodium (VOLTAREN) 1 % GEL Apply 2 g topically 3 times daily For acute Right Shoulder pain for 3 months. Apply 2g to right shoulder TID for pain.   Yes Mina Rey MD   loratadine (CLARITIN) 10 MG capsule Take 1 capsule by mouth at bedtime   Yes Mina Rey MD   insulin glargine (LANTUS) 100 UNIT/ML injection vial Inject 17 Units into the skin nightly Hold for blood sugar less than 100.   Yes Mina Rey MD   Insulin Aspart, w/Niacinamide, (FIASP FLEXTOUCH) 100 UNIT/ML SOPN Inject 4-12 Units into the skin 3 times daily (before meals) Inject as per sliding scale:  201-250 = 4 units  251-300 = 6 units  301-350 = 8 units  351-400 = 12 units Recheck in 2 hours and notify physician.   Yes Mina Rey MD   famotidine (PEPCID) 20 MG tablet Take 1 tablet by mouth daily   Yes Mina Rey MD   sodium bicarbonate 650 MG tablet Take 2 tablets by mouth 2 times daily Indications: Chronic Kidney Disease   Yes Mina Rey MD   vitamin D (CHOLECALCIFEROL) 74341 UNIT CAPS Take 1 capsule by mouth once a week .   Yes Mina Rey MD   magnesium oxide (MAG-OX) 400 MG tablet Take 1 tablet by mouth daily 10/16/24 1/14/25 Yes Simone Rg PA   Acidophilus Lactobacillus CAPS Take 1 capsule by mouth 2 times daily   Yes Mina Rey MD   levothyroxine (SYNTHROID) 50 MCG tablet Take

## 2024-11-04 NOTE — PLAN OF CARE
74yo M with nondisplaced intertrochanteric hip fracture    -Consult note to follow  -Will plan for ORIF on 11/5  -bedrest  -multimodal pain control, minimize opioids  -pressure area monitoring    Ash Alexandre MD  Orthopedic Surgery, Adult Reconstruction

## 2024-11-04 NOTE — DISCHARGE INSTR - COC
Continuity of Care Form    Patient Name: Chauncey Mario   :  1950  MRN:  [id number]    Admit date:  11/3/2024  Discharge date:  2024    Code Status Order: Full Code   Advance Directives:   Advance Care Flowsheet Documentation             Admitting Physician:  Margarito Ibarra MD  PCP: Dae Rob MD    Discharging Nurse: Zofia Villegas   Discharging Hospital Unit/Room#: 4TN-4477/4477-01  Discharging Unit Phone Number: [phone]    Emergency Contact:   Extended Emergency Contact Information  Primary Emergency Contact: Erika Alexandre  Home Phone: [phone]  Work Phone: [phone]  Relation: Legal Guardian    Past Surgical History:  Past Surgical History:   Procedure Laterality Date    ANKLE SURGERY Right 2018    ORIF of R ankle     COLONOSCOPY N/A 2018    COLONOSCOPY CONTROL HEMORRHAGE performed by Kamran Magallanes MD at [facility] ENDOSCOPY    CORONARY ANGIOPLASTY      CORONARY ANGIOPLASTY WITH STENT PLACEMENT      x 4    CYSTOSCOPY  14    transurethral vaporization of prostate    HERNIA REPAIR      X2    ORIF DISTAL RADIUS FRACTURE Left 2019    LEFT DISTAL RADIUS OPEN REDUCTION INTERNAL FIXATION   performed by Teddy Go MD at [facility] OR    UPPER GASTROINTESTINAL ENDOSCOPY N/A 2018    EGD BIOPSY performed by Kamran Magallanes MD at [facility] ENDOSCOPY    UPPER GASTROINTESTINAL ENDOSCOPY N/A 5/3/2019    EGD WITH ANESTHESIA performed by Kamran Magallanes MD at [facility] ENDOSCOPY    VASECTOMY         Immunization History:   Immunization History   Administered Date(s) Administered    COVID-19, PFIZER PURPLE top, DILUTE for use, (age 12 y+), 30mcg/0.3mL 2021, 2021, 2021, 10/21/2021, 2021    Influenza Virus Vaccine 10/10/2014    Pneumococcal, PCV-13, PREVNAR 13, (age 6w+), IM, 0.5mL 2017    TDaP, ADACEL (age 10y-64y), BOOSTRIX (age 10y+), IM, 0.5mL 2020       Active Problems:  Patient Active Problem  that worsens with inspiration, coughing up blood, light headedness, feelings of apprehension. If you experience any of these symptoms call the office or go to the closest ER.    WOUND CARE: The dressing will remain in place until post-op day 7. You can shower with the dressing but should not tub-bath or submerge your incision in water. Prior to any wound care please wash and dry your hands.  Keep your incision clean and dry. If the wound is dry, you do not need to cover it with a dressing. The visiting nurse, physical therapist or rehab facility can help remove the dressing. Should your incision start to drain let our office know.     Please follow-up with Dr. Alexandre in orthopedic clinic in about 2 weeks from day of surgery, call [phone] to make an appointment      Patient's personal belongings (please select all that are sent with patient):  Clothing     RN SIGNATURE:  Electronically signed by Zofia Villegas RN on 11/8/24 at 9:03 AM EST    CASE MANAGEMENT/SOCIAL WORK SECTION    Inpatient Status Date: 11/3/2024    Readmission Risk Assessment Score:  Readmission Risk              Risk of Unplanned Readmission:  25           Discharging to Facility/ Agency   [place] [address]  Phone: [phone]  Fax: [phone]       / signature: Meeta Crocker RN, BSN  [phone]     PHYSICIAN SECTION    Prognosis: Guarded    Condition at Discharge: Stable    Rehab Potential (if transferring to Rehab): Good    Recommended Labs or Other Treatments After Discharge: ***    Physician Certification: I certify the above information and transfer of Chauncey Mario  is necessary for the continuing treatment of the diagnosis listed and that he requires Skilled Nursing Facility for greater 30 days.     Update Admission H&P: No change in H&P    PHYSICIAN SIGNATURE:  Electronically signed by Margarito Ibarra MD on 11/8/24 at 8:29 AM EST

## 2024-11-04 NOTE — PROGRESS NOTES
Telephone consent completed with legal guardian Erika Alexandre at this time and verified by two nurses.

## 2024-11-04 NOTE — CONSULTS
[facility] Orthopedic Surgery  Consult Note    Patient: Chauncey Mario  Admit Date: 11/3/2024  Requesting Physician: Margarito Ibarra MD  Room: [address] [place] [address]    Chief complaint: RIGHT hip pain    HPI: Chauncey Mario is a 73 y.o. male who presented to [facility] ER with complaints of right hip pain after a fall.  He reports his leg just gave out.  He has parkinson and ataxia.  Sustained left hip fracture 4 years ago treated with IM nail.  Sustained right distal clavicle fracture treated non-op 3 weeks ago.  Denies shoulder pain.     Describes pain in the right hip of moderate intensity and of aching nature since the fall which is relieved by rest. Denies new numbness/tingling.       Independent imaging review of the right hip via plain films demonstrated: nondisplaced greater troch fracture with intertroch extension.    Patient lives in SNF and uses walker to ambulate very occasionally - is mainly only able to transfer bed to chair.      Relevant notes, labs and other tests reviewed.  Medical History:  Past Medical History:   Diagnosis Date    Arthritis     Ataxia 3/14/2014    BPH (benign prostatic hyperplasia)     CAD (coronary artery disease)     4 stents    Coronary atherosclerosis of native coronary artery 5/11/2013    Diabetes mellitus (HCC)     Environmental allergies     Hepatitis     Hydronephrosis 3/14/2014    Hyperlipidemia     Hypertension     Kidney disease     Neuropathy     Parkinson's disease (HCC)     ?    S/P coronary artery stent placement x 4     x 4    Schizoaffective disorder, bipolar type (HCC)      Past Surgical History:   Procedure Laterality Date    ANKLE SURGERY Right 06/09/2018    ORIF of R ankle     COLONOSCOPY N/A 12/13/2018    COLONOSCOPY CONTROL HEMORRHAGE performed by Kamran Magallanes MD at [facility] ENDOSCOPY    CORONARY ANGIOPLASTY      CORONARY ANGIOPLASTY WITH STENT PLACEMENT      x 4    CYSTOSCOPY  4/4/14    transurethral vaporization of prostate    HERNIA REPAIR      X2     ORIF DISTAL RADIUS FRACTURE Left 6/20/2019    LEFT DISTAL RADIUS OPEN REDUCTION INTERNAL FIXATION   performed by Teddy Go MD at [facility] OR    UPPER GASTROINTESTINAL ENDOSCOPY N/A 12/13/2018    EGD BIOPSY performed by Kamran Magallanes MD at [facility] ENDOSCOPY    UPPER GASTROINTESTINAL ENDOSCOPY N/A 5/3/2019    EGD WITH ANESTHESIA performed by Kamran Magallanes MD at [facility] ENDOSCOPY    VASECTOMY         Social History:    reports that he quit smoking about 11 years ago. His smoking use included cigarettes. He started smoking about 36 years ago. He has a 25 pack-year smoking history. He has never used smokeless tobacco.    Family History:        Problem Relation Age of Onset    Other Mother         CEREBRAL PALSY    Heart Disease Father         CHF       Medications:  ALL MEDICATIONS HAVE BEEN REVIEWED:  Scheduled:   fluticasone  1 spray Nasal Daily    traZODone  100 mg Oral Nightly    ferrous sulfate  325 mg Oral BID    metFORMIN  500 mg Oral BID WC    PARoxetine  40 mg Oral Daily    aspirin  81 mg Oral Daily    carvedilol  6.25 mg Oral BID WC    simethicone  120 mg Oral QAM    mirtazapine  7.5 mg Oral Nightly    tamsulosin  0.4 mg Oral Nightly    midodrine  2.5 mg Oral Daily    levothyroxine  50 mcg Oral Daily    sodium bicarbonate  1,300 mg Oral BID    magnesium oxide  400 mg Oral Daily    senna  2 tablet Oral BID    diclofenac sodium  2 g Topical TID    insulin glargine  17 Units SubCUTAneous Nightly    cetirizine  10 mg Oral QHS    sodium chloride flush  5-40 mL IntraVENous 2 times per day    enoxaparin  40 mg SubCUTAneous Daily    insulin lispro  0-8 Units SubCUTAneous 4x Daily AC & HS     Continuous:   sodium chloride 75 mL/hr at 11/03/24 1955    sodium chloride      dextrose       PRN:acetaminophen, polyethylene glycol, sodium chloride flush, sodium chloride, potassium chloride **OR** potassium alternative oral replacement **OR** potassium chloride, oxyCODONE **OR**

## 2024-11-04 NOTE — PROGRESS NOTES
Occupational and Physical Therapy  Chauncey Mario    PT and OT orders received. Plan is for hip ORIF surgery on 11/5/24.  Will hold therapy evaluations until after surgery.    Thank you,  Sarah Lopez, OTR/L [id number]  Marian Sheehan PT, DPT [id number]

## 2024-11-04 NOTE — PROGRESS NOTES
voice recognition program.  Efforts were made to edit the dictations but occasionally words are mis-transcribed.)        Margarito Ibarra MD  11/4/2024    Please use 3Nod to contact me with any questions during the day.   The hospitalist service will provide cross-coverage for this patient from 7pm to 7am.    During those hours, contact the on-call hospitalist MD/JAYME for questions.

## 2024-11-04 NOTE — CARE COORDINATION
Case Management Assessment  Initial Evaluation    Date/Time of Evaluation: 11/4/2024 2:41 PM  Assessment Completed by: PALLAVI Ramirez    If patient is discharged prior to next notation, then this note serves as note for discharge by case management.    Patient Name: Chauncey Mario                   YOB: 1950  Diagnosis: Closed right hip fracture, initial encounter ([facility]) [S72.001A]  Fall at nursing home, initial encounter [W19.XXXA, Y92.129]  Displaced fracture of greater trochanter of right femur, initial encounter for closed fracture ([facility]) [S72.111A]                   Date / Time: 11/3/2024  2:05 PM    Patient Admission Status: Inpatient   Readmission Risk (Low < 19, Mod (19-27), High > 27): Readmission Risk Score: 19.4    Current PCP: Dae Rob MD  PCP verified by CM? (P) Yes ([facility])    Chart Reviewed: Yes      History Provided by: (P) Patient, Other (see comment) (Legal Guardian Erika Alexandre)  Patient Orientation: Alert and Oriented, Person, Place, Situation    Patient Cognition: Alert    Hospitalization in the last 30 days (Readmission):  No    If yes, Readmission Assessment in CM Navigator will be completed.    Advance Directives:      Code Status: Full Code   Patient's Primary Decision Maker is: Named in Scanned ACP Document      Discharge Planning:    Patient lives with: (P) Other (Comment) (Legal Guardian Erika Alexandre) Type of Home: (P) Long-Term Care ([facility])  Primary Care Giver: Other (Comment) ([facility]  Patient Support Systems include: (P)  (Tulio Zaldivar, Legal Guardian Erika Alexandre)   Current Financial resources: (P) Medicare, Medicaid  Current community resources: (P) None  Current services prior to admission: (P) Extended Care Placement ([facility])            Current DME:              Type of Home Care services:  (P) None    ADLS  Prior functional level: (P) Assistance with the following:, Bathing, Dressing, Mobility, Cooking  Current  functional level: (P) Assistance with the following:, Dressing, Bathing, Mobility, Cooking    PT AM-PAC:   /24  OT AM-PAC:   /24    Family can provide assistance at DC: (P) No  Would you like Case Management to discuss the discharge plan with any other family members/significant others, and if so, who? (P) No  Plans to Return to Present Housing: (P) Yes  Other Identified Issues/Barriers to RETURNING to current housing: Surgery Tuesday  Potential Assistance needed at discharge: (P) Extended Care Facility, Durable Medical Equipment ([facility])            Potential DME: (P) Walker  Patient expects to discharge to: (P) Long-term care ([facility])  Plan for transportation at discharge: (P) Self    Financial    Payor: SHARPE cVidya[place]ISHNAVI [place] / [facility] / Product Type: *No Product type* /     Does insurance require precert for SNF: No, Per Valdez at [facility] patient will not need to wait for precert to return.     Potential assistance Purchasing Medications: (P) No  Meds-to-Beds request: No      [facility] [id number] - [facility] [address] [facility] - P [phone] - F [phone]  [address] [facility] [address] [place] [address]  Phone: [phone] Fax: [phone]    [facility] [place] [facility] [address] -  [phone] - F [phone]  [address] [id number]  Phone: [phone] Fax: [phone]    [facility] - [place], [place] - [address] [facility] [address] [facility] - P [phone] - F [phone]  [address] [facility] [address]  Phone: [phone] Fax: [phone]      Notes:    Factors facilitating achievement of predicted outcomes: Cooperative    Barriers to discharge: Hip surgery Tuesday    Additional Case Management Notes:  confirmed with patient that he is from [facility] and plans to return at discharge.  Patient reports he has lived there for 5 years.   confirmed with Valdez at [facility] that

## 2024-11-05 ENCOUNTER — APPOINTMENT (OUTPATIENT)
Dept: GENERAL RADIOLOGY | Age: 74
End: 2024-11-05
Payer: COMMERCIAL

## 2024-11-05 ENCOUNTER — ANESTHESIA (OUTPATIENT)
Dept: OPERATING ROOM | Age: 74
End: 2024-11-05
Payer: COMMERCIAL

## 2024-11-05 LAB
ANION GAP SERPL CALCULATED.3IONS-SCNC: 11 MMOL/L (ref 3–16)
BASOPHILS # BLD: 0 K/UL (ref 0–0.2)
BASOPHILS NFR BLD: 0.5 %
BUN SERPL-MCNC: 32 MG/DL (ref 7–20)
CALCIUM SERPL-MCNC: 8.2 MG/DL (ref 8.3–10.6)
CHLORIDE SERPL-SCNC: 106 MMOL/L (ref 99–110)
CO2 SERPL-SCNC: 19 MMOL/L (ref 21–32)
CREAT SERPL-MCNC: 1.8 MG/DL (ref 0.8–1.3)
DEPRECATED RDW RBC AUTO: 13.5 % (ref 12.4–15.4)
EOSINOPHIL # BLD: 0.2 K/UL (ref 0–0.6)
EOSINOPHIL NFR BLD: 2.7 %
GFR SERPLBLD CREATININE-BSD FMLA CKD-EPI: 39 ML/MIN/{1.73_M2}
GLUCOSE BLD-MCNC: 100 MG/DL (ref 70–99)
GLUCOSE BLD-MCNC: 174 MG/DL (ref 70–99)
GLUCOSE BLD-MCNC: 217 MG/DL (ref 70–99)
GLUCOSE BLD-MCNC: 283 MG/DL (ref 70–99)
GLUCOSE BLD-MCNC: 97 MG/DL (ref 70–99)
GLUCOSE SERPL-MCNC: 101 MG/DL (ref 70–99)
HCT VFR BLD AUTO: 26 % (ref 40.5–52.5)
HGB BLD-MCNC: 8.7 G/DL (ref 13.5–17.5)
LYMPHOCYTES # BLD: 1.1 K/UL (ref 1–5.1)
LYMPHOCYTES NFR BLD: 14.5 %
MCH RBC QN AUTO: 29.8 PG (ref 26–34)
MCHC RBC AUTO-ENTMCNC: 33.4 G/DL (ref 31–36)
MCV RBC AUTO: 89.5 FL (ref 80–100)
MONOCYTES # BLD: 0.6 K/UL (ref 0–1.3)
MONOCYTES NFR BLD: 8.8 %
NEUTROPHILS # BLD: 5.3 K/UL (ref 1.7–7.7)
NEUTROPHILS NFR BLD: 73.5 %
PERFORMED ON: ABNORMAL
PERFORMED ON: NORMAL
PLATELET # BLD AUTO: 175 K/UL (ref 135–450)
PMV BLD AUTO: 7.6 FL (ref 5–10.5)
POTASSIUM SERPL-SCNC: 4.9 MMOL/L (ref 3.5–5.1)
RBC # BLD AUTO: 2.9 M/UL (ref 4.2–5.9)
REASON FOR REJECTION: NORMAL
REJECTED TEST: NORMAL
SODIUM SERPL-SCNC: 136 MMOL/L (ref 136–145)
WBC # BLD AUTO: 7.2 K/UL (ref 4–11)

## 2024-11-05 PROCEDURE — 2580000003 HC RX 258: Performed by: INTERNAL MEDICINE

## 2024-11-05 PROCEDURE — 3700000001 HC ADD 15 MINUTES (ANESTHESIA): Performed by: ORTHOPAEDIC SURGERY

## 2024-11-05 PROCEDURE — 73502 X-RAY EXAM HIP UNI 2-3 VIEWS: CPT

## 2024-11-05 PROCEDURE — 6360000002 HC RX W HCPCS: Performed by: ORTHOPAEDIC SURGERY

## 2024-11-05 PROCEDURE — 2580000003 HC RX 258: Performed by: ORTHOPAEDIC SURGERY

## 2024-11-05 PROCEDURE — 80048 BASIC METABOLIC PNL TOTAL CA: CPT

## 2024-11-05 PROCEDURE — 7100000001 HC PACU RECOVERY - ADDTL 15 MIN: Performed by: ORTHOPAEDIC SURGERY

## 2024-11-05 PROCEDURE — 85025 COMPLETE CBC W/AUTO DIFF WBC: CPT

## 2024-11-05 PROCEDURE — 6370000000 HC RX 637 (ALT 250 FOR IP): Performed by: INTERNAL MEDICINE

## 2024-11-05 PROCEDURE — 6360000002 HC RX W HCPCS: Performed by: NURSE ANESTHETIST, CERTIFIED REGISTERED

## 2024-11-05 PROCEDURE — 3600000014 HC SURGERY LEVEL 4 ADDTL 15MIN: Performed by: ORTHOPAEDIC SURGERY

## 2024-11-05 PROCEDURE — 99222 1ST HOSP IP/OBS MODERATE 55: CPT | Performed by: ORTHOPAEDIC SURGERY

## 2024-11-05 PROCEDURE — 27245 TREAT THIGH FRACTURE: CPT | Performed by: ORTHOPAEDIC SURGERY

## 2024-11-05 PROCEDURE — 36415 COLL VENOUS BLD VENIPUNCTURE: CPT

## 2024-11-05 PROCEDURE — 3700000000 HC ANESTHESIA ATTENDED CARE: Performed by: ORTHOPAEDIC SURGERY

## 2024-11-05 PROCEDURE — 2500000003 HC RX 250 WO HCPCS: Performed by: NURSE ANESTHETIST, CERTIFIED REGISTERED

## 2024-11-05 PROCEDURE — 6370000000 HC RX 637 (ALT 250 FOR IP): Performed by: NURSE PRACTITIONER

## 2024-11-05 PROCEDURE — 1200000000 HC SEMI PRIVATE

## 2024-11-05 PROCEDURE — 6360000002 HC RX W HCPCS

## 2024-11-05 PROCEDURE — 2720000010 HC SURG SUPPLY STERILE: Performed by: ORTHOPAEDIC SURGERY

## 2024-11-05 PROCEDURE — 0QS636Z REPOSITION RIGHT UPPER FEMUR WITH INTRAMEDULLARY INTERNAL FIXATION DEVICE, PERCUTANEOUS APPROACH: ICD-10-PCS | Performed by: ORTHOPAEDIC SURGERY

## 2024-11-05 PROCEDURE — 7100000000 HC PACU RECOVERY - FIRST 15 MIN: Performed by: ORTHOPAEDIC SURGERY

## 2024-11-05 PROCEDURE — C1713 ANCHOR/SCREW BN/BN,TIS/BN: HCPCS | Performed by: ORTHOPAEDIC SURGERY

## 2024-11-05 PROCEDURE — 3600000004 HC SURGERY LEVEL 4 BASE: Performed by: ORTHOPAEDIC SURGERY

## 2024-11-05 PROCEDURE — 6360000002 HC RX W HCPCS: Performed by: ANESTHESIOLOGY

## 2024-11-05 PROCEDURE — 2709999900 HC NON-CHARGEABLE SUPPLY: Performed by: ORTHOPAEDIC SURGERY

## 2024-11-05 DEVICE — TRIGEN INTERTAN 10S 10MM X 18CM 125DEGREE
Type: IMPLANTABLE DEVICE | Site: HIP | Status: FUNCTIONAL
Brand: TRIGEN

## 2024-11-05 DEVICE — INTERTAN LAG/COMPRESSION SCREW KIT                                    90MM / 85MM
Type: IMPLANTABLE DEVICE | Site: HIP | Status: FUNCTIONAL
Brand: TRIGEN

## 2024-11-05 DEVICE — TRIGEN LOW PROFILE SCREW 5.0MM X 30MM
Type: IMPLANTABLE DEVICE | Site: HIP | Status: FUNCTIONAL
Brand: TRIGEN

## 2024-11-05 RX ORDER — OXYCODONE HYDROCHLORIDE 5 MG/1
5 TABLET ORAL
Status: DISCONTINUED | OUTPATIENT
Start: 2024-11-05 | End: 2024-11-05 | Stop reason: HOSPADM

## 2024-11-05 RX ORDER — ONDANSETRON 2 MG/ML
4 INJECTION INTRAMUSCULAR; INTRAVENOUS
Status: DISCONTINUED | OUTPATIENT
Start: 2024-11-05 | End: 2024-11-05 | Stop reason: HOSPADM

## 2024-11-05 RX ORDER — DEXAMETHASONE SODIUM PHOSPHATE 4 MG/ML
INJECTION, SOLUTION INTRA-ARTICULAR; INTRALESIONAL; INTRAMUSCULAR; INTRAVENOUS; SOFT TISSUE
Status: DISCONTINUED | OUTPATIENT
Start: 2024-11-05 | End: 2024-11-05 | Stop reason: SDUPTHER

## 2024-11-05 RX ORDER — LABETALOL HYDROCHLORIDE 5 MG/ML
10 INJECTION, SOLUTION INTRAVENOUS
Status: DISCONTINUED | OUTPATIENT
Start: 2024-11-05 | End: 2024-11-05 | Stop reason: HOSPADM

## 2024-11-05 RX ORDER — HYDRALAZINE HYDROCHLORIDE 20 MG/ML
10 INJECTION INTRAMUSCULAR; INTRAVENOUS
Status: DISCONTINUED | OUTPATIENT
Start: 2024-11-05 | End: 2024-11-05 | Stop reason: HOSPADM

## 2024-11-05 RX ORDER — NALOXONE HYDROCHLORIDE 0.4 MG/ML
INJECTION, SOLUTION INTRAMUSCULAR; INTRAVENOUS; SUBCUTANEOUS PRN
Status: DISCONTINUED | OUTPATIENT
Start: 2024-11-05 | End: 2024-11-05 | Stop reason: HOSPADM

## 2024-11-05 RX ORDER — HYDROMORPHONE HYDROCHLORIDE 2 MG/ML
INJECTION, SOLUTION INTRAMUSCULAR; INTRAVENOUS; SUBCUTANEOUS
Status: COMPLETED
Start: 2024-11-05 | End: 2024-11-05

## 2024-11-05 RX ORDER — TRANEXAMIC ACID 100 MG/ML
INJECTION, SOLUTION INTRAVENOUS
Status: DISCONTINUED | OUTPATIENT
Start: 2024-11-05 | End: 2024-11-05 | Stop reason: SDUPTHER

## 2024-11-05 RX ORDER — ONDANSETRON 2 MG/ML
INJECTION INTRAMUSCULAR; INTRAVENOUS
Status: DISCONTINUED | OUTPATIENT
Start: 2024-11-05 | End: 2024-11-05 | Stop reason: SDUPTHER

## 2024-11-05 RX ORDER — ROCURONIUM BROMIDE 10 MG/ML
INJECTION, SOLUTION INTRAVENOUS
Status: DISCONTINUED | OUTPATIENT
Start: 2024-11-05 | End: 2024-11-05 | Stop reason: SDUPTHER

## 2024-11-05 RX ORDER — GLYCOPYRROLATE 0.2 MG/ML
INJECTION INTRAMUSCULAR; INTRAVENOUS
Status: DISCONTINUED | OUTPATIENT
Start: 2024-11-05 | End: 2024-11-05 | Stop reason: SDUPTHER

## 2024-11-05 RX ORDER — BUPIVACAINE HYDROCHLORIDE 5 MG/ML
INJECTION, SOLUTION EPIDURAL; INTRACAUDAL
Status: COMPLETED | OUTPATIENT
Start: 2024-11-05 | End: 2024-11-05

## 2024-11-05 RX ORDER — LIDOCAINE HYDROCHLORIDE 20 MG/ML
INJECTION, SOLUTION INFILTRATION; PERINEURAL
Status: DISCONTINUED | OUTPATIENT
Start: 2024-11-05 | End: 2024-11-05 | Stop reason: SDUPTHER

## 2024-11-05 RX ORDER — FENTANYL CITRATE 50 UG/ML
INJECTION, SOLUTION INTRAMUSCULAR; INTRAVENOUS
Status: DISCONTINUED | OUTPATIENT
Start: 2024-11-05 | End: 2024-11-05 | Stop reason: SDUPTHER

## 2024-11-05 RX ORDER — HYDROMORPHONE HYDROCHLORIDE 2 MG/ML
0.5 INJECTION, SOLUTION INTRAMUSCULAR; INTRAVENOUS; SUBCUTANEOUS EVERY 5 MIN PRN
Status: DISCONTINUED | OUTPATIENT
Start: 2024-11-05 | End: 2024-11-05 | Stop reason: HOSPADM

## 2024-11-05 RX ORDER — ETOMIDATE 2 MG/ML
INJECTION INTRAVENOUS
Status: DISCONTINUED | OUTPATIENT
Start: 2024-11-05 | End: 2024-11-05 | Stop reason: SDUPTHER

## 2024-11-05 RX ADMIN — FLUTICASONE PROPIONATE 1 SPRAY: 50 SPRAY, METERED NASAL at 08:39

## 2024-11-05 RX ADMIN — FENTANYL CITRATE 50 MCG: 50 INJECTION, SOLUTION INTRAMUSCULAR; INTRAVENOUS at 11:55

## 2024-11-05 RX ADMIN — TRANEXAMIC ACID 1000 MG: 1 INJECTION, SOLUTION INTRAVENOUS at 12:23

## 2024-11-05 RX ADMIN — GLYCOPYRROLATE 0.2 MG: 0.2 INJECTION, SOLUTION INTRAMUSCULAR; INTRAVENOUS at 11:52

## 2024-11-05 RX ADMIN — ETOMIDATE 20 MG: 20 INJECTION, SOLUTION INTRAVENOUS at 11:55

## 2024-11-05 RX ADMIN — CEFAZOLIN 2000 MG: 2 INJECTION, POWDER, FOR SOLUTION INTRAMUSCULAR; INTRAVENOUS at 11:45

## 2024-11-05 RX ADMIN — ROCURONIUM BROMIDE 50 MG: 10 INJECTION, SOLUTION INTRAVENOUS at 11:55

## 2024-11-05 RX ADMIN — SODIUM CHLORIDE: 9 INJECTION, SOLUTION INTRAVENOUS at 01:34

## 2024-11-05 RX ADMIN — LIDOCAINE HYDROCHLORIDE 100 MG: 20 INJECTION, SOLUTION INFILTRATION; PERINEURAL at 11:55

## 2024-11-05 RX ADMIN — INSULIN LISPRO 2 UNITS: 100 INJECTION, SOLUTION INTRAVENOUS; SUBCUTANEOUS at 16:33

## 2024-11-05 RX ADMIN — ACETAMINOPHEN 1000 MG: 500 TABLET ORAL at 14:32

## 2024-11-05 RX ADMIN — INSULIN GLARGINE 17 UNITS: 100 INJECTION, SOLUTION SUBCUTANEOUS at 21:14

## 2024-11-05 RX ADMIN — INSULIN LISPRO 4 UNITS: 100 INJECTION, SOLUTION INTRAVENOUS; SUBCUTANEOUS at 21:14

## 2024-11-05 RX ADMIN — LEVOTHYROXINE SODIUM 50 MCG: 0.07 TABLET ORAL at 05:06

## 2024-11-05 RX ADMIN — ACETAMINOPHEN 1000 MG: 500 TABLET ORAL at 21:09

## 2024-11-05 RX ADMIN — TRAZODONE HYDROCHLORIDE 100 MG: 50 TABLET ORAL at 21:11

## 2024-11-05 RX ADMIN — CARVEDILOL 6.25 MG: 6.25 TABLET, FILM COATED ORAL at 16:33

## 2024-11-05 RX ADMIN — HYDROMORPHONE HYDROCHLORIDE 0.5 MG: 2 INJECTION, SOLUTION INTRAMUSCULAR; INTRAVENOUS; SUBCUTANEOUS at 13:21

## 2024-11-05 RX ADMIN — MIRTAZAPINE 7.5 MG: 15 TABLET, FILM COATED ORAL at 21:09

## 2024-11-05 RX ADMIN — CETIRIZINE HYDROCHLORIDE 10 MG: 10 TABLET, FILM COATED ORAL at 21:13

## 2024-11-05 RX ADMIN — DICLOFENAC SODIUM 2 G: 10 GEL TOPICAL at 14:32

## 2024-11-05 RX ADMIN — DICLOFENAC SODIUM 2 G: 10 GEL TOPICAL at 08:39

## 2024-11-05 RX ADMIN — HYDROMORPHONE HYDROCHLORIDE 0.5 MG: 2 INJECTION, SOLUTION INTRAMUSCULAR; INTRAVENOUS; SUBCUTANEOUS at 13:40

## 2024-11-05 RX ADMIN — SODIUM CHLORIDE, PRESERVATIVE FREE 10 ML: 5 INJECTION INTRAVENOUS at 08:39

## 2024-11-05 RX ADMIN — FENTANYL CITRATE 50 MCG: 50 INJECTION, SOLUTION INTRAMUSCULAR; INTRAVENOUS at 12:20

## 2024-11-05 RX ADMIN — SUGAMMADEX 200 MG: 100 INJECTION, SOLUTION INTRAVENOUS at 12:41

## 2024-11-05 RX ADMIN — DEXAMETHASONE SODIUM PHOSPHATE 4 MG: 4 INJECTION, SOLUTION INTRAMUSCULAR; INTRAVENOUS at 11:59

## 2024-11-05 RX ADMIN — DICLOFENAC SODIUM 2 G: 10 GEL TOPICAL at 21:13

## 2024-11-05 RX ADMIN — PHENYLEPHRINE HYDROCHLORIDE 100 MCG: 10 INJECTION INTRAVENOUS at 12:01

## 2024-11-05 RX ADMIN — METFORMIN HYDROCHLORIDE 500 MG: 500 TABLET ORAL at 16:33

## 2024-11-05 RX ADMIN — ONDANSETRON 4 MG: 2 INJECTION INTRAMUSCULAR; INTRAVENOUS at 12:01

## 2024-11-05 RX ADMIN — SENNOSIDES 17.2 MG: 8.6 TABLET, FILM COATED ORAL at 21:11

## 2024-11-05 RX ADMIN — FERROUS SULFATE TAB 325 MG (65 MG ELEMENTAL FE) 325 MG: 325 (65 FE) TAB at 21:11

## 2024-11-05 RX ADMIN — TAMSULOSIN HYDROCHLORIDE 0.4 MG: 0.4 CAPSULE ORAL at 21:09

## 2024-11-05 RX ADMIN — SODIUM BICARBONATE 1300 MG: 650 TABLET ORAL at 21:11

## 2024-11-05 ASSESSMENT — PAIN DESCRIPTION - ORIENTATION
ORIENTATION: RIGHT

## 2024-11-05 ASSESSMENT — PAIN SCALES - GENERAL
PAINLEVEL_OUTOF10: 2
PAINLEVEL_OUTOF10: 10
PAINLEVEL_OUTOF10: 8

## 2024-11-05 ASSESSMENT — PAIN DESCRIPTION - LOCATION
LOCATION: HIP

## 2024-11-05 ASSESSMENT — PAIN DESCRIPTION - DESCRIPTORS: DESCRIPTORS: ACHING

## 2024-11-05 ASSESSMENT — PAIN DESCRIPTION - PAIN TYPE: TYPE: SURGICAL PAIN

## 2024-11-05 ASSESSMENT — LIFESTYLE VARIABLES: SMOKING_STATUS: 0

## 2024-11-05 ASSESSMENT — PAIN - FUNCTIONAL ASSESSMENT: PAIN_FUNCTIONAL_ASSESSMENT: NONE - DENIES PAIN

## 2024-11-05 NOTE — CARE COORDINATION
CM reviewed chart for discharge planning.    Pt having R hip surgery today.    Pt is LTC at [facility] and can return without pre cert when stable.    Meeta Crocker RN, BSN  [phone]

## 2024-11-05 NOTE — PROGRESS NOTES
Progress Note - Dr. Ibarra - Internal Medicine  PCP: Dae Rob MD [address] [facility]- 2nd Floor / [facility]* [phone]    Hospital Day: 2  Code Status: Full Code  Current Diet: Diet NPO        CC: follow up on medical issues    Subjective:   Chauncey Mario is a 73 y.o. male.    Pt seen and examined  Chart reviewed since last visit, labs and imaging below      Doing ok  No new issues  Plan is for surgery this morning      Review of Systems: (1 system for EPF, 2-9 for detailed, 10+ for comprehensive)  Constitutional: Negative for chills and fever.     HENT: Negative for dental problem, nosebleeds and rhinorrhea.    Hard of hearing  Eyes: Negative for photophobia and visual disturbance.     Respiratory: Negative for cough, chest tightness and shortness of breath.      Cardiovascular: Negative for chest pain and leg swelling.     Gastrointestinal: Negative for diarrhea, nausea and vomiting.     Endocrine: Negative for polydipsia and polyphagia.     Genitourinary: Negative for frequency, hematuria and urgency.     Musculoskeletal: Negative for back pain and myalgias.     Skin: Negative for rash.   Positive for hip pain  Allergic/Immunologic: Negative for food allergies.     Neurological: Negative for dizziness, seizures, syncope and facial asymmetry.     Hematological: Negative for adenopathy.     Psychiatric/Behavioral: Negative for dysphoric mood. The patient is not nervous/anxious.        I have reviewed the patient's medical and social history in detail and updated the computerized patient record.  To recap: He  has a past medical history of Arthritis, Ataxia, BPH (benign prostatic hyperplasia), CAD (coronary artery disease), Coronary atherosclerosis of native coronary artery, Diabetes mellitus (HCC), Environmental allergies, Hepatitis, Hydronephrosis, Hyperlipidemia, Hypertension, Kidney disease, Neuropathy, Parkinson's disease (HCC), S/P coronary artery stent placement x 4, and Schizoaffective  bolus 10% 250 mL, 250 mL, IntraVENous, PRN  glucagon injection 1 mg, 1 mg, SubCUTAneous, PRN  dextrose 10 % infusion, , IntraVENous, Continuous PRN         Objective:  /74   Pulse 73   Temp 98.2 °F (36.8 °C) (Oral)   Resp 18   Ht 1.626 m (5' 4\")   Wt 61.8 kg (136 lb 4.8 oz)   SpO2 94%   BMI 23.40 kg/m²      Patient Vitals for the past 24 hrs:   BP Temp Temp src Pulse Resp SpO2 Weight   11/05/24 0530 -- -- -- -- -- -- 61.8 kg (136 lb 4.8 oz)   11/05/24 0345 110/74 98.2 °F (36.8 °C) Oral 73 18 94 % --   11/04/24 2345 (!) 96/58 98 °F (36.7 °C) Oral 66 18 95 % --   11/04/24 2015 133/67 97.8 °F (36.6 °C) Oral 75 16 97 % --   11/04/24 1800 115/71 -- -- 78 -- 96 % --   11/04/24 0830 114/67 98.4 °F (36.9 °C) Oral 86 18 96 % --     Patient Vitals for the past 96 hrs (Last 3 readings):   Weight   11/05/24 0530 61.8 kg (136 lb 4.8 oz)   11/04/24 0645 60 kg (132 lb 4.4 oz)   11/03/24 1804 60.6 kg (133 lb 9.6 oz)           Intake/Output Summary (Last 24 hours) at 11/5/2024 0813  Last data filed at 11/4/2024 1211  Gross per 24 hour   Intake 240 ml   Output --   Net 240 ml         Physical Exam: (2-7 system for EPF/Detailed, >=8 for Comprehensive)  /74   Pulse 73   Temp 98.2 °F (36.8 °C) (Oral)   Resp 18   Ht 1.626 m (5' 4\")   Wt 61.8 kg (136 lb 4.8 oz)   SpO2 94%   BMI 23.40 kg/m²   Constitutional: vitals as above: alert, appears stated age and cooperative    Psychiatric: normal insight and judgment, oriented to person, place, time, and general circumstances    Head: Normocephalic, without obvious abnormality, atraumatic    Eyes:lids and lashes normal, conjunctivae and sclerae normal and pupils equal, round, reactive to light and accomodation    EMNT: external ears normal, nares midline    Neck: no carotid bruit, supple, symmetrical, trachea midline and thyroid not enlarged, symmetric, no tenderness/mass/nodules     Respiratory: clear to auscultation and percussion bilaterally with normal respiratory

## 2024-11-05 NOTE — PLAN OF CARE

## 2024-11-05 NOTE — PROGRESS NOTES
Patient to PACU from OR, arouses to voice. VSS- on 6L simple mask satting high 90s. Sinus tach on monitor. Dressing to R hip CDI, ice applied. R pedal pulse 1+ with extremity warm. Will continue to monitor

## 2024-11-05 NOTE — PROGRESS NOTES
Shift assessment complete, alert with intermittent confusion, VSS administered all night meds, surgery tomorrow, NPO at midnight, denies pain, all safety precautions in place.

## 2024-11-05 NOTE — PROGRESS NOTES
Patient awake resting in bed. VSS. Dressing to R hip CDI. R pedal pulse 1+- confirmed with doppler. Extremity warm. Verbal orders per Dr. Alexandre patient does not need xray- order d/c'd. Patient rating pain tolerable and denies nausea- tolerating ice chips. Phase one criteria met, will transfer patient back to

## 2024-11-05 NOTE — ANESTHESIA PRE PROCEDURE
Department of Anesthesiology  Preprocedure Note       Name:  Chauncey Mario   Age:  73 y.o.  :  1950                                          MRN:  [id number]         Date:  2024      Surgeon: Surgeon(s):  Ash Alexandre MD    Procedure: Procedure(s):  RIGHT HIP INTRA MEDULLARY NAILING - ALEXANDRE & NEPHEW    Medications prior to admission:   Prior to Admission medications    Medication Sig Start Date End Date Taking? Authorizing Provider   senna (SENOKOT) 8.6 MG tablet Take 2 tablets by mouth 2 times daily Take until 2024   Yes Mina Rey MD   diclofenac sodium (VOLTAREN) 1 % GEL Apply 2 g topically 3 times daily For acute Right Shoulder pain for 3 months. Apply 2g to right shoulder TID for pain.   Yes Mina Rey MD   loratadine (CLARITIN) 10 MG capsule Take 1 capsule by mouth at bedtime   Yes Mina Rey MD   insulin glargine (LANTUS) 100 UNIT/ML injection vial Inject 17 Units into the skin nightly Hold for blood sugar less than 100.   Yes Mina Rey MD   Insulin Aspart, w/Niacinamide, (FIASP FLEXTOUCH) 100 UNIT/ML SOPN Inject 4-12 Units into the skin 3 times daily (before meals) Inject as per sliding scale:  201-250 = 4 units  251-300 = 6 units  301-350 = 8 units  351-400 = 12 units Recheck in 2 hours and notify physician.   Yes Mina Rey MD   famotidine (PEPCID) 20 MG tablet Take 1 tablet by mouth daily   Yes Mina Rey MD   sodium bicarbonate 650 MG tablet Take 2 tablets by mouth 2 times daily Indications: Chronic Kidney Disease   Yes Mina Rey MD   vitamin D (CHOLECALCIFEROL) 82061 UNIT CAPS Take 1 capsule by mouth once a week .   Yes Mina Rey MD   magnesium oxide (MAG-OX) 400 MG tablet Take 1 tablet by mouth daily 10/16/24 1/14/25 Yes Simone Rg PA   Acidophilus Lactobacillus CAPS Take 1 capsule by mouth 2 times daily   Yes Mina Rye MD   levothyroxine (SYNTHROID) 50 MCG tablet Take

## 2024-11-05 NOTE — ANESTHESIA POSTPROCEDURE EVALUATION
Department of Anesthesiology  Postprocedure Note    Patient: Chauncey Mario  MRN: [id number]  YOB: 1950  Date of evaluation: 11/5/2024    Procedure Summary       Date: 11/05/24 Room / Location: [address]    Anesthesia Start: 1150 Anesthesia Stop: 1305    Procedure: RIGHT HIP INTRA MEDULLARY NAILING - ALEXANDRE & NEPHEW (Right: Hip) Diagnosis:       Closed displaced intertrochanteric fracture of right femur, initial encounter ([facility])      (Closed displaced intertrochanteric fracture of right femur, initial encounter ([facility]) [S72.141A])    Surgeons: Ash Alexandre MD Responsible Provider: Pelon Villagran MD    Anesthesia Type: general ASA Status: 4            Anesthesia Type: No value filed.    Wendy Phase I: Wendy Score: 10    Wendy Phase II:      Anesthesia Post Evaluation    Patient location during evaluation: PACU  Patient participation: complete - patient participated  Level of consciousness: awake and alert  Airway patency: patent  Nausea & Vomiting: no vomiting and no nausea  Cardiovascular status: hemodynamically stable  Respiratory status: acceptable  Hydration status: stable  Pain management: adequate    No notable events documented.

## 2024-11-05 NOTE — OP NOTE
Patient: Chauncey Mario                    : 1950     MRN: [id number]     Date: 24     SURGEON: Ash Alexandre MD     ASSISTANT:     PREOPERATIVE DIAGNOSES:     1) RIGHT intertrochanteric hip fracture      POSTOPERATIVE DIAGNOSES: Same     PROCEDURES: Open reduction internal fixation of RIGHT hip fracture with cephalomedullary device     ANESTHESIA: General    COMPLICATIONS: None     ESTIMATED BLOOD LOSS: <100mL     SPECIMENS: none     DRAINS: none     TOURNIQUET TIME:  Tourniquet was not used.     IMPLANTS USED:   Implant Name Type Inv. Item Serial No.  Lot No. LRB No. Used Action   NAIL IM L180MM [id number] 125DEG SHT GAMAL INTERTROCHANTERIC AG - [id number]  NAIL IM L180MM [id number] 125DEG SHT GAMAL INTERTROCHANTERIC AG  [employer] [facility] [id number] Right 1 Implanted   KIT SCR LAG L90MM [id number] COMPR L85MM DIA7MM INTEGR INTLOK - [id number]  KIT SCR LAG L90MM [id number] COMPR L85MM DIA7MM INTEGR INTLOK  [place] [facility] [id number] Right 1 Implanted   SCREW BNE L30MM DIA5MM YUNIER TI INT CAPT HEX LO PROF FOR IM - [id number]  SCREW BNE L30MM DIA5MM YUNIER TI INT CAPT HEX LO PROF FOR IM  [employer]- [id number] Right 1 Implanted        INDICATIONS: Chauncey Mario is a 73 y.o. male with who presented to the ED with a hip fracture.  He was found in the emergency department to have an intertrochanteric hip fracture.  Due to concerns for prolonged immobilization and immobilization related complications decision was made to proceed with operative fixation.  The risks and benefits of surgery were discussed with the patient, as well as the alternatives to surgery and the expected post-operative course. Informed consent was obtained.     DESCRIPTION OF OPERATION: The patient was met in the preoperative holding area where the informed consent was reviewed and the operative site was marked.  He was then taken back to the Operating Room. After induction of   anesthesia, He was then placed into the supine position on a fracture table.  The operative leg was positioned in traction adequately padded with a traction boot.  The nonoperative leg was placed in a well leg burns padded to cushion the bony prominences.  All bony prominences were well padded.  At this point reduction was performed with traction adduction and internal rotation of the operative extremity x-ray confirmed near anatomic reduction on both AP and lateral.       The operative extremity was prepped and draped in the usual sterile fashion. A safety timeout was performed where the correct patient, planned operative procedure, and correct operative site was reviewed and agreed upon by all Operating Room staff. It was also confirmed that the patient had received a dose of intravenous prophylactic antibiotics and TXA.         PROCEDURE: X-ray was brought into the lateral position where a guidewire was used to confirm the trajectory of the femur on the lateral x-ray.  This was marked on the skin.  Additionally an anterior posterior line was marked from the ASIS posteriorly creating a grid.  In the proximal posterior quadrant a guidewire was inserted this was driven through the fascia contacting the greater trochanter.  Using AP and lateral x-ray this was confirmed to be in the center of the tip of the greater trochanter projecting down the shaft of the femur.  This was gently impacted into position down the shaft.  Next a scalpel was used to incise the skin down through the fascia to the level of the greater trochanter with direct palpation this pathway was confirmed.  Next an opening reamer was used with a soft tissue protector with care to protect the surrounding soft tissue.  Next the implant was introduced into the proximal femoral canal and inserted to a depth where the locking screw had an adequate trajectory into the femoral neck.  Next a lateral guide was placed to identify the incision for the locking

## 2024-11-05 NOTE — PLAN OF CARE
Problem: Chronic Conditions and Co-morbidities  Goal: Patient's chronic conditions and co-morbidity symptoms are monitored and maintained or improved  11/5/2024 0922 by Zofia Villegas RN Flowsheets (Taken 11/5/2024 0922)  Care Plan - Patient's Chronic Conditions and Co-Morbidity Symptoms are Monitored and Maintained or Improved: Monitor and assess patient's chronic conditions and comorbid symptoms for stability, deterioration, or improvement     Problem: Discharge Planning  Goal: Discharge to home or other facility with appropriate resources  11/5/2024 0922 by Zofia Villegas RN Flowsheets (Taken 11/5/2024 0922)  Discharge to home or other facility with appropriate resources:   Arrange for needed discharge resources and transportation as appropriate   Identify barriers to discharge with patient and caregiver   Identify discharge learning needs (meds, wound care, etc)     Problem: Pain  Goal: Verbalizes/displays adequate comfort level or baseline comfort level  11/5/2024 0922 by Zofia Villegas RN Flowsheets (Taken 11/5/2024 0922)  Verbalizes/displays adequate comfort level or baseline comfort level:   Encourage patient to monitor pain and request assistance   Administer analgesics based on type and severity of pain and evaluate response   Assess pain using appropriate pain scale     Problem: Safety - Adult  Goal: Free from fall injury  11/5/2024 0922 by Zofia Villegas RN Flowsheets (Taken 11/5/2024 0922)  Free From Fall Injury: Instruct family/caregiver on patient safety     Problem: ABCDS Injury Assessment  Goal: Absence of physical injury  11/5/2024 0922 by Zofia Villegas RN Flowsheets (Taken 11/5/2024 0922)  Absence of Physical Injury: Implement safety measures based on patient assessment

## 2024-11-05 NOTE — PROGRESS NOTES
Physical/Occupational Therapy  Chauncey Mario    Orders received, chart reviewed. Pt to OR this date for R hip intramedullary nailing. Will re-attempt evaluation tomorrow as schedule allows and pt becomes medically appropriate.    Marian Sheehan PT, DPT [id number]   Sarah Lopez, OTR/L [id number]

## 2024-11-06 LAB
ANION GAP SERPL CALCULATED.3IONS-SCNC: 10 MMOL/L (ref 3–16)
ANION GAP SERPL CALCULATED.3IONS-SCNC: 10 MMOL/L (ref 3–16)
BASOPHILS # BLD: 0 K/UL (ref 0–0.2)
BASOPHILS NFR BLD: 0.2 %
BUN SERPL-MCNC: 34 MG/DL (ref 7–20)
BUN SERPL-MCNC: 38 MG/DL (ref 7–20)
CALCIUM SERPL-MCNC: 8.5 MG/DL (ref 8.3–10.6)
CALCIUM SERPL-MCNC: 8.5 MG/DL (ref 8.3–10.6)
CHLORIDE SERPL-SCNC: 104 MMOL/L (ref 99–110)
CHLORIDE SERPL-SCNC: 107 MMOL/L (ref 99–110)
CO2 SERPL-SCNC: 21 MMOL/L (ref 21–32)
CO2 SERPL-SCNC: 22 MMOL/L (ref 21–32)
CREAT SERPL-MCNC: 1.6 MG/DL (ref 0.8–1.3)
CREAT SERPL-MCNC: 1.8 MG/DL (ref 0.8–1.3)
DEPRECATED RDW RBC AUTO: 13.9 % (ref 12.4–15.4)
EOSINOPHIL # BLD: 0 K/UL (ref 0–0.6)
EOSINOPHIL NFR BLD: 0 %
GFR SERPLBLD CREATININE-BSD FMLA CKD-EPI: 39 ML/MIN/{1.73_M2}
GFR SERPLBLD CREATININE-BSD FMLA CKD-EPI: 45 ML/MIN/{1.73_M2}
GLUCOSE BLD-MCNC: 191 MG/DL (ref 70–99)
GLUCOSE BLD-MCNC: 229 MG/DL (ref 70–99)
GLUCOSE BLD-MCNC: 248 MG/DL (ref 70–99)
GLUCOSE BLD-MCNC: 284 MG/DL (ref 70–99)
GLUCOSE SERPL-MCNC: 193 MG/DL (ref 70–99)
GLUCOSE SERPL-MCNC: 269 MG/DL (ref 70–99)
HCT VFR BLD AUTO: 23.5 % (ref 40.5–52.5)
HGB BLD-MCNC: 8 G/DL (ref 13.5–17.5)
LYMPHOCYTES # BLD: 0.5 K/UL (ref 1–5.1)
LYMPHOCYTES NFR BLD: 6 %
MCH RBC QN AUTO: 30.8 PG (ref 26–34)
MCHC RBC AUTO-ENTMCNC: 34.3 G/DL (ref 31–36)
MCV RBC AUTO: 89.8 FL (ref 80–100)
MONOCYTES # BLD: 0.6 K/UL (ref 0–1.3)
MONOCYTES NFR BLD: 7.7 %
NEUTROPHILS # BLD: 6.9 K/UL (ref 1.7–7.7)
NEUTROPHILS NFR BLD: 86.1 %
PERFORMED ON: ABNORMAL
PLATELET # BLD AUTO: 188 K/UL (ref 135–450)
PMV BLD AUTO: 7.5 FL (ref 5–10.5)
POTASSIUM SERPL-SCNC: 5.3 MMOL/L (ref 3.5–5.1)
POTASSIUM SERPL-SCNC: 5.7 MMOL/L (ref 3.5–5.1)
RBC # BLD AUTO: 2.61 M/UL (ref 4.2–5.9)
SODIUM SERPL-SCNC: 136 MMOL/L (ref 136–145)
SODIUM SERPL-SCNC: 138 MMOL/L (ref 136–145)
WBC # BLD AUTO: 8.1 K/UL (ref 4–11)

## 2024-11-06 PROCEDURE — 97530 THERAPEUTIC ACTIVITIES: CPT

## 2024-11-06 PROCEDURE — 6370000000 HC RX 637 (ALT 250 FOR IP): Performed by: PHYSICIAN ASSISTANT

## 2024-11-06 PROCEDURE — 1200000000 HC SEMI PRIVATE

## 2024-11-06 PROCEDURE — 6360000002 HC RX W HCPCS: Performed by: PHYSICIAN ASSISTANT

## 2024-11-06 PROCEDURE — 85025 COMPLETE CBC W/AUTO DIFF WBC: CPT

## 2024-11-06 PROCEDURE — 99024 POSTOP FOLLOW-UP VISIT: CPT | Performed by: NURSE PRACTITIONER

## 2024-11-06 PROCEDURE — 80048 BASIC METABOLIC PNL TOTAL CA: CPT

## 2024-11-06 PROCEDURE — 6370000000 HC RX 637 (ALT 250 FOR IP): Performed by: INTERNAL MEDICINE

## 2024-11-06 PROCEDURE — 97166 OT EVAL MOD COMPLEX 45 MIN: CPT

## 2024-11-06 PROCEDURE — 2500000003 HC RX 250 WO HCPCS: Performed by: INTERNAL MEDICINE

## 2024-11-06 PROCEDURE — 97161 PT EVAL LOW COMPLEX 20 MIN: CPT

## 2024-11-06 PROCEDURE — 6370000000 HC RX 637 (ALT 250 FOR IP): Performed by: NURSE PRACTITIONER

## 2024-11-06 PROCEDURE — 97535 SELF CARE MNGMENT TRAINING: CPT

## 2024-11-06 PROCEDURE — 2580000003 HC RX 258: Performed by: INTERNAL MEDICINE

## 2024-11-06 PROCEDURE — 6360000002 HC RX W HCPCS: Performed by: INTERNAL MEDICINE

## 2024-11-06 PROCEDURE — APPNB45 APP NON BILLABLE 31-45 MINUTES: Performed by: NURSE PRACTITIONER

## 2024-11-06 PROCEDURE — 36415 COLL VENOUS BLD VENIPUNCTURE: CPT

## 2024-11-06 RX ORDER — ASPIRIN 81 MG/1
81 TABLET, CHEWABLE ORAL 2 TIMES DAILY
Qty: 60 TABLET | Refills: 0 | Status: SHIPPED | OUTPATIENT
Start: 2024-11-06 | End: 2024-12-06

## 2024-11-06 RX ORDER — OXYCODONE HYDROCHLORIDE 5 MG/1
5 TABLET ORAL EVERY 4 HOURS PRN
Qty: 20 TABLET | Refills: 0 | Status: SHIPPED | OUTPATIENT
Start: 2024-11-06 | End: 2024-11-13

## 2024-11-06 RX ORDER — PROCHLORPERAZINE EDISYLATE 5 MG/ML
10 INJECTION INTRAMUSCULAR; INTRAVENOUS ONCE
Status: COMPLETED | OUTPATIENT
Start: 2024-11-07 | End: 2024-11-07

## 2024-11-06 RX ADMIN — INSULIN LISPRO 2 UNITS: 100 INJECTION, SOLUTION INTRAVENOUS; SUBCUTANEOUS at 06:25

## 2024-11-06 RX ADMIN — METFORMIN HYDROCHLORIDE 500 MG: 500 TABLET ORAL at 16:26

## 2024-11-06 RX ADMIN — ACETAMINOPHEN 1000 MG: 500 TABLET ORAL at 20:54

## 2024-11-06 RX ADMIN — DICLOFENAC SODIUM 2 G: 10 GEL TOPICAL at 13:44

## 2024-11-06 RX ADMIN — CARVEDILOL 6.25 MG: 6.25 TABLET, FILM COATED ORAL at 16:26

## 2024-11-06 RX ADMIN — INSULIN LISPRO 2 UNITS: 100 INJECTION, SOLUTION INTRAVENOUS; SUBCUTANEOUS at 16:26

## 2024-11-06 RX ADMIN — METFORMIN HYDROCHLORIDE 500 MG: 500 TABLET ORAL at 08:03

## 2024-11-06 RX ADMIN — SODIUM BICARBONATE 50 MEQ: 84 INJECTION INTRAVENOUS at 09:03

## 2024-11-06 RX ADMIN — SIMETHICONE 120 MG: 80 TABLET, CHEWABLE ORAL at 08:02

## 2024-11-06 RX ADMIN — FLUTICASONE PROPIONATE 1 SPRAY: 50 SPRAY, METERED NASAL at 08:02

## 2024-11-06 RX ADMIN — INSULIN LISPRO 4 UNITS: 100 INJECTION, SOLUTION INTRAVENOUS; SUBCUTANEOUS at 11:11

## 2024-11-06 RX ADMIN — ENOXAPARIN SODIUM 40 MG: 100 INJECTION SUBCUTANEOUS at 08:03

## 2024-11-06 RX ADMIN — SODIUM CHLORIDE: 9 INJECTION, SOLUTION INTRAVENOUS at 18:14

## 2024-11-06 RX ADMIN — INSULIN LISPRO 2 UNITS: 100 INJECTION, SOLUTION INTRAVENOUS; SUBCUTANEOUS at 20:53

## 2024-11-06 RX ADMIN — OXYCODONE 10 MG: 5 TABLET ORAL at 06:31

## 2024-11-06 RX ADMIN — ACETAMINOPHEN 1000 MG: 500 TABLET ORAL at 13:44

## 2024-11-06 RX ADMIN — Medication 400 MG: at 08:03

## 2024-11-06 RX ADMIN — ASPIRIN 81 MG: 81 TABLET, COATED ORAL at 08:03

## 2024-11-06 RX ADMIN — FERROUS SULFATE TAB 325 MG (65 MG ELEMENTAL FE) 325 MG: 325 (65 FE) TAB at 08:03

## 2024-11-06 RX ADMIN — SODIUM CHLORIDE, PRESERVATIVE FREE 10 ML: 5 INJECTION INTRAVENOUS at 08:04

## 2024-11-06 RX ADMIN — CARVEDILOL 6.25 MG: 6.25 TABLET, FILM COATED ORAL at 08:02

## 2024-11-06 RX ADMIN — LEVOTHYROXINE SODIUM 50 MCG: 0.07 TABLET ORAL at 06:03

## 2024-11-06 RX ADMIN — TAMSULOSIN HYDROCHLORIDE 0.4 MG: 0.4 CAPSULE ORAL at 20:57

## 2024-11-06 RX ADMIN — TRAZODONE HYDROCHLORIDE 100 MG: 50 TABLET ORAL at 20:57

## 2024-11-06 RX ADMIN — PAROXETINE HYDROCHLORIDE 40 MG: 20 TABLET, FILM COATED ORAL at 08:03

## 2024-11-06 RX ADMIN — INSULIN GLARGINE 17 UNITS: 100 INJECTION, SOLUTION SUBCUTANEOUS at 20:53

## 2024-11-06 RX ADMIN — CETIRIZINE HYDROCHLORIDE 10 MG: 10 TABLET, FILM COATED ORAL at 20:54

## 2024-11-06 RX ADMIN — MIRTAZAPINE 7.5 MG: 15 TABLET, FILM COATED ORAL at 20:56

## 2024-11-06 RX ADMIN — DICLOFENAC SODIUM 2 G: 10 GEL TOPICAL at 20:57

## 2024-11-06 RX ADMIN — ONDANSETRON 4 MG: 2 INJECTION, SOLUTION INTRAMUSCULAR; INTRAVENOUS at 15:44

## 2024-11-06 RX ADMIN — DICLOFENAC SODIUM 2 G: 10 GEL TOPICAL at 08:03

## 2024-11-06 RX ADMIN — ONDANSETRON 4 MG: 2 INJECTION, SOLUTION INTRAMUSCULAR; INTRAVENOUS at 20:44

## 2024-11-06 RX ADMIN — SODIUM BICARBONATE 1300 MG: 650 TABLET ORAL at 20:54

## 2024-11-06 RX ADMIN — SENNOSIDES 17.2 MG: 8.6 TABLET, FILM COATED ORAL at 08:03

## 2024-11-06 RX ADMIN — SODIUM BICARBONATE 1300 MG: 650 TABLET ORAL at 08:02

## 2024-11-06 RX ADMIN — OXYCODONE 10 MG: 5 TABLET ORAL at 20:56

## 2024-11-06 RX ADMIN — SENNOSIDES 17.2 MG: 8.6 TABLET, FILM COATED ORAL at 20:54

## 2024-11-06 RX ADMIN — FERROUS SULFATE TAB 325 MG (65 MG ELEMENTAL FE) 325 MG: 325 (65 FE) TAB at 20:57

## 2024-11-06 RX ADMIN — SODIUM CHLORIDE: 9 INJECTION, SOLUTION INTRAVENOUS at 04:03

## 2024-11-06 RX ADMIN — MIDODRINE HYDROCHLORIDE 2.5 MG: 5 TABLET ORAL at 08:02

## 2024-11-06 RX ADMIN — ACETAMINOPHEN 1000 MG: 500 TABLET ORAL at 08:02

## 2024-11-06 ASSESSMENT — PAIN SCALES - GENERAL
PAINLEVEL_OUTOF10: 10
PAINLEVEL_OUTOF10: 7
PAINLEVEL_OUTOF10: 0
PAINLEVEL_OUTOF10: 0

## 2024-11-06 ASSESSMENT — PAIN DESCRIPTION - ORIENTATION
ORIENTATION: RIGHT
ORIENTATION: RIGHT

## 2024-11-06 ASSESSMENT — PAIN DESCRIPTION - DESCRIPTORS: DESCRIPTORS: ACHING

## 2024-11-06 ASSESSMENT — PAIN DESCRIPTION - LOCATION
LOCATION: HIP
LOCATION: ABDOMEN

## 2024-11-06 ASSESSMENT — PAIN - FUNCTIONAL ASSESSMENT: PAIN_FUNCTIONAL_ASSESSMENT: PREVENTS OR INTERFERES SOME ACTIVE ACTIVITIES AND ADLS

## 2024-11-06 NOTE — PROGRESS NOTES
Progress Note - Dr. Ibarra - Internal Medicine  PCP: Dae Rob MD [address] [facility]- 2nd Floor / [facility]* [phone]    Hospital Day: 3  Code Status: Full Code  Current Diet: ADULT DIET; Regular; 3 carb choices (45 gm/meal)        CC: follow up on medical issues    Subjective:   Chauncey Mario is a 73 y.o. male.    Pt seen and examined  Chart reviewed since last visit, labs and imaging below      Doing ok  No new issues  Appears to have tolerated surgery OK    K is high, repeat labs ordered      Review of Systems: (1 system for EPF, 2-9 for detailed, 10+ for comprehensive)  Constitutional: Negative for chills and fever.     HENT: Negative for dental problem, nosebleeds and rhinorrhea.    Hard of hearing  Eyes: Negative for photophobia and visual disturbance.     Respiratory: Negative for cough, chest tightness and shortness of breath.      Cardiovascular: Negative for chest pain and leg swelling.     Gastrointestinal: Negative for diarrhea, nausea and vomiting.     Endocrine: Negative for polydipsia and polyphagia.     Genitourinary: Negative for frequency, hematuria and urgency.     Musculoskeletal: Negative for back pain and myalgias.     Skin: Negative for rash.   Positive for hip pain  Allergic/Immunologic: Negative for food allergies.     Neurological: Negative for dizziness, seizures, syncope and facial asymmetry.     Hematological: Negative for adenopathy.     Psychiatric/Behavioral: Negative for dysphoric mood. The patient is not nervous/anxious.        I have reviewed the patient's medical and social history in detail and updated the computerized patient record.  To recap: He  has a past medical history of Arthritis, Ataxia, BPH (benign prostatic hyperplasia), CAD (coronary artery disease), Coronary atherosclerosis of native coronary artery, Diabetes mellitus (HCC), Environmental allergies, Hepatitis, Hydronephrosis, Hyperlipidemia, Hypertension, Kidney disease, Neuropathy, Parkinson's

## 2024-11-06 NOTE — PLAN OF CARE
Problem: Chronic Conditions and Co-morbidities  Goal: Patient's chronic conditions and co-morbidity symptoms are monitored and maintained or improved  11/6/2024 0811 by Zofia Villegas RN Flowsheets (Taken 11/6/2024 0811)  Care Plan - Patient's Chronic Conditions and Co-Morbidity Symptoms are Monitored and Maintained or Improved: Monitor and assess patient's chronic conditions and comorbid symptoms for stability, deterioration, or improvement     Problem: Discharge Planning  Goal: Discharge to home or other facility with appropriate resources  11/6/2024 0811 by Zofia Villegas RN  Flowsheets (Taken 11/6/2024 0811)  Discharge to home or other facility with appropriate resources:   Identify barriers to discharge with patient and caregiver   Arrange for needed discharge resources and transportation as appropriate   Identify discharge learning needs (meds, wound care, etc)     Problem: Pain  Goal: Verbalizes/displays adequate comfort level or baseline comfort level  11/6/2024 0811 by Zofia Villegas RN  Flowsheets (Taken 11/6/2024 0811)  Verbalizes/displays adequate comfort level or baseline comfort level:   Encourage patient to monitor pain and request assistance   Assess pain using appropriate pain scale   Administer analgesics based on type and severity of pain and evaluate response     Problem: ABCDS Injury Assessment  Goal: Absence of physical injury  11/6/2024 0811 by Zofia Villegas RN Flowsheets (Taken 11/6/2024 0811)  Absence of Physical Injury: Implement safety measures based on patient assessment     Problem: Musculoskeletal - Adult  Goal: Return mobility to safest level of function  11/6/2024 0811 by Zofia Villegas RN Flowsheets (Taken 11/6/2024 0811)  Return Mobility to Safest Level of Function:   Assess patient stability and activity tolerance for standing, transferring and ambulating with or without assistive devices   Assist with transfers and ambulation using safe patient handling equipment

## 2024-11-06 NOTE — PROGRESS NOTES
during transfer - pt with poor safety awareness with poor carryover of VC for initiation/sequencing   Functional Mobility  No functional mobility completed on this date secondary to pt refusing at this time due to pain.  Balance:  Static Sitting Balance: fair (+): maintains balance at SBA/supervision without use of UE support  Dynamic Sitting Balance: poor (+): requires min (A) to maintain balance  Static Standing Balance: poor (+): requires min (A) to maintain balance  Dynamic Standing Balance: poor: requires mod (A) to maintain balance  Comments: Pt remained seated at EOB ~20 minutes while completing ADLs, pt progressing from min A to SBA throughout for unsupported trunk control     Other Therapeutic Interventions  - See PT noted for LE exercises   Functional Outcomes  AM-PAC Inpatient Daily Activity Raw Score: 16                                    Cognition  Overall Cognitive Status: Impaired  Arousal/Alertness: appropriate responses to stimuli  Following Commands: follows one step commands with repetition, follows one step commands with increased time  Attention Span: attends with cues to redirect, difficulty dividing attention  Memory: decreased recall of recent events  Safety Judgement: decreased awareness of need for assistance, decreased awareness of need for safety  Problem Solving: assistance required to generate solutions, assistance required to implement solutions, decreased awareness of errors, assistance required to identify errors made, assistance required to correct errors made  Insights: decreased awareness of deficits  Initiation: requires cues for some  Sequencing: requires cues for some  Orientation:    Pt tangential and agitated during initial encounter - Orientation questions deferred at this time   Command Following:   accurately follows one step commands     Education  Barriers To Learning: cognition  Patient Education: patient educated on goals, OT role and benefits, plan of care,

## 2024-11-06 NOTE — PLAN OF CARE
Problem: Chronic Conditions and Co-morbidities  Goal: Patient's chronic conditions and co-morbidity symptoms are monitored and maintained or improved  11/5/2024 2227 by Kati Bella RN  Outcome: Progressing  11/5/2024 0922 by Zofia Villegas RN  Flowsheets (Taken 11/5/2024 0922)  Care Plan - Patient's Chronic Conditions and Co-Morbidity Symptoms are Monitored and Maintained or Improved: Monitor and assess patient's chronic conditions and comorbid symptoms for stability, deterioration, or improvement     Problem: Discharge Planning  Goal: Discharge to home or other facility with appropriate resources  11/5/2024 2227 by Kati Bella RN  Outcome: Progressing  11/5/2024 0922 by Zofia Villegas RN  Flowsheets (Taken 11/5/2024 0922)  Discharge to home or other facility with appropriate resources:   Arrange for needed discharge resources and transportation as appropriate   Identify barriers to discharge with patient and caregiver   Identify discharge learning needs (meds, wound care, etc)     Problem: Pain  Goal: Verbalizes/displays adequate comfort level or baseline comfort level  11/5/2024 2227 by Kati Bella RN  Outcome: Progressing  11/5/2024 0922 by Zofia Villegas RN  Flowsheets (Taken 11/5/2024 0922)  Verbalizes/displays adequate comfort level or baseline comfort level:   Encourage patient to monitor pain and request assistance   Administer analgesics based on type and severity of pain and evaluate response   Assess pain using appropriate pain scale     Problem: Safety - Adult  Goal: Free from fall injury  11/5/2024 2227 by Kati Bella RN  Outcome: Progressing  11/5/2024 0922 by Zofia Villegas RN  Flowsheets (Taken 11/5/2024 0922)  Free From Fall Injury: Instruct family/caregiver on patient safety     Problem: ABCDS Injury Assessment  Goal: Absence of physical injury  11/5/2024 2227 by Kati Bella RN  Outcome: Progressing  11/5/2024 0922 by Zofia Villegas

## 2024-11-06 NOTE — PROGRESS NOTES
[facility] Orthopedic Surgery   Progress Note    CHIEF COMPLAINT/DIAGNOSIS: S/p right hip IM nailing for fracture    SUBJECTIVE: The patient is seen sitting up in the chair; describes mild pain.  Denies new issues.     OBJECTIVE  Physical    VITALS:  /74   Pulse 99   Temp 97.8 °F (36.6 °C) (Oral)   Resp 16   Ht 1.626 m (5' 4\")   Wt 67.9 kg (149 lb 11.1 oz)   SpO2 98%   BMI 25.69 kg/m²     GENERAL: Alert and oriented x3, in no acute distress.   MUSCULOSKELETAL: Able to dorsi and plantarflex the ankle on operative side without issue.   INCISION:  Covered with post-op dressing; clean dry and intact.  ROM: Limited secondary pain and swelling.   Sensory:  Intact to light touch in peroneal and tibial distributions.   Vascular:   2+ DP pulses with brisk cap refill;  calf soft and nontender.    Data    ALL MEDICATIONS HAVE BEEN REVIEWED    CBC:   Recent Labs     11/04/24  0509 11/05/24  0742 11/06/24  0502   WBC 6.5 7.2 8.1   HGB 9.0* 8.7* 8.0*   HCT 26.2* 26.0* 23.5*    175 188     BMP:   Recent Labs     11/04/24  0509 11/05/24  0619 11/06/24  0502   * 136 138   K 5.1 4.9 5.7*    106 107   CO2 24 19* 21   BUN 30* 32* 34*   CREATININE 1.6* 1.8* 1.8*     INR: No results for input(s): \"INR\" in the last 72 hours.    ASSESSMENT:  S/p right IM nail (11/5/24), POD#1  Acute blood loss anemia as expected  DM II  HLD  Schizoaffective disorder    PLAN:   - WB status:  WBAT through operative extremity;   - DVT prophylaxis: Lovenox as inpt, will d/c with ASA 81mg BID x 30 days.  - Ice therapy  - PT/OT  - Pain Control: Rx for dc in chart.  Due to orthopaedic surgical procedure/condition, patient may require pain medication for up to 6-8 weeks.  - Expected acute blood loss anemia: H/H today: 8/23.5   - ID:  Ancef x 2 doses post-op completed.   - Disposition: per primary team; ok to d/c from our end once medically stable and dispo needs met.     Follow-up in two weeks with Dr. Alexandre.  Office #

## 2024-11-06 NOTE — PROGRESS NOTES
[facility] - Inpatient Rehabilitation Department   Phone: [phone]    Physical Therapy    [x] Initial Evaluation            [] Daily Treatment Note         [] Discharge Summary      Patient: Chauncey Mario   : 1950   MRN: [id number]   Date of Service:  2024  Admitting Diagnosis: Closed fracture of right hip with routine healing  Current Admission Summary: Per H&P on 11/3 \"73 y.o. male with a history of hypertension, hyperlipidemia, CAD, CHF, former tobacco abuse, COPD, CKD, diabetes, Parkinson's disease and ataxia who presents to the emergency department today via ambulance from [facility] with report of right hip pain. Patient states he was using his walker walking out of his room when his left leg \"gave out\".    - X-ray right hip and pelvis shows a comminuted nondisplaced fracture of the proximal right femur in the region of the greater trochanter.    - OR on  with Dr. Alexandre for open reduction internal fixation of RIGHT hip fracture with cephalomedullary device     Past Medical History:  has a past medical history of Arthritis, Ataxia, BPH (benign prostatic hyperplasia), CAD (coronary artery disease), Coronary atherosclerosis of native coronary artery, Diabetes mellitus (HCC), Environmental allergies, Hepatitis, Hydronephrosis, Hyperlipidemia, Hypertension, Kidney disease, Neuropathy, Parkinson's disease (HCC), S/P coronary artery stent placement x 4, and Schizoaffective disorder, bipolar type (HCC).  Past Surgical History:  has a past surgical history that includes Coronary angioplasty; Coronary angioplasty with stent; hernia repair; Vasectomy; Cystocopy (14); Ankle surgery (Right, 2018); Upper gastrointestinal endoscopy (N/A, 2018); Colonoscopy (N/A, 2018); Upper gastrointestinal endoscopy (N/A, 5/3/2019); and ORIF DISTAL RADIUS FRACTURE (Left, 2019).    Discharge Recommendations: Chauncey Mario scored a 24 on the AM-PAC short mobility  historian, and with a w/c for longer distances. At this time the patient requires 2 person assist for bed mobility and transfers due to increased pain. The patient would benefit from continued skilled PT at discharge to promote return to baseline function.   Safety Interventions: patient left in chair, chair alarm in place, call light within reach, gait belt, patient at risk for falls, and nurse notified    Plan  Frequency: 7 x/week  Current Treatment Recommendations: strengthening, ROM, balance training, functional mobility training, transfer training, gait training, endurance training, wheelchair mobility training, patient/caregiver education, pain management, home exercise program, safety education, and equipment evaluation/education    Goals  Patient Goals: Get going at my own pace   Short Term Goals:  Time Frame: Before discharge  Patient will complete bed mobility at contact guard assistance with bed flat and use of rail  Patient will complete sit<>stand transfers at contact guard assistance   Patient will complete stand pivot transfers at contact guard assistance   Patient will ambulate 50 ft with use of rolling walker at contact guard assistance  Patient to maintain standing at contact guard assistance for 2 minutes with use of (B) UE support in order to complete ADLs    Above goals reviewed on 11/6/2024.  All goals are ongoing at this time unless indicated above.      Therapy Session Time      Individual Group Co-treatment   Time In     0852   Time Out     1007   Minutes     75     Timed Code Treatment Minutes:  60 Minutes  Total Treatment Minutes:  75 Minutes       Electronically Signed By: Isabel Summers, PT      Isabel Summers PT, DPT, CNS #[id number]

## 2024-11-06 NOTE — PROGRESS NOTES
Shift assessment complete, A&O, intermittent confusion, denies pain, incision to right CDI, an ice pack applied and administered all night meds. Voiding, safety precaution in place.

## 2024-11-07 LAB
ABO + RH BLD: NORMAL
ANION GAP SERPL CALCULATED.3IONS-SCNC: 5 MMOL/L (ref 3–16)
BASOPHILS # BLD: 0 K/UL (ref 0–0.2)
BASOPHILS NFR BLD: 0.3 %
BLD GP AB SCN SERPL QL: NORMAL
BUN SERPL-MCNC: 33 MG/DL (ref 7–20)
CALCIUM SERPL-MCNC: 8.3 MG/DL (ref 8.3–10.6)
CHLORIDE SERPL-SCNC: 110 MMOL/L (ref 99–110)
CO2 SERPL-SCNC: 26 MMOL/L (ref 21–32)
CREAT SERPL-MCNC: 1.4 MG/DL (ref 0.8–1.3)
DEPRECATED RDW RBC AUTO: 13.7 % (ref 12.4–15.4)
EOSINOPHIL # BLD: 0.1 K/UL (ref 0–0.6)
EOSINOPHIL NFR BLD: 2.2 %
GFR SERPLBLD CREATININE-BSD FMLA CKD-EPI: 53 ML/MIN/{1.73_M2}
GLUCOSE BLD-MCNC: 101 MG/DL (ref 70–99)
GLUCOSE BLD-MCNC: 105 MG/DL (ref 70–99)
GLUCOSE BLD-MCNC: 166 MG/DL (ref 70–99)
GLUCOSE BLD-MCNC: 187 MG/DL (ref 70–99)
GLUCOSE SERPL-MCNC: 108 MG/DL (ref 70–99)
HCT VFR BLD AUTO: 20.4 % (ref 40.5–52.5)
HCT VFR BLD AUTO: 29.6 % (ref 40.5–52.5)
HGB BLD-MCNC: 10 G/DL (ref 13.5–17.5)
HGB BLD-MCNC: 6.8 G/DL (ref 13.5–17.5)
HYPOCHROMIA BLD QL SMEAR: ABNORMAL
LYMPHOCYTES # BLD: 1 K/UL (ref 1–5.1)
LYMPHOCYTES NFR BLD: 16.3 %
MCH RBC QN AUTO: 29.9 PG (ref 26–34)
MCHC RBC AUTO-ENTMCNC: 33.3 G/DL (ref 31–36)
MCV RBC AUTO: 89.7 FL (ref 80–100)
MICROCYTES BLD QL SMEAR: ABNORMAL
MONOCYTES # BLD: 0.7 K/UL (ref 0–1.3)
MONOCYTES NFR BLD: 11.5 %
NEUTROPHILS # BLD: 4.1 K/UL (ref 1.7–7.7)
NEUTROPHILS NFR BLD: 69.7 %
PERFORMED ON: ABNORMAL
PLATELET # BLD AUTO: 196 K/UL (ref 135–450)
PLATELET BLD QL SMEAR: ADEQUATE
PMV BLD AUTO: 8 FL (ref 5–10.5)
POTASSIUM SERPL-SCNC: 5 MMOL/L (ref 3.5–5.1)
RBC # BLD AUTO: 2.28 M/UL (ref 4.2–5.9)
SLIDE REVIEW: ABNORMAL
SODIUM SERPL-SCNC: 141 MMOL/L (ref 136–145)
WBC # BLD AUTO: 5.9 K/UL (ref 4–11)

## 2024-11-07 PROCEDURE — 36415 COLL VENOUS BLD VENIPUNCTURE: CPT

## 2024-11-07 PROCEDURE — 85014 HEMATOCRIT: CPT

## 2024-11-07 PROCEDURE — 86850 RBC ANTIBODY SCREEN: CPT

## 2024-11-07 PROCEDURE — 97116 GAIT TRAINING THERAPY: CPT

## 2024-11-07 PROCEDURE — 85025 COMPLETE CBC W/AUTO DIFF WBC: CPT

## 2024-11-07 PROCEDURE — 86901 BLOOD TYPING SEROLOGIC RH(D): CPT

## 2024-11-07 PROCEDURE — 2580000003 HC RX 258: Performed by: INTERNAL MEDICINE

## 2024-11-07 PROCEDURE — 36430 TRANSFUSION BLD/BLD COMPNT: CPT

## 2024-11-07 PROCEDURE — 1200000000 HC SEMI PRIVATE

## 2024-11-07 PROCEDURE — 6360000002 HC RX W HCPCS: Performed by: PHYSICIAN ASSISTANT

## 2024-11-07 PROCEDURE — 97530 THERAPEUTIC ACTIVITIES: CPT

## 2024-11-07 PROCEDURE — 80048 BASIC METABOLIC PNL TOTAL CA: CPT

## 2024-11-07 PROCEDURE — 6370000000 HC RX 637 (ALT 250 FOR IP): Performed by: INTERNAL MEDICINE

## 2024-11-07 PROCEDURE — 6370000000 HC RX 637 (ALT 250 FOR IP): Performed by: NURSE PRACTITIONER

## 2024-11-07 PROCEDURE — APPNB45 APP NON BILLABLE 31-45 MINUTES: Performed by: NURSE PRACTITIONER

## 2024-11-07 PROCEDURE — 86900 BLOOD TYPING SEROLOGIC ABO: CPT

## 2024-11-07 PROCEDURE — 30233N1 TRANSFUSION OF NONAUTOLOGOUS RED BLOOD CELLS INTO PERIPHERAL VEIN, PERCUTANEOUS APPROACH: ICD-10-PCS | Performed by: INTERNAL MEDICINE

## 2024-11-07 PROCEDURE — 6370000000 HC RX 637 (ALT 250 FOR IP): Performed by: PHYSICIAN ASSISTANT

## 2024-11-07 PROCEDURE — P9016 RBC LEUKOCYTES REDUCED: HCPCS

## 2024-11-07 PROCEDURE — 99024 POSTOP FOLLOW-UP VISIT: CPT | Performed by: NURSE PRACTITIONER

## 2024-11-07 PROCEDURE — 6360000002 HC RX W HCPCS: Performed by: NURSE PRACTITIONER

## 2024-11-07 PROCEDURE — 85018 HEMOGLOBIN: CPT

## 2024-11-07 PROCEDURE — 6360000002 HC RX W HCPCS: Performed by: INTERNAL MEDICINE

## 2024-11-07 PROCEDURE — 86923 COMPATIBILITY TEST ELECTRIC: CPT

## 2024-11-07 RX ORDER — SODIUM CHLORIDE 9 MG/ML
INJECTION, SOLUTION INTRAVENOUS PRN
Status: DISCONTINUED | OUTPATIENT
Start: 2024-11-07 | End: 2024-11-07

## 2024-11-07 RX ADMIN — PROCHLORPERAZINE EDISYLATE 10 MG: 5 INJECTION INTRAMUSCULAR; INTRAVENOUS at 00:04

## 2024-11-07 RX ADMIN — CARVEDILOL 6.25 MG: 6.25 TABLET, FILM COATED ORAL at 09:52

## 2024-11-07 RX ADMIN — ACETAMINOPHEN 1000 MG: 500 TABLET ORAL at 16:21

## 2024-11-07 RX ADMIN — ENOXAPARIN SODIUM 40 MG: 100 INJECTION SUBCUTANEOUS at 09:53

## 2024-11-07 RX ADMIN — INSULIN GLARGINE 17 UNITS: 100 INJECTION, SOLUTION SUBCUTANEOUS at 21:06

## 2024-11-07 RX ADMIN — SODIUM CHLORIDE: 9 INJECTION, SOLUTION INTRAVENOUS at 14:47

## 2024-11-07 RX ADMIN — ACETAMINOPHEN 1000 MG: 500 TABLET ORAL at 09:51

## 2024-11-07 RX ADMIN — DICLOFENAC SODIUM 2 G: 10 GEL TOPICAL at 09:53

## 2024-11-07 RX ADMIN — OXYCODONE 5 MG: 5 TABLET ORAL at 16:21

## 2024-11-07 RX ADMIN — CARVEDILOL 6.25 MG: 6.25 TABLET, FILM COATED ORAL at 16:22

## 2024-11-07 RX ADMIN — MIRTAZAPINE 7.5 MG: 15 TABLET, FILM COATED ORAL at 21:07

## 2024-11-07 RX ADMIN — CETIRIZINE HYDROCHLORIDE 10 MG: 10 TABLET, FILM COATED ORAL at 21:08

## 2024-11-07 RX ADMIN — SENNOSIDES 17.2 MG: 8.6 TABLET, FILM COATED ORAL at 09:52

## 2024-11-07 RX ADMIN — SODIUM BICARBONATE 1300 MG: 650 TABLET ORAL at 21:07

## 2024-11-07 RX ADMIN — MIDODRINE HYDROCHLORIDE 2.5 MG: 5 TABLET ORAL at 09:52

## 2024-11-07 RX ADMIN — SODIUM CHLORIDE, PRESERVATIVE FREE 10 ML: 5 INJECTION INTRAVENOUS at 21:12

## 2024-11-07 RX ADMIN — ACETAMINOPHEN 1000 MG: 500 TABLET ORAL at 21:08

## 2024-11-07 RX ADMIN — Medication 400 MG: at 09:52

## 2024-11-07 RX ADMIN — SENNOSIDES 17.2 MG: 8.6 TABLET, FILM COATED ORAL at 21:07

## 2024-11-07 RX ADMIN — ASPIRIN 81 MG: 81 TABLET, COATED ORAL at 09:52

## 2024-11-07 RX ADMIN — DICLOFENAC SODIUM 2 G: 10 GEL TOPICAL at 21:10

## 2024-11-07 RX ADMIN — DICLOFENAC SODIUM 2 G: 10 GEL TOPICAL at 16:23

## 2024-11-07 RX ADMIN — FERROUS SULFATE TAB 325 MG (65 MG ELEMENTAL FE) 325 MG: 325 (65 FE) TAB at 21:09

## 2024-11-07 RX ADMIN — INSULIN LISPRO 2 UNITS: 100 INJECTION, SOLUTION INTRAVENOUS; SUBCUTANEOUS at 16:22

## 2024-11-07 RX ADMIN — TAMSULOSIN HYDROCHLORIDE 0.4 MG: 0.4 CAPSULE ORAL at 21:07

## 2024-11-07 RX ADMIN — PAROXETINE HYDROCHLORIDE 40 MG: 20 TABLET, FILM COATED ORAL at 09:51

## 2024-11-07 RX ADMIN — METFORMIN HYDROCHLORIDE 500 MG: 500 TABLET ORAL at 16:22

## 2024-11-07 RX ADMIN — ONDANSETRON 4 MG: 2 INJECTION, SOLUTION INTRAMUSCULAR; INTRAVENOUS at 16:54

## 2024-11-07 RX ADMIN — HYDRALAZINE HYDROCHLORIDE 10 MG: 20 INJECTION INTRAMUSCULAR; INTRAVENOUS at 00:04

## 2024-11-07 RX ADMIN — SODIUM BICARBONATE 1300 MG: 650 TABLET ORAL at 09:51

## 2024-11-07 RX ADMIN — SIMETHICONE 120 MG: 80 TABLET, CHEWABLE ORAL at 09:51

## 2024-11-07 RX ADMIN — METFORMIN HYDROCHLORIDE 500 MG: 500 TABLET ORAL at 09:52

## 2024-11-07 RX ADMIN — POLYETHYLENE GLYCOL 3350 17 G: 17 POWDER, FOR SOLUTION ORAL at 09:52

## 2024-11-07 RX ADMIN — FERROUS SULFATE TAB 325 MG (65 MG ELEMENTAL FE) 325 MG: 325 (65 FE) TAB at 09:52

## 2024-11-07 RX ADMIN — FLUTICASONE PROPIONATE 1 SPRAY: 50 SPRAY, METERED NASAL at 09:54

## 2024-11-07 RX ADMIN — TRAZODONE HYDROCHLORIDE 100 MG: 50 TABLET ORAL at 21:07

## 2024-11-07 ASSESSMENT — PAIN SCALES - GENERAL
PAINLEVEL_OUTOF10: 0
PAINLEVEL_OUTOF10: 0

## 2024-11-07 ASSESSMENT — PAIN DESCRIPTION - ORIENTATION: ORIENTATION: UPPER

## 2024-11-07 ASSESSMENT — PAIN DESCRIPTION - LOCATION: LOCATION: ABDOMEN

## 2024-11-07 ASSESSMENT — PAIN DESCRIPTION - DESCRIPTORS: DESCRIPTORS: ACHING;DISCOMFORT

## 2024-11-07 NOTE — PROGRESS NOTES
[facility] - Inpatient Rehabilitation Department   Phone: [phone]    Occupational Therapy    [] Initial Evaluation            [x] Daily Treatment Note         [] Discharge Summary      Patient: Chauncey Mario   : 1950   MRN: [id number]   Date of Service:  2024    Admitting Diagnosis:  Closed fracture of right hip with routine healing  Current Admission Summary: Chauncey Mario is a 73 y.o. male who presented to [facility] ER with complaints of right hip pain after a fall.  He reports his leg just gave out.  He has parkinson and ataxia.  Sustained left hip fracture 4 years ago treated with IM nail.  Sustained right distal clavicle fracture treated non-op 3 weeks ago.  Denies shoulder pain.      Describes pain in the right hip of moderate intensity and of aching nature since the fall which is relieved by rest. Denies new numbness/tingling.        Independent imaging review of the right hip via plain films demonstrated: nondisplaced greater troch fracture with intertroch extension.     Patient lives in SNF and uses walker to ambulate very occasionally - is mainly only able to transfer bed to chair.    Past Medical History:  has a past medical history of Arthritis, Ataxia, BPH (benign prostatic hyperplasia), CAD (coronary artery disease), Coronary atherosclerosis of native coronary artery, Diabetes mellitus ([facility]), Environmental allergies, Hepatitis, Hydronephrosis, Hyperlipidemia, Hypertension, Kidney disease, Neuropathy, Parkinson's disease ([facility]), S/P coronary artery stent placement x 4, and Schizoaffective disorder, bipolar type ([facility]).  Past Surgical History:  has a past surgical history that includes Coronary angioplasty; Coronary angioplasty with stent; hernia repair; Vasectomy; Cystocopy (14); Ankle surgery (Right, 2018); Upper gastrointestinal endoscopy (N/A, 2018); Colonoscopy (N/A, 2018); Upper gastrointestinal endoscopy (N/A, 5/3/2019); ORIF DISTAL RADIUS  deficits  Initiation: requires cues for some  Sequencing: requires cues for some  Orientation:    Pt tangential and agitated during initial encounter - Orientation questions deferred at this time   Command Following:   accurately follows one step commands     Education  Barriers To Learning: cognition  Patient Education: patient educated on goals, OT role and benefits, plan of care, weight-bearing education, proper use of assistive device/equipment, energy conservation, pressure relief, transfer training, discharge recommendations  Learning Assessment:  patient will require reinforcement due to cognitive deficits    Assessment  Activity Tolerance: Fair - pt tolerated mobility well with max encouragement and VC for sequencing   Impairments Requiring Therapeutic Intervention: decreased functional mobility, decreased ADL status, decreased strength, decreased safety awareness, decreased cognition, decreased endurance, decreased balance, increased pain  Prognosis: good  Clinical Assessment: Pt presents with deficits listed above. Pt is currently below baseline since recent fall and R hip sx. Pt requires increased assistance with ADLs and mobility and would benefit from continued skilled OT to return to PLOF.    Safety Interventions: patient left in chair, chair alarm in place, call light within reach, gait belt, patient at risk for falls, and nurse notified    Plan  Frequency: 7 x/week  Current Treatment Recommendations: strengthening, balance training, functional mobility training, transfer training, gait training, stair training, endurance training, wheelchair mobility training, patient/caregiver education, ADL/self-care training, pain management, safety education, equipment evaluation/education, and positioning    Goals  Patient Goals: to return home   Short Term Goals:  Time Frame: prior to D/C  Patient will complete upper body ADL at stand by assistance   Patient will complete lower body ADL at minimal assistance

## 2024-11-07 NOTE — FLOWSHEET NOTE
Pt still with nausea and vomiting compazine ordered per on call np. Pt states he is passing gas bowel sounds audible. Hypo active

## 2024-11-07 NOTE — PROGRESS NOTES
([facility]), S/P coronary artery stent placement x 4, and Schizoaffective disorder, bipolar type ([facility]).. He  has a past surgical history that includes Coronary angioplasty; Coronary angioplasty with stent; hernia repair; Vasectomy; Cystocopy (4/4/14); Ankle surgery (Right, 06/09/2018); Upper gastrointestinal endoscopy (N/A, 12/13/2018); Colonoscopy (N/A, 12/13/2018); Upper gastrointestinal endoscopy (N/A, 5/3/2019); ORIF DISTAL RADIUS FRACTURE (Left, 6/20/2019); and hip surgery (Right, 11/5/2024).. He  reports that he quit smoking about 11 years ago. His smoking use included cigarettes. He started smoking about 36 years ago. He has a 25 pack-year smoking history. He has never used smokeless tobacco. He reports that he does not drink alcohol and does not use drugs..        Active Hospital Problems    Diagnosis Date Noted    Hyperlipidemia associated with type 2 diabetes mellitus ([facility]) [E11.69, E78.5] 02/26/2023     Priority: Medium    Closed fracture of right hip with routine healing [S72.001D] 11/03/2024    Closed right hip fracture, initial encounter ([facility]) [S72.001A] 11/03/2024    HTN (hypertension) [I10] 11/04/2023    Anemia [D64.9] 09/10/2017    DM2 (diabetes mellitus, type 2) ([facility]) [E11.9] 05/12/2013       Current Facility-Administered Medications: 0.9 % sodium chloride infusion, , IntraVENous, PRN  acetaminophen (TYLENOL) tablet 1,000 mg, 1,000 mg, Oral, TID  fluticasone (FLONASE) 50 MCG/ACT nasal spray 1 spray, 1 spray, Nasal, Daily  traZODone (DESYREL) tablet 100 mg, 100 mg, Oral, Nightly  ferrous sulfate (IRON 325) tablet 325 mg, 325 mg, Oral, BID  metFORMIN (GLUCOPHAGE) tablet 500 mg, 500 mg, Oral, BID WC  PARoxetine (PAXIL) tablet 40 mg, 40 mg, Oral, Daily  aspirin EC tablet 81 mg, 81 mg, Oral, Daily  carvedilol (COREG) tablet 6.25 mg, 6.25 mg, Oral, BID WC  simethicone (MYLICON) chewable tablet 120 mg, 120 mg, Oral, QAM  mirtazapine (REMERON) tablet 7.5 mg, 7.5 mg, Oral, Nightly  tamsulosin (FLOMAX) capsule 0.4  further characterization     XR CLAVICLE RIGHT    Result Date: 10/25/2024  Radiology exam is complete. No Radiologist dictation. Please follow up with ordering provider.     XR SHOULDER RIGHT (MIN 2 VIEWS)    Result Date: 10/25/2024  Radiology exam is complete. No Radiologist dictation. Please follow up with ordering provider.       Lab Results   Component Value Date/Time    GLUCOSE 108 11/07/2024 05:11 AM     Lab Results   Component Value Date/Time    POCGLU 105 11/07/2024 07:13 AM     /70   Pulse 91   Temp 97.6 °F (36.4 °C) (Oral)   Resp 18   Ht 1.626 m (5' 4\")   Wt 64.9 kg (143 lb 1.3 oz)   SpO2 95%   BMI 24.56 kg/m²     Assessment and Plan:  Principal Problem:    Closed fracture of right hip with routine healing -Established problem. Stable.    Plan:  stay on post op pathway. To work with pt/ot today. Transition to oral pain meds. Cont to follow h/h to assess post op anemia.   Active Problems:    Hyperlipidemia associated with type 2 diabetes mellitus (HCC) -Established problem. Stable.    Plan: stay on meds    DM2 (diabetes mellitus, type 2) ([facility]) -Established problem. Stable.  Glu 108  Plan: Continue on CCC diet, home medications. CBC and BMP ordered to monitor sugars and for other medication complications. Fingerstick glucose ordered to check for alterations in levels throughout day.  Sliding scale insulin ordered to cover fingerstick levels.     Anemia, acute blood loss postoperatively  - Established problem. Hgb 6.8.    Plan: indication for transfusion - prbc ordered 11/7. Cont to monitor h/h to assess progression of anemia.  Repeat Hgb level ordered. Recommend ferrous sulfate or MVI as outpatient.     HTN (hypertension) -Established problem. Stable.  125/70  Plan: Continue medications. Continue to check BP q shift. CBC and BMP ordered to monitor for disease progression, medication side effect.     Closed right hip fracture, initial encounter ([facility])      Case discussed with: ortho  Tests

## 2024-11-07 NOTE — PLAN OF CARE
Problem: Chronic Conditions and Co-morbidities  Goal: Patient's chronic conditions and co-morbidity symptoms are monitored and maintained or improved  Outcome: Progressing  Flowsheets (Taken 11/7/2024 0140)  Care Plan - Patient's Chronic Conditions and Co-Morbidity Symptoms are Monitored and Maintained or Improved: Monitor and assess patient's chronic conditions and comorbid symptoms for stability, deterioration, or improvement     Problem: Discharge Planning  Goal: Discharge to home or other facility with appropriate resources  Outcome: Progressing     Problem: Pain  Goal: Verbalizes/displays adequate comfort level or baseline comfort level  Outcome: Progressing  Flowsheets (Taken 11/7/2024 0140)  Verbalizes/displays adequate comfort level or baseline comfort level: Assess pain using appropriate pain scale     Problem: Safety - Adult  Goal: Free from fall injury  Outcome: Progressing     Problem: ABCDS Injury Assessment  Goal: Absence of physical injury  Outcome: Progressing  Flowsheets (Taken 11/7/2024 0140)  Absence of Physical Injury: Implement safety measures based on patient assessment     Problem: Musculoskeletal - Adult  Goal: Return mobility to safest level of function  Outcome: Progressing  Flowsheets (Taken 11/7/2024 0140)  Return Mobility to Safest Level of Function: Assess patient stability and activity tolerance for standing, transferring and ambulating with or without assistive devices     Problem: Skin/Tissue Integrity  Goal: Absence of new skin breakdown  Description: 1.  Monitor for areas of redness and/or skin breakdown  2.  Assess vascular access sites hourly  3.  Every 4-6 hours minimum:  Change oxygen saturation probe site  4.  Every 4-6 hours:  If on nasal continuous positive airway pressure, respiratory therapy assess nares and determine need for appliance change or resting period.  Outcome: Progressing

## 2024-11-07 NOTE — PROGRESS NOTES
[facility] - Inpatient Rehabilitation Department   Phone: [phone]    Physical Therapy    [] Initial Evaluation            [x] Daily Treatment Note         [] Discharge Summary      Patient: Chauncey Mario   : 1950   MRN: [id number]   Date of Service:  2024  Admitting Diagnosis: Closed fracture of right hip with routine healing  Current Admission Summary: Per H&P on 11/3 \"73 y.o. male with a history of hypertension, hyperlipidemia, CAD, CHF, former tobacco abuse, COPD, CKD, diabetes, Parkinson's disease and ataxia who presents to the emergency department today via ambulance from [facility] with report of right hip pain. Patient states he was using his walker walking out of his room when his left leg \"gave out\".    - X-ray right hip and pelvis shows a comminuted nondisplaced fracture of the proximal right femur in the region of the greater trochanter.    - OR on  with Dr. Alexandre for open reduction internal fixation of RIGHT hip fracture with cephalomedullary device     Past Medical History:  has a past medical history of Arthritis, Ataxia, BPH (benign prostatic hyperplasia), CAD (coronary artery disease), Coronary atherosclerosis of native coronary artery, Diabetes mellitus (HCC), Environmental allergies, Hepatitis, Hydronephrosis, Hyperlipidemia, Hypertension, Kidney disease, Neuropathy, Parkinson's disease (HCC), S/P coronary artery stent placement x 4, and Schizoaffective disorder, bipolar type (HCC).  Past Surgical History:  has a past surgical history that includes Coronary angioplasty; Coronary angioplasty with stent; hernia repair; Vasectomy; Cystocopy (14); Ankle surgery (Right, 2018); Upper gastrointestinal endoscopy (N/A, 2018); Colonoscopy (N/A, 2018); Upper gastrointestinal endoscopy (N/A, 5/3/2019); ORIF DISTAL RADIUS FRACTURE (Left, 2019); and hip surgery (Right, 2024).    Discharge Recommendations: Chauncey Mario scored a   on the AM-PAC short mobility form. Current research shows that an AM-PAC score of 17 or less is typically not associated with a discharge to the patient's home setting. Based on the patient's AM-PAC score and their current functional mobility deficits, it is recommended that the patient have 3-5 sessions per week of Physical Therapy at d/c to increase the patient's independence.  Please see assessment section for further patient specific details.    If patient discharges prior to next session this note will serve as a discharge summary.  Please see below for the latest assessment towards goals.      DME Required For Discharge: DME to be determined at next level of care    Precautions/Restrictions: high fall risk, weight bearing, 3 carb diet  Weight Bearing Restrictions: weight bearing as tolerated  [] Right Upper Extremity  [] Left Upper Extremity [] Right Lower Extremity  [] Left Lower Extremity     Required Braces/Orthotics: sling   [x] Right  [] Left  Positional Restrictions: Okay for full shoulder ROM but not strengthening per note (see below)      Per Ortho note on 10/25, Dr. Thompson:  Comminuted distal clavicle fracture  I recommend conservative treatment  He has preserved shoulder active range of motion today  Potential for fibrous union here  Nonetheless, advance activities as tolerated with the right shoulder/arm  Sling for comfort  Work with physical therapy at the facility, okay for full range of motion, but no strengthening work yet  He can remove the sling and use the right arm when ambulating to support himself with a walker    Pre-Admission Information   Type of Home: long term care facility - [facility]   Home Layout: one level  Home Access: level entry  Bathroom Layout: sponge bathes in room   Bathroom Equipment: grab bars around toilet  Toilet Height: standard height  Home Equipment: manual wheelchair, Rollator, bed rail  Transfer Assistance: Independent without use of device  Ambulation

## 2024-11-07 NOTE — CONSENT
Informed Consent for Blood Component Transfusion Note    I have discussed with the patient the rationale for blood component transfusion; its benefits in treating or preventing fatigue, organ damage, or death; and its risk which includes mild transfusion reactions, rare risk of blood borne infection, or more serious but rare reactions. I have discussed the alternatives to transfusion, including the risk and consequences of not receiving transfusion. The patient had an opportunity to ask questions and had agreed to proceed with transfusion of blood components.    Electronically signed by Margarito Ibarra MD on 11/7/24 at 7:59 AM EST

## 2024-11-07 NOTE — PROGRESS NOTES
[facility]   Progress Note    CHIEF COMPLAINT/DIAGNOSIS: S/p right hip IM nailing for fracture    SUBJECTIVE: The patient is seen sitting up in bed.   Pain controlled.  Denies new issues. Hgb low today    OBJECTIVE  Physical    VITALS:  /70   Pulse 91   Temp 97.6 °F (36.4 °C) (Oral)   Resp 18   Ht 1.626 m (5' 4\")   Wt 64.9 kg (143 lb 1.3 oz)   SpO2 95%   BMI 24.56 kg/m²     GENERAL: Alert and oriented x3, in no acute distress.   MUSCULOSKELETAL: Able to dorsi and plantarflex the ankle on operative side without issue.   INCISION:  Covered with post-op dressing; clean dry and intact.  NO hematoma noted.   ROM: Limited secondary pain and swelling.   Sensory:  Intact to light touch in peroneal and tibial distributions.   Vascular:   2+ DP pulses with brisk cap refill;  calf soft and nontender.    Data    ALL MEDICATIONS HAVE BEEN REVIEWED    CBC:   Recent Labs     11/05/24  0742 11/06/24  0502 11/07/24  0511   WBC 7.2 8.1 5.9   HGB 8.7* 8.0* 6.8*   HCT 26.0* 23.5* 20.4*    188 196     BMP:   Recent Labs     11/06/24  0502 11/06/24  1140 11/07/24  0511    136 141   K 5.7* 5.3* 5.0    104 110   CO2 21 22 26   BUN 34* 38* 33*   CREATININE 1.8* 1.6* 1.4*     INR: No results for input(s): \"INR\" in the last 72 hours.    ASSESSMENT:  S/p right IM nail (11/5/24), POD#2  Acute blood loss anemia as expected  DM II  HLD  Schizoaffective disorder    PLAN:   - WB status:  WBAT through operative extremity;   - DVT prophylaxis: Lovenox as inpt, will d/c with ASA 81mg BID x 30 days.  - Ice therapy  - PT/OT  - Pain Control: Rx for dc in chart.  Due to orthopaedic surgical procedure/condition, patient may require pain medication for up to 6-8 weeks.  - Expected acute blood loss anemia: H/H today: 6.8/20.4 - 1 unit PRBC 11/7  - ID:  Ancef x 2 doses post-op completed.   - Disposition: per primary team; ok to d/c from our end once medically stable and dispo needs met.     Follow-up in two weeks

## 2024-11-07 NOTE — PLAN OF CARE
Problem: Chronic Conditions and Co-morbidities  Goal: Patient's chronic conditions and co-morbidity symptoms are monitored and maintained or improved  11/7/2024 1131 by Francine Marvin RN  Outcome: Progressing  11/7/2024 0140 by Jason Rae RN  Outcome: Progressing  Flowsheets (Taken 11/7/2024 0140)  Care Plan - Patient's Chronic Conditions and Co-Morbidity Symptoms are Monitored and Maintained or Improved: Monitor and assess patient's chronic conditions and comorbid symptoms for stability, deterioration, or improvement     Problem: Discharge Planning  Goal: Discharge to home or other facility with appropriate resources  11/7/2024 1131 by Francine Marvin RN  Outcome: Progressing  11/7/2024 0140 by Jason Rae RN  Outcome: Progressing     Problem: Pain  Goal: Verbalizes/displays adequate comfort level or baseline comfort level  11/7/2024 1131 by Francine Marvin RN  Outcome: Progressing  11/7/2024 0140 by Jason Rae RN  Outcome: Progressing  Flowsheets (Taken 11/7/2024 0140)  Verbalizes/displays adequate comfort level or baseline comfort level: Assess pain using appropriate pain scale     Problem: Safety - Adult  Goal: Free from fall injury  11/7/2024 1131 by Francine Marvin RN  Outcome: Progressing  11/7/2024 0140 by Jason Rae RN  Outcome: Progressing     Problem: ABCDS Injury Assessment  Goal: Absence of physical injury  11/7/2024 1131 by Francine Marvin RN  Outcome: Progressing  11/7/2024 0140 by Jason Rae RN  Outcome: Progressing  Flowsheets (Taken 11/7/2024 0140)  Absence of Physical Injury: Implement safety measures based on patient assessment     Problem: Musculoskeletal - Adult  Goal: Return mobility to safest level of function  11/7/2024 1131 by Francine Marvin RN  Outcome: Progressing  11/7/2024 0140 by Jason Rae RN  Outcome: Progressing  Flowsheets (Taken 11/7/2024 0140)  Return Mobility to Safest Level of Function: Assess patient

## 2024-11-07 NOTE — PROGRESS NOTES
Shift assessment completed. VSS. Alert and oriented x 4. Dressing to right hip clean, dry and intact. Medications given per MAR. The care plan and education have been reviewed and mutually agreed upon with the patient. Call light in place.

## 2024-11-08 VITALS
HEART RATE: 106 BPM | BODY MASS INDEX: 22.28 KG/M2 | SYSTOLIC BLOOD PRESSURE: 116 MMHG | DIASTOLIC BLOOD PRESSURE: 80 MMHG | TEMPERATURE: 98.2 F | HEIGHT: 64 IN | OXYGEN SATURATION: 100 % | RESPIRATION RATE: 16 BRPM | WEIGHT: 130.51 LBS

## 2024-11-08 LAB
ANION GAP SERPL CALCULATED.3IONS-SCNC: 6 MMOL/L (ref 3–16)
BASOPHILS # BLD: 0 K/UL (ref 0–0.2)
BASOPHILS NFR BLD: 0.6 %
BUN SERPL-MCNC: 26 MG/DL (ref 7–20)
CALCIUM SERPL-MCNC: 8.2 MG/DL (ref 8.3–10.6)
CHLORIDE SERPL-SCNC: 108 MMOL/L (ref 99–110)
CO2 SERPL-SCNC: 25 MMOL/L (ref 21–32)
CREAT SERPL-MCNC: 1.3 MG/DL (ref 0.8–1.3)
DEPRECATED RDW RBC AUTO: 13.9 % (ref 12.4–15.4)
EOSINOPHIL # BLD: 0.2 K/UL (ref 0–0.6)
EOSINOPHIL NFR BLD: 3.8 %
GFR SERPLBLD CREATININE-BSD FMLA CKD-EPI: 58 ML/MIN/{1.73_M2}
GLUCOSE BLD-MCNC: 112 MG/DL (ref 70–99)
GLUCOSE BLD-MCNC: 170 MG/DL (ref 70–99)
GLUCOSE BLD-MCNC: 57 MG/DL (ref 70–99)
GLUCOSE SERPL-MCNC: 59 MG/DL (ref 70–99)
HCT VFR BLD AUTO: 26.3 % (ref 40.5–52.5)
HGB BLD-MCNC: 8.9 G/DL (ref 13.5–17.5)
LYMPHOCYTES # BLD: 0.9 K/UL (ref 1–5.1)
LYMPHOCYTES NFR BLD: 17.6 %
MCH RBC QN AUTO: 30.7 PG (ref 26–34)
MCHC RBC AUTO-ENTMCNC: 33.9 G/DL (ref 31–36)
MCV RBC AUTO: 90.5 FL (ref 80–100)
MONOCYTES # BLD: 0.5 K/UL (ref 0–1.3)
MONOCYTES NFR BLD: 9.6 %
NEUTROPHILS # BLD: 3.7 K/UL (ref 1.7–7.7)
NEUTROPHILS NFR BLD: 68.4 %
PERFORMED ON: ABNORMAL
PLATELET # BLD AUTO: 204 K/UL (ref 135–450)
PMV BLD AUTO: 7.6 FL (ref 5–10.5)
POTASSIUM SERPL-SCNC: 4 MMOL/L (ref 3.5–5.1)
RBC # BLD AUTO: 2.91 M/UL (ref 4.2–5.9)
SODIUM SERPL-SCNC: 139 MMOL/L (ref 136–145)
WBC # BLD AUTO: 5.4 K/UL (ref 4–11)

## 2024-11-08 PROCEDURE — 6360000002 HC RX W HCPCS: Performed by: PHYSICIAN ASSISTANT

## 2024-11-08 PROCEDURE — 97116 GAIT TRAINING THERAPY: CPT

## 2024-11-08 PROCEDURE — 97530 THERAPEUTIC ACTIVITIES: CPT

## 2024-11-08 PROCEDURE — 2580000003 HC RX 258: Performed by: INTERNAL MEDICINE

## 2024-11-08 PROCEDURE — 6360000002 HC RX W HCPCS: Performed by: INTERNAL MEDICINE

## 2024-11-08 PROCEDURE — 6370000000 HC RX 637 (ALT 250 FOR IP): Performed by: PHYSICIAN ASSISTANT

## 2024-11-08 PROCEDURE — 80048 BASIC METABOLIC PNL TOTAL CA: CPT

## 2024-11-08 PROCEDURE — APPNB45 APP NON BILLABLE 31-45 MINUTES: Performed by: NURSE PRACTITIONER

## 2024-11-08 PROCEDURE — 6370000000 HC RX 637 (ALT 250 FOR IP): Performed by: NURSE PRACTITIONER

## 2024-11-08 PROCEDURE — 97535 SELF CARE MNGMENT TRAINING: CPT

## 2024-11-08 PROCEDURE — 6370000000 HC RX 637 (ALT 250 FOR IP): Performed by: INTERNAL MEDICINE

## 2024-11-08 PROCEDURE — 99024 POSTOP FOLLOW-UP VISIT: CPT | Performed by: NURSE PRACTITIONER

## 2024-11-08 PROCEDURE — 85025 COMPLETE CBC W/AUTO DIFF WBC: CPT

## 2024-11-08 RX ADMIN — METFORMIN HYDROCHLORIDE 500 MG: 500 TABLET ORAL at 09:08

## 2024-11-08 RX ADMIN — HYDRALAZINE HYDROCHLORIDE 10 MG: 20 INJECTION INTRAMUSCULAR; INTRAVENOUS at 05:30

## 2024-11-08 RX ADMIN — FERROUS SULFATE TAB 325 MG (65 MG ELEMENTAL FE) 325 MG: 325 (65 FE) TAB at 09:08

## 2024-11-08 RX ADMIN — PAROXETINE HYDROCHLORIDE 40 MG: 20 TABLET, FILM COATED ORAL at 09:07

## 2024-11-08 RX ADMIN — SODIUM CHLORIDE, PRESERVATIVE FREE 10 ML: 5 INJECTION INTRAVENOUS at 09:09

## 2024-11-08 RX ADMIN — DICLOFENAC SODIUM 2 G: 10 GEL TOPICAL at 09:08

## 2024-11-08 RX ADMIN — LEVOTHYROXINE SODIUM 50 MCG: 0.07 TABLET ORAL at 06:20

## 2024-11-08 RX ADMIN — SODIUM BICARBONATE 1300 MG: 650 TABLET ORAL at 09:07

## 2024-11-08 RX ADMIN — SENNOSIDES 17.2 MG: 8.6 TABLET, FILM COATED ORAL at 09:07

## 2024-11-08 RX ADMIN — ASPIRIN 81 MG: 81 TABLET, COATED ORAL at 09:07

## 2024-11-08 RX ADMIN — Medication 400 MG: at 09:08

## 2024-11-08 RX ADMIN — ENOXAPARIN SODIUM 40 MG: 100 INJECTION SUBCUTANEOUS at 09:08

## 2024-11-08 RX ADMIN — MIDODRINE HYDROCHLORIDE 2.5 MG: 5 TABLET ORAL at 09:08

## 2024-11-08 RX ADMIN — CARVEDILOL 6.25 MG: 6.25 TABLET, FILM COATED ORAL at 09:07

## 2024-11-08 RX ADMIN — SIMETHICONE 120 MG: 80 TABLET, CHEWABLE ORAL at 09:07

## 2024-11-08 RX ADMIN — FLUTICASONE PROPIONATE 1 SPRAY: 50 SPRAY, METERED NASAL at 09:09

## 2024-11-08 RX ADMIN — ACETAMINOPHEN 1000 MG: 500 TABLET ORAL at 09:08

## 2024-11-08 ASSESSMENT — PAIN SCALES - GENERAL: PAINLEVEL_OUTOF10: 0

## 2024-11-08 NOTE — PROGRESS NOTES
Pt discharged. IV taken out. No complications. Report called to Aishwarya Zaldivar. All questions answered. All belongings sent with patient.       Electronically signed by Zofia Villegas RN on 11/8/2024 at 1:05 PM

## 2024-11-08 NOTE — PROGRESS NOTES
[facility]   Progress Note    CHIEF COMPLAINT/DIAGNOSIS: S/p right hip IM nailing for fracture    SUBJECTIVE: The patient is seen sitting up in chair.   Pain controlled.  Denies new issues. S/p 1 unit PRBC.    OBJECTIVE  Physical    VITALS:  /80   Pulse (!) 106   Temp 98.2 °F (36.8 °C) (Oral)   Resp 16   Ht 1.626 m (5' 4\")   Wt 59.2 kg (130 lb 8.2 oz)   SpO2 100%   BMI 22.40 kg/m²     GENERAL: Alert and oriented x3, in no acute distress.   MUSCULOSKELETAL: Able to dorsi and plantarflex the ankle on operative side without issue.   INCISION:  Covered with post-op dressing; clean dry and intact.  NO hematoma noted.   ROM: Limited secondary pain and swelling.   Sensory:  Intact to light touch in peroneal and tibial distributions.   Vascular:   2+ DP pulses with brisk cap refill;  calf soft and nontender.    Data    ALL MEDICATIONS HAVE BEEN REVIEWED    CBC:   Recent Labs     11/06/24  0502 11/07/24  0511 11/07/24  1635 11/08/24  0513   WBC 8.1 5.9  --  5.4   HGB 8.0* 6.8* 10.0* 8.9*   HCT 23.5* 20.4* 29.6* 26.3*    196  --  204     BMP:   Recent Labs     11/06/24  1140 11/07/24  0511 11/08/24  0513    141 139   K 5.3* 5.0 4.0    110 108   CO2 22 26 25   BUN 38* 33* 26*   CREATININE 1.6* 1.4* 1.3     INR: No results for input(s): \"INR\" in the last 72 hours.    ASSESSMENT:  S/p right IM nail (11/5/24), POD#3  Acute blood loss anemia as expected  DM II  HLD  Schizoaffective disorder    PLAN:   - WB status:  WBAT through operative extremity;   - DVT prophylaxis: Lovenox as inpt, will d/c with ASA 81mg BID x 30 days.  - Ice therapy  - PT/OT  - Pain Control: Rx for dc in chart.  Due to orthopaedic surgical procedure/condition, patient may require pain medication for up to 6-8 weeks.  - Expected acute blood loss anemia: H/H today: 8.9/26.3- 1 unit PRBC 11/7  - ID:  Ancef x 2 doses post-op completed.   - Disposition: per primary team; ok to d/c from our end once medically stable

## 2024-11-08 NOTE — PROGRESS NOTES
benefits, plan of care, weight-bearing education, proper use of assistive device/equipment, energy conservation, pressure relief, transfer training, discharge recommendations  Learning Assessment:  patient will require reinforcement due to cognitive deficits    Assessment  Activity Tolerance: tolerated well with increased time. /80 mmHg after mobility. SpO2 100% on RA. 114 bpm   Impairments Requiring Therapeutic Intervention: decreased functional mobility, decreased ADL status, decreased strength, decreased safety awareness, decreased cognition, decreased endurance, decreased balance, increased pain  Prognosis: good  Clinical Assessment: Pt is currently below baseline since recent fall and R hip sx. Patient typically independent with ADLs and mobility with rollator or wc. Patient requiring varied assistance this date, modA of 2 progressing to Kevin. Patient requiring mod-maxA for ADLs. Pt would benefit from continued skilled OT to return to OF.    Safety Interventions: patient left in chair, chair alarm in place, call light within reach, gait belt, patient at risk for falls, and nurse notified    Plan  Frequency: 7 x/week  Current Treatment Recommendations: strengthening, balance training, functional mobility training, transfer training, gait training, stair training, endurance training, wheelchair mobility training, patient/caregiver education, ADL/self-care training, pain management, safety education, equipment evaluation/education, and positioning    Goals  Patient Goals: to return home   Short Term Goals:  Time Frame: prior to D/C  Patient will complete upper body ADL at stand by assistance   Patient will complete lower body ADL at minimal assistance   Patient will complete toileting at minimal assistance   Patient will complete grooming at stand by assistance   Patient will complete functional transfers at minimal assistance   Patient will complete functional mobility at minimal assistance     Above  goals reviewed on 11/8/2024.  All goals are ongoing at this time unless indicated above.       Therapy Session Time     Individual Group Co-treatment   Time In 0908 0823   Time Out 0935 0908   Minutes 27  45        Timed Code Treatment Minutes:  72 minutes   Total Treatment Minutes:  72 minutes       Electronically Signed By: AMANDA Gallego OTR/MIKE [id number]

## 2024-11-08 NOTE — PLAN OF CARE
Problem: Chronic Conditions and Co-morbidities  Goal: Patient's chronic conditions and co-morbidity symptoms are monitored and maintained or improved  11/8/2024 0843 by Zofia Villegas RN  Flowsheets (Taken 11/8/2024 0843)  Care Plan - Patient's Chronic Conditions and Co-Morbidity Symptoms are Monitored and Maintained or Improved:   Monitor and assess patient's chronic conditions and comorbid symptoms for stability, deterioration, or improvement   Collaborate with multidisciplinary team to address chronic and comorbid conditions and prevent exacerbation or deterioration     Problem: Discharge Planning  Goal: Discharge to home or other facility with appropriate resources  11/8/2024 0843 by Zofia Villegas RN  Flowsheets (Taken 11/8/2024 0843)  Discharge to home or other facility with appropriate resources:   Identify barriers to discharge with patient and caregiver   Arrange for needed discharge resources and transportation as appropriate   Identify discharge learning needs (meds, wound care, etc)     Problem: Pain  Goal: Verbalizes/displays adequate comfort level or baseline comfort level  11/8/2024 0843 by Zofia Villegas RN  Flowsheets (Taken 11/8/2024 0843)  Verbalizes/displays adequate comfort level or baseline comfort level:   Encourage patient to monitor pain and request assistance   Assess pain using appropriate pain scale   Administer analgesics based on type and severity of pain and evaluate response     Problem: ABCDS Injury Assessment  Goal: Absence of physical injury  11/8/2024 0843 by Zofia Villegas RN  Flowsheets (Taken 11/8/2024 0843)  Absence of Physical Injury: Implement safety measures based on patient assessment

## 2024-11-08 NOTE — CARE COORDINATION
Case Management -  Discharge Note      Patient Name: Chauncey Mario                   YOB: 1950            Readmission Risk (Low < 19, Mod (19-27), High > 27): Readmission Risk Score: 19.2    Current PCP: Dae Rob MD      (IMM) Important Message from Medicare:    Has pt received appropriate IMM before discharge if required: yes  Date: 11/08/2024    PT AM-PAC: 8 /24  OT AM-PAC: 16 /24    Patient/patient representative has been educated on the benefits of LTC as well as the possible risks of declining recommended services. Patient/patient representative has acknowledged the information provided and decided on the following discharge plan. Patient/ patient representative has been provided freedom of choice regarding service provider, supported by basic dialogue that supports the patient's individualized plan of care/goals.    Aishwarya at [address] [address]  Phone: [phone]  Fax: [phone] Faxed to [facility] at [phone] per his request. He is aware of transport time.     Financial    Payor: [facility] / Plan: [facility] DUAL / Product Type: *No Product type* /     Pharmacy:  Potential assistance Purchasing Medications: No  Meds-to-Beds request: No      [facility] [id number] - [place], [place] - [address] -  [phone] - F [phone]  [address] [address]  Phone: [phone] Fax: [phone]    [facility] [place] [facility] [address] [phone] - F [phone]  [address] [facility] [address]  Phone: [phone] Fax: [phone]    [facility] - [place], [place] - [address] - P [phone] - F [phone]  [address] [facility] [address]  Phone: [phone] Fax: [phone]      Notes:    Additional Case Management Notes: Discharge Plan:     Patient discharged to:[facility]  SW/MAGALIS Planner faxed, MATT and AVS to:[phone]  Narcotic

## 2024-11-08 NOTE — DISCHARGE SUMMARY
[facility] -- Physician Discharge Summary     Chauncey Mario  1950  MRN: [id number]    Admit Date: 11/3/2024  Discharge Date: No discharge date for patient encounter.    Attending MD: Margarito Ibarra MD  Discharging MD: Margarito Ibarra MD  PCP: Dae Rob MD [address] [facility] [address] [facility] / [facility]* [phone]    Admission Diagnosis: Closed right hip fracture, initial encounter ([facility]) [S72.001A]  Fall at nursing home, initial encounter [W19.XXXA, Y92.129]  Displaced fracture of greater trochanter of right femur, initial encounter for closed fracture (HCC) [S72.111A]  DISCHARGE DIAGNOSIS: same    Full Hospital Problem List:  Active Hospital Problems    Diagnosis Date Noted    Hyperlipidemia associated with type 2 diabetes mellitus (HCC) [E11.69, E78.5] 02/26/2023     Priority: Medium    Closed fracture of right hip with routine healing [S72.001D] 11/03/2024    Closed right hip fracture, initial encounter ([facility]) [S72.001A] 11/03/2024    HTN (hypertension) [I10] 11/04/2023    Anemia [D64.9] 09/10/2017    DM2 (diabetes mellitus, type 2) (HCC) [E11.9] 05/12/2013           Hospital Course:     73 y.o. male with a history of hypertension, hyperlipidemia, CAD, CHF, former tobacco abuse, COPD, CKD, diabetes, Parkinson's disease and ataxia who presents to the emergency department today via ambulance from [facility] with report of right hip pain.  Patient states he was using his walker walking out of his room when his left leg \"gave out\".  He states he fractured his left femur about 4 months ago and has a gamma nail.  He has been doing physical therapy.  Patient's blood glucose was found to be 56.  He was also reportedly hypotensive at 56/20.  Patient's blood pressure is 131/108 on arrival to the emergency department.  Glucose is 72.  He is alert and oriented x 3 with GCS 15.  He has a normal neurologic exam.  NIH is 0.       EKG completed shows no signs of acute  coronary artery stent placement x 4, and Schizoaffective disorder, bipolar type (HCC).. He  has a past surgical history that includes Coronary angioplasty; Coronary angioplasty with stent; hernia repair; Vasectomy; Cystocopy (4/4/14); Ankle surgery (Right, 06/09/2018); Upper gastrointestinal endoscopy (N/A, 12/13/2018); Colonoscopy (N/A, 12/13/2018); Upper gastrointestinal endoscopy (N/A, 5/3/2019); ORIF DISTAL RADIUS FRACTURE (Left, 6/20/2019); and hip surgery (Right, 11/5/2024).. He  reports that he quit smoking about 11 years ago. His smoking use included cigarettes. He started smoking about 36 years ago. He has a 25 pack-year smoking history. He has never used smokeless tobacco. He reports that he does not drink alcohol and does not use drugs..      Current Facility-Administered Medications: acetaminophen (TYLENOL) tablet 1,000 mg, 1,000 mg, Oral, TID  fluticasone (FLONASE) 50 MCG/ACT nasal spray 1 spray, 1 spray, Nasal, Daily  traZODone (DESYREL) tablet 100 mg, 100 mg, Oral, Nightly  ferrous sulfate (IRON 325) tablet 325 mg, 325 mg, Oral, BID  metFORMIN (GLUCOPHAGE) tablet 500 mg, 500 mg, Oral, BID WC  PARoxetine (PAXIL) tablet 40 mg, 40 mg, Oral, Daily  aspirin EC tablet 81 mg, 81 mg, Oral, Daily  carvedilol (COREG) tablet 6.25 mg, 6.25 mg, Oral, BID   simethicone (MYLICON) chewable tablet 120 mg, 120 mg, Oral, QAM  mirtazapine (REMERON) tablet 7.5 mg, 7.5 mg, Oral, Nightly  tamsulosin (FLOMAX) capsule 0.4 mg, 0.4 mg, Oral, Nightly  midodrine (PROAMATINE) tablet 2.5 mg, 2.5 mg, Oral, Daily  levothyroxine (SYNTHROID) tablet 50 mcg, 50 mcg, Oral, Daily  sodium bicarbonate tablet 1,300 mg, 1,300 mg, Oral, BID  magnesium oxide (MAG-OX) tablet 400 mg, 400 mg, Oral, Daily  acetaminophen (TYLENOL) tablet 500 mg, 500 mg, Oral, Q6H PRN  senna (SENOKOT) tablet 17.2 mg, 2 tablet, Oral, BID  polyethylene glycol (GLYCOLAX) packet 17 g, 17 g, Oral, Daily PRN  diclofenac sodium (VOLTAREN) 1 % gel 2 g, 2 g, Topical,

## 2024-11-08 NOTE — CARE COORDINATION
CM called [facility] with [facility] [phone] and he will need to call me back. CM will fax discharge paperwork with transport time.      Meeta Crocker RN, BSN  [phone]

## 2024-11-08 NOTE — PROGRESS NOTES
[facility] - Inpatient Rehabilitation Department   Phone: [phone]    Physical Therapy    [] Initial Evaluation            [x] Daily Treatment Note         [] Discharge Summary      Patient: Chauncey Mario   : 1950   MRN: [id number]   Date of Service:  2024  Admitting Diagnosis: Closed fracture of right hip with routine healing  Current Admission Summary: Per H&P on 11/3 \"73 y.o. male with a history of hypertension, hyperlipidemia, CAD, CHF, former tobacco abuse, COPD, CKD, diabetes, Parkinson's disease and ataxia who presents to the emergency department today via ambulance from [facility] with report of right hip pain. Patient states he was using his walker walking out of his room when his left leg \"gave out\".    - X-ray right hip and pelvis shows a comminuted nondisplaced fracture of the proximal right femur in the region of the greater trochanter.    - OR on  with Dr. Alexandre for open reduction internal fixation of RIGHT hip fracture with cephalomedullary device     Past Medical History:  has a past medical history of Arthritis, Ataxia, BPH (benign prostatic hyperplasia), CAD (coronary artery disease), Coronary atherosclerosis of native coronary artery, Diabetes mellitus (HCC), Environmental allergies, Hepatitis, Hydronephrosis, Hyperlipidemia, Hypertension, Kidney disease, Neuropathy, Parkinson's disease (HCC), S/P coronary artery stent placement x 4, and Schizoaffective disorder, bipolar type ([facility]).  Past Surgical History:  has a past surgical history that includes Coronary angioplasty; Coronary angioplasty with stent; hernia repair; Vasectomy; Cystocopy (14); Ankle surgery (Right, 2018); Upper gastrointestinal endoscopy (N/A, 2018); Colonoscopy (N/A, 2018); Upper gastrointestinal endoscopy (N/A, 5/3/2019); ORIF DISTAL RADIUS FRACTURE (Left, 2019); and hip surgery (Right, 2024).    Discharge Recommendations: Chauncey Mario scored a   from recliner to ambulate in room to chair and then ambulated back to recliner after 3-5 min rest break. Pt's sit to stand fluctuated from Mod A of 2 to CGA but required Max of 1 for stand to sit due to decreased eccentric control. Pt required assistance with navigating RW for transfers.    Ambulation:  Surface:level surface  Assistive Device: rolling walker  Other Appliance: IV pole  Assistance: 2 person assistance with Min A     Distance: 10' + 10'  Gait Mechanics: significant decreased mary and step length with step to gait pattern and poor sequencing and navigation of RW despite cues   Comments: Pt required assistance with navigating walker and balance this date. Required increased time to perform with VC.   Stair Mobility:  Stair mobility not completed on this date.  Comments:  Wheelchair Mobility:  No w/c mobility completed on this date.  Comments:  Balance:  Static Sitting Balance: fair (-): maintains balance at SBA with use of UE support  Dynamic Sitting Balance: fair (-): maintains balance at CGA with use of UE support  Static Standing Balance: poor (+): requires min (A) to maintain balance  Dynamic Standing Balance: poor: requires mod (A) to maintain balance  Comments: Pt sat EOB for 3-5 mins without LOB. Stood with RW prior to ambulation with Min A.     Other Therapeutic Interventions  See OT note for ADL details   Extended time spent encouraging Pt to participate in therapy and education on benefits of mobility.      Functional Outcomes  AM-PAC Inpatient Mobility Raw Score : 8              Cognition  Overall Cognitive Status: Impaired  Following Commands: follows one step commands with repetition, follows one step commands with increased time  Attention Span: attends with cues to redirect, difficulty dividing attention  Safety Judgement: decreased awareness of need for assistance, decreased awareness of need for safety  Problem Solving: assistance required to generate solutions, decreased awareness of

## 2024-11-08 NOTE — CARE COORDINATION
11/08/24 0914   IMM Letter   IMM Letter given to Patient/Family/Significant other/Guardian/POA/by: Meeta Crocker RN CM discussed on phone with Erika Alexandre legal guardian and she does not wish to appeal   IMM Letter date given: 11/08/24   IMM Letter time given: 0912  (copy made for hard chart)

## 2024-11-08 NOTE — CARE COORDINATION
Pt ready for discharge.    CM spoke to RN who states pt did not want to go back but after working with therapy and getting cleaned up seems fine with leaving now.     CM called Kita Alexandre legal guardian and updated on the above. BASILIO also discussed if pt ever wants a different LTC facility they would need to work with the facility. She states he has done this before and really just doesn't want to be anywhere.     She is fine with pt returning. CM told her if transport different from 1 PM CM will contact her.    CM having transport arranged via [employer] for 1pm if available.    Meeta Crocker, RN, BSN  [phone]

## 2024-11-10 LAB
BLOOD BANK DISPENSE STATUS: NORMAL
BLOOD BANK DISPENSE STATUS: NORMAL
BLOOD BANK PRODUCT CODE: NORMAL
BLOOD BANK PRODUCT CODE: NORMAL
BPU ID: NORMAL
BPU ID: NORMAL
DESCRIPTION BLOOD BANK: NORMAL
DESCRIPTION BLOOD BANK: NORMAL

## 2024-11-10 NOTE — PROGRESS NOTES
Physician Progress Note      PATIENT:               NIALL CARLOS  CSN #:                  [id number]  :                       1950  ADMIT DATE:       11/3/2024 2:05 PM  DISCH DATE:        2024 1:22 PM  RESPONDING  PROVIDER #:        Margarito Ibarra MD          QUERY TEXT:    Pt admitted with nondisplaced fracture of the proximal right femur. Pt noted   to have osteopenia in XR HIP 2-3 VW W PELVIS RIGHT . If possible, please   document in progress notes and discharge summary if you are evaluating and/or   treating any of the following:    The medical record reflects the following:  Risk Factors: Fall, Age 73    Clinical Indicators: H&P  73 y.o. male with   X-ray right hip and pelvis   shows a comminuted nondisplaced fracture of the proximal right femur in the   region of the greater trochanter.  XR HIP 2-3 VW W PELVIS RIGHT  There is osteopenia and there is a   comminuted nondisplaced fracture of the proximal right femur in the region of   the greater trochanter.    Treatment: Open reduction internal fixation, Vitamin D3 125 MCG    Thank You  Bobby HELMS CDS  Options provided:  -- Pathological proximal right femur fracture due to osteopenia following fall   which would not usually break a normal, healthy bone  -- Traumatic proximal right femur fracture  -- Other - I will add my own diagnosis  -- Disagree - Not applicable / Not valid  -- Disagree - Clinically unable to determine / Unknown  -- Refer to Clinical Documentation Reviewer    PROVIDER RESPONSE TEXT:    This patient has a pathological proximal right femur fracture due to   osteopenia following fall which would not usually break a normal, healthy   bone.    Query created by: Bobby Abdul on 2024 12:38 AM      Electronically signed by:  Margarito Ibarra MD 11/10/2024 11:16 AM

## 2024-11-22 ENCOUNTER — OFFICE VISIT (OUTPATIENT)
Dept: ORTHOPEDIC SURGERY | Age: 74
End: 2024-11-22

## 2024-11-22 VITALS — HEIGHT: 64 IN | WEIGHT: 130 LBS | BODY MASS INDEX: 22.2 KG/M2

## 2024-11-22 DIAGNOSIS — Z96.641 HISTORY OF RIGHT HIP HEMIARTHROPLASTY: Primary | ICD-10-CM

## 2024-11-22 NOTE — PROGRESS NOTES
Patient: Chauncey Mario                  : 1950   MRN: [id number]   Date of Visit: 24     Physician: Ash Alexandre MD.     Reason for Visit: Status post R hip CMN     Subjective History of Present Illness:     Chauncey Mario is here for regularly scheduled follow-up s/p right hip CMN, doing well using walker intermittently at his facility, pain well controlled     Physical Examination??: ?   General: Patient is alert and oriented x 3 and appears comfortable.   Incision CDI, no signs of infection, dehiscence     Radiographs: 3 views of right hip with well positioned CMN, no evidence of joint subluxation/dislocation      Assessment and Plan?: The patient is progressing well approximately 2 weeks s/p right hip CMn     1. A thorough discussion was had with the patient concerning the ?postoperative course and the patient is in agreement with the plan.   2. Continue to progress WB  3. Minimize opioids  4. F/u in 4 weeks    _______________________      Ash Alexandre MD

## 2024-11-25 ENCOUNTER — TELEPHONE (OUTPATIENT)
Dept: ORTHOPEDIC SURGERY | Age: 74
End: 2024-11-25

## 2024-11-25 ENCOUNTER — OFFICE VISIT (OUTPATIENT)
Dept: ORTHOPEDIC SURGERY | Age: 74
End: 2024-11-25
Payer: COMMERCIAL

## 2024-11-25 VITALS — WEIGHT: 130 LBS | HEIGHT: 64 IN | RESPIRATION RATE: 16 BRPM | BODY MASS INDEX: 22.2 KG/M2

## 2024-11-25 DIAGNOSIS — S42.031D TRAUMATIC CLOSED FRACTURE OF DISTAL CLAVICLE WITH MINIMAL DISPLACEMENT, RIGHT, WITH ROUTINE HEALING, SUBSEQUENT ENCOUNTER: Primary | ICD-10-CM

## 2024-11-25 PROCEDURE — G8420 CALC BMI NORM PARAMETERS: HCPCS | Performed by: ORTHOPAEDIC SURGERY

## 2024-11-25 PROCEDURE — 1159F MED LIST DOCD IN RCRD: CPT | Performed by: ORTHOPAEDIC SURGERY

## 2024-11-25 PROCEDURE — 1111F DSCHRG MED/CURRENT MED MERGE: CPT | Performed by: ORTHOPAEDIC SURGERY

## 2024-11-25 PROCEDURE — 1126F AMNT PAIN NOTED NONE PRSNT: CPT | Performed by: ORTHOPAEDIC SURGERY

## 2024-11-25 PROCEDURE — G8427 DOCREV CUR MEDS BY ELIG CLIN: HCPCS | Performed by: ORTHOPAEDIC SURGERY

## 2024-11-25 PROCEDURE — 99213 OFFICE O/P EST LOW 20 MIN: CPT | Performed by: ORTHOPAEDIC SURGERY

## 2024-11-25 PROCEDURE — 3017F COLORECTAL CA SCREEN DOC REV: CPT | Performed by: ORTHOPAEDIC SURGERY

## 2024-11-25 PROCEDURE — 1036F TOBACCO NON-USER: CPT | Performed by: ORTHOPAEDIC SURGERY

## 2024-11-25 PROCEDURE — G8484 FLU IMMUNIZE NO ADMIN: HCPCS | Performed by: ORTHOPAEDIC SURGERY

## 2024-11-25 PROCEDURE — 1123F ACP DISCUSS/DSCN MKR DOCD: CPT | Performed by: ORTHOPAEDIC SURGERY

## 2024-11-25 NOTE — PROGRESS NOTES
ORTHOPAEDIC OFFICE NOTE    Chief Complaint   Patient presents with    Follow-up     Right shoulder/clavicle        HPI  11/25/24  Chauncey returns to clinic for follow-up of his right distal clavicle fracture, being treated conservatively  Since we last saw him, he had another fall and fractured his right hip  He underwent operative fixation with Dr. Alexandre  He reports he has not been using the sling  He denies pain in the right shoulder  No numbness or tingling      10/25/24  73 y.o. male RHD seen for evaluation of right shoulder injury:  The patient reports he fell approximately 2 weeks ago, fell onto the right side when going to the bathroom in the middle the night  He did not get x-rays until a few days ago  He is a nursing home resident, he is at [facility]  The pain is described over the superior lateral shoulder  Associated with swelling and bruising  Pain is rated 4/10  He uses a rolling walker for ambulation      No Known Allergies     Current Outpatient Medications   Medication Sig Dispense Refill    aspirin (ASPIRIN CHILDRENS) 81 MG chewable tablet Take 1 tablet by mouth in the morning and at bedtime 60 tablet 0    acetaminophen (TYLENOL) 500 MG tablet Take 1 tablet by mouth every 6 hours as needed for Pain or Fever      senna (SENOKOT) 8.6 MG tablet Take 2 tablets by mouth 2 times daily Take until 11/4/2024      polyethylene glycol (GLYCOLAX) 17 g packet Take 1 packet by mouth daily as needed for Constipation Give until 11/4/2024      diclofenac sodium (VOLTAREN) 1 % GEL Apply 2 g topically 3 times daily For acute Right Shoulder pain for 3 months. Apply 2g to right shoulder TID for pain.      loratadine (CLARITIN) 10 MG capsule Take 1 capsule by mouth at bedtime      insulin glargine (LANTUS) 100 UNIT/ML injection vial Inject 17 Units into the skin nightly Hold for blood sugar less than 100.      Insulin Aspart, w/Niacinamide, (FIASP FLEXTOUCH) 100 UNIT/ML SOPN Inject 4-12 Units into the skin 3

## 2024-12-30 ENCOUNTER — OFFICE VISIT (OUTPATIENT)
Dept: ORTHOPEDIC SURGERY | Age: 74
End: 2024-12-30

## 2024-12-30 VITALS — WEIGHT: 130 LBS | BODY MASS INDEX: 22.2 KG/M2 | HEIGHT: 64 IN

## 2024-12-30 DIAGNOSIS — Z96.641 HISTORY OF RIGHT HIP HEMIARTHROPLASTY: Primary | ICD-10-CM

## 2024-12-30 PROCEDURE — 99024 POSTOP FOLLOW-UP VISIT: CPT | Performed by: ORTHOPAEDIC SURGERY

## 2024-12-30 NOTE — PROGRESS NOTES
Patient: Chauncey Mario                  : 1950   MRN: [id number]   Date of Visit: 24     Physician: Ash Alexandre MD.     Reason for Visit: Status post R hip CMN     Subjective History of Present Illness:     Chauncey Mario is here for regularly scheduled follow-up s/p right hip CMN, doing well using walker intermittently at his facility, pain well controlled     Physical Examination??: ?   General: Patient is alert and oriented x 3 and appears comfortable.   Incision CDI, no signs of infection, dehiscence     Radiographs: 3 views of right hip with well positioned CMN, no evidence of joint subluxation/dislocation      Assessment and Plan?: The patient is progressing well approximately 8 weeks s/p right hip CMn     1. A thorough discussion was had with the patient concerning the ?postoperative course and the patient is in agreement with the plan.   2. Continue to progress WB  3. Minimize opioids  4. F/u in 6 months    _______________________      Ash Alexandre MD

## (undated) DEVICE — SOLUTION IRRIG 1000ML STRL H2O USP PLAS POUR BTL

## (undated) DEVICE — APPLICATOR MEDICATED 26 CC SOLUTION HI LT ORNG CHLORAPREP

## (undated) DEVICE — DRAPE EQUIP C ARM MINI 10000100] TIDI PRODUCTS INC]

## (undated) DEVICE — FIAPC® PROBE W/ FILTER 2200 A OD 2.3MM/6.9FR; L 2.2M/7.2FT: Brand: ERBE

## (undated) DEVICE — ENDO CARRY-ON PROCEDURE KIT INCLUDES SUCTION TUBING, LUBRICANT, GAUZE, BIOHAZARD STICKER, TRANSPORT PAD AND INTERCEPT BEDSIDE KIT.: Brand: ENDO CARRY-ON PROCEDURE KIT

## (undated) DEVICE — Z DISCONTINUED USE 2276105 GOWN PROTCT UNIV CHST W28IN L49IN SL 24IN BLU SPUNBOND FLM

## (undated) DEVICE — FORCEP REPROC BIOP HOT 2.8MM MIN WORK CHANL RADL JAW 4

## (undated) DEVICE — DRESSING STERILE PETRO W3XL8IN N ADH OIL EMUL GZ CURAD

## (undated) DEVICE — 3D ISLAND DRESSING 4IN X 12IN: Brand: THERABOND 3D ANTIMICROBIAL BARRIER SYSTEMS

## (undated) DEVICE — GLOVE SURG SZ 75 L12IN FNGR THK79MIL GRN LTX FREE

## (undated) DEVICE — SOLUTION,SALINE,IRRGATION,500ML,STRL: Brand: MEDLINE

## (undated) DEVICE — INTERIGHTAN 7.0MM COMPRESSION                                    SCREW STARIGHTER DRILL: Brand: TRIGEN

## (undated) DEVICE — SOLUTION IRRIG 1000ML 0.9% SOD CHL USP POUR PLAS BTL

## (undated) DEVICE — SET GRAV VENT NVENT CK VLV 3 NDL FREE PRT 10 GTT

## (undated) DEVICE — SOLUTION IV 1000ML LAC RINGERS PH 6.5 INJ USP VIAFLX PLAS

## (undated) DEVICE — ROYAL SILK SURGICAL GOWN, XL: Brand: CONVERTORS

## (undated) DEVICE — 6617 IOBAN II PATIENT ISOLATION DRAPE 5/BX,4BX/CS: Brand: STERI-DRAPE™ IOBAN™ 2

## (undated) DEVICE — LEGGINGS, PAIR, CLEAR, STERILE: Brand: MEDLINE

## (undated) DEVICE — FORCEP BX STD CAP 240CM RAD JAW 4

## (undated) DEVICE — AIRLIFE™ NASAL OXYGEN CANNULA CURVED, FLARED TIP, WITH 7 FEET (2.1 M) CRUSH RESISTANT TUBING, OVER-THE-EAR STYLE: Brand: AIRLIFE™

## (undated) DEVICE — GLOVE ORANGE PI 7 1/2   MSG9075

## (undated) DEVICE — ELECTRODE ECG MONITR FOAM TEAR DROP ADLT RED

## (undated) DEVICE — SUTURE COAT VCRL SZ 4-0 L18IN ABSRB UD L19MM PS-2 1/2 CIR J496G

## (undated) DEVICE — 3M™ TEGADERM™ TRANSPARENT FILM DRESSING FRAME STYLE, 1624W, 2-3/8 IN X 2-3/4 IN (6 CM X 7 CM), 100/CT 4CT/CASE: Brand: 3M™ TEGADERM™

## (undated) DEVICE — SUTURE VICRYL + SZ 2-0 L36IN ABSRB UD L36MM CT-1 1/2 CIR VCP945H

## (undated) DEVICE — SPLINT ORTH W3XL12IN LAYERED FBRGLS FOAM PD BRTH BK MOLD

## (undated) DEVICE — GLOVE ORANGE PI 8   MSG9080

## (undated) DEVICE — INTERTAN LAG SCREW DRILL: Brand: TRIGEN

## (undated) DEVICE — Device: Brand: JELCO

## (undated) DEVICE — 6619 2 PTNT ISO SYS INCISE AREA&LT;(&GT;&&LT;)&GT;P: Brand: STERI-DRAPE™ IOBAN™ 2

## (undated) DEVICE — STERILE POLYISOPRENE POWDER-FREE SURGICAL GLOVES WITH EMOLLIENT COATING: Brand: PROTEXIS

## (undated) DEVICE — GUIDE PIN 3.2MM X 343MM: Brand: TRIGEN

## (undated) DEVICE — CONMED SCOPE SAVER BITE BLOCK, 20X27 MM: Brand: SCOPE SAVER

## (undated) DEVICE — 7.0MM COMPRESSION SCREW DRILL: Brand: TRIGEN

## (undated) DEVICE — BIT DRL DIA2.2MM CROSSLOCK MOD TY DVR ANAT VOLAR PLATING

## (undated) DEVICE — SUTURE ETHLN SZ 4-0 L18IN NONABSORBABLE BLK L19MM PS-2 3/8 1667H

## (undated) DEVICE — COTTON UNDERCAST PADDING,CRIMPED FINISH: Brand: WEBRIL

## (undated) DEVICE — ORIF & ANTERIOR HIP: Brand: MEDLINE INDUSTRIES, INC.

## (undated) DEVICE — STERILE LATEX POWDER-FREE SURGICAL GLOVESWITH NITRILE COATING: Brand: PROTEXIS

## (undated) DEVICE — NEEDLE 25GAX5.0MM INJ CARR LOCKE

## (undated) DEVICE — PADDING UNDERCAST W4INXL4YD 100% COT CRIMPED FINISH WBRL II

## (undated) DEVICE — METER ACCU-CHEK INFORM BASE GLUCOSE

## (undated) DEVICE — Z INACTIVE NO SUPPLIER IDENTIFIED BANDAGE COMPR W3INX5YD SELF CLSR FLAP E SHUR BAND

## (undated) DEVICE — SYRINGE, LUER LOCK, 10ML: Brand: MEDLINE

## (undated) DEVICE — Device

## (undated) DEVICE — SUTURE VICRYL SZ 0 L36IN ABSRB UD CT-1 L36MM 1/2 CIR TAPR PNT VCP946H

## (undated) DEVICE — BANDAGE COBAN 4 IN COMPR W4INXL5YD FOAM COHESIVE QUIK STK SELF ADH SFT

## (undated) DEVICE — SPONGE LAP W18XL18IN WHT COT 4 PLY FLD STRUNG RADPQ DISP ST 2 PER PACK

## (undated) DEVICE — COVER,MAYO STAND,XL,STERILE: Brand: MEDLINE

## (undated) DEVICE — 4.0MM LONG AO PILOT DRILL: Brand: TRIGEN